# Patient Record
Sex: MALE | Race: BLACK OR AFRICAN AMERICAN | Employment: UNEMPLOYED | URBAN - METROPOLITAN AREA
[De-identification: names, ages, dates, MRNs, and addresses within clinical notes are randomized per-mention and may not be internally consistent; named-entity substitution may affect disease eponyms.]

---

## 2017-01-29 PROBLEM — M25.561 RIGHT KNEE PAIN: Status: ACTIVE | Noted: 2017-01-29

## 2017-01-29 PROBLEM — I16.1 HYPERTENSIVE EMERGENCY WITHOUT CONGESTIVE HEART FAILURE: Status: ACTIVE | Noted: 2017-01-29

## 2017-01-29 PROBLEM — R74.01 TRANSAMINITIS: Status: ACTIVE | Noted: 2017-01-29

## 2017-12-05 PROBLEM — I26.99 PULMONARY EMBOLISM ON RIGHT (HCC): Status: ACTIVE | Noted: 2017-12-05

## 2017-12-05 PROBLEM — I26.99 PULMONARY EMBOLUS, RIGHT (HCC): Status: ACTIVE | Noted: 2017-12-05

## 2019-02-19 ENCOUNTER — HOSPITAL ENCOUNTER (EMERGENCY)
Age: 54
Discharge: OTHER INSTITUTION | End: 2019-02-19
Attending: EMERGENCY MEDICINE
Payer: MEDICAID

## 2019-02-19 ENCOUNTER — HOSPITAL ENCOUNTER (EMERGENCY)
Age: 54
Discharge: OTHER INSTITUTION | End: 2019-02-20
Attending: EMERGENCY MEDICINE
Payer: MEDICAID

## 2019-02-19 ENCOUNTER — APPOINTMENT (OUTPATIENT)
Dept: CT IMAGING | Age: 54
End: 2019-02-19
Payer: MEDICAID

## 2019-02-19 VITALS
WEIGHT: 144.8 LBS | RESPIRATION RATE: 20 BRPM | SYSTOLIC BLOOD PRESSURE: 159 MMHG | HEIGHT: 68 IN | TEMPERATURE: 98.5 F | BODY MASS INDEX: 21.95 KG/M2 | DIASTOLIC BLOOD PRESSURE: 96 MMHG | HEART RATE: 73 BPM | OXYGEN SATURATION: 96 %

## 2019-02-19 DIAGNOSIS — F39 MOOD DISORDER (HCC): ICD-10-CM

## 2019-02-19 DIAGNOSIS — I10 ESSENTIAL HYPERTENSION: Primary | ICD-10-CM

## 2019-02-19 DIAGNOSIS — R51.9 HEADACHE, UNSPECIFIED HEADACHE TYPE: Primary | ICD-10-CM

## 2019-02-19 DIAGNOSIS — R45.851 SUICIDAL IDEATION: ICD-10-CM

## 2019-02-19 DIAGNOSIS — K08.89 TOOTHACHE: ICD-10-CM

## 2019-02-19 LAB
ACETAMINOPHEN LEVEL: <5 MCG/ML (ref 10–30)
ALBUMIN SERPL-MCNC: 4.1 G/DL (ref 3.5–5.2)
ALP BLD-CCNC: 54 U/L (ref 40–129)
ALT SERPL-CCNC: 34 U/L (ref 0–40)
AMPHETAMINE SCREEN, URINE: NOT DETECTED
ANION GAP SERPL CALCULATED.3IONS-SCNC: 15 MMOL/L (ref 7–16)
AST SERPL-CCNC: 94 U/L (ref 0–39)
BARBITURATE SCREEN URINE: NOT DETECTED
BENZODIAZEPINE SCREEN, URINE: NOT DETECTED
BILIRUB SERPL-MCNC: 1.2 MG/DL (ref 0–1.2)
BUN BLDV-MCNC: 13 MG/DL (ref 6–20)
CALCIUM SERPL-MCNC: 8.6 MG/DL (ref 8.6–10.2)
CANNABINOID SCREEN URINE: NOT DETECTED
CHLORIDE BLD-SCNC: 101 MMOL/L (ref 98–107)
CO2: 25 MMOL/L (ref 22–29)
COCAINE METABOLITE SCREEN URINE: NOT DETECTED
CREAT SERPL-MCNC: 0.9 MG/DL (ref 0.7–1.2)
EKG ATRIAL RATE: 84 BPM
EKG P AXIS: 67 DEGREES
EKG P-R INTERVAL: 142 MS
EKG Q-T INTERVAL: 400 MS
EKG QRS DURATION: 110 MS
EKG QTC CALCULATION (BAZETT): 472 MS
EKG R AXIS: -31 DEGREES
EKG T AXIS: 45 DEGREES
EKG VENTRICULAR RATE: 84 BPM
ETHANOL: 64 MG/DL (ref 0–0.08)
GFR AFRICAN AMERICAN: >60
GFR NON-AFRICAN AMERICAN: >60 ML/MIN/1.73
GLUCOSE BLD-MCNC: 66 MG/DL (ref 74–99)
HCT VFR BLD CALC: 39 % (ref 37–54)
HEMOGLOBIN: 14.5 G/DL (ref 12.5–16.5)
MCH RBC QN AUTO: 31.3 PG (ref 26–35)
MCHC RBC AUTO-ENTMCNC: 37.2 % (ref 32–34.5)
MCV RBC AUTO: 84.2 FL (ref 80–99.9)
METHADONE SCREEN, URINE: NOT DETECTED
OPIATE SCREEN URINE: NOT DETECTED
PDW BLD-RTO: 19.4 FL (ref 11.5–15)
PHENCYCLIDINE SCREEN URINE: NOT DETECTED
PLATELET # BLD: 337 E9/L (ref 130–450)
PMV BLD AUTO: 10.7 FL (ref 7–12)
POTASSIUM SERPL-SCNC: 3.5 MMOL/L (ref 3.5–5)
PROPOXYPHENE SCREEN: NOT DETECTED
RBC # BLD: 4.63 E12/L (ref 3.8–5.8)
SALICYLATE, SERUM: <0.3 MG/DL (ref 0–30)
SODIUM BLD-SCNC: 141 MMOL/L (ref 132–146)
TOTAL PROTEIN: 7.5 G/DL (ref 6.4–8.3)
TRICYCLIC ANTIDEPRESSANTS SCREEN SERUM: NEGATIVE NG/ML
WBC # BLD: 5.9 E9/L (ref 4.5–11.5)

## 2019-02-19 PROCEDURE — 80307 DRUG TEST PRSMV CHEM ANLYZR: CPT

## 2019-02-19 PROCEDURE — 70450 CT HEAD/BRAIN W/O DYE: CPT

## 2019-02-19 PROCEDURE — 96372 THER/PROPH/DIAG INJ SC/IM: CPT

## 2019-02-19 PROCEDURE — 6370000000 HC RX 637 (ALT 250 FOR IP): Performed by: EMERGENCY MEDICINE

## 2019-02-19 PROCEDURE — G0480 DRUG TEST DEF 1-7 CLASSES: HCPCS

## 2019-02-19 PROCEDURE — 36415 COLL VENOUS BLD VENIPUNCTURE: CPT

## 2019-02-19 PROCEDURE — 99285 EMERGENCY DEPT VISIT HI MDM: CPT

## 2019-02-19 PROCEDURE — 99282 EMERGENCY DEPT VISIT SF MDM: CPT

## 2019-02-19 PROCEDURE — 6360000002 HC RX W HCPCS: Performed by: EMERGENCY MEDICINE

## 2019-02-19 PROCEDURE — 85027 COMPLETE CBC AUTOMATED: CPT

## 2019-02-19 PROCEDURE — 80053 COMPREHEN METABOLIC PANEL: CPT

## 2019-02-19 PROCEDURE — 93005 ELECTROCARDIOGRAM TRACING: CPT | Performed by: EMERGENCY MEDICINE

## 2019-02-19 RX ORDER — HYDRALAZINE HYDROCHLORIDE 25 MG/1
25 TABLET, FILM COATED ORAL 4 TIMES DAILY
Qty: 90 TABLET | Refills: 1 | Status: SHIPPED | OUTPATIENT
Start: 2019-02-19 | End: 2019-03-21 | Stop reason: SDUPTHER

## 2019-02-19 RX ORDER — CLONIDINE HYDROCHLORIDE 0.1 MG/1
0.2 TABLET ORAL ONCE
Status: COMPLETED | OUTPATIENT
Start: 2019-02-19 | End: 2019-02-19

## 2019-02-19 RX ORDER — LEVETIRACETAM 500 MG/1
500 TABLET ORAL ONCE
Status: COMPLETED | OUTPATIENT
Start: 2019-02-19 | End: 2019-02-19

## 2019-02-19 RX ORDER — ACETAMINOPHEN 325 MG/1
650 TABLET ORAL ONCE
Status: COMPLETED | OUTPATIENT
Start: 2019-02-19 | End: 2019-02-19

## 2019-02-19 RX ORDER — HYDRALAZINE HYDROCHLORIDE 20 MG/ML
10 INJECTION INTRAMUSCULAR; INTRAVENOUS ONCE
Status: COMPLETED | OUTPATIENT
Start: 2019-02-19 | End: 2019-02-19

## 2019-02-19 RX ORDER — AMLODIPINE BESYLATE 10 MG/1
10 TABLET ORAL DAILY
Qty: 30 TABLET | Refills: 1 | Status: SHIPPED | OUTPATIENT
Start: 2019-02-19 | End: 2019-03-21 | Stop reason: SDUPTHER

## 2019-02-19 RX ORDER — AMLODIPINE BESYLATE 5 MG/1
10 TABLET ORAL ONCE
Status: COMPLETED | OUTPATIENT
Start: 2019-02-19 | End: 2019-02-19

## 2019-02-19 RX ORDER — LEVETIRACETAM 500 MG/1
500 TABLET ORAL 2 TIMES DAILY
Qty: 60 TABLET | Refills: 0 | Status: SHIPPED | OUTPATIENT
Start: 2019-02-19 | End: 2019-03-21 | Stop reason: SDUPTHER

## 2019-02-19 RX ADMIN — LEVETIRACETAM 500 MG: 500 TABLET ORAL at 23:04

## 2019-02-19 RX ADMIN — HYDRALAZINE HYDROCHLORIDE 10 MG: 20 INJECTION INTRAMUSCULAR; INTRAVENOUS at 22:02

## 2019-02-19 RX ADMIN — AMLODIPINE BESYLATE 10 MG: 5 TABLET ORAL at 23:04

## 2019-02-19 RX ADMIN — ACETAMINOPHEN 650 MG: 325 TABLET, FILM COATED ORAL at 12:45

## 2019-02-19 RX ADMIN — ACETAMINOPHEN 650 MG: 325 TABLET, FILM COATED ORAL at 06:50

## 2019-02-19 RX ADMIN — CLONIDINE HYDROCHLORIDE 0.2 MG: 0.1 TABLET ORAL at 19:00

## 2019-02-19 ASSESSMENT — PAIN DESCRIPTION - PAIN TYPE: TYPE: ACUTE PAIN

## 2019-02-19 ASSESSMENT — PAIN SCALES - GENERAL
PAINLEVEL_OUTOF10: 9
PAINLEVEL_OUTOF10: 10
PAINLEVEL_OUTOF10: 10

## 2019-02-19 ASSESSMENT — PAIN DESCRIPTION - ORIENTATION: ORIENTATION: UPPER

## 2019-02-19 ASSESSMENT — PAIN DESCRIPTION - DESCRIPTORS: DESCRIPTORS: CONSTANT

## 2019-02-19 ASSESSMENT — PAIN DESCRIPTION - LOCATION: LOCATION: HEAD

## 2019-02-20 VITALS
DIASTOLIC BLOOD PRESSURE: 79 MMHG | WEIGHT: 144 LBS | SYSTOLIC BLOOD PRESSURE: 136 MMHG | BODY MASS INDEX: 21.82 KG/M2 | TEMPERATURE: 98.1 F | HEIGHT: 68 IN | OXYGEN SATURATION: 98 % | HEART RATE: 73 BPM | RESPIRATION RATE: 16 BRPM

## 2019-02-20 PROCEDURE — 6370000000 HC RX 637 (ALT 250 FOR IP): Performed by: EMERGENCY MEDICINE

## 2019-02-20 RX ORDER — PENICILLIN V POTASSIUM 500 MG/1
500 TABLET ORAL 4 TIMES DAILY
Qty: 40 TABLET | Refills: 0 | Status: SHIPPED | OUTPATIENT
Start: 2019-02-20 | End: 2019-02-27

## 2019-02-20 RX ORDER — IBUPROFEN 400 MG/1
400 TABLET ORAL EVERY 6 HOURS PRN
Qty: 28 TABLET | Refills: 0 | Status: SHIPPED | OUTPATIENT
Start: 2019-02-20 | End: 2019-02-27 | Stop reason: SDUPTHER

## 2019-02-20 RX ORDER — IBUPROFEN 400 MG/1
400 TABLET ORAL ONCE
Status: COMPLETED | OUTPATIENT
Start: 2019-02-20 | End: 2019-02-20

## 2019-02-20 RX ADMIN — IBUPROFEN 400 MG: 400 TABLET ORAL at 08:23

## 2019-02-20 ASSESSMENT — PAIN SCALES - GENERAL: PAINLEVEL_OUTOF10: 8

## 2019-02-22 ENCOUNTER — APPOINTMENT (OUTPATIENT)
Dept: CT IMAGING | Age: 54
End: 2019-02-22
Payer: MEDICAID

## 2019-02-22 ENCOUNTER — HOSPITAL ENCOUNTER (EMERGENCY)
Age: 54
Discharge: LEFT BEFORE DISCHARGE | End: 2019-02-22
Attending: EMERGENCY MEDICINE
Payer: MEDICAID

## 2019-02-22 VITALS
DIASTOLIC BLOOD PRESSURE: 91 MMHG | OXYGEN SATURATION: 96 % | HEART RATE: 77 BPM | SYSTOLIC BLOOD PRESSURE: 155 MMHG | WEIGHT: 155 LBS | TEMPERATURE: 97.9 F | BODY MASS INDEX: 23.49 KG/M2 | HEIGHT: 68 IN | RESPIRATION RATE: 16 BRPM

## 2019-02-22 DIAGNOSIS — R51.9 NONINTRACTABLE HEADACHE, UNSPECIFIED CHRONICITY PATTERN, UNSPECIFIED HEADACHE TYPE: Primary | ICD-10-CM
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
## 2019-02-22 LAB — ETHANOL: 194 MG/DL (ref 0–0.08)

## 2019-02-22 PROCEDURE — 36415 COLL VENOUS BLD VENIPUNCTURE: CPT

## 2019-02-22 PROCEDURE — 70450 CT HEAD/BRAIN W/O DYE: CPT

## 2019-02-22 PROCEDURE — 99284 EMERGENCY DEPT VISIT MOD MDM: CPT

## 2019-02-22 PROCEDURE — G0480 DRUG TEST DEF 1-7 CLASSES: HCPCS

## 2019-02-22 ASSESSMENT — PAIN DESCRIPTION - FREQUENCY: FREQUENCY: CONTINUOUS

## 2019-02-22 ASSESSMENT — PAIN DESCRIPTION - PAIN TYPE: TYPE: ACUTE PAIN

## 2019-02-22 ASSESSMENT — PAIN DESCRIPTION - LOCATION: LOCATION: HEAD

## 2019-02-22 ASSESSMENT — PAIN SCALES - GENERAL: PAINLEVEL_OUTOF10: 10

## 2019-02-22 ASSESSMENT — PAIN DESCRIPTION - DESCRIPTORS: DESCRIPTORS: CONSTANT

## 2019-02-24 ENCOUNTER — HOSPITAL ENCOUNTER (EMERGENCY)
Age: 54
Discharge: HOME | End: 2019-02-24
Attending: EMERGENCY MEDICINE
Payer: MEDICAID

## 2019-02-24 VITALS
HEIGHT: 68 IN | WEIGHT: 148.19 LBS | SYSTOLIC BLOOD PRESSURE: 189 MMHG | OXYGEN SATURATION: 96 % | RESPIRATION RATE: 16 BRPM | HEART RATE: 89 BPM | TEMPERATURE: 97.2 F | DIASTOLIC BLOOD PRESSURE: 78 MMHG | BODY MASS INDEX: 22.46 KG/M2

## 2019-02-24 DIAGNOSIS — K64.4 EXTERNAL HEMORRHOIDS WITHOUT COMPLICATION: Primary | ICD-10-CM

## 2019-02-24 PROCEDURE — 99282 EMERGENCY DEPT VISIT SF MDM: CPT

## 2019-02-24 ASSESSMENT — PAIN DESCRIPTION - DESCRIPTORS: DESCRIPTORS: BURNING

## 2019-02-24 ASSESSMENT — PAIN DESCRIPTION - PAIN TYPE: TYPE: CHRONIC PAIN

## 2019-02-24 ASSESSMENT — PAIN DESCRIPTION - LOCATION: LOCATION: BUTTOCKS

## 2019-02-24 ASSESSMENT — PAIN SCALES - GENERAL: PAINLEVEL_OUTOF10: 10

## 2019-02-27 ENCOUNTER — OFFICE VISIT (OUTPATIENT)
Dept: INTERNAL MEDICINE | Age: 54
End: 2019-02-27
Payer: MEDICAID

## 2019-02-27 ENCOUNTER — HOSPITAL ENCOUNTER (EMERGENCY)
Age: 54
Discharge: HOME | End: 2019-02-27
Payer: MEDICAID

## 2019-02-27 VITALS
HEART RATE: 97 BPM | DIASTOLIC BLOOD PRESSURE: 102 MMHG | WEIGHT: 144 LBS | TEMPERATURE: 98.2 F | BODY MASS INDEX: 21.82 KG/M2 | SYSTOLIC BLOOD PRESSURE: 186 MMHG | HEIGHT: 68 IN | RESPIRATION RATE: 14 BRPM

## 2019-02-27 VITALS
RESPIRATION RATE: 16 BRPM | HEART RATE: 78 BPM | BODY MASS INDEX: 22.43 KG/M2 | DIASTOLIC BLOOD PRESSURE: 98 MMHG | TEMPERATURE: 98 F | HEIGHT: 68 IN | OXYGEN SATURATION: 100 % | WEIGHT: 148 LBS | SYSTOLIC BLOOD PRESSURE: 179 MMHG

## 2019-02-27 DIAGNOSIS — K64.9 HEMORRHOIDS, UNSPECIFIED HEMORRHOID TYPE: ICD-10-CM

## 2019-02-27 DIAGNOSIS — R22.31 LOCALIZED SWELLING ON RIGHT HAND: Primary | ICD-10-CM

## 2019-02-27 DIAGNOSIS — I10 UNCONTROLLED HYPERTENSION: ICD-10-CM

## 2019-02-27 DIAGNOSIS — F10.10 ALCOHOL ABUSE: ICD-10-CM

## 2019-02-27 DIAGNOSIS — K64.4 EXTERNAL HEMORRHOIDS WITHOUT COMPLICATION: Primary | ICD-10-CM

## 2019-02-27 PROBLEM — R74.01 TRANSAMINITIS: Status: RESOLVED | Noted: 2017-01-29 | Resolved: 2019-02-27

## 2019-02-27 PROBLEM — I26.99 PULMONARY EMBOLUS, RIGHT (HCC): Status: RESOLVED | Noted: 2017-12-05 | Resolved: 2019-02-27

## 2019-02-27 PROBLEM — M25.561 RIGHT KNEE PAIN: Status: RESOLVED | Noted: 2017-01-29 | Resolved: 2019-02-27

## 2019-02-27 PROBLEM — R51.9 HEADACHE, UNSPECIFIED HEADACHE TYPE: Status: RESOLVED | Noted: 2019-02-19 | Resolved: 2019-02-27

## 2019-02-27 PROCEDURE — 99282 EMERGENCY DEPT VISIT SF MDM: CPT

## 2019-02-27 PROCEDURE — 99214 OFFICE O/P EST MOD 30 MIN: CPT | Performed by: INTERNAL MEDICINE

## 2019-02-27 PROCEDURE — 99212 OFFICE O/P EST SF 10 MIN: CPT | Performed by: INTERNAL MEDICINE

## 2019-02-27 RX ORDER — IBUPROFEN 400 MG/1
400 TABLET ORAL EVERY 6 HOURS PRN
Qty: 28 TABLET | Refills: 0 | Status: SHIPPED | OUTPATIENT
Start: 2019-02-27 | End: 2019-03-21 | Stop reason: SDUPTHER

## 2019-02-27 RX ORDER — DIAPER,BRIEF,INFANT-TODD,DISP
EACH MISCELLANEOUS ONCE
Status: DISCONTINUED | OUTPATIENT
Start: 2019-02-27 | End: 2019-02-27 | Stop reason: HOSPADM

## 2019-02-27 ASSESSMENT — PAIN DESCRIPTION - LOCATION: LOCATION: BUTTOCKS

## 2019-02-27 ASSESSMENT — PAIN DESCRIPTION - PAIN TYPE: TYPE: CHRONIC PAIN

## 2019-02-27 ASSESSMENT — PAIN SCALES - WONG BAKER: WONGBAKER_NUMERICALRESPONSE: 4

## 2019-02-27 ASSESSMENT — ENCOUNTER SYMPTOMS: RESPIRATORY NEGATIVE: 1

## 2019-03-04 ENCOUNTER — HOSPITAL ENCOUNTER (EMERGENCY)
Age: 54
Discharge: HOME | End: 2019-03-04
Attending: EMERGENCY MEDICINE
Payer: MEDICAID

## 2019-03-04 ENCOUNTER — HOSPITAL ENCOUNTER (OUTPATIENT)
Dept: ULTRASOUND IMAGING | Age: 54
Discharge: HOME | End: 2019-03-06
Payer: MEDICAID

## 2019-03-04 VITALS
DIASTOLIC BLOOD PRESSURE: 90 MMHG | OXYGEN SATURATION: 96 % | SYSTOLIC BLOOD PRESSURE: 150 MMHG | RESPIRATION RATE: 14 BRPM | HEART RATE: 87 BPM | TEMPERATURE: 98.6 F

## 2019-03-04 DIAGNOSIS — M79.89 LEG SWELLING: ICD-10-CM

## 2019-03-04 DIAGNOSIS — M79.89 LEG SWELLING: Primary | ICD-10-CM

## 2019-03-04 PROCEDURE — 93971 EXTREMITY STUDY: CPT

## 2019-03-04 PROCEDURE — 99283 EMERGENCY DEPT VISIT LOW MDM: CPT

## 2019-03-21 ENCOUNTER — HOSPITAL ENCOUNTER (EMERGENCY)
Age: 54
Discharge: HOME | End: 2019-03-21
Payer: MEDICAID

## 2019-03-21 VITALS
TEMPERATURE: 97.6 F | HEIGHT: 68 IN | HEART RATE: 95 BPM | SYSTOLIC BLOOD PRESSURE: 183 MMHG | BODY MASS INDEX: 21.82 KG/M2 | RESPIRATION RATE: 16 BRPM | WEIGHT: 144 LBS | OXYGEN SATURATION: 96 % | DIASTOLIC BLOOD PRESSURE: 94 MMHG

## 2019-03-21 DIAGNOSIS — R22.31 LOCALIZED SWELLING ON RIGHT HAND: ICD-10-CM

## 2019-03-21 DIAGNOSIS — Z76.0 ENCOUNTER FOR MEDICATION REFILL: Primary | ICD-10-CM

## 2019-03-21 PROCEDURE — 99281 EMR DPT VST MAYX REQ PHY/QHP: CPT

## 2019-03-21 RX ORDER — LEVETIRACETAM 500 MG/1
500 TABLET ORAL 2 TIMES DAILY
Qty: 60 TABLET | Refills: 0 | Status: SHIPPED | OUTPATIENT
Start: 2019-03-21 | End: 2019-04-21

## 2019-03-21 RX ORDER — HYDRALAZINE HYDROCHLORIDE 25 MG/1
25 TABLET, FILM COATED ORAL 4 TIMES DAILY
Qty: 90 TABLET | Refills: 1 | Status: SHIPPED | OUTPATIENT
Start: 2019-03-21 | End: 2019-04-25 | Stop reason: SDUPTHER

## 2019-03-21 RX ORDER — AMLODIPINE BESYLATE 10 MG/1
10 TABLET ORAL DAILY
Qty: 30 TABLET | Refills: 1 | Status: SHIPPED | OUTPATIENT
Start: 2019-03-21 | End: 2019-04-25 | Stop reason: SDUPTHER

## 2019-03-21 RX ORDER — IBUPROFEN 400 MG/1
400 TABLET ORAL EVERY 6 HOURS PRN
Qty: 28 TABLET | Refills: 0 | Status: SHIPPED | OUTPATIENT
Start: 2019-03-21 | End: 2019-04-25 | Stop reason: SDUPTHER

## 2019-03-21 RX ORDER — HYDROCODONE BITARTRATE AND ACETAMINOPHEN 5; 325 MG/1; MG/1
1 TABLET ORAL EVERY 6 HOURS PRN
Qty: 20 TABLET | Refills: 0 | Status: SHIPPED | OUTPATIENT
Start: 2019-03-21 | End: 2019-03-21 | Stop reason: CLARIF

## 2019-04-21 ENCOUNTER — HOSPITAL ENCOUNTER (EMERGENCY)
Age: 54
Discharge: HOME | End: 2019-04-21
Payer: MEDICAID

## 2019-04-21 VITALS
RESPIRATION RATE: 16 BRPM | OXYGEN SATURATION: 97 % | HEART RATE: 74 BPM | BODY MASS INDEX: 23.04 KG/M2 | WEIGHT: 152 LBS | DIASTOLIC BLOOD PRESSURE: 93 MMHG | HEIGHT: 68 IN | TEMPERATURE: 97 F | SYSTOLIC BLOOD PRESSURE: 150 MMHG

## 2019-04-21 DIAGNOSIS — R51.9 ACUTE NONINTRACTABLE HEADACHE, UNSPECIFIED HEADACHE TYPE: Primary | ICD-10-CM

## 2019-04-21 PROCEDURE — 99283 EMERGENCY DEPT VISIT LOW MDM: CPT

## 2019-04-21 PROCEDURE — 6370000000 HC RX 637 (ALT 250 FOR IP): Performed by: NURSE PRACTITIONER

## 2019-04-21 RX ORDER — ACETAMINOPHEN 325 MG/1
650 TABLET ORAL ONCE
Status: COMPLETED | OUTPATIENT
Start: 2019-04-21 | End: 2019-04-21

## 2019-04-21 RX ORDER — IBUPROFEN 400 MG/1
400 TABLET ORAL ONCE
Status: COMPLETED | OUTPATIENT
Start: 2019-04-21 | End: 2019-04-21

## 2019-04-21 RX ADMIN — ACETAMINOPHEN 650 MG: 325 TABLET, FILM COATED ORAL at 02:30

## 2019-04-21 RX ADMIN — IBUPROFEN 400 MG: 400 TABLET, FILM COATED ORAL at 02:30

## 2019-04-21 ASSESSMENT — PAIN DESCRIPTION - DESCRIPTORS: DESCRIPTORS: SHARP

## 2019-04-21 ASSESSMENT — PAIN DESCRIPTION - FREQUENCY: FREQUENCY: CONTINUOUS

## 2019-04-21 ASSESSMENT — PAIN DESCRIPTION - LOCATION: LOCATION: HEAD

## 2019-04-21 ASSESSMENT — PAIN SCALES - GENERAL
PAINLEVEL_OUTOF10: 10
PAINLEVEL_OUTOF10: 10

## 2019-04-21 NOTE — ED PROVIDER NOTES
[facility]     Department of Emergency Medicine   ED  Provider Note  Admit Date/RoomTime: 4/21/2019  2:06 AM  ED Room: CHAIR01/   Chief Complaint:   Headache (at top of head. sharp pain x1 week. )    History of Present Illness   Source of history provided by:  patient. History/Exam Limitations: none. Kaela Malone is a 52 y.o. old male who has a past medical hx of:   Past Medical History:   Diagnosis Date    Headache     Hypertension     presents to the emergency department by private vehicle, for complaint of gradual onset crown sharp pain which began 1 week(s) prior to arrival.  Since onset the symptoms have been no change and moderate in severity. The patient has history of headaches of unknown type diagnosed in the past.   Today's episode is typical for him. He has had CT and PCP evaluation in the past. The pain is associated with none and negative for numbness, weakness, speech or visual changes. The symptoms are aggravated by none and relieved by nothing. There has been no history of recent trauma. Treatment prior to arrival consisted of: none with no relief of symptoms. He has a history of no anticoagulation use. His CT of the head from 2/22/19 was reviewed and discussed with him. ROS    Pertinent positives and negatives are stated within HPI, all other systems reviewed and are negative. History reviewed. No pertinent surgical history. Social History:  reports that he has been smoking cigarettes. He has a 4.60 pack-year smoking history. He has never used smokeless tobacco. He reports that he drinks alcohol. He reports that he does not use drugs. Family History: family history is not on file.    Allergies: Ace inhibitors and Fish-derived products    Physical Exam           ED Triage Vitals   BP Temp Temp Source Pulse Resp SpO2 Height Weight   04/21/19 0159 04/21/19 0159 04/21/19 0159 04/21/19 0138 04/21/19 0159 04/21/19 0138 04/21/19 0159 04/21/19 0159   (!) 150/93 97 °F (36.1 °C) with the plan. Assessment      1. Acute nonintractable headache, unspecified headache type      Plan   Discharge to home  Patient condition is good    New Medications     New Prescriptions    No medications on file     Electronically signed by ROSY Montana CNP   DD: 4/21/19  **This report was transcribed using voice recognition software. Every effort was made to ensure accuracy; however, inadvertent computerized transcription errors may be present.   END OF ED PROVIDER NOTE       ROSY Bose CNP  04/21/19 2682

## 2019-04-21 NOTE — ED NOTES
resp easy. A&Ox4. Pt walks with steady gait. No apparent distress noted at this time.  OK for d/c after medication per Tiffany Benson RN  04/21/19 7037

## 2019-04-25 ENCOUNTER — HOSPITAL ENCOUNTER (EMERGENCY)
Age: 54
Discharge: HOME | End: 2019-04-25
Attending: EMERGENCY MEDICINE
Payer: MEDICAID

## 2019-04-25 ENCOUNTER — OFFICE VISIT (OUTPATIENT)
Dept: INTERNAL MEDICINE | Age: 54
End: 2019-04-25
Payer: MEDICAID

## 2019-04-25 ENCOUNTER — TELEPHONE (OUTPATIENT)
Dept: NEUROLOGY | Age: 54
End: 2019-04-25

## 2019-04-25 VITALS
RESPIRATION RATE: 18 BRPM | DIASTOLIC BLOOD PRESSURE: 91 MMHG | SYSTOLIC BLOOD PRESSURE: 155 MMHG | HEART RATE: 80 BPM | TEMPERATURE: 98.3 F

## 2019-04-25 VITALS
OXYGEN SATURATION: 96 % | HEIGHT: 68 IN | BODY MASS INDEX: 23.04 KG/M2 | TEMPERATURE: 97.8 F | HEART RATE: 86 BPM | SYSTOLIC BLOOD PRESSURE: 142 MMHG | DIASTOLIC BLOOD PRESSURE: 94 MMHG | RESPIRATION RATE: 18 BRPM | WEIGHT: 152 LBS

## 2019-04-25 DIAGNOSIS — R51.9 NONINTRACTABLE HEADACHE, UNSPECIFIED CHRONICITY PATTERN, UNSPECIFIED HEADACHE TYPE: Primary | ICD-10-CM

## 2019-04-25 DIAGNOSIS — G44.309 HEADACHES DUE TO OLD HEAD INJURY: ICD-10-CM

## 2019-04-25 DIAGNOSIS — R10.13 EPIGASTRIC PAIN: ICD-10-CM

## 2019-04-25 DIAGNOSIS — R56.9 SEIZURE (HCC): ICD-10-CM

## 2019-04-25 DIAGNOSIS — S09.90XS HEADACHES DUE TO OLD HEAD INJURY: ICD-10-CM

## 2019-04-25 DIAGNOSIS — I10 ESSENTIAL HYPERTENSION: Primary | ICD-10-CM

## 2019-04-25 PROCEDURE — 99213 OFFICE O/P EST LOW 20 MIN: CPT | Performed by: INTERNAL MEDICINE

## 2019-04-25 PROCEDURE — 4004F PT TOBACCO SCREEN RCVD TLK: CPT | Performed by: INTERNAL MEDICINE

## 2019-04-25 PROCEDURE — 3017F COLORECTAL CA SCREEN DOC REV: CPT | Performed by: INTERNAL MEDICINE

## 2019-04-25 PROCEDURE — 99283 EMERGENCY DEPT VISIT LOW MDM: CPT

## 2019-04-25 PROCEDURE — G8427 DOCREV CUR MEDS BY ELIG CLIN: HCPCS | Performed by: INTERNAL MEDICINE

## 2019-04-25 PROCEDURE — 6370000000 HC RX 637 (ALT 250 FOR IP): Performed by: NURSE PRACTITIONER

## 2019-04-25 PROCEDURE — 99214 OFFICE O/P EST MOD 30 MIN: CPT | Performed by: INTERNAL MEDICINE

## 2019-04-25 PROCEDURE — G8420 CALC BMI NORM PARAMETERS: HCPCS | Performed by: INTERNAL MEDICINE

## 2019-04-25 RX ORDER — AMLODIPINE BESYLATE 10 MG/1
10 TABLET ORAL DAILY
Qty: 30 TABLET | Refills: 1 | Status: SHIPPED
Start: 2019-04-25 | End: 2022-01-11 | Stop reason: SDUPTHER

## 2019-04-25 RX ORDER — IBUPROFEN 800 MG/1
800 TABLET ORAL ONCE
Status: COMPLETED | OUTPATIENT
Start: 2019-04-25 | End: 2019-04-25

## 2019-04-25 RX ORDER — LEVETIRACETAM 500 MG/1
500 TABLET ORAL 2 TIMES DAILY
COMMUNITY
End: 2019-04-25 | Stop reason: SDUPTHER

## 2019-04-25 RX ORDER — LEVETIRACETAM 500 MG/1
500 TABLET ORAL 2 TIMES DAILY
Qty: 60 TABLET | Refills: 1 | Status: SHIPPED | OUTPATIENT
Start: 2019-04-25 | End: 2019-06-08 | Stop reason: SDUPTHER

## 2019-04-25 RX ORDER — HYDRALAZINE HYDROCHLORIDE 25 MG/1
25 TABLET, FILM COATED ORAL 4 TIMES DAILY
Qty: 120 TABLET | Refills: 1 | Status: SHIPPED
Start: 2019-04-25 | End: 2022-01-16

## 2019-04-25 RX ORDER — IBUPROFEN 600 MG/1
600 TABLET ORAL 3 TIMES DAILY PRN
Qty: 90 TABLET | Refills: 0 | Status: SHIPPED
Start: 2019-04-25 | End: 2021-06-08

## 2019-04-25 RX ORDER — IBUPROFEN 800 MG/1
800 TABLET ORAL EVERY 6 HOURS PRN
Qty: 21 TABLET | Refills: 0 | Status: SHIPPED | OUTPATIENT
Start: 2019-04-25 | End: 2019-04-25 | Stop reason: SDUPTHER

## 2019-04-25 RX ADMIN — IBUPROFEN 800 MG: 800 TABLET ORAL at 02:28

## 2019-04-25 SDOH — HEALTH STABILITY: MENTAL HEALTH: HOW OFTEN DO YOU HAVE A DRINK CONTAINING ALCOHOL?: 2-4 TIMES A MONTH

## 2019-04-25 ASSESSMENT — PAIN DESCRIPTION - PAIN TYPE: TYPE: ACUTE PAIN

## 2019-04-25 ASSESSMENT — PAIN DESCRIPTION - DESCRIPTORS: DESCRIPTORS: HEADACHE

## 2019-04-25 ASSESSMENT — PAIN SCALES - GENERAL: PAINLEVEL_OUTOF10: 10

## 2019-04-25 ASSESSMENT — PAIN DESCRIPTION - LOCATION: LOCATION: HEAD

## 2019-04-25 ASSESSMENT — ENCOUNTER SYMPTOMS
ABDOMINAL PAIN: 1
RESPIRATORY NEGATIVE: 1

## 2019-04-25 NOTE — ED NOTES
[facility]  Expected date:   Expected time:   Means of arrival:   Comments:  triage     Brandan Hartley RN  04/25/19 4211

## 2019-04-25 NOTE — PATIENT INSTRUCTIONS
Please restart your amlodipine 10 mg daily. Please continue your hydralazine four times a day. Please take ibuprofen only as needed. Also, please elevate your legs and monitor for improvement. Lastly, please drink more WATER and try to avoid alcohol. Thank you for coming today. We will see you in 1 month.

## 2019-04-25 NOTE — PROGRESS NOTES
Chief Complaint   Patient presents with    Medication Refill       History of Present Illness:   53 yo male here as a walk-in. He describes having a headache that has lasted for weeks. He does report that ibuprofen helps. He describes it as located on the top portion of his head. He reports being in a coma for a couple of weeks in 2017 and states he has always had discomfort since. He denies having any visual changes/flashes of light, numbness or tingling or neurological symptoms. He states he has been taking his hydralazine four times a day but has not been taking his amlodipine for several days as he was \"saving his one pill. \"    Review of Systems   Constitutional: Negative. HENT: Negative. Respiratory: Negative. Cardiovascular: Positive for leg swelling. Gastrointestinal: Positive for abdominal pain. Genitourinary: Negative. Neurological: Positive for headaches. Psychiatric/Behavioral: Negative for agitation. Past Medical History:   Diagnosis Date    Headache     Hypertension      No past surgical history on file. No family history on file.   Social History     Socioeconomic History    Marital status: Single     Spouse name: Not on file    Number of children: Not on file    Years of education: Not on file    Highest education level: Not on file   Occupational History    Not on file   Social Needs    Financial resource strain: Not on file    Food insecurity:     Worry: Not on file     Inability: Not on file    Transportation needs:     Medical: Not on file     Non-medical: Not on file   Tobacco Use    Smoking status: Current Every Day Smoker     Packs/day: 0.20     Years: 23.00     Pack years: 4.60     Types: Cigarettes    Smokeless tobacco: Never Used   Substance and Sexual Activity    Alcohol use: Yes     Frequency: 2-4 times a month     Comment: occ beer    Drug use: No    Sexual activity: Not on file   Lifestyle    Physical activity:     Days per week: Not on file     Minutes per session: Not on file    Stress: Not on file   Relationships    Social connections:     Talks on phone: Not on file     Gets together: Not on file     Attends Tenriism service: Not on file     Active member of club or organization: Not on file     Attends meetings of clubs or organizations: Not on file     Relationship status: Not on file    Intimate partner violence:     Fear of current or ex partner: Not on file     Emotionally abused: Not on file     Physically abused: Not on file     Forced sexual activity: Not on file   Other Topics Concern    Not on file   Social History Narrative    Not on file     Allergies   Allergen Reactions    Ace Inhibitors      Facial swelling, [facility] 2018    Fish-Derived Products      States allergy to all fish except shrimp     Current Outpatient Medications on File Prior to Visit   Medication Sig Dispense Refill    ibuprofen (ADVIL;MOTRIN) 800 MG tablet Take 1 tablet by mouth every 6 hours as needed for Pain 21 tablet 0    levETIRAcetam (KEPPRA) 500 MG tablet Take 500 mg by mouth 2 times daily      amLODIPine (NORVASC) 10 MG tablet Take 1 tablet by mouth daily 30 tablet 1    hydrALAZINE (APRESOLINE) 25 MG tablet Take 1 tablet by mouth 4 times daily 90 tablet 1    ibuprofen (IBU) 400 MG tablet Take 1 tablet by mouth every 6 hours as needed for Pain 28 tablet 0     No current facility-administered medications on file prior to visit. Blood pressure (!) 155/91, pulse 80, temperature 98.3 °F (36.8 °C), temperature source Oral, resp. rate 18. Physical Exam   Constitutional: He is oriented to person, place, and time. He appears well-developed. HENT:   Head: Normocephalic and atraumatic. Eyes: Pupils are equal, round, and reactive to light. Neck: Normal range of motion. Neck supple. Cardiovascular: Normal rate, regular rhythm and normal heart sounds. Pulmonary/Chest: Effort normal and breath sounds normal.   Abdominal: Soft.    Bony protrusion near inferior portion of sternum   Musculoskeletal: He exhibits edema and deformity. See abdominal section    LLE 1+ pitting edema   Neurological: He is alert and oriented to person, place, and time. Skin: Skin is warm. Psychiatric: He has a normal mood and affect. Assessment/Plan:  Harman Arndt was seen today for medication refill. Diagnoses and all orders for this visit:    Essential hypertension  -     amLODIPine (NORVASC) 10 MG tablet; Take 1 tablet by mouth daily  -     hydrALAZINE (APRESOLINE) 25 MG tablet; Take 1 tablet by mouth 4 times daily    Headaches due to old head injury  -     ibuprofen (ADVIL;MOTRIN) 600 MG tablet; Take 1 tablet by mouth 3 times daily as needed for Pain    Seizure (HCC)  -     levETIRAcetam (KEPPRA) 500 MG tablet;  Take 1 tablet by mouth 2 times daily  -     Shanae - Isaiah Fowler CNP, Neurology, [place]    Epigastric pain- possible pancreatitis especially given alcohol abuse but do not think it would  at this time as patient is tolerating oral and I encouraged increasing oral fluids (water)    Dependent edema- try elevating legs    Bony protrusion near inferior portion of sternum- reviewed 2017 CXR which revealed chronic fracture of right 7th rib but this is a different site: monitor for now; consider re-imaging if pain persists    Maxine Tillman MD

## 2019-04-25 NOTE — ED NOTES
Pt presents to ED for c/o HA x1 week on the front part of his forehead. Walks with steady gait to room. resp easy. A&Ox4. MSPs intact x4. When asked if he tried to take anything for his headache prior to arrival he says \"I don't want to spend money on that, I need a prescription for it\". Pt medicated and given OK by [place] Friend NP for d/c. Pt provided with food and OJ as requested.       Estrella Gracia RN  04/25/19 9333

## 2019-05-22 ENCOUNTER — TELEPHONE (OUTPATIENT)
Dept: INTERNAL MEDICINE | Age: 54
End: 2019-05-22

## 2019-05-22 NOTE — TELEPHONE ENCOUNTER
Reached out to patient to complete Pre-Charting. Unable to reach to patient, left a voicemail for patient to call me back to proceed with [facility] flow sheet.

## 2019-05-28 ENCOUNTER — TELEPHONE (OUTPATIENT)
Dept: NEUROLOGY | Age: 54
End: 2019-05-28

## 2019-05-28 NOTE — TELEPHONE ENCOUNTER
Third attempt, left messages three times. Unable to contact patient. We would be more than happy to schedule this patient with one of our providers when they call us back. At this time we are forwarding the referral back to referring provider to inform them that we were unable to schedule the patient.

## 2019-06-06 ENCOUNTER — HOSPITAL ENCOUNTER (OUTPATIENT)
Age: 54
Setting detail: OBSERVATION
Discharge: LEFT BEFORE DISCHARGE | End: 2019-06-07
Attending: EMERGENCY MEDICINE | Admitting: INTERNAL MEDICINE
Payer: MEDICAID

## 2019-06-06 ENCOUNTER — APPOINTMENT (OUTPATIENT)
Dept: GENERAL RADIOLOGY | Age: 54
End: 2019-06-06
Payer: MEDICAID

## 2019-06-06 ENCOUNTER — APPOINTMENT (OUTPATIENT)
Dept: CT IMAGING | Age: 54
End: 2019-06-06
Payer: MEDICAID

## 2019-06-06 DIAGNOSIS — R56.9 SEIZURE (HCC): Primary | ICD-10-CM

## 2019-06-06 DIAGNOSIS — S00.03XA HEMATOMA OF SCALP, INITIAL ENCOUNTER: ICD-10-CM

## 2019-06-06 DIAGNOSIS — S01.511A LIP LACERATION, INITIAL ENCOUNTER: ICD-10-CM

## 2019-06-06 DIAGNOSIS — R79.89 ELEVATED LFTS: ICD-10-CM

## 2019-06-06 DIAGNOSIS — S01.01XA LACERATION OF SCALP, INITIAL ENCOUNTER: ICD-10-CM

## 2019-06-06 DIAGNOSIS — E87.6 HYPOKALEMIA: ICD-10-CM

## 2019-06-06 LAB
ACETAMINOPHEN LEVEL: <5 MCG/ML (ref 10–30)
ALBUMIN SERPL-MCNC: 3.9 G/DL (ref 3.5–5.2)
ALP BLD-CCNC: 89 U/L (ref 40–129)
ALT SERPL-CCNC: 260 U/L (ref 0–40)
AMPHETAMINE SCREEN, URINE: NOT DETECTED
ANION GAP SERPL CALCULATED.3IONS-SCNC: 24 MMOL/L (ref 7–16)
ANION GAP SERPL CALCULATED.3IONS-SCNC: 25 MMOL/L (ref 7–16)
AST SERPL-CCNC: 1156 U/L (ref 0–39)
BACTERIA: ABNORMAL /HPF
BARBITURATE SCREEN URINE: NOT DETECTED
BASOPHILS ABSOLUTE: 0.02 E9/L (ref 0–0.2)
BASOPHILS RELATIVE PERCENT: 0.3 % (ref 0–2)
BENZODIAZEPINE SCREEN, URINE: NOT DETECTED
BILIRUB SERPL-MCNC: 2.2 MG/DL (ref 0–1.2)
BILIRUBIN URINE: NEGATIVE
BLOOD, URINE: ABNORMAL
BUN BLDV-MCNC: 11 MG/DL (ref 6–20)
BUN BLDV-MCNC: 12 MG/DL (ref 6–20)
CALCIUM SERPL-MCNC: 8.9 MG/DL (ref 8.6–10.2)
CALCIUM SERPL-MCNC: 9 MG/DL (ref 8.6–10.2)
CANNABINOID SCREEN URINE: NOT DETECTED
CHLORIDE BLD-SCNC: 88 MMOL/L (ref 98–107)
CHLORIDE BLD-SCNC: 91 MMOL/L (ref 98–107)
CLARITY: CLEAR
CO2: 22 MMOL/L (ref 22–29)
CO2: 22 MMOL/L (ref 22–29)
COCAINE METABOLITE SCREEN URINE: NOT DETECTED
COLOR: YELLOW
CREAT SERPL-MCNC: 1.3 MG/DL (ref 0.7–1.2)
CREAT SERPL-MCNC: 1.4 MG/DL (ref 0.7–1.2)
EOSINOPHILS ABSOLUTE: 0 E9/L (ref 0.05–0.5)
EOSINOPHILS RELATIVE PERCENT: 0 % (ref 0–6)
EPITHELIAL CELLS, UA: ABNORMAL /HPF
ETHANOL: <10 MG/DL (ref 0–0.08)
GFR AFRICAN AMERICAN: >60
GFR AFRICAN AMERICAN: >60
GFR NON-AFRICAN AMERICAN: >60 ML/MIN/1.73
GFR NON-AFRICAN AMERICAN: >60 ML/MIN/1.73
GLUCOSE BLD-MCNC: 118 MG/DL (ref 74–99)
GLUCOSE BLD-MCNC: 195 MG/DL (ref 74–99)
GLUCOSE URINE: NEGATIVE MG/DL
HCT VFR BLD CALC: 42.1 % (ref 37–54)
HEMOGLOBIN: 15.8 G/DL (ref 12.5–16.5)
IMMATURE GRANULOCYTES #: 0.03 E9/L
IMMATURE GRANULOCYTES %: 0.4 % (ref 0–5)
INR BLD: 0.9
KETONES, URINE: NEGATIVE MG/DL
LACTIC ACID, SEPSIS: 1.5 MMOL/L (ref 0.5–1.9)
LACTIC ACID, SEPSIS: 9.2 MMOL/L (ref 0.5–1.9)
LEUKOCYTE ESTERASE, URINE: NEGATIVE
LYMPHOCYTES ABSOLUTE: 0.37 E9/L (ref 1.5–4)
LYMPHOCYTES RELATIVE PERCENT: 4.7 % (ref 20–42)
MAGNESIUM: 1.7 MG/DL (ref 1.6–2.6)
MCH RBC QN AUTO: 32.2 PG (ref 26–35)
MCHC RBC AUTO-ENTMCNC: 37.5 % (ref 32–34.5)
MCV RBC AUTO: 85.7 FL (ref 80–99.9)
METHADONE SCREEN, URINE: NOT DETECTED
MONOCYTES ABSOLUTE: 0.57 E9/L (ref 0.1–0.95)
MONOCYTES RELATIVE PERCENT: 7.2 % (ref 2–12)
NEUTROPHILS ABSOLUTE: 6.96 E9/L (ref 1.8–7.3)
NEUTROPHILS RELATIVE PERCENT: 87.4 % (ref 43–80)
NITRITE, URINE: NEGATIVE
OPIATE SCREEN URINE: NOT DETECTED
PDW BLD-RTO: 13.9 FL (ref 11.5–15)
PH UA: 7 (ref 5–9)
PHENCYCLIDINE SCREEN URINE: NOT DETECTED
PLATELET # BLD: 169 E9/L (ref 130–450)
PMV BLD AUTO: 11.7 FL (ref 7–12)
POTASSIUM SERPL-SCNC: 2.8 MMOL/L (ref 3.5–5)
POTASSIUM SERPL-SCNC: 3.2 MMOL/L (ref 3.5–5)
PROPOXYPHENE SCREEN: NOT DETECTED
PROTEIN UA: 100 MG/DL
PROTHROMBIN TIME: 10.4 SEC (ref 9.3–12.4)
RBC # BLD: 4.91 E12/L (ref 3.8–5.8)
RBC UA: ABNORMAL /HPF (ref 0–2)
SALICYLATE, SERUM: <0.3 MG/DL (ref 0–30)
SODIUM BLD-SCNC: 135 MMOL/L (ref 132–146)
SODIUM BLD-SCNC: 137 MMOL/L (ref 132–146)
SPECIFIC GRAVITY UA: 1.01 (ref 1–1.03)
TOTAL CK: 1992 U/L (ref 20–200)
TOTAL CK: 2775 U/L (ref 20–200)
TOTAL PROTEIN: 7.6 G/DL (ref 6.4–8.3)
TRICYCLIC ANTIDEPRESSANTS SCREEN SERUM: NEGATIVE NG/ML
TROPONIN: <0.01 NG/ML (ref 0–0.03)
UROBILINOGEN, URINE: 0.2 E.U./DL
WBC # BLD: 8 E9/L (ref 4.5–11.5)
WBC UA: ABNORMAL /HPF (ref 0–5)

## 2019-06-06 PROCEDURE — 83735 ASSAY OF MAGNESIUM: CPT

## 2019-06-06 PROCEDURE — 96375 TX/PRO/DX INJ NEW DRUG ADDON: CPT

## 2019-06-06 PROCEDURE — 80048 BASIC METABOLIC PNL TOTAL CA: CPT

## 2019-06-06 PROCEDURE — 94761 N-INVAS EAR/PLS OXIMETRY MLT: CPT

## 2019-06-06 PROCEDURE — 85610 PROTHROMBIN TIME: CPT

## 2019-06-06 PROCEDURE — 96372 THER/PROPH/DIAG INJ SC/IM: CPT

## 2019-06-06 PROCEDURE — 70450 CT HEAD/BRAIN W/O DYE: CPT

## 2019-06-06 PROCEDURE — 87040 BLOOD CULTURE FOR BACTERIA: CPT

## 2019-06-06 PROCEDURE — G0378 HOSPITAL OBSERVATION PER HR: HCPCS

## 2019-06-06 PROCEDURE — 80307 DRUG TEST PRSMV CHEM ANLYZR: CPT

## 2019-06-06 PROCEDURE — 12001 RPR S/N/AX/GEN/TRNK 2.5CM/<: CPT

## 2019-06-06 PROCEDURE — 80074 ACUTE HEPATITIS PANEL: CPT

## 2019-06-06 PROCEDURE — 82550 ASSAY OF CK (CPK): CPT

## 2019-06-06 PROCEDURE — 2580000003 HC RX 258: Performed by: INTERNAL MEDICINE

## 2019-06-06 PROCEDURE — 6360000002 HC RX W HCPCS: Performed by: STUDENT IN AN ORGANIZED HEALTH CARE EDUCATION/TRAINING PROGRAM

## 2019-06-06 PROCEDURE — 93005 ELECTROCARDIOGRAM TRACING: CPT | Performed by: EMERGENCY MEDICINE

## 2019-06-06 PROCEDURE — 80053 COMPREHEN METABOLIC PANEL: CPT

## 2019-06-06 PROCEDURE — 83605 ASSAY OF LACTIC ACID: CPT

## 2019-06-06 PROCEDURE — 36415 COLL VENOUS BLD VENIPUNCTURE: CPT

## 2019-06-06 PROCEDURE — 84484 ASSAY OF TROPONIN QUANT: CPT

## 2019-06-06 PROCEDURE — 6370000000 HC RX 637 (ALT 250 FOR IP): Performed by: EMERGENCY MEDICINE

## 2019-06-06 PROCEDURE — 2580000003 HC RX 258: Performed by: EMERGENCY MEDICINE

## 2019-06-06 PROCEDURE — 81001 URINALYSIS AUTO W/SCOPE: CPT

## 2019-06-06 PROCEDURE — 2500000003 HC RX 250 WO HCPCS: Performed by: INTERNAL MEDICINE

## 2019-06-06 PROCEDURE — 96374 THER/PROPH/DIAG INJ IV PUSH: CPT

## 2019-06-06 PROCEDURE — 6370000000 HC RX 637 (ALT 250 FOR IP): Performed by: INTERNAL MEDICINE

## 2019-06-06 PROCEDURE — 2500000003 HC RX 250 WO HCPCS: Performed by: EMERGENCY MEDICINE

## 2019-06-06 PROCEDURE — 6360000002 HC RX W HCPCS: Performed by: INTERNAL MEDICINE

## 2019-06-06 PROCEDURE — 99285 EMERGENCY DEPT VISIT HI MDM: CPT

## 2019-06-06 PROCEDURE — 71045 X-RAY EXAM CHEST 1 VIEW: CPT

## 2019-06-06 PROCEDURE — 85025 COMPLETE CBC W/AUTO DIFF WBC: CPT

## 2019-06-06 PROCEDURE — 80177 DRUG SCRN QUAN LEVETIRACETAM: CPT

## 2019-06-06 PROCEDURE — G0480 DRUG TEST DEF 1-7 CLASSES: HCPCS

## 2019-06-06 PROCEDURE — 90471 IMMUNIZATION ADMIN: CPT | Performed by: EMERGENCY MEDICINE

## 2019-06-06 PROCEDURE — 6360000002 HC RX W HCPCS: Performed by: EMERGENCY MEDICINE

## 2019-06-06 PROCEDURE — 90715 TDAP VACCINE 7 YRS/> IM: CPT | Performed by: EMERGENCY MEDICINE

## 2019-06-06 RX ORDER — SODIUM CHLORIDE 0.9 % (FLUSH) 0.9 %
10 SYRINGE (ML) INJECTION PRN
Status: DISCONTINUED | OUTPATIENT
Start: 2019-06-06 | End: 2019-06-07 | Stop reason: HOSPADM

## 2019-06-06 RX ORDER — LORAZEPAM 2 MG/ML
2 INJECTION INTRAMUSCULAR ONCE
Status: COMPLETED | OUTPATIENT
Start: 2019-06-06 | End: 2019-06-06

## 2019-06-06 RX ORDER — 0.9 % SODIUM CHLORIDE 0.9 %
1000 INTRAVENOUS SOLUTION INTRAVENOUS ONCE
Status: COMPLETED | OUTPATIENT
Start: 2019-06-06 | End: 2019-06-06

## 2019-06-06 RX ORDER — LEVETIRACETAM 10 MG/ML
1000 INJECTION INTRAVASCULAR EVERY 12 HOURS
Status: DISCONTINUED | OUTPATIENT
Start: 2019-06-07 | End: 2019-06-07

## 2019-06-06 RX ORDER — LIDOCAINE HYDROCHLORIDE AND EPINEPHRINE 10; 10 MG/ML; UG/ML
20 INJECTION, SOLUTION INFILTRATION; PERINEURAL ONCE
Status: DISCONTINUED | OUTPATIENT
Start: 2019-06-06 | End: 2019-06-07 | Stop reason: HOSPADM

## 2019-06-06 RX ORDER — SODIUM CHLORIDE, SODIUM LACTATE, POTASSIUM CHLORIDE, CALCIUM CHLORIDE 600; 310; 30; 20 MG/100ML; MG/100ML; MG/100ML; MG/100ML
INJECTION, SOLUTION INTRAVENOUS CONTINUOUS
Status: DISCONTINUED | OUTPATIENT
Start: 2019-06-06 | End: 2019-06-07 | Stop reason: HOSPADM

## 2019-06-06 RX ORDER — HYDRALAZINE HYDROCHLORIDE 25 MG/1
25 TABLET, FILM COATED ORAL EVERY 8 HOURS SCHEDULED
Status: DISCONTINUED | OUTPATIENT
Start: 2019-06-06 | End: 2019-06-07 | Stop reason: HOSPADM

## 2019-06-06 RX ORDER — LABETALOL HYDROCHLORIDE 5 MG/ML
10 INJECTION, SOLUTION INTRAVENOUS EVERY 6 HOURS PRN
Status: DISCONTINUED | OUTPATIENT
Start: 2019-06-06 | End: 2019-06-07 | Stop reason: HOSPADM

## 2019-06-06 RX ORDER — AMLODIPINE BESYLATE 10 MG/1
10 TABLET ORAL DAILY
Status: DISCONTINUED | OUTPATIENT
Start: 2019-06-06 | End: 2019-06-07 | Stop reason: HOSPADM

## 2019-06-06 RX ORDER — SODIUM CHLORIDE 9 MG/ML
INJECTION, SOLUTION INTRAVENOUS CONTINUOUS
Status: DISCONTINUED | OUTPATIENT
Start: 2019-06-06 | End: 2019-06-06

## 2019-06-06 RX ORDER — ONDANSETRON 2 MG/ML
4 INJECTION INTRAMUSCULAR; INTRAVENOUS EVERY 6 HOURS PRN
Status: DISCONTINUED | OUTPATIENT
Start: 2019-06-06 | End: 2019-06-07 | Stop reason: HOSPADM

## 2019-06-06 RX ORDER — MORPHINE SULFATE 2 MG/ML
1 INJECTION, SOLUTION INTRAMUSCULAR; INTRAVENOUS ONCE
Status: COMPLETED | OUTPATIENT
Start: 2019-06-06 | End: 2019-06-06

## 2019-06-06 RX ORDER — LEVETIRACETAM 10 MG/ML
1000 INJECTION INTRAVASCULAR ONCE
Status: COMPLETED | OUTPATIENT
Start: 2019-06-06 | End: 2019-06-06

## 2019-06-06 RX ORDER — LABETALOL HYDROCHLORIDE 5 MG/ML
10 INJECTION, SOLUTION INTRAVENOUS ONCE
Status: COMPLETED | OUTPATIENT
Start: 2019-06-06 | End: 2019-06-06

## 2019-06-06 RX ORDER — HYDRALAZINE HYDROCHLORIDE 20 MG/ML
10 INJECTION INTRAMUSCULAR; INTRAVENOUS ONCE
Status: COMPLETED | OUTPATIENT
Start: 2019-06-06 | End: 2019-06-06

## 2019-06-06 RX ORDER — DIAPER,BRIEF,INFANT-TODD,DISP
EACH MISCELLANEOUS ONCE
Status: COMPLETED | OUTPATIENT
Start: 2019-06-06 | End: 2019-06-06

## 2019-06-06 RX ORDER — POTASSIUM CHLORIDE 20 MEQ/1
40 TABLET, EXTENDED RELEASE ORAL ONCE
Status: COMPLETED | OUTPATIENT
Start: 2019-06-06 | End: 2019-06-06

## 2019-06-06 RX ORDER — SODIUM CHLORIDE 0.9 % (FLUSH) 0.9 %
10 SYRINGE (ML) INJECTION EVERY 12 HOURS SCHEDULED
Status: DISCONTINUED | OUTPATIENT
Start: 2019-06-06 | End: 2019-06-07 | Stop reason: HOSPADM

## 2019-06-06 RX ADMIN — LEVETIRACETAM 1000 MG: 10 INJECTION INTRAVENOUS at 13:40

## 2019-06-06 RX ADMIN — LORAZEPAM 2 MG: 2 INJECTION INTRAMUSCULAR; INTRAVENOUS at 13:40

## 2019-06-06 RX ADMIN — SODIUM CHLORIDE 1000 ML: 9 INJECTION, SOLUTION INTRAVENOUS at 13:41

## 2019-06-06 RX ADMIN — AMLODIPINE BESYLATE 10 MG: 10 TABLET ORAL at 23:07

## 2019-06-06 RX ADMIN — HYDRALAZINE HYDROCHLORIDE 25 MG: 25 TABLET, FILM COATED ORAL at 23:07

## 2019-06-06 RX ADMIN — Medication: at 11:52

## 2019-06-06 RX ADMIN — TETANUS TOXOID, REDUCED DIPHTHERIA TOXOID AND ACELLULAR PERTUSSIS VACCINE, ADSORBED 0.5 ML: 5; 2.5; 8; 8; 2.5 SUSPENSION INTRAMUSCULAR at 11:50

## 2019-06-06 RX ADMIN — MORPHINE SULFATE 1 MG: 2 INJECTION, SOLUTION INTRAMUSCULAR; INTRAVENOUS at 23:07

## 2019-06-06 RX ADMIN — Medication 10 ML: at 23:07

## 2019-06-06 RX ADMIN — HYDRALAZINE HYDROCHLORIDE 10 MG: 20 INJECTION, SOLUTION INTRAMUSCULAR; INTRAVENOUS at 11:50

## 2019-06-06 RX ADMIN — SODIUM CHLORIDE 1000 ML: 9 INJECTION, SOLUTION INTRAVENOUS at 11:51

## 2019-06-06 RX ADMIN — FAMOTIDINE 20 MG: 10 INJECTION, SOLUTION INTRAVENOUS at 23:08

## 2019-06-06 RX ADMIN — ENOXAPARIN SODIUM 40 MG: 40 INJECTION SUBCUTANEOUS at 23:12

## 2019-06-06 RX ADMIN — LABETALOL HYDROCHLORIDE 10 MG: 5 INJECTION INTRAVENOUS at 14:11

## 2019-06-06 RX ADMIN — POTASSIUM CHLORIDE 40 MEQ: 20 TABLET, EXTENDED RELEASE ORAL at 12:09

## 2019-06-06 RX ADMIN — SODIUM CHLORIDE, POTASSIUM CHLORIDE, SODIUM LACTATE AND CALCIUM CHLORIDE: 600; 310; 30; 20 INJECTION, SOLUTION INTRAVENOUS at 16:55

## 2019-06-06 ASSESSMENT — PAIN SCALES - GENERAL
PAINLEVEL_OUTOF10: 9
PAINLEVEL_OUTOF10: 9
PAINLEVEL_OUTOF10: 10
PAINLEVEL_OUTOF10: 0

## 2019-06-06 ASSESSMENT — PAIN DESCRIPTION - LOCATION: LOCATION: HEAD

## 2019-06-06 ASSESSMENT — PAIN DESCRIPTION - PAIN TYPE: TYPE: ACUTE PAIN

## 2019-06-06 NOTE — ED NOTES
3L oxygen applied to pt at this time. Pulse ox 99%, pt alert and able to follow commands. Confusion noted.       Abe Cockayne Phillips-Soares, RN  06/06/19 2927

## 2019-06-06 NOTE — PROGRESS NOTES
Patient observed walking in the room and very unsteady on his feet. Educated patient about safety and to use the call light if he wanted to get up to the restroom. Patient observed walking in the room 15 minutes later still unsteady on his feet.

## 2019-06-06 NOTE — PROGRESS NOTES
Phone call received from patient family member. Went into patient room to ask if he wanted to speak with this family member or if I should. Observed patient in bed with eyes closed and IV completely out of arm and running in patient bed.

## 2019-06-06 NOTE — ED NOTES
Bed: 14A-14  Expected date:   Expected time:   Means of arrival:   Comments:  ems     Laura Barbosa RN  06/06/19 8006

## 2019-06-06 NOTE — ED PROVIDER NOTES
Department of Emergency Medicine   ED  Provider Note  Admit Date/RoomTime: 6/6/2019 10:35 AM  ED Room: 8202/8202-A    HPI:   Tom Ravi is a 52 y.o. male presenting to the ED for seizures, beginning just PTA. The complaint has been intermittent, moderate in severity, and worsened by nothing. Pt was walking home from the store when he reportedly fell unconscious and had a seizure. The event was witnessed by his neighbor who called EMS and came outside to help. He states he has had one seizure in the past in 2007. The pt denies HA, SOB, chest pain, unilateral weakness, confusion, focal deficits, neuropathy/paresthesia, incontinence, fever, chills, abd pain, N/V/D, neck pain, back pain, dysuria, and hematuria. ROS:   Pertinent positives and negatives are stated within HPI, all other systems reviewed and are negative.    --------------------------------------------- PAST HISTORY ---------------------------------------------  Past Medical History:  has a past medical history of Headache and Hypertension. Past Surgical History:  has no past surgical history on file. Social History:  reports that he has been smoking cigarettes. He has a 4.60 pack-year smoking history. He has never used smokeless tobacco. He reports that he drinks alcohol. He reports that he does not use drugs. Family History: family history is not on file. The patients home medications have been reviewed.     Allergies: Ace inhibitors and Fish-derived products    -------------------------------------------------- RESULTS -------------------------------------------------  All laboratory and radiology results have been personally reviewed by myself   LABS:  Results for orders placed or performed during the hospital encounter of 06/06/19   CBC Auto Differential   Result Value Ref Range    WBC 8.0 4.5 - 11.5 E9/L    RBC 4.91 3.80 - 5.80 E12/L    Hemoglobin 15.8 12.5 - 16.5 g/dL    Hematocrit 42.1 37.0 - 54.0 %    MCV 85.7 80.0 - 99.9 fL MCH 32.2 26.0 - 35.0 pg    MCHC 37.5 (H) 32.0 - 34.5 %    RDW 13.9 11.5 - 15.0 fL    Platelets 482 958 - 286 E9/L    MPV 11.7 7.0 - 12.0 fL    Neutrophils % 87.4 (H) 43.0 - 80.0 %    Immature Granulocytes % 0.4 0.0 - 5.0 %    Lymphocytes % 4.7 (L) 20.0 - 42.0 %    Monocytes % 7.2 2.0 - 12.0 %    Eosinophils % 0.0 0.0 - 6.0 %    Basophils % 0.3 0.0 - 2.0 %    Neutrophils # 6.96 1.80 - 7.30 E9/L    Immature Granulocytes # 0.03 E9/L    Lymphocytes # 0.37 (L) 1.50 - 4.00 E9/L    Monocytes # 0.57 0.10 - 0.95 E9/L    Eosinophils # 0.00 (L) 0.05 - 0.50 E9/L    Basophils # 0.02 0.00 - 0.20 E9/L   Comprehensive Metabolic Panel   Result Value Ref Range    Sodium 135 132 - 146 mmol/L    Potassium 3.2 (L) 3.5 - 5.0 mmol/L    Chloride 88 (L) 98 - 107 mmol/L    CO2 22 22 - 29 mmol/L    Anion Gap 25 (H) 7 - 16 mmol/L    Glucose 118 (H) 74 - 99 mg/dL    BUN 12 6 - 20 mg/dL    CREATININE 1.4 (H) 0.7 - 1.2 mg/dL    GFR Non-African American >60 >=60 mL/min/1.73    GFR African American >60     Calcium 9.0 8.6 - 10.2 mg/dL    Total Protein 7.6 6.4 - 8.3 g/dL    Alb 3.9 3.5 - 5.2 g/dL    Total Bilirubin 2.2 (H) 0.0 - 1.2 mg/dL    Alkaline Phosphatase 89 40 - 129 U/L     (H) 0 - 40 U/L    AST 1,156 (H) 0 - 39 U/L   Troponin   Result Value Ref Range    Troponin <0.01 0.00 - 0.03 ng/mL   Lactate, Sepsis   Result Value Ref Range    Lactic Acid, Sepsis 9.2 (HH) 0.5 - 1.9 mmol/L   Protime-INR   Result Value Ref Range    Protime 10.4 9.3 - 12.4 sec    INR 0.9    Urinalysis   Result Value Ref Range    Color, UA Yellow Straw/Yellow    Clarity, UA Clear Clear    Glucose, Ur Negative Negative mg/dL    Bilirubin Urine Negative Negative    Ketones, Urine Negative Negative mg/dL    Specific Gravity, UA 1.015 1.005 - 1.030    Blood, Urine LARGE (A) Negative    pH, UA 7.0 5.0 - 9.0    Protein,  (A) Negative mg/dL    Urobilinogen, Urine 0.2 <2.0 E.U./dL    Nitrite, Urine Negative Negative    Leukocyte Esterase, Urine Negative Negative perihilar and basilar infiltrates and pleural   effusion concerning for pneumonia. EKG:  This EKG is signed and interpreted by me. Rate: 1200  Rhythm: Sinus  Interpretation: Normal sinus rhythm without any ST elevation or depression. No T-wave inversion. Evidence for LVH. Normal intervals. Normal axis. Comparison: stable as compared to patient's most recent EKG    ------------------------- NURSING NOTES AND VITALS REVIEWED ---------------------------  The nursing notes within the ED encounter and vital signs as below have been reviewed. BP (!) 170/95   Pulse 95   Temp 98.2 °F (36.8 °C) (Temporal)   Resp 16   Ht 5' 8\" (1.727 m)   Wt 142 lb (64.4 kg)   SpO2 96%   BMI 21.59 kg/m²   Oxygen Saturation Interpretation: Normal    ---------------------------------------------------PHYSICAL EXAM--------------------------------------    Constitutional/General: Alert and oriented x3, afebrile, appears in NAD. HENT: Left upper lip swollen with a small through and through lac which is 0.5 cm on the outside and 2 cm on the inside. 1 cm lac with surrounding hematoma to the occipital region. Surgical scar to the same region from a prior head injury. Pupils equal and reactive bilaterally. Oropharynx clear. Handling secretions. Neck: Supple, full ROM. No JVD. Pulmonary: Lungs clear to auscultation bilaterally, no wheezes, rales, or rhonchi. Not in respiratory distress. Cardiovascular: Tachy. Regular rhythm. No murmurs. 2+ distal pulses. Abdomen: Non tender, soft, non distended. No guarding, rebound, or rigidity. Extremities: Moves all extremities x4. No calf tenderness. No edema. Skin: Warm and dry without rash. Back: No CVA tenderness. Neurologic: No focal deficits, equal sensation, systemic strength 5/5 to all extremities symmetrically.    Psych: Normal affect.     ------------------------------ ED COURSE/MEDICAL DECISION MAKING----------------------  Medications   lidocaine-EPINEPHrine 1 body or tendon injury seen. Debridement: partial thickness. Undermining: None. Wound Margins Revised: no.  Flaps Aligned: yes. The wound was closed with staples. Dressing:  gauze was placed. Total number suture and staples:  3. There were no additional lacerations requiring repair. Medical Decision Making: Libby Villalba presents for a seizure. Patient has multiple injuries including a lip laceration as well as a scalp laceration, which was cleaned, irrigated, and repaired. On lab work patient was hypokalemic and potassium replaced in the emergency department. Lactate elevated at 9.2, likely secondary to seizure activity. Patient hydrated with IV fluids. Patient has elevated LFTs, which is a new finding. Patient not complaining of any abdominal pain. Will add on acute hepatitis testing. CT the head shows no acute intracranial findings. While in the emergency department the patient did have a 2nd seizure. He was given 2 mg of Ativan and loaded with Keppra. There is evidence on the patient's medication rack that he should be taking Keppra, but he states he does not. We'll admit for further observation given his multiple seizures today with ongoing hydration and antiepileptic management. ED Course as of Jun 06 1537   Thu Jun 06, 2019   1337 Spoke with Dr. Kandi Rosenberg about admission.     [BM]   [id number] back to patient's room. Patient actively seizing. Pt given 2 mg of Ativan IV.     [BM]      ED Course User Index  [BM] Jamal Dong MD       Counseling: The emergency provider has spoken with the patient and discussed todays results, in addition to providing specific details for the plan of care and counseling regarding the diagnosis and prognosis. Questions are answered at this time and they are agreeable with the plan.      --------------------------------- IMPRESSION AND DISPOSITION ---------------------------------    IMPRESSION  1. Seizure ([address]    2.  Hematoma of scalp, initial encounter 3. Laceration of scalp, initial encounter    4. Lip laceration, initial encounter    5. Hypokalemia    6. Elevated LFTs        DISPOSITION  Disposition: Admit to telemetry  Patient condition is fair    6/6/19, 10:58 AM.    This note is prepared by Susie Jerez, acting as Scribe for Dg Barger MD.    Dg Barger MD:  The scribe's documentation has been prepared under my direction and personally reviewed by me in its entirety. I confirm that the note above accurately reflects all work, treatment, procedures, and medical decision making performed by me.              Dg Barger MD  06/06/19 3844

## 2019-06-06 NOTE — PROGRESS NOTES
Seizure pads placed on patient bed for safety and bed alarm initiated. Again educated patient on safety and and that he could possibly fall due to him being unsteady. Alarm trigger 20 minutes later. Patient in the bathroom and seizure pads on the floor at the bedside.

## 2019-06-06 NOTE — H&P
[facility]  Internal Medicine Residency Program  History and Physical    Patient:  Mickie Hanna 52 y.o. male MRN: [id number]     Date of Service: 6/6/2019    Hospital Day: 1      Chief complaint: had concerns including Seizures (found by bystander shaking in the street. states had a seizure in the past years ago). History of Present Illness   History obtained from EMR  The patient is a 52 y.o. male with PMH of HTN, seizure on keppra, brought in by EMS after he fell and seizure activity. History obtained from EMR. Patient was brought in after fall which walking followed by tonic clonic seizure activity noted by by stander. He loss consciousness, associated with trauma to back of his head, laceration on upper lip and biting, no bowel/bladder incontinence noted. Following arrival to ED, patient was awake but appear confuse. Patient reported to have second focal seizure while in ED. During interview, patient found to be postictal phase. ED Course: Patient lab showed hypokalemia, elevated creatinine, lactic acidosis,transminitis. 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 Patient received IV ativan and Iv keppra and admitted for further observation. Past Medical History:      Diagnosis Date    Headache     Hypertension        Past Surgical History:    History reviewed. No pertinent surgical history. Medications Prior to Admission:    Prior to Admission medications    Medication Sig Start Date End Date Taking?  Authorizing Provider   amLODIPine (NORVASC) 10 MG tablet Take 1 tablet by mouth daily 4/25/19  Yes Tristan Arriaga MD   hydrALAZINE (APRESOLINE) 25 MG tablet Take 1 tablet by mouth 4 times daily 4/25/19  Yes Tristan Arriaga MD   ibuprofen (ADVIL;MOTRIN) 600 MG tablet Take 1 tablet by mouth 3 times daily as needed for Pain 4/25/19  Yes Tristan Arriaga MD   levETIRAcetam (KEPPRA) 500 MG tablet Take 1 tablet by abnormality, as questioned. If there is clinical concern for potential underlying acute cerebrovascular infarction/accident or other potential abnormalities of underlying brain parenchyma, MRI of the brain would be recommended for more detailed evaluation. . 2. Area of encephalomalacic change in the left temporal lobe suggestive of previous cerebral vascular infarction/accident or trauma, if there is clinical concern for a superimposed acute area of cerebrovascular ischemia/infarction in this region an MRI would be required for further evaluation. 3. Hypodensity noted in the left thalamus seen on previous study consistent with a remote left thalamic infarction. 4. Mild to moderate cerebral volume loss/atrophy with white matter changes suggestive of sequelae small vessel ischemic disease. Xr Chest Portable    Result Date: 2019  Patient MRN:  [id number] : 1965 Age: 52 years Gender: Male Order Date:  2019 11:00 AM EXAM: XR CHEST PORTABLE one image INDICATION:  altered mental status altered mental status COMPARISON: 2017 FINDINGS: Heart size is normal. There is some hazy right perihilar densities extending to the right lung base. There is elevation of right hemidiaphragm with the blunting of the right costophrenic angle likely small pleural effusion. The left lung is clear. Mild strandy right perihilar and basilar infiltrates and pleural effusion concerning for pneumonia. EKG: normal sinus rhythm. Resident's Assessment and Plan     Naa Strauss is a 52 y.o. male     1. 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 Hypokalemia  3. CHAYITO, baseline:0.9, 2/2 NSAID's  4.HAGMA 2/2 to lactic acidosis  5. Transaminitis, in setting of seizure  6. Hx of HTN    Plan  - s/p received iv keppra and ativan in ED  -will check keppra level and continue Keppra  - Neurology consulted, input appreciated  -received oral potassium , follow BMP and Mg at 2000 and replace electrolyte  -follow urine electrolyte, continue IV fluid  -trend lactic acid q 6hr and check Ck , if elevated trend CK q6hr  -follow hepatitis panel and CK  - continue seizure precaution  - NPO , if patient more awake check bed side swallow  -monitor BP, hydralazine q 6hr    PT/OT evaluation:  DVT prophylaxis/ GI prophylaxis: lovenox/ pepcid  Disposition: home +/- home health / [facility] / Armando Camacho / Julianna Mccurdy MD, PGY-1   Attending physician: Dr. Derrick Diggs

## 2019-06-06 NOTE — ED NOTES
SBAR sent to floor, transportation and floor notified at this time.         Ana Laura Rose RN  06/06/19 7246

## 2019-06-07 VITALS
RESPIRATION RATE: 16 BRPM | HEIGHT: 68 IN | DIASTOLIC BLOOD PRESSURE: 88 MMHG | HEART RATE: 97 BPM | BODY MASS INDEX: 21.52 KG/M2 | WEIGHT: 142 LBS | OXYGEN SATURATION: 97 % | SYSTOLIC BLOOD PRESSURE: 129 MMHG | TEMPERATURE: 94.9 F

## 2019-06-07 LAB
ALBUMIN SERPL-MCNC: 3.9 G/DL (ref 3.5–5.2)
ALP BLD-CCNC: 84 U/L (ref 40–129)
ALT SERPL-CCNC: 227 U/L (ref 0–40)
ANION GAP SERPL CALCULATED.3IONS-SCNC: 13 MMOL/L (ref 7–16)
AST SERPL-CCNC: 862 U/L (ref 0–39)
BASOPHILS ABSOLUTE: 0.02 E9/L (ref 0–0.2)
BASOPHILS RELATIVE PERCENT: 0.4 % (ref 0–2)
BILIRUB SERPL-MCNC: 1.5 MG/DL (ref 0–1.2)
BUN BLDV-MCNC: 9 MG/DL (ref 6–20)
CALCIUM SERPL-MCNC: 8.6 MG/DL (ref 8.6–10.2)
CHLORIDE BLD-SCNC: 93 MMOL/L (ref 98–107)
CO2: 29 MMOL/L (ref 22–29)
CREAT SERPL-MCNC: 1.1 MG/DL (ref 0.7–1.2)
EOSINOPHILS ABSOLUTE: 0.03 E9/L (ref 0.05–0.5)
EOSINOPHILS RELATIVE PERCENT: 0.5 % (ref 0–6)
GFR AFRICAN AMERICAN: >60
GFR NON-AFRICAN AMERICAN: >60 ML/MIN/1.73
GLUCOSE BLD-MCNC: 104 MG/DL (ref 74–99)
HAV IGM SER IA-ACNC: NORMAL
HAV IGM SER IA-ACNC: NORMAL
HCT VFR BLD CALC: 38.3 % (ref 37–54)
HEMOGLOBIN: 14.8 G/DL (ref 12.5–16.5)
HEPATITIS B CORE IGM ANTIBODY: NORMAL
HEPATITIS B CORE IGM ANTIBODY: NORMAL
HEPATITIS B SURFACE ANTIGEN INTERPRETATION: NORMAL
HEPATITIS B SURFACE ANTIGEN INTERPRETATION: NORMAL
HEPATITIS C ANTIBODY INTERPRETATION: NORMAL
HEPATITIS C ANTIBODY INTERPRETATION: NORMAL
IMMATURE GRANULOCYTES #: 0.02 E9/L
IMMATURE GRANULOCYTES %: 0.4 % (ref 0–5)
INR BLD: 0.9
LYMPHOCYTES ABSOLUTE: 0.66 E9/L (ref 1.5–4)
LYMPHOCYTES RELATIVE PERCENT: 11.7 % (ref 20–42)
MAGNESIUM: 1.7 MG/DL (ref 1.6–2.6)
MCH RBC QN AUTO: 33 PG (ref 26–35)
MCHC RBC AUTO-ENTMCNC: 38.6 % (ref 32–34.5)
MCV RBC AUTO: 85.5 FL (ref 80–99.9)
MONOCYTES ABSOLUTE: 0.69 E9/L (ref 0.1–0.95)
MONOCYTES RELATIVE PERCENT: 12.2 % (ref 2–12)
NEUTROPHILS ABSOLUTE: 4.23 E9/L (ref 1.8–7.3)
NEUTROPHILS RELATIVE PERCENT: 74.8 % (ref 43–80)
PDW BLD-RTO: 13.8 FL (ref 11.5–15)
PLATELET # BLD: 135 E9/L (ref 130–450)
PMV BLD AUTO: 12.1 FL (ref 7–12)
POTASSIUM REFLEX MAGNESIUM: 2.7 MMOL/L (ref 3.5–5)
PROTHROMBIN TIME: 10.3 SEC (ref 9.3–12.4)
RBC # BLD: 4.48 E12/L (ref 3.8–5.8)
SODIUM BLD-SCNC: 135 MMOL/L (ref 132–146)
TOTAL CK: 3296 U/L (ref 20–200)
TOTAL CK: 3810 U/L (ref 20–200)
TOTAL PROTEIN: 6.9 G/DL (ref 6.4–8.3)
WBC # BLD: 5.7 E9/L (ref 4.5–11.5)

## 2019-06-07 PROCEDURE — 6370000000 HC RX 637 (ALT 250 FOR IP): Performed by: INTERNAL MEDICINE

## 2019-06-07 PROCEDURE — 82550 ASSAY OF CK (CPK): CPT

## 2019-06-07 PROCEDURE — 96372 THER/PROPH/DIAG INJ SC/IM: CPT

## 2019-06-07 PROCEDURE — 6360000002 HC RX W HCPCS: Performed by: INTERNAL MEDICINE

## 2019-06-07 PROCEDURE — 99231 SBSQ HOSP IP/OBS SF/LOW 25: CPT | Performed by: INTERNAL MEDICINE

## 2019-06-07 PROCEDURE — 36415 COLL VENOUS BLD VENIPUNCTURE: CPT

## 2019-06-07 PROCEDURE — 85025 COMPLETE CBC W/AUTO DIFF WBC: CPT

## 2019-06-07 PROCEDURE — 99218 PR INITIAL OBSERVATION CARE/DAY 30 MINUTES: CPT | Performed by: PSYCHIATRY & NEUROLOGY

## 2019-06-07 PROCEDURE — 80053 COMPREHEN METABOLIC PANEL: CPT

## 2019-06-07 PROCEDURE — 83735 ASSAY OF MAGNESIUM: CPT

## 2019-06-07 PROCEDURE — G0378 HOSPITAL OBSERVATION PER HR: HCPCS

## 2019-06-07 PROCEDURE — 85610 PROTHROMBIN TIME: CPT

## 2019-06-07 RX ORDER — POTASSIUM CHLORIDE 20 MEQ/1
40 TABLET, EXTENDED RELEASE ORAL ONCE
Status: COMPLETED | OUTPATIENT
Start: 2019-06-07 | End: 2019-06-07

## 2019-06-07 RX ORDER — LORAZEPAM 2 MG/ML
1 INJECTION INTRAMUSCULAR
Status: DISCONTINUED | OUTPATIENT
Start: 2019-06-07 | End: 2019-06-07 | Stop reason: HOSPADM

## 2019-06-07 RX ORDER — POTASSIUM CHLORIDE 20 MEQ/1
20 TABLET, EXTENDED RELEASE ORAL ONCE
Status: COMPLETED | OUTPATIENT
Start: 2019-06-07 | End: 2019-06-07

## 2019-06-07 RX ORDER — FAMOTIDINE 20 MG/1
20 TABLET, FILM COATED ORAL 2 TIMES DAILY
Status: DISCONTINUED | OUTPATIENT
Start: 2019-06-07 | End: 2019-06-07 | Stop reason: HOSPADM

## 2019-06-07 RX ORDER — LANOLIN ALCOHOL/MO/W.PET/CERES
400 CREAM (GRAM) TOPICAL DAILY
Qty: 30 TABLET | Refills: 0 | OUTPATIENT
Start: 2019-06-07

## 2019-06-07 RX ORDER — MAGNESIUM SULFATE IN WATER 40 MG/ML
2 INJECTION, SOLUTION INTRAVENOUS ONCE
Status: DISCONTINUED | OUTPATIENT
Start: 2019-06-07 | End: 2019-06-07

## 2019-06-07 RX ORDER — ACETAMINOPHEN 325 MG/1
650 TABLET ORAL ONCE
Status: COMPLETED | OUTPATIENT
Start: 2019-06-07 | End: 2019-06-07

## 2019-06-07 RX ORDER — POTASSIUM CHLORIDE 7.45 MG/ML
10 INJECTION INTRAVENOUS
Status: DISCONTINUED | OUTPATIENT
Start: 2019-06-07 | End: 2019-06-07

## 2019-06-07 RX ORDER — LANOLIN ALCOHOL/MO/W.PET/CERES
400 CREAM (GRAM) TOPICAL 2 TIMES DAILY
Status: DISCONTINUED | OUTPATIENT
Start: 2019-06-07 | End: 2019-06-07 | Stop reason: HOSPADM

## 2019-06-07 RX ORDER — LEVETIRACETAM 500 MG/1
1000 TABLET ORAL 2 TIMES DAILY
Status: DISCONTINUED | OUTPATIENT
Start: 2019-06-07 | End: 2019-06-07 | Stop reason: HOSPADM

## 2019-06-07 RX ADMIN — POTASSIUM CHLORIDE 20 MEQ: 20 TABLET, EXTENDED RELEASE ORAL at 05:45

## 2019-06-07 RX ADMIN — HYDRALAZINE HYDROCHLORIDE 25 MG: 25 TABLET, FILM COATED ORAL at 05:45

## 2019-06-07 RX ADMIN — ACETAMINOPHEN 650 MG: 325 TABLET, FILM COATED ORAL at 12:14

## 2019-06-07 RX ADMIN — MAGNESIUM GLUCONATE 500 MG ORAL TABLET 400 MG: 500 TABLET ORAL at 10:34

## 2019-06-07 RX ADMIN — AMLODIPINE BESYLATE 10 MG: 10 TABLET ORAL at 09:19

## 2019-06-07 RX ADMIN — POTASSIUM CHLORIDE 20 MEQ: 20 TABLET, EXTENDED RELEASE ORAL at 10:34

## 2019-06-07 RX ADMIN — ENOXAPARIN SODIUM 40 MG: 40 INJECTION SUBCUTANEOUS at 09:19

## 2019-06-07 RX ADMIN — FAMOTIDINE 20 MG: 20 TABLET, FILM COATED ORAL at 09:19

## 2019-06-07 RX ADMIN — POTASSIUM CHLORIDE 40 MEQ: 20 TABLET, EXTENDED RELEASE ORAL at 05:44

## 2019-06-07 RX ADMIN — LEVETIRACETAM 1000 MG: 500 TABLET ORAL at 09:19

## 2019-06-07 ASSESSMENT — PAIN SCALES - GENERAL
PAINLEVEL_OUTOF10: 0
PAINLEVEL_OUTOF10: 1

## 2019-06-07 ASSESSMENT — VISUAL ACUITY: VA_NORMAL: 1

## 2019-06-07 NOTE — PROGRESS NOTES
Patient keeps pulling out IV's and refusing to get another. Notified doctor to inquire about changing IV meds to oral. Awaiting response.

## 2019-06-07 NOTE — PROGRESS NOTES
[address]   Internal Medicine Residency / [address]    Attending Physician Statement  I have discussed the case, including pertinent history and exam findings with the resident and the team.  I have seen and examined the patient and the key elements of the encounter have been performed by me. I agree with the assessment, plan and orders as documented by the resident. No further seizures on Keppra  VS stable  Off Advil now  Check BP's standing  Plan;Discharge today and NO DRIVING  Remainder of medical problems as per resident note.       Dimitri Han  Internal Medicine Residency Faculty

## 2019-06-07 NOTE — CONSULTS
SpO2 97%   BMI 21.59 kg/m²     Physical Exam   Constitutional: He appears well-nourished. HENT:   Stapled lacceration on the back of the head swelling of the lip. bit tongue   Eyes: Pupils are equal, round, and reactive to light. Conjunctivae and lids are normal.   Neck: Neck supple. Cardiovascular: Normal rate and regular rhythm. Pulmonary/Chest: Effort normal and breath sounds normal.   Abdominal: Soft. Musculoskeletal: Normal range of motion. Neurological: Coordination normal.   Reflex Scores:       Tricep reflexes are 2+ on the right side and 2+ on the left side. Bicep reflexes are 2+ on the right side and 2+ on the left side. Brachioradialis reflexes are 2+ on the right side and 2+ on the left side. Patellar reflexes are 2+ on the right side and 2+ on the left side. Achilles reflexes are 2+ on the right side and 2+ on the left side. Skin: Skin is warm and dry. Psychiatric: He has a normal mood and affect. His speech is normal and behavior is normal.               Neurological Exam  Mental Status   Oriented to person, place, time and situation. Recent and remote memory are intact. Speech is normal. Language is fluent with no aphasia. Attention and concentration are normal. Fund of knowledge is appropriate for level of education. Apraxia absent. Cranial Nerves  CN II: Visual acuity is normal. Visual fields full to confrontation. CN III, IV, VI: Extraocular movements intact bilaterally. Normal lids and orbits bilaterally. Pupils equal round and reactive to light bilaterally. CN V: Facial sensation is normal.  CN VII: Full and symmetric facial movement. CN VIII: Hearing is normal.  CN IX, X: Palate elevates symmetrically. Normal gag reflex. CN XI: Shoulder shrug strength is normal.  CN XII: Tongue midline without atrophy or fasciculations. Motor  Normal muscle bulk throughout. No fasciculations present. Normal muscle tone.  No abnormal involuntary 2/22/2019 2/19/2019 10/15/2017          11:05 AM  4:15 AM  5:20 AM  7:30 AM   Ethanol Lvl      mg/dL <10 194 64 67     Component      Latest Ref Rng & Units 10/7/2017 9/11/2017 7/25/2017 6/21/2017           2:44 AM  3:00 PM  2:00 PM  5:49 AM   Ethanol Lvl      mg/dL 136 <10 252 94     Component      Latest Ref Rng & Units 5/25/2017 1/29/2017           1:10 AM  9:50 AM   Ethanol Lvl      mg/dL 18 <10     I independently reviewed the labs and imaging studies at today's appointment. Assessment:     Seizure. Likely late effect of previous ICH. Patient Active Problem List   Diagnosis    Hypertensive emergency without congestive heart failure    Pulmonary embolism on right ([address]    Suicidal ideation    Seizure ([address]       Plan:     Restart Keppra 500mg BID. Follow up in neurology clinic   OK for discharge from my standpoint when cleared by medicine. Do driving until cleared by neurology in follow up.      Stephanie Gifford  9:44 AM  6/7/2019

## 2019-06-07 NOTE — CARE COORDINATION
Spoke with mother of patient, Seth Chavez, from [place] about the patient as the patient is not cooperative. He states to call his mother. She states he lives with the father in [facility]. All other family lives out of state. He does not drive. He is independent at home. She states he does not have dme equipment. He had a traumatic head injury years ago after falling down the steps. He has required some supervision since that time. His father is with him most of the day and night. Patient does not work currently. He is receiving disability per his mother. Plan is for patient to return to this fathers home. The mother does not know the pcp or the pharmacy that her son uses. Will follow for any needs.

## 2019-06-07 NOTE — DISCHARGE SUMMARY
Internal Medicine Department   Discharge summary    Patient ID:  Sabrina Bro  52 y.o.  1965    Admission Date: 6/6/2019     Discharge Date: 6/7/2019       House team:1    Admission Dx: 1. Tonic clonic Seizure,likley breakthrough seizure,noncompliance  2. Hypokalemia  3. CHAYITO, 2/2 NSAID's  4.HAGMA 2/2 to lactic acidosis  5. Transaminitis  6. Hx of HTN    Discharge Dx: 1. Tonic clonic Seizure,likley breakthrough seizure,noncompliance  2. Hypokalemia  3. CHAYITO, baseline:0.9, 2/2 NSAID's  4.HAGMA 2/2 to lactic acidosis  5. Transaminitis  6. Hx of HTN    Consults:  Neurology    Procedures:  No discharge procedures on file. Brief summary of the patient course:  52 y.o. male with PMH of HTN, seizure on keppra,hx of SDH s/p hemicraniotomy after fall, brought in by EMS after he fell and seizure activity. History obtained from EMR. Patient was brought in after fall which walking followed by tonic clonic seizure activity noted by by stander. He loss consciousness, associated with trauma to back of his head, laceration on upper lip and biting, no bowel/bladder incontinence noted. Following arrival to ED, patient was awake but appear confuse. Patient reported to have second focal seizure while in ED. During interview, patient found to be postictal phase. Patient lab showed hypokalemia, elevated creatinine, lactic acidosis,transminitis. 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 Patient received IV ativan and Iv keppra and admitted . During hospital stay patient, patient didn't have any further seizure activity. Patient was hypokalemic, which was replaced orally,patient refuse iv medication and iv line placement. Neurology consulted recommend,continue keppra ,and to follow up OP. Patient was planned discharge after repeat BMP,for hypokalemia and Magnesium but patient left against medical advice before repeat lab result.       Discharge medication: Harleen Whaley   Home Medication Instructions AOL:[id number]    Printed on:06/07/19 1500   Medication Information                      amLODIPine (NORVASC) 10 MG tablet  Take 1 tablet by mouth daily             hydrALAZINE (APRESOLINE) 25 MG tablet  Take 1 tablet by mouth 4 times daily             ibuprofen (ADVIL;MOTRIN) 600 MG tablet  Take 1 tablet by mouth 3 times daily as needed for Pain             levETIRAcetam (KEPPRA) 500 MG tablet  Take 1 tablet by mouth 2 times daily                    Medication List      ASK your doctor about these medications    amLODIPine 10 MG tablet  Commonly known as:  NORVASC  Take 1 tablet by mouth daily     hydrALAZINE 25 MG tablet  Commonly known as:  APRESOLINE  Take 1 tablet by mouth 4 times daily     ibuprofen 600 MG tablet  Commonly known as:  ADVIL;MOTRIN  Take 1 tablet by mouth 3 times daily as needed for Pain     levETIRAcetam 500 MG tablet  Commonly known as:  KEPPRA  Take 1 tablet by mouth 2 times daily            Discharge Instructions:   Diet: common adult  Activity: as tolerated  Be compliant with medication    Follow up  [facility] [address] [facility] Internal Medicine  On 6/21/2019  hospital follow up at [address]   Emir Iraheta MD    please call and schedule appointment  YUNIOR Robertson [phone]   Patient instruction  1. Be compliant with medication  2. No driving until follow up in Neurology clinic  3.  Avoid Ibuprofen and other NSAID's       Disposition:Patient Left AMA      Attending Physician: Paramjit Barraza MD PGY1,    6/7/2019 3:00 PM

## 2019-06-08 ENCOUNTER — APPOINTMENT (OUTPATIENT)
Dept: CT IMAGING | Age: 54
End: 2019-06-08
Payer: MEDICAID

## 2019-06-08 ENCOUNTER — HOSPITAL ENCOUNTER (EMERGENCY)
Age: 54
Discharge: HOME | End: 2019-06-08
Attending: EMERGENCY MEDICINE
Payer: MEDICAID

## 2019-06-08 VITALS
HEIGHT: 68 IN | DIASTOLIC BLOOD PRESSURE: 104 MMHG | HEART RATE: 100 BPM | TEMPERATURE: 97.7 F | BODY MASS INDEX: 21.22 KG/M2 | SYSTOLIC BLOOD PRESSURE: 152 MMHG | RESPIRATION RATE: 16 BRPM | WEIGHT: 140 LBS | OXYGEN SATURATION: 99 %

## 2019-06-08 DIAGNOSIS — R51.9 NONINTRACTABLE HEADACHE, UNSPECIFIED CHRONICITY PATTERN, UNSPECIFIED HEADACHE TYPE: Primary | ICD-10-CM

## 2019-06-08 LAB
EKG ATRIAL RATE: 97 BPM
EKG P AXIS: 65 DEGREES
EKG P-R INTERVAL: 138 MS
EKG Q-T INTERVAL: 380 MS
EKG QRS DURATION: 94 MS
EKG QTC CALCULATION (BAZETT): 482 MS
EKG R AXIS: 1 DEGREES
EKG T AXIS: 51 DEGREES
EKG VENTRICULAR RATE: 97 BPM
KEPPRA: 16 UG/ML (ref 12–46)

## 2019-06-08 PROCEDURE — 93010 ELECTROCARDIOGRAM REPORT: CPT | Performed by: INTERNAL MEDICINE

## 2019-06-08 PROCEDURE — 70450 CT HEAD/BRAIN W/O DYE: CPT

## 2019-06-08 PROCEDURE — 6370000000 HC RX 637 (ALT 250 FOR IP): Performed by: PREVENTIVE MEDICINE

## 2019-06-08 PROCEDURE — 99284 EMERGENCY DEPT VISIT MOD MDM: CPT

## 2019-06-08 RX ORDER — LEVETIRACETAM 500 MG/1
500 TABLET ORAL 2 TIMES DAILY
Qty: 30 TABLET | Refills: 0 | Status: SHIPPED | OUTPATIENT
Start: 2019-06-08 | End: 2022-01-16

## 2019-06-08 RX ORDER — ACETAMINOPHEN 500 MG
1000 TABLET ORAL ONCE
Status: COMPLETED | OUTPATIENT
Start: 2019-06-08 | End: 2019-06-08

## 2019-06-08 RX ORDER — ACETAMINOPHEN 500 MG
1000 TABLET ORAL EVERY 6 HOURS PRN
Qty: 60 TABLET | Refills: 0 | Status: SHIPPED | OUTPATIENT
Start: 2019-06-08 | End: 2021-06-05

## 2019-06-08 RX ADMIN — ACETAMINOPHEN 1000 MG: 500 TABLET ORAL at 07:50

## 2019-06-08 ASSESSMENT — ENCOUNTER SYMPTOMS
DIARRHEA: 0
ALLERGIC/IMMUNOLOGIC NEGATIVE: 1
COUGH: 0
CHEST TIGHTNESS: 0
NAUSEA: 0
VOMITING: 0
SHORTNESS OF BREATH: 0
CONSTIPATION: 0
ABDOMINAL PAIN: 0

## 2019-06-08 ASSESSMENT — PAIN DESCRIPTION - LOCATION: LOCATION: HEAD

## 2019-06-08 ASSESSMENT — PAIN SCALES - GENERAL: PAINLEVEL_OUTOF10: 7

## 2019-06-08 ASSESSMENT — PAIN DESCRIPTION - DESCRIPTORS: DESCRIPTORS: ACHING

## 2019-06-08 ASSESSMENT — PAIN DESCRIPTION - PAIN TYPE: TYPE: ACUTE PAIN

## 2019-06-08 ASSESSMENT — PAIN DESCRIPTION - FREQUENCY: FREQUENCY: CONTINUOUS

## 2019-06-08 NOTE — PROGRESS NOTES
CLINICAL PHARMACY NOTE: MEDS TO [address] Select Patient?: Yes  Total # of Prescriptions Filled: 2   The following medications were delivered to the patient:  · KEPPRA 500 MG   · TYLENOL 500 MG   Total # of Interventions Completed: 3  Time Spent (min): 15    Additional Documentation:

## 2019-06-08 NOTE — ED NOTES
Called pt to bring back to room, but pt not in waiting room.   Will check again shortly for pt     Vanessa Chau RN  06/08/19 8811

## 2019-06-08 NOTE — ED PROVIDER NOTES
This is a 76-year-old male with history of seizures presenting to the emergency department for evaluation of headache. The patient is the primary historian. He reports this headache has been ongoing for many many months. He states this is not the worst headache of his life. He states he was recently evaluated in the emergency department and admitted to the hospital for seizures however he left before being discharged states he was told to take Tylenol for his headache which she has at home. However because he left against medical advice he did not get his prescriptions, he states he came to the hospital for Tylenol today. He reports no changes in his vision blurry vision or loss of consciousness. He denies any nausea vomiting associated with these symptoms. Localizes the pain to the back of his head with no radiation. States this feels typical headache. Does report a history of a craniotomy in the past. Denies any suicidal homicidal ideation          Review of Systems   Constitutional: Negative for chills and fever. HENT: Negative for congestion. Eyes: Negative for visual disturbance. Respiratory: Negative for cough, chest tightness and shortness of breath. Cardiovascular: Negative for chest pain, palpitations and leg swelling. Gastrointestinal: Negative for abdominal pain, constipation, diarrhea, nausea and vomiting. Endocrine: Negative. Genitourinary: Negative for dysuria, frequency, hematuria and urgency. Musculoskeletal: Negative for arthralgias. Skin: Negative. Allergic/Immunologic: Negative. Neurological: Negative for dizziness, weakness and headaches. Hematological: Negative. Psychiatric/Behavioral: Negative. Physical Exam   Constitutional: He is oriented to person, place, and time. He appears well-developed and well-nourished. No distress. HENT:   Head: Normocephalic and atraumatic.    Right Ear: External ear normal.   Left Ear: External ear normal.   Nose: Nose normal.   Mouth/Throat: Oropharynx is clear and moist. No oropharyngeal exudate. There is a large left-sided surgical scar on the cranium. Eyes: Pupils are equal, round, and reactive to light. Conjunctivae and EOM are normal. Right eye exhibits no discharge. Left eye exhibits no discharge. Neck: Normal range of motion. Neck supple. No JVD present. No tracheal deviation present. No thyromegaly present. Cardiovascular: Normal rate, regular rhythm, normal heart sounds and intact distal pulses. Exam reveals no gallop and no friction rub. No murmur heard. Pulmonary/Chest: Effort normal and breath sounds normal. No respiratory distress. He has no wheezes. He has no rales. Abdominal: Soft. Bowel sounds are normal. He exhibits no distension. There is no tenderness. There is no rebound and no guarding. Musculoskeletal: Normal range of motion. He exhibits no edema. Lymphadenopathy:     He has no cervical adenopathy. Neurological: He is alert and oriented to person, place, and time. No cranial nerve deficit. Coordination normal.   Skin: Skin is warm and dry. He is not diaphoretic. Psychiatric: He has a normal mood and affect. His behavior is normal. Judgment and thought content normal.   Vitals reviewed. Procedures    MDM  Number of Diagnoses or Management Options  Nonintractable headache, unspecified chronicity pattern, unspecified headache type:   Diagnosis management comments: 78-year-old male with the was stated history, signs and symptoms, clinical concern for tension headache, migraine, intracranial bleed or tumor. Patient given Tylenol, CT negative discharged home after feeling much better. I have discussed the results of the workup as well as the plan with the patient and family who indicate understanding and agreement with the plan. All questions answered at this time. ED Course as of Jun 08 1700   Sat Jun 08, 2019   8979 Patient presents with headache.  States he has had similar headaches in the past. No trauma. No focal numbness or weakness. Facial symmetry. Speech clear. Cranial nerves intact. No focal motor or sensory deficits. No ataxia with finger-to-nose or heel-to-shin. No drift in upper or lower extremities. No nystagmus. No nuchal rigidity or meningeal signs. [JA]      ED Course User Index  [MELY] Lee Segovia MD       --------------------------------------------- PAST HISTORY ---------------------------------------------  Past Medical History:  has a past medical history of Headache and Hypertension. Past Surgical History:  has no past surgical history on file. Social History:  reports that he has been smoking cigarettes. He has a 4.60 pack-year smoking history. He has never used smokeless tobacco. He reports that he drinks alcohol. He reports that he does not use drugs. Family History: family history is not on file. The patients home medications have been reviewed. Allergies: Ace inhibitors and Fish-derived products    -------------------------------------------------- RESULTS -------------------------------------------------  Labs:  No results found for this visit on 06/08/19. Radiology:  CT Head WO Contrast   Final Result   No evidence of intracranial hemorrhage. No change since   June 6, 2019.                ------------------------- NURSING NOTES AND VITALS REVIEWED ---------------------------  Date / Time Roomed:  6/8/2019  7:02 AM  ED Bed Assignment:  01/01    The nursing notes within the ED encounter and vital signs as below have been reviewed.    BP (!) 152/104   Pulse 100   Temp 97.7 °F (36.5 °C)   Resp 16   Ht 5' 8\" (1.727 m)   Wt 140 lb (63.5 kg)   SpO2 99%   BMI 21.29 kg/m²   Oxygen Saturation Interpretation: Normal      ------------------------------------------ PROGRESS NOTES ------------------------------------------  5:00 PM  I have spoken with the patient and discussed todays results, in addition to providing specific details for the plan of care and counseling regarding the diagnosis and prognosis. Their questions are answered at this time and they are agreeable with the plan. I discussed at length with them reasons for immediate return here for re evaluation. They will followup with PCP.       --------------------------------- ADDITIONAL PROVIDER NOTES ---------------------------------  At this time the patient is without objective evidence of an acute process requiring hospitalization or inpatient management. They have remained hemodynamically stable throughout their entire ED visit and are stable for discharge with outpatient follow-up. The plan has been discussed in detail and they are aware of the specific conditions for emergent return, as well as the importance of follow-up. Discharge Medication List as of 6/8/2019  8:55 AM      START taking these medications    Details   acetaminophen (TYLENOL) 500 MG tablet Take 2 tablets by mouth every 6 hours as needed for Pain, Disp-60 tablet, R-0Print             Diagnosis:  1. Nonintractable headache, unspecified chronicity pattern, unspecified headache type        Disposition:  Patient's disposition: Discharge to home  Patient's condition is stable. ED Course as of Jun 08 1659   Sat Jun 08, 2019   0745 Patient presents with headache. States he has had similar headaches in the past. No trauma. No focal numbness or weakness. Facial symmetry. Speech clear. Cranial nerves intact. No focal motor or sensory deficits. No ataxia with finger-to-nose or heel-to-shin. No drift in upper or lower extremities. No nystagmus. No nuchal rigidity or meningeal signs. [JA]      ED Course User Index  [JA] Mahad Monteiro MD       Labs      Radiology      EKG Interpretation.           Moncho Felix DO  Resident  06/08/19 2971

## 2019-06-10 ENCOUNTER — TELEPHONE (OUTPATIENT)
Dept: INTERNAL MEDICINE | Age: 54
End: 2019-06-10

## 2019-06-10 NOTE — TELEPHONE ENCOUNTER
[facility] Initial Follow Up Call    Outreach made within 2 business days of discharge: Yes    Patient: Armaan Camara Patient : 1965   MRN: <[id number]>  Reason for Admission: There are no discharge diagnoses documented for the most recent discharge.   Discharge Date: 19       Spoke with: Left voicemail    Discharge department[facility]facility[facility]    Scheduled appointment with PCP within 7-14 days    Follow Up  Future Appointments   Date Time Provider Tom Chavez   2019  8:00 AM ER FOLLOW UP [id number]

## 2019-06-11 LAB
BLOOD CULTURE, ROUTINE: NORMAL
CULTURE, BLOOD 2: NORMAL

## 2019-06-26 ENCOUNTER — HOSPITAL ENCOUNTER (EMERGENCY)
Age: 54
Discharge: HOME | End: 2019-06-26
Payer: MEDICAID

## 2019-06-26 VITALS
RESPIRATION RATE: 16 BRPM | BODY MASS INDEX: 27.28 KG/M2 | HEART RATE: 90 BPM | HEIGHT: 68 IN | WEIGHT: 180 LBS | SYSTOLIC BLOOD PRESSURE: 135 MMHG | DIASTOLIC BLOOD PRESSURE: 89 MMHG | TEMPERATURE: 96.9 F | OXYGEN SATURATION: 96 %

## 2019-06-26 DIAGNOSIS — Z48.02 ENCOUNTER FOR STAPLE REMOVAL: Primary | ICD-10-CM

## 2019-06-26 PROCEDURE — 99283 EMERGENCY DEPT VISIT LOW MDM: CPT

## 2019-06-26 NOTE — ED PROVIDER NOTES
[facility]    HPI: Libby Vidal is a 52 y.o. male with a past medical history of  has a past medical history of Headache and Hypertension. presenting for reevaluation of the wound as well as suture removal. The patient sustained a laceration 12 Days ago. The wound was closed with stples.  Patient states that the wound has been healing well without evidence of erythema, purulent drainage, or increased pain. The patient denies a sensation of foreign body. The patient denies any fevers.     ROS:   Unless otherwise stated in this report or unable to obtain because of the patient's clinical or mental status as evidenced by the medical record, this patients's positive and negative responses for Review of Systems, constitutional, psych, eyes, ENT, cardiovascular, respiratory, gastrointestinal, neurological, genitourinary, musculoskeletal, integument systems and systems related to the presenting problem are either stated in the preceding or were not pertinent or were negative for the symptoms and/or complaints related to the medical problem. --------------------------------------------- PAST HISTORY ---------------------------------------------  Past Medical History:  has a past medical history of Headache and Hypertension. Past Surgical History:  has no past surgical history on file. Social History:  reports that he has been smoking cigarettes. He has a 4.60 pack-year smoking history. He has never used smokeless tobacco. He reports that he drinks alcohol. He reports that he does not use drugs. Family History: family history is not on file. The patients home medications have been reviewed. Allergies: Ace inhibitors and Fish-derived products    ------------------------- NURSING NOTES AND VITALS REVIEWED ---------------------------   The nursing notes within the ED encounter and vital signs as below have been reviewed by myself.   /89   Pulse 90   Temp 96.9 °F (36.1 °C)   Resp 16   Ht 5'

## 2019-06-26 NOTE — ED NOTES
3 staples removed from the posterior head on the left side. Wound is well approximated.      Juan Braxton RN  06/26/19 2881

## 2020-04-14 ENCOUNTER — APPOINTMENT (OUTPATIENT)
Dept: CT IMAGING | Age: 55
End: 2020-04-14
Payer: COMMERCIAL

## 2020-04-14 ENCOUNTER — HOSPITAL ENCOUNTER (EMERGENCY)
Age: 55
Discharge: LEFT BEFORE DISCHARGE | End: 2020-04-14
Attending: EMERGENCY MEDICINE
Payer: COMMERCIAL

## 2020-04-14 VITALS
OXYGEN SATURATION: 99 % | HEART RATE: 87 BPM | HEIGHT: 68 IN | BODY MASS INDEX: 22.28 KG/M2 | WEIGHT: 147 LBS | DIASTOLIC BLOOD PRESSURE: 73 MMHG | TEMPERATURE: 99.1 F | SYSTOLIC BLOOD PRESSURE: 142 MMHG | RESPIRATION RATE: 16 BRPM

## 2020-04-14 LAB
ALBUMIN SERPL-MCNC: 4.4 G/DL (ref 3.5–5.2)
ALP BLD-CCNC: 43 U/L (ref 40–129)
ALT SERPL-CCNC: 38 U/L (ref 0–40)
ANION GAP SERPL CALCULATED.3IONS-SCNC: 14 MMOL/L (ref 7–16)
AST SERPL-CCNC: 160 U/L (ref 0–39)
BASOPHILS ABSOLUTE: 0.03 E9/L (ref 0–0.2)
BASOPHILS RELATIVE PERCENT: 0.6 % (ref 0–2)
BILIRUB SERPL-MCNC: 0.5 MG/DL (ref 0–1.2)
BILIRUBIN DIRECT: <0.2 MG/DL (ref 0–0.3)
BILIRUBIN, INDIRECT: ABNORMAL MG/DL (ref 0–1)
BUN BLDV-MCNC: 21 MG/DL (ref 6–20)
CALCIUM SERPL-MCNC: 8.9 MG/DL (ref 8.6–10.2)
CHLORIDE BLD-SCNC: 104 MMOL/L (ref 98–107)
CO2: 24 MMOL/L (ref 22–29)
CREAT SERPL-MCNC: 1.1 MG/DL (ref 0.7–1.2)
EOSINOPHILS ABSOLUTE: 0 E9/L (ref 0.05–0.5)
EOSINOPHILS RELATIVE PERCENT: 0 % (ref 0–6)
GFR AFRICAN AMERICAN: >60
GFR NON-AFRICAN AMERICAN: >60 ML/MIN/1.73
GLUCOSE BLD-MCNC: 79 MG/DL (ref 74–99)
HCT VFR BLD CALC: 38.4 % (ref 37–54)
HEMOGLOBIN: 13.6 G/DL (ref 12.5–16.5)
IMMATURE GRANULOCYTES #: 0.01 E9/L
IMMATURE GRANULOCYTES %: 0.2 % (ref 0–5)
LACTIC ACID: 1.4 MMOL/L (ref 0.5–2.2)
LIPASE: 75 U/L (ref 13–60)
LYMPHOCYTES ABSOLUTE: 1.77 E9/L (ref 1.5–4)
LYMPHOCYTES RELATIVE PERCENT: 33.7 % (ref 20–42)
MCH RBC QN AUTO: 30 PG (ref 26–35)
MCHC RBC AUTO-ENTMCNC: 35.4 % (ref 32–34.5)
MCV RBC AUTO: 84.8 FL (ref 80–99.9)
MONOCYTES ABSOLUTE: 0.93 E9/L (ref 0.1–0.95)
MONOCYTES RELATIVE PERCENT: 17.7 % (ref 2–12)
NEUTROPHILS ABSOLUTE: 2.51 E9/L (ref 1.8–7.3)
NEUTROPHILS RELATIVE PERCENT: 47.8 % (ref 43–80)
PDW BLD-RTO: 14.8 FL (ref 11.5–15)
PLATELET # BLD: 210 E9/L (ref 130–450)
PMV BLD AUTO: 11.4 FL (ref 7–12)
POTASSIUM REFLEX MAGNESIUM: 4.7 MMOL/L (ref 3.5–5)
RBC # BLD: 4.53 E12/L (ref 3.8–5.8)
SODIUM BLD-SCNC: 142 MMOL/L (ref 132–146)
TOTAL PROTEIN: 7.9 G/DL (ref 6.4–8.3)
WBC # BLD: 5.3 E9/L (ref 4.5–11.5)

## 2020-04-14 PROCEDURE — 83690 ASSAY OF LIPASE: CPT

## 2020-04-14 PROCEDURE — 83605 ASSAY OF LACTIC ACID: CPT

## 2020-04-14 PROCEDURE — 2580000003 HC RX 258: Performed by: EMERGENCY MEDICINE

## 2020-04-14 PROCEDURE — 6370000000 HC RX 637 (ALT 250 FOR IP): Performed by: EMERGENCY MEDICINE

## 2020-04-14 PROCEDURE — 80076 HEPATIC FUNCTION PANEL: CPT

## 2020-04-14 PROCEDURE — 85025 COMPLETE CBC W/AUTO DIFF WBC: CPT

## 2020-04-14 PROCEDURE — 99284 EMERGENCY DEPT VISIT MOD MDM: CPT

## 2020-04-14 PROCEDURE — 80048 BASIC METABOLIC PNL TOTAL CA: CPT

## 2020-04-14 RX ORDER — ACETAMINOPHEN 500 MG
1000 TABLET ORAL ONCE
Status: COMPLETED | OUTPATIENT
Start: 2020-04-14 | End: 2020-04-14

## 2020-04-14 RX ORDER — 0.9 % SODIUM CHLORIDE 0.9 %
1000 INTRAVENOUS SOLUTION INTRAVENOUS ONCE
Status: COMPLETED | OUTPATIENT
Start: 2020-04-14 | End: 2020-04-14

## 2020-04-14 RX ORDER — SODIUM CHLORIDE 0.9 % (FLUSH) 0.9 %
10 SYRINGE (ML) INJECTION ONCE
Status: DISCONTINUED | OUTPATIENT
Start: 2020-04-14 | End: 2020-04-14 | Stop reason: HOSPADM

## 2020-04-14 RX ADMIN — ACETAMINOPHEN 1000 MG: 500 TABLET ORAL at 06:39

## 2020-04-14 RX ADMIN — SODIUM CHLORIDE 1000 ML: 9 INJECTION, SOLUTION INTRAVENOUS at 06:38

## 2020-04-14 ASSESSMENT — ENCOUNTER SYMPTOMS
BLOOD IN STOOL: 0
SHORTNESS OF BREATH: 0
WHEEZING: 0
EYE DISCHARGE: 0
RHINORRHEA: 0
BACK PAIN: 0
EYE PAIN: 0
EYE REDNESS: 0
ABDOMINAL PAIN: 0
VOMITING: 0
CONSTIPATION: 1
SINUS PRESSURE: 0
NAUSEA: 0
DIARRHEA: 0
SORE THROAT: 0
ABDOMINAL DISTENTION: 0
COUGH: 0

## 2020-04-14 ASSESSMENT — PAIN DESCRIPTION - LOCATION: LOCATION: HEAD

## 2020-04-14 ASSESSMENT — PAIN SCALES - GENERAL
PAINLEVEL_OUTOF10: 4
PAINLEVEL_OUTOF10: 9

## 2020-04-14 ASSESSMENT — PAIN DESCRIPTION - DESCRIPTORS: DESCRIPTORS: SHARP

## 2020-04-14 ASSESSMENT — PAIN DESCRIPTION - PAIN TYPE: TYPE: ACUTE PAIN

## 2020-04-14 NOTE — ED NOTES
Attempted to draw blood cultures and vein infiltrated.  Pt refused further attempts at this time      Maya Xiao, [facility]  04/14/20 3954

## 2020-04-14 NOTE — ED PROVIDER NOTES
HPI  This is a 78-year-old male with a PMHx significant for TBI, hypertension, seizure disorder and pulmonary embolism who presents with 2 weeks of constant headache and approximately 4 weeks of constipation. Patient states that the headache came on slowly and has been intermittently present over this time period. He describes the pain as a dull ache localized to the top of his head. The patient states that his last normal bowel movement was approximately 7 days ago. He states that he cannot think of anything that makes his headache better or worse, \"it is just been there since it started. \"  He states that he is concerned about the headache because he has a history of TBI and spent approximately 2 weeks in a coma in 2007. He states that he has taken ibuprofen for his headache, but the last time was several days ago and he did not get any relief. The patient denies recent trauma, fever, chills, fatigue, dizziness, vision changes, congestion, rhinorrhea, sore throat, neck pain, chest pain, palpitations, hx of MI, LE edema, SOB, cough, hemoptysis, wheezing, abdominal pain, N/V/D, hematochezia, melena, dysuria, hematuria, generalized weakness and paresthesias. The patient is currently taking no blood thinners. The patient is a current everyday smoker. He states that he drinks alcohol socially, but denies all illicit drug use. The history is provided by the patient. Review of Systems   Constitutional: Negative for chills, diaphoresis, fatigue and fever. HENT: Negative for congestion, ear pain, postnasal drip, rhinorrhea, sinus pressure and sore throat. Eyes: Negative for pain, discharge and redness. Respiratory: Negative for cough, shortness of breath and wheezing. Cardiovascular: Negative for chest pain, palpitations and leg swelling. Gastrointestinal: Positive for constipation. Negative for abdominal distention, abdominal pain, blood in stool, diarrhea, nausea and vomiting. Genitourinary: Negative for difficulty urinating, dysuria, flank pain, frequency and hematuria. Musculoskeletal: Negative for arthralgias, back pain, myalgias and neck pain. Skin: Negative for rash and wound. Neurological: Positive for headaches. Negative for dizziness, syncope, weakness, light-headedness and numbness. Hematological: Negative for adenopathy. All other systems reviewed and are negative. Physical Exam  Vitals signs and nursing note reviewed. Constitutional:       General: He is not in acute distress. Appearance: He is well-developed. He is not ill-appearing or diaphoretic. HENT:      Head: Normocephalic and atraumatic. Right Ear: External ear normal.      Left Ear: External ear normal.      Nose: Nose normal.      Mouth/Throat:      Mouth: Mucous membranes are moist.      Pharynx: Oropharynx is clear. No oropharyngeal exudate or posterior oropharyngeal erythema. Eyes:      General: No scleral icterus. Extraocular Movements: Extraocular movements intact. Conjunctiva/sclera: Conjunctivae normal.      Pupils: Pupils are equal, round, and reactive to light. Neck:      Musculoskeletal: Normal range of motion and neck supple. No neck rigidity or muscular tenderness. Cardiovascular:      Rate and Rhythm: Normal rate and regular rhythm. Heart sounds: Normal heart sounds. No murmur. No friction rub. No gallop. Comments: Upper extremity lower extremity distal pulses intact bilaterally was 2/4  Pulmonary:      Effort: Pulmonary effort is normal. No respiratory distress. Breath sounds: Normal breath sounds. No wheezing, rhonchi or rales. Comments: Good air movement noted throughout  Chest:      Chest wall: No tenderness. Abdominal:      General: Bowel sounds are normal. There is no distension. Palpations: Abdomen is soft. There is no mass. Tenderness: There is no abdominal tenderness. There is no guarding or rebound.    Musculoskeletal: Normal range of motion. General: No tenderness or deformity. Right lower leg: No edema. Left lower leg: No edema. Lymphadenopathy:      Cervical: No cervical adenopathy. Skin:     General: Skin is warm and dry. Capillary Refill: Capillary refill takes less than 2 seconds. Findings: No erythema or rash. Neurological:      Mental Status: He is alert and oriented to person, place, and time. Cranial Nerves: No cranial nerve deficit. Sensory: No sensory deficit. Motor: No weakness. Coordination: Coordination normal.     Procedures:  No procedures performed. --------------------------------------------- PAST HISTORY ---------------------------------------------  Past Medical History:  has a past medical history of Headache and Hypertension. Past Surgical History:  has no past surgical history on file. Social History:  reports that he has been smoking cigarettes. He has a 4.60 pack-year smoking history. He has never used smokeless tobacco. He reports current alcohol use. He reports that he does not use drugs. Family History: family history is not on file. The patients home medications have been reviewed.     Allergies: Ace inhibitors and Fish-derived products    -------------------------------------------------- RESULTS -------------------------------------------------  Labs:  Results for orders placed or performed during the hospital encounter of 04/14/20   CBC Auto Differential   Result Value Ref Range    WBC 5.3 4.5 - 11.5 E9/L    RBC 4.53 3.80 - 5.80 E12/L    Hemoglobin 13.6 12.5 - 16.5 g/dL    Hematocrit 38.4 37.0 - 54.0 %    MCV 84.8 80.0 - 99.9 fL    MCH 30.0 26.0 - 35.0 pg    MCHC 35.4 (H) 32.0 - 34.5 %    RDW 14.8 11.5 - 15.0 fL    Platelets 398 948 - 794 E9/L    MPV 11.4 7.0 - 12.0 fL    Neutrophils % 47.8 43.0 - 80.0 %    Immature Granulocytes % 0.2 0.0 - 5.0 %    Lymphocytes % 33.7 20.0 - 42.0 %    Monocytes % 17.7 (H) 2.0 - 12.0 % Eosinophils % 0.0 0.0 - 6.0 %    Basophils % 0.6 0.0 - 2.0 %    Neutrophils Absolute 2.51 1.80 - 7.30 E9/L    Immature Granulocytes # 0.01 E9/L    Lymphocytes Absolute 1.77 1.50 - 4.00 E9/L    Monocytes Absolute 0.93 0.10 - 0.95 E9/L    Eosinophils Absolute 0.00 (L) 0.05 - 0.50 E9/L    Basophils Absolute 0.03 0.00 - 0.20 E9/L   Lipase   Result Value Ref Range    Lipase 75 (H) 13 - 60 U/L   Lactic Acid, Plasma   Result Value Ref Range    Lactic Acid 1.4 0.5 - 2.2 mmol/L   Basic Metabolic Panel w/ Reflex to MG   Result Value Ref Range    Sodium 142 132 - 146 mmol/L    Potassium reflex Magnesium 4.7 3.5 - 5.0 mmol/L    Chloride 104 98 - 107 mmol/L    CO2 24 22 - 29 mmol/L    Anion Gap 14 7 - 16 mmol/L    Glucose 79 74 - 99 mg/dL    BUN 21 (H) 6 - 20 mg/dL    CREATININE 1.1 0.7 - 1.2 mg/dL    GFR Non-African American >60 >=60 mL/min/1.73    GFR African American >60     Calcium 8.9 8.6 - 10.2 mg/dL   Hepatic Function Panel   Result Value Ref Range    Total Protein 7.9 6.4 - 8.3 g/dL    Alb 4.4 3.5 - 5.2 g/dL    Alkaline Phosphatase 43 40 - 129 U/L    ALT 38 0 - 40 U/L     (H) 0 - 39 U/L    Total Bilirubin 0.5 0.0 - 1.2 mg/dL    Bilirubin, Direct <0.2 0.0 - 0.3 mg/dL    Bilirubin, Indirect see below 0.0 - 1.0 mg/dL       Radiology:  No results found.    ------------------------- NURSING NOTES AND VITALS REVIEWED ---------------------------  Date / Time Roomed:  4/14/2020  5:57 AM  ED Bed Assignment:  15/15    The nursing notes within the ED encounter and vital signs as below have been reviewed. BP (!) 142/73   Pulse 87   Temp 99.1 °F (37.3 °C)   Resp 16   Ht 5' 8\" (1.727 m)   Wt 147 lb (66.7 kg)   SpO2 99%   BMI 22.35 kg/m²   Oxygen Saturation Interpretation: Normal    --------------------------------- ADDITIONAL PROVIDER NOTES ---------------------------------  ED Course as of Apr 14 0859   Tue Apr 14, 2020   0617 This resident in to evaluate the patient.     [ML]   0896 Patient informed me that he wants to leave AMA. The risks of doing so were explained to him up to and including death. The patient verbalized an understanding and agreement with this. I informed him that, should his condition change, he can always come back to the ER to be evaluated. I also recommended that he follow-up with his PCP in 2 to 3 days. [ML]      ED Course User Index  [ML] Jame Gamboa, DO     I have spoken with the patient and discussed todays availabe results to this point, in addition to providing specific details for the plan of care and counseling regarding the diagnosis and prognosis. Their questions are answered, however they are not agreeable to admission. This patient has chosen to leave against medical advice. I have personally explained to them that choosing to do so may result in permanent bodily harm or death. I discussed at length that without further evaluation and monitoring there may be unforeseen circumstances and deterioration resulting in permanent bodily harm or death as a result of their choice. They are alert, oriented, and competent at this time. They state that they are aware of the serious risks as explained, but they continue to wish to leave against medical   advice. In light of their decision to leave against medical advice, follow-up has been arranged and they are aware of the importance of following up as instructed. They have been advised that they should return to the ED immediately if they change their mind at any time, or if their condition begins to change or worsen. The follow up plan has been discussed in detail and they are aware of the specific conditions for emergent return, as well as the importance of follow-up. MDM:  Patient presented from home with 2 weeks of a headache and 4 weeks of constipation. The patient states that his last bowel movement was approximately 7 days ago.   On arrival, he was mildly hypertensive with remaining vital signs within normal

## 2020-05-06 ENCOUNTER — HOSPITAL ENCOUNTER (EMERGENCY)
Age: 55
Discharge: HOME | End: 2020-05-06
Attending: EMERGENCY MEDICINE
Payer: COMMERCIAL

## 2020-05-06 VITALS
HEIGHT: 68 IN | TEMPERATURE: 98.5 F | BODY MASS INDEX: 24.25 KG/M2 | SYSTOLIC BLOOD PRESSURE: 177 MMHG | WEIGHT: 160 LBS | HEART RATE: 84 BPM | DIASTOLIC BLOOD PRESSURE: 96 MMHG | RESPIRATION RATE: 17 BRPM | OXYGEN SATURATION: 95 %

## 2020-05-06 LAB
EKG ATRIAL RATE: 75 BPM
EKG P AXIS: 29 DEGREES
EKG P-R INTERVAL: 122 MS
EKG Q-T INTERVAL: 414 MS
EKG QRS DURATION: 92 MS
EKG QTC CALCULATION (BAZETT): 462 MS
EKG R AXIS: -26 DEGREES
EKG T AXIS: 44 DEGREES
EKG VENTRICULAR RATE: 75 BPM

## 2020-05-06 PROCEDURE — 6370000000 HC RX 637 (ALT 250 FOR IP): Performed by: EMERGENCY MEDICINE

## 2020-05-06 PROCEDURE — 93005 ELECTROCARDIOGRAM TRACING: CPT | Performed by: EMERGENCY MEDICINE

## 2020-05-06 PROCEDURE — 93010 ELECTROCARDIOGRAM REPORT: CPT | Performed by: INTERNAL MEDICINE

## 2020-05-06 PROCEDURE — 99283 EMERGENCY DEPT VISIT LOW MDM: CPT

## 2020-05-06 RX ORDER — LEVETIRACETAM 500 MG/1
500 TABLET ORAL ONCE
Status: COMPLETED | OUTPATIENT
Start: 2020-05-06 | End: 2020-05-06

## 2020-05-06 RX ORDER — AMLODIPINE BESYLATE 5 MG/1
10 TABLET ORAL ONCE
Status: COMPLETED | OUTPATIENT
Start: 2020-05-06 | End: 2020-05-06

## 2020-05-06 RX ORDER — HYDRALAZINE HYDROCHLORIDE 25 MG/1
25 TABLET, FILM COATED ORAL ONCE
Status: COMPLETED | OUTPATIENT
Start: 2020-05-06 | End: 2020-05-06

## 2020-05-06 RX ADMIN — HYDRALAZINE HYDROCHLORIDE 25 MG: 25 TABLET, FILM COATED ORAL at 11:39

## 2020-05-06 RX ADMIN — AMLODIPINE BESYLATE 10 MG: 5 TABLET ORAL at 11:39

## 2020-05-06 RX ADMIN — LEVETIRACETAM 500 MG: 500 TABLET ORAL at 11:43

## 2020-05-06 ASSESSMENT — ENCOUNTER SYMPTOMS
WHEEZING: 0
NAUSEA: 0
SHORTNESS OF BREATH: 0
DIARRHEA: 0
EYE PAIN: 0
VOMITING: 0
EYE REDNESS: 0
SINUS PRESSURE: 0
EYE DISCHARGE: 0
ABDOMINAL PAIN: 0
COUGH: 0
SORE THROAT: 0
BACK PAIN: 0

## 2020-05-06 NOTE — ED PROVIDER NOTES
Patient is a 64-year-old male with past medical history of hypertension, PE, seizures presenting to the emergency department for hypertension. Symptoms mild in severity. Patient was going to senior living today and they refused him at the senior living secondary to hypertension. Patient has no complaints in the room. He states he did not take his medications today. He is on 2 different hypertension medications at home. He states his blood pressure is usually controlled when he is able to take his meds. He denies any other complaints. No headache, no blurry vision, changes in vision, numbness, tingling, chest pain, shortness of breath, diarrhea, constipation. He has not had decreased urination. He is on nothing at home for symptoms. Taking his medications makes his symptoms better, not taking his medications makes them worse. Review of Systems   Constitutional: Negative for chills and fever. HENT: Negative for ear pain, sinus pressure and sore throat. Eyes: Negative for pain, discharge and redness. Respiratory: Negative for cough, shortness of breath and wheezing. Cardiovascular: Negative for chest pain. Gastrointestinal: Negative for abdominal pain, diarrhea, nausea and vomiting. Genitourinary: Negative for dysuria and frequency. Musculoskeletal: Negative for arthralgias and back pain. Skin: Negative for rash and wound. Neurological: Negative for dizziness, weakness, numbness and headaches. Hematological: Negative for adenopathy. All other systems reviewed and are negative. Physical Exam  Vitals signs and nursing note reviewed. Constitutional:       General: He is not in acute distress. Appearance: He is well-developed. He is not ill-appearing. HENT:      Head: Normocephalic and atraumatic. Eyes:      Extraocular Movements: Extraocular movements intact. Pupils: Pupils are equal, round, and reactive to light.    Neck:      Musculoskeletal: Normal range of motion and

## 2021-05-07 ENCOUNTER — HOSPITAL ENCOUNTER (EMERGENCY)
Age: 56
Discharge: HOME | End: 2021-05-07
Attending: EMERGENCY MEDICINE
Payer: COMMERCIAL

## 2021-05-07 ENCOUNTER — APPOINTMENT (OUTPATIENT)
Dept: CT IMAGING | Age: 56
End: 2021-05-07
Payer: COMMERCIAL

## 2021-05-07 VITALS
HEIGHT: 68 IN | TEMPERATURE: 97.3 F | DIASTOLIC BLOOD PRESSURE: 80 MMHG | OXYGEN SATURATION: 98 % | HEART RATE: 68 BPM | RESPIRATION RATE: 18 BRPM | SYSTOLIC BLOOD PRESSURE: 140 MMHG | BODY MASS INDEX: 24.25 KG/M2 | WEIGHT: 160 LBS

## 2021-05-07 DIAGNOSIS — R51.9 NONINTRACTABLE EPISODIC HEADACHE, UNSPECIFIED HEADACHE TYPE: Primary | ICD-10-CM

## 2021-05-07 LAB
ANION GAP SERPL CALCULATED.3IONS-SCNC: 15 MMOL/L (ref 7–16)
BASOPHILS ABSOLUTE: 0.05 E9/L (ref 0–0.2)
BASOPHILS RELATIVE PERCENT: 0.6 % (ref 0–2)
BUN BLDV-MCNC: 12 MG/DL (ref 6–20)
CALCIUM SERPL-MCNC: 9.4 MG/DL (ref 8.6–10.2)
CHLORIDE BLD-SCNC: 99 MMOL/L (ref 98–107)
CO2: 25 MMOL/L (ref 22–29)
CREAT SERPL-MCNC: 1 MG/DL (ref 0.7–1.2)
EOSINOPHILS ABSOLUTE: 0.07 E9/L (ref 0.05–0.5)
EOSINOPHILS RELATIVE PERCENT: 0.8 % (ref 0–6)
GFR AFRICAN AMERICAN: >60
GFR NON-AFRICAN AMERICAN: >60 ML/MIN/1.73
GLUCOSE BLD-MCNC: 105 MG/DL (ref 74–99)
HCT VFR BLD CALC: 42.2 % (ref 37–54)
HEMOGLOBIN: 15.4 G/DL (ref 12.5–16.5)
IMMATURE GRANULOCYTES #: 0.02 E9/L
IMMATURE GRANULOCYTES %: 0.2 % (ref 0–5)
LYMPHOCYTES ABSOLUTE: 1.7 E9/L (ref 1.5–4)
LYMPHOCYTES RELATIVE PERCENT: 20.5 % (ref 20–42)
MCH RBC QN AUTO: 29.8 PG (ref 26–35)
MCHC RBC AUTO-ENTMCNC: 36.5 % (ref 32–34.5)
MCV RBC AUTO: 81.6 FL (ref 80–99.9)
MONOCYTES ABSOLUTE: 0.53 E9/L (ref 0.1–0.95)
MONOCYTES RELATIVE PERCENT: 6.4 % (ref 2–12)
NEUTROPHILS ABSOLUTE: 5.91 E9/L (ref 1.8–7.3)
NEUTROPHILS RELATIVE PERCENT: 71.5 % (ref 43–80)
PDW BLD-RTO: 14.6 FL (ref 11.5–15)
PLATELET # BLD: 255 E9/L (ref 130–450)
PMV BLD AUTO: 10.4 FL (ref 7–12)
POTASSIUM REFLEX MAGNESIUM: 3.9 MMOL/L (ref 3.5–5)
RBC # BLD: 5.17 E12/L (ref 3.8–5.8)
SODIUM BLD-SCNC: 139 MMOL/L (ref 132–146)
WBC # BLD: 8.3 E9/L (ref 4.5–11.5)

## 2021-05-07 PROCEDURE — 2580000003 HC RX 258: Performed by: EMERGENCY MEDICINE

## 2021-05-07 PROCEDURE — 96375 TX/PRO/DX INJ NEW DRUG ADDON: CPT

## 2021-05-07 PROCEDURE — 80048 BASIC METABOLIC PNL TOTAL CA: CPT

## 2021-05-07 PROCEDURE — 99284 EMERGENCY DEPT VISIT MOD MDM: CPT

## 2021-05-07 PROCEDURE — 70450 CT HEAD/BRAIN W/O DYE: CPT

## 2021-05-07 PROCEDURE — 6360000002 HC RX W HCPCS: Performed by: EMERGENCY MEDICINE

## 2021-05-07 PROCEDURE — 36415 COLL VENOUS BLD VENIPUNCTURE: CPT

## 2021-05-07 PROCEDURE — 85025 COMPLETE CBC W/AUTO DIFF WBC: CPT

## 2021-05-07 PROCEDURE — 96374 THER/PROPH/DIAG INJ IV PUSH: CPT

## 2021-05-07 RX ORDER — 0.9 % SODIUM CHLORIDE 0.9 %
1000 INTRAVENOUS SOLUTION INTRAVENOUS ONCE
Status: COMPLETED | OUTPATIENT
Start: 2021-05-07 | End: 2021-05-07

## 2021-05-07 RX ORDER — METOCLOPRAMIDE HYDROCHLORIDE 5 MG/ML
10 INJECTION INTRAMUSCULAR; INTRAVENOUS ONCE
Status: COMPLETED | OUTPATIENT
Start: 2021-05-07 | End: 2021-05-07

## 2021-05-07 RX ORDER — DIPHENHYDRAMINE HYDROCHLORIDE 50 MG/ML
25 INJECTION INTRAMUSCULAR; INTRAVENOUS ONCE
Status: COMPLETED | OUTPATIENT
Start: 2021-05-07 | End: 2021-05-07

## 2021-05-07 RX ORDER — DEXAMETHASONE SODIUM PHOSPHATE 10 MG/ML
10 INJECTION INTRAMUSCULAR; INTRAVENOUS ONCE
Status: COMPLETED | OUTPATIENT
Start: 2021-05-07 | End: 2021-05-07

## 2021-05-07 RX ORDER — KETOROLAC TROMETHAMINE 30 MG/ML
15 INJECTION, SOLUTION INTRAMUSCULAR; INTRAVENOUS ONCE
Status: COMPLETED | OUTPATIENT
Start: 2021-05-07 | End: 2021-05-07

## 2021-05-07 RX ADMIN — DEXAMETHASONE SODIUM PHOSPHATE 10 MG: 10 INJECTION INTRAMUSCULAR; INTRAVENOUS at 08:17

## 2021-05-07 RX ADMIN — SODIUM CHLORIDE 1000 ML: 9 INJECTION, SOLUTION INTRAVENOUS at 08:17

## 2021-05-07 RX ADMIN — METOCLOPRAMIDE HYDROCHLORIDE 10 MG: 5 INJECTION INTRAMUSCULAR; INTRAVENOUS at 08:14

## 2021-05-07 RX ADMIN — DIPHENHYDRAMINE HYDROCHLORIDE 25 MG: 50 INJECTION, SOLUTION INTRAMUSCULAR; INTRAVENOUS at 08:17

## 2021-05-07 RX ADMIN — KETOROLAC TROMETHAMINE 15 MG: 30 INJECTION, SOLUTION INTRAMUSCULAR; INTRAVENOUS at 08:17

## 2021-05-07 ASSESSMENT — PAIN SCALES - GENERAL
PAINLEVEL_OUTOF10: 10
PAINLEVEL_OUTOF10: 10

## 2021-05-07 ASSESSMENT — PAIN DESCRIPTION - DESCRIPTORS: DESCRIPTORS: OTHER (COMMENT)

## 2021-05-07 NOTE — ED PROVIDER NOTES
Department of Emergency Medicine   ED  Provider Note  Admit Date/RoomTime: 5/7/2021  7:55 AM  ED Room: 24/24          History of Present Illness:  5/7/21, Time: 7:57 AM EDT  Chief Complaint   Patient presents with    Other     states that he is feeling drowsy x 2 wks was asleep in waiting room had to wake him several time     Headache     x2 wks he denies injury states that it feels like people who have alzhimers. pt denies visual issses as well                 Edward Camacho is a 60 y.o. male presenting to the ED for headache and fatigue, beginning over 2 weeks ago. The complaint has been intermittent, mild in severity, and worsened by nothing. Patient states that over the past 2 weeks he has felt somewhat fatigued, more recently over the past few days he has been having a diffuse headache described as dull pressure with no radiation, no exacerbating or remitting factors. He denies any fevers, no neck or back pain. He denies any recent fall or trauma. There has been no cough, nausea/vomiting/diarrhea, or dysuria. Review of Systems:   Pertinent positives and negatives are stated within HPI, all other systems reviewed and are negative.        --------------------------------------------- PAST HISTORY ---------------------------------------------  Past Medical History:  has a past medical history of Headache and Hypertension. Past Surgical History:  has no past surgical history on file. Social History:  reports that he has been smoking cigarettes. He has a 4.60 pack-year smoking history. He has never used smokeless tobacco. He reports current alcohol use. He reports that he does not use drugs. Family History: family history is not on file. . Unless otherwise noted, family history is non contributory    The patients home medications have been reviewed.     Allergies: Ace inhibitors and Fish-derived products        ---------------------------------------------------PHYSICAL EXAM--------------------------------------    Constitutional/General: Alert and oriented x3  Head: Normocephalic and atraumatic  Eyes: PERRL, EOMI, sclera non icteric  Mouth: Oropharynx clear, handling secretions, no trismus, no asymmetry of the posterior oropharynx or uvular edema  Neck: Supple, full ROM, no stridor, no meningeal signs  Respiratory: Lungs clear to auscultation bilaterally, no wheezes, rales, or rhonchi. Not in respiratory distress  Cardiovascular:  Regular rate. Regular rhythm. No murmurs, no aortic murmurs, no gallops, or rubs. 2+ distal pulses. Equal extremity pulses. Chest: No chest wall tenderness  GI:  Abdomen Soft, Non tender, Non distended. No rebound, guarding, or rigidity. No pulsatile masses. Musculoskeletal: Moves all extremities x 4. Warm and well perfused, no clubbing, cyanosis, or edema. Capillary refill <3 seconds  Integument: skin warm and dry. No rashes. Neurologic: GCS 15, no focal deficits, symmetric strength 5/5 in the upper and lower extremities bilaterally  Psychiatric: Normal Affect          -------------------------------------------------- RESULTS -------------------------------------------------  I have personally reviewed all laboratory and imaging results for this patient. Results are listed below.      LABS: (Lab results interpreted by me)  Results for orders placed or performed during the hospital encounter of 05/07/21   CBC Auto Differential   Result Value Ref Range    WBC 8.3 4.5 - 11.5 E9/L    RBC 5.17 3.80 - 5.80 E12/L    Hemoglobin 15.4 12.5 - 16.5 g/dL    Hematocrit 42.2 37.0 - 54.0 %    MCV 81.6 80.0 - 99.9 fL    MCH 29.8 26.0 - 35.0 pg    MCHC 36.5 (H) 32.0 - 34.5 %    RDW 14.6 11.5 - 15.0 fL    Platelets 408 657 - 589 E9/L    MPV 10.4 7.0 - 12.0 fL    Neutrophils % 71.5 43.0 - 80.0 %    Immature Granulocytes % 0.2 0.0 - 5.0 %    Lymphocytes % 20.5 20.0 - 42.0 %    Monocytes % 6.4 2.0 - 12.0 %    Eosinophils % 0.8 0.0 - 6.0 %    Basophils % 0.6 0.0 - 2.0 % Neutrophils Absolute 5.91 1.80 - 7.30 E9/L    Immature Granulocytes # 0.02 E9/L    Lymphocytes Absolute 1.70 1.50 - 4.00 E9/L    Monocytes Absolute 0.53 0.10 - 0.95 E9/L    Eosinophils Absolute 0.07 0.05 - 0.50 E9/L    Basophils Absolute 0.05 0.00 - 0.20 D1/W   Basic Metabolic Panel w/ Reflex to MG   Result Value Ref Range    Sodium 139 132 - 146 mmol/L    Potassium reflex Magnesium 3.9 3.5 - 5.0 mmol/L    Chloride 99 98 - 107 mmol/L    CO2 25 22 - 29 mmol/L    Anion Gap 15 7 - 16 mmol/L    Glucose 105 (H) 74 - 99 mg/dL    BUN 12 6 - 20 mg/dL    CREATININE 1.0 0.7 - 1.2 mg/dL    GFR Non-African American >60 >=60 mL/min/1.73    GFR African American >60     Calcium 9.4 8.6 - 10.2 mg/dL   ,       RADIOLOGY:  Interpreted by Radiologist unless otherwise specified  CT Head WO Contrast   Final Result   No change in left temporal lobe encephalomalacia. No intracranial hemorrhage.                   ------------------------- NURSING NOTES AND VITALS REVIEWED ---------------------------   The nursing notes within the ED encounter and vital signs as below have been reviewed by myself  BP (!) 140/80   Pulse 68   Temp 97.3 °F (36.3 °C) (Temporal)   Resp 18   Ht 5' 8\" (1.727 m)   Wt 160 lb (72.6 kg)   SpO2 98%   BMI 24.33 kg/m²     Oxygen Saturation Interpretation: Normal    The patients available past medical records and past encounters were reviewed.         ------------------------------ ED COURSE/MEDICAL DECISION MAKING----------------------  Medications   metoclopramide (REGLAN) injection 10 mg (10 mg Intravenous Given 5/7/21 0814)   diphenhydrAMINE (BENADRYL) injection 25 mg (25 mg Intravenous Given 5/7/21 0817)   ketorolac (TORADOL) injection 15 mg (15 mg Intravenous Given 5/7/21 0817)   0.9 % sodium chloride bolus (0 mLs Intravenous Stopped 5/7/21 0952)   dexamethasone (DECADRON) injection 10 mg (10 mg Intravenous Given 5/7/21 0817)           The cardiac monitor revealed sinus rhythm with a heart rate in the 70s as interpreted by me. The cardiac monitor was ordered secondary to the patient's chest pain and to monitor for patient for dysrhythmia. CPT [id number]           Medical Decision Making:     I, Dr. Parminder Fajardo, am the primary provider of record    49-year-old male presenting with headache and fatigue. He arrives hemodynamically stable and well-appearing, neurologically intact. Metabolic panel is within acceptable limits, no leukocytosis or anemia. CT head shows no acute abnormality. Patient was given migraine cocktail including IV fluids with Reglan and Benadryl, Toradol and Decadron. Reassessment showed resolution of headache. Patient is stating he is feeling better, ambulating without difficulty. He is comfortable discharge home and PCP follow-up. Instruction to return for any changes in condition or new symptoms. Re-Evaluations:  ED Course as of May 07 1626   Fri May 07, 2021   1025 Patient is ambulating to bathroom without difficulty    [PB]      ED Course User Index  [PB] Parminder Fajardo DO             This patient's ED course included: a personal history and physicial examination, re-evaluation prior to disposition and IV medications    This patient has remained hemodynamically stable, improved and been closely monitored during their ED course. Counseling: The emergency provider has spoken with the patient and discussed todays results, in addition to providing specific details for the plan of care and counseling regarding the diagnosis and prognosis. Questions are answered at this time and they are agreeable with the plan.       --------------------------------- IMPRESSION AND DISPOSITION ---------------------------------    IMPRESSION  1. Nonintractable episodic headache, unspecified headache type        DISPOSITION  Disposition: Discharge to home  Patient condition is stable        NOTE: This report was transcribed using voice recognition software.  Every effort was made to ensure

## 2021-05-07 NOTE — LETTER
[facility]   [address]  [place], [address]      May 10, 2021    [address]  [place] [id number]      Dear Lora Todd,    This letter is regarding your Emergency Department (ED) visit at [facility] Emergency Department on 5/7/21. Melinda Mon wanted to make sure that you understand your discharge instructions and that you were able to fill any prescriptions that may have been ordered for you. Please contact the office at \"[phone]  if the ED advised to you follow up with Melinda Mon, or if you have any further questions or needs. Also did you know -   *Visiting the ED for a non-emergency could result in higher co-pays than you would normally be subject to paying? *You can call your doctor even after hours so they can direct you to the most appropriate care. [facility]) practices can often offer you an appointment on the same day that you call. Many [address]  appointments; check our website for availability in your community , [email or web address]    Evisits are now available for patients for $36 through [employer] for certain conditions:  * Sinus, cold and or cough       * Diarrhea            * Headache  * Heartburn                                * Poison Aliza          * Back pain     * Urinary problems                         Sincerely,     Dara Hawkins MD and your [facility]

## 2021-05-11 ENCOUNTER — TELEPHONE (OUTPATIENT)
Dept: INTERNAL MEDICINE | Age: 56
End: 2021-05-11

## 2021-05-11 NOTE — TELEPHONE ENCOUNTER
[place] [facility] ([facility]) ED Follow up Call    Reason for ED visit:  nonintractable headache                FU appts/Provider:    No future appointments. VOICEMAIL DOCUMENTATION - ERASE IF NOT USED  Hi, this message is for  Mariann Cope   This is Lilly Monk  from [employer] office. Just calling to see how you are doing after your recent visit to the Emergency Room. [facility] wants to make sure you were able to fill any prescriptions and that you understand your discharge instructions. Please return our call if you need to make a follow up appointment with your provider or have any further needs. Our phone number is [phone] Have a great day.

## 2021-05-16 ENCOUNTER — APPOINTMENT (OUTPATIENT)
Dept: GENERAL RADIOLOGY | Age: 56
End: 2021-05-16
Payer: COMMERCIAL

## 2021-05-16 ENCOUNTER — HOSPITAL ENCOUNTER (EMERGENCY)
Age: 56
Discharge: HOME | End: 2021-05-16
Attending: EMERGENCY MEDICINE
Payer: COMMERCIAL

## 2021-05-16 ENCOUNTER — APPOINTMENT (OUTPATIENT)
Dept: CT IMAGING | Age: 56
End: 2021-05-16
Payer: COMMERCIAL

## 2021-05-16 VITALS
DIASTOLIC BLOOD PRESSURE: 80 MMHG | OXYGEN SATURATION: 97 % | HEART RATE: 80 BPM | TEMPERATURE: 98.1 F | SYSTOLIC BLOOD PRESSURE: 130 MMHG | RESPIRATION RATE: 18 BRPM

## 2021-05-16 DIAGNOSIS — R91.8 LUNG MASS: ICD-10-CM

## 2021-05-16 DIAGNOSIS — R51.9 ACUTE NONINTRACTABLE HEADACHE, UNSPECIFIED HEADACHE TYPE: Primary | ICD-10-CM

## 2021-05-16 DIAGNOSIS — G40.919 BREAKTHROUGH SEIZURE (HCC): ICD-10-CM

## 2021-05-16 LAB
ACETAMINOPHEN LEVEL: <5 MCG/ML (ref 10–30)
ALBUMIN SERPL-MCNC: 3.9 G/DL (ref 3.5–5.2)
ALP BLD-CCNC: 63 U/L (ref 40–129)
ALT SERPL-CCNC: 74 U/L (ref 0–40)
AMPHETAMINE SCREEN, URINE: NOT DETECTED
ANION GAP SERPL CALCULATED.3IONS-SCNC: 38 MMOL/L (ref 7–16)
AST SERPL-CCNC: 263 U/L (ref 0–39)
BACTERIA: ABNORMAL /HPF
BARBITURATE SCREEN URINE: NOT DETECTED
BASOPHILS ABSOLUTE: 0.04 E9/L (ref 0–0.2)
BASOPHILS RELATIVE PERCENT: 0.4 % (ref 0–2)
BENZODIAZEPINE SCREEN, URINE: NOT DETECTED
BILIRUB SERPL-MCNC: 1.6 MG/DL (ref 0–1.2)
BILIRUBIN URINE: NEGATIVE
BLOOD, URINE: ABNORMAL
BUN BLDV-MCNC: 20 MG/DL (ref 6–20)
CALCIUM SERPL-MCNC: 8.4 MG/DL (ref 8.6–10.2)
CANNABINOID SCREEN URINE: NOT DETECTED
CHLORIDE BLD-SCNC: 88 MMOL/L (ref 98–107)
CLARITY: CLEAR
CO2: 13 MMOL/L (ref 22–29)
COCAINE METABOLITE SCREEN URINE: NOT DETECTED
COLOR: YELLOW
CREAT SERPL-MCNC: 1.2 MG/DL (ref 0.7–1.2)
EKG ATRIAL RATE: 107 BPM
EKG P AXIS: 66 DEGREES
EKG P-R INTERVAL: 124 MS
EKG Q-T INTERVAL: 382 MS
EKG QRS DURATION: 96 MS
EKG QTC CALCULATION (BAZETT): 509 MS
EKG R AXIS: -24 DEGREES
EKG T AXIS: 52 DEGREES
EKG VENTRICULAR RATE: 107 BPM
EOSINOPHILS ABSOLUTE: 0 E9/L (ref 0.05–0.5)
EOSINOPHILS RELATIVE PERCENT: 0 % (ref 0–6)
ETHANOL: 66 MG/DL (ref 0–0.08)
FENTANYL SCREEN, URINE: NOT DETECTED
GFR AFRICAN AMERICAN: >60
GFR NON-AFRICAN AMERICAN: >60 ML/MIN/1.73
GLUCOSE BLD-MCNC: 108 MG/DL (ref 74–99)
GLUCOSE URINE: NEGATIVE MG/DL
HCT VFR BLD CALC: 46.9 % (ref 37–54)
HEMOGLOBIN: 16.7 G/DL (ref 12.5–16.5)
IMMATURE GRANULOCYTES #: 0.05 E9/L
IMMATURE GRANULOCYTES %: 0.5 % (ref 0–5)
KETONES, URINE: NEGATIVE MG/DL
LEUKOCYTE ESTERASE, URINE: NEGATIVE
LYMPHOCYTES ABSOLUTE: 2.36 E9/L (ref 1.5–4)
LYMPHOCYTES RELATIVE PERCENT: 22.7 % (ref 20–42)
Lab: NORMAL
MCH RBC QN AUTO: 29.7 PG (ref 26–35)
MCHC RBC AUTO-ENTMCNC: 35.6 % (ref 32–34.5)
MCV RBC AUTO: 83.5 FL (ref 80–99.9)
METHADONE SCREEN, URINE: NOT DETECTED
MONOCYTES ABSOLUTE: 1.24 E9/L (ref 0.1–0.95)
MONOCYTES RELATIVE PERCENT: 11.9 % (ref 2–12)
NEUTROPHILS ABSOLUTE: 6.7 E9/L (ref 1.8–7.3)
NEUTROPHILS RELATIVE PERCENT: 64.5 % (ref 43–80)
NITRITE, URINE: NEGATIVE
OPIATE SCREEN URINE: NOT DETECTED
OXYCODONE URINE: NOT DETECTED
PDW BLD-RTO: 15.9 FL (ref 11.5–15)
PH UA: 6 (ref 5–9)
PHENCYCLIDINE SCREEN URINE: NOT DETECTED
PLATELET # BLD: 156 E9/L (ref 130–450)
PMV BLD AUTO: 11.2 FL (ref 7–12)
POTASSIUM SERPL-SCNC: 3.5 MMOL/L (ref 3.5–5)
PROTEIN UA: 100 MG/DL
RBC # BLD: 5.62 E12/L (ref 3.8–5.8)
RBC UA: ABNORMAL /HPF (ref 0–2)
SALICYLATE, SERUM: <0.3 MG/DL (ref 0–30)
SODIUM BLD-SCNC: 139 MMOL/L (ref 132–146)
SPECIFIC GRAVITY UA: 1.02 (ref 1–1.03)
TOTAL PROTEIN: 7.4 G/DL (ref 6.4–8.3)
TRICYCLIC ANTIDEPRESSANTS SCREEN SERUM: NEGATIVE NG/ML
UROBILINOGEN, URINE: 2 E.U./DL
WBC # BLD: 10.4 E9/L (ref 4.5–11.5)
WBC UA: ABNORMAL /HPF (ref 0–5)

## 2021-05-16 PROCEDURE — 93005 ELECTROCARDIOGRAM TRACING: CPT | Performed by: EMERGENCY MEDICINE

## 2021-05-16 PROCEDURE — 70450 CT HEAD/BRAIN W/O DYE: CPT

## 2021-05-16 PROCEDURE — 6360000002 HC RX W HCPCS: Performed by: EMERGENCY MEDICINE

## 2021-05-16 PROCEDURE — 36415 COLL VENOUS BLD VENIPUNCTURE: CPT

## 2021-05-16 PROCEDURE — 6360000002 HC RX W HCPCS

## 2021-05-16 PROCEDURE — 6370000000 HC RX 637 (ALT 250 FOR IP): Performed by: EMERGENCY MEDICINE

## 2021-05-16 PROCEDURE — 96375 TX/PRO/DX INJ NEW DRUG ADDON: CPT

## 2021-05-16 PROCEDURE — 80307 DRUG TEST PRSMV CHEM ANLYZR: CPT

## 2021-05-16 PROCEDURE — 71045 X-RAY EXAM CHEST 1 VIEW: CPT

## 2021-05-16 PROCEDURE — 80053 COMPREHEN METABOLIC PANEL: CPT

## 2021-05-16 PROCEDURE — 96365 THER/PROPH/DIAG IV INF INIT: CPT

## 2021-05-16 PROCEDURE — 2580000003 HC RX 258: Performed by: EMERGENCY MEDICINE

## 2021-05-16 PROCEDURE — 80143 DRUG ASSAY ACETAMINOPHEN: CPT

## 2021-05-16 PROCEDURE — 80179 DRUG ASSAY SALICYLATE: CPT

## 2021-05-16 PROCEDURE — 81001 URINALYSIS AUTO W/SCOPE: CPT

## 2021-05-16 PROCEDURE — 99285 EMERGENCY DEPT VISIT HI MDM: CPT

## 2021-05-16 PROCEDURE — 85025 COMPLETE CBC W/AUTO DIFF WBC: CPT

## 2021-05-16 PROCEDURE — 93010 ELECTROCARDIOGRAM REPORT: CPT | Performed by: INTERNAL MEDICINE

## 2021-05-16 PROCEDURE — 82077 ASSAY SPEC XCP UR&BREATH IA: CPT

## 2021-05-16 RX ORDER — LORAZEPAM 2 MG/ML
2 INJECTION INTRAMUSCULAR ONCE
Status: COMPLETED | OUTPATIENT
Start: 2021-05-16 | End: 2021-05-16

## 2021-05-16 RX ORDER — LORAZEPAM 2 MG/ML
INJECTION INTRAMUSCULAR
Status: COMPLETED
Start: 2021-05-16 | End: 2021-05-16

## 2021-05-16 RX ORDER — SODIUM CHLORIDE 9 MG/ML
INJECTION, SOLUTION INTRAVENOUS CONTINUOUS
Status: DISCONTINUED | OUTPATIENT
Start: 2021-05-16 | End: 2021-05-16 | Stop reason: HOSPADM

## 2021-05-16 RX ORDER — AMLODIPINE BESYLATE 5 MG/1
10 TABLET ORAL ONCE
Status: COMPLETED | OUTPATIENT
Start: 2021-05-16 | End: 2021-05-16

## 2021-05-16 RX ORDER — METOCLOPRAMIDE HYDROCHLORIDE 5 MG/ML
10 INJECTION INTRAMUSCULAR; INTRAVENOUS ONCE
Status: COMPLETED | OUTPATIENT
Start: 2021-05-16 | End: 2021-05-16

## 2021-05-16 RX ORDER — LEVETIRACETAM 10 MG/ML
1000 INJECTION INTRAVASCULAR ONCE
Status: COMPLETED | OUTPATIENT
Start: 2021-05-16 | End: 2021-05-16

## 2021-05-16 RX ORDER — DIPHENHYDRAMINE HYDROCHLORIDE 50 MG/ML
25 INJECTION INTRAMUSCULAR; INTRAVENOUS ONCE
Status: COMPLETED | OUTPATIENT
Start: 2021-05-16 | End: 2021-05-16

## 2021-05-16 RX ORDER — KETOROLAC TROMETHAMINE 30 MG/ML
15 INJECTION, SOLUTION INTRAMUSCULAR; INTRAVENOUS ONCE
Status: COMPLETED | OUTPATIENT
Start: 2021-05-16 | End: 2021-05-16

## 2021-05-16 RX ADMIN — DIPHENHYDRAMINE HYDROCHLORIDE 25 MG: 50 INJECTION INTRAMUSCULAR; INTRAVENOUS at 05:45

## 2021-05-16 RX ADMIN — LORAZEPAM 2 MG: 2 INJECTION INTRAMUSCULAR at 05:58

## 2021-05-16 RX ADMIN — LORAZEPAM 2 MG: 2 INJECTION INTRAMUSCULAR; INTRAVENOUS at 05:58

## 2021-05-16 RX ADMIN — KETOROLAC TROMETHAMINE 15 MG: 30 INJECTION, SOLUTION INTRAMUSCULAR; INTRAVENOUS at 05:45

## 2021-05-16 RX ADMIN — LEVETIRACETAM 1000 MG: 1000 INJECTION, SOLUTION INTRAVENOUS at 06:04

## 2021-05-16 RX ADMIN — METOCLOPRAMIDE HYDROCHLORIDE 10 MG: 5 INJECTION INTRAMUSCULAR; INTRAVENOUS at 05:46

## 2021-05-16 RX ADMIN — SODIUM CHLORIDE: 9 INJECTION, SOLUTION INTRAVENOUS at 05:45

## 2021-05-16 RX ADMIN — AMLODIPINE BESYLATE 10 MG: 5 TABLET ORAL at 05:41

## 2021-05-16 NOTE — ED PROVIDER NOTES
EXAM--------------------------------------    Constitutional/General: Alert and oriented x3  Head: Normocephalic and atraumatic  Eyes: PERRL, EOMI, sclera non icteric  Mouth: Oropharynx clear, handling secretions, no trismus, no asymmetry of the posterior oropharynx or uvular edema  Neck: Supple, full ROM, no stridor, no meningeal signs  Respiratory: Lungs clear to auscultation bilaterally,Not in respiratory distress  Cardiovascular:  Regular rate. Regular rhythm. 2+ distal pulses. Equal extremity pulses. Chest: No chest wall tenderness  GI:  Abdomen Soft, Non tender, Non distended. No rebound, guarding, or rigidity. Musculoskeletal: Moves all extremities x 4. Warm and well perfused, no clubbing, cyanosis, or edema. Capillary refill <3 seconds  Integument: skin warm and dry. No rashes. Neurologic: GCS 15,  Psychiatric: Normal Affect      EKG: Interpreted by emergency department physician, Dr. Wendy Carrington   This EKG is signed and interpreted by me. Rate: 107  Rhythm: Sinus  Interpretation: Sinus rhythm sinus tachycardia, borderline left ventricular perjury. WY is 124, QRS is 96, QTc is 509, previously 467, otherwise stable compared to prior EKG from May 6, 2020  Comparison: stable as compared to patient's most recent EKG      -------------------------------------------------- RESULTS -------------------------------------------------  I have personally reviewed all laboratory and imaging results for this patient. Results are listed below.      LABS: (Lab results interpreted by me)  No results found for this visit on 05/16/21.,       RADIOLOGY:  Interpreted by Radiologist unless otherwise specified  XR CHEST PORTABLE    (Results Pending)   CT HEAD WO CONTRAST    (Results Pending)                    ------------------------- NURSING NOTES AND VITALS REVIEWED ---------------------------   The nursing notes within the ED encounter and vital signs as below have been reviewed by myself  BP (!) 158/113   Pulse 95 and emergency management    This patient has remained unchanged during their ED course. Time: 0630   I have signed this patient out to the oncoming physician, Dr. Erinn oPtts. I have discussed the patient's initial exam, treatment and plan of care with the on coming physician. I have notified the patient / family of the change in treating physician and answered their questions to this point.            --------------------------------- IMPRESSION AND DISPOSITION ---------------------------------    IMPRESSION  1. Acute nonintractable headache, unspecified headache type    2. Breakthrough seizure ([facility] [address]    3. Lung mass        DISPOSITION  Disposition: Pending Workup   Patient condition is stable        NOTE: This report was transcribed using voice recognition software.  Every effort was made to ensure accuracy; however, inadvertent computerized transcription errors may be present       Cary Butcher DO  05/17/21 4023

## 2021-05-16 NOTE — LETTER
[facility]   [address] [facility] [address], [address]      May 17, 2021    [address]  [place] [address]      Dear Lora Todd,    This letter is regarding your Emergency Department (ED) visit at [facility] Emergency Department on 5/16/21. Melinda Mon wanted to make sure that you understand your discharge instructions and that you were able to fill any prescriptions that may have been ordered for you. Please contact the office at \"[phone]  if the ED advised to you follow up with Melinda Mon, or if you have any further questions or needs. Also did you know -   *Visiting the ED for a non-emergency could result in higher co-pays than you would normally be subject to paying? *You can call your doctor even after hours so they can direct you to the most appropriate care. [facility]) practices can often offer you an appointment on the same day that you call. [facility] [address]  appointments; check our website for availability in your community , www. [email or web address]    Evisits are now available for patients for $36 through [employer] for certain conditions:  * Sinus, cold and or cough       * Diarrhea            * Headache  * Heartburn                                * Poison Aliza          * Back pain     * Urinary problems                         Sincerely,     Dara Hawkins MD and your [facility]

## 2021-05-20 ENCOUNTER — HOSPITAL ENCOUNTER (EMERGENCY)
Age: 56
Discharge: HOME | End: 2021-05-20
Attending: EMERGENCY MEDICINE
Payer: COMMERCIAL

## 2021-05-20 VITALS
HEART RATE: 78 BPM | HEIGHT: 68 IN | TEMPERATURE: 97.7 F | OXYGEN SATURATION: 98 % | WEIGHT: 160 LBS | RESPIRATION RATE: 14 BRPM | DIASTOLIC BLOOD PRESSURE: 83 MMHG | BODY MASS INDEX: 24.25 KG/M2 | SYSTOLIC BLOOD PRESSURE: 157 MMHG

## 2021-05-20 DIAGNOSIS — R51.9 HEADACHE, UNSPECIFIED HEADACHE TYPE: Primary | ICD-10-CM

## 2021-05-20 DIAGNOSIS — I10 ESSENTIAL HYPERTENSION: ICD-10-CM

## 2021-05-20 LAB
ALBUMIN SERPL-MCNC: 3.9 G/DL (ref 3.5–5.2)
ALP BLD-CCNC: 68 U/L (ref 40–129)
ALT SERPL-CCNC: 129 U/L (ref 0–40)
ANION GAP SERPL CALCULATED.3IONS-SCNC: 19 MMOL/L (ref 7–16)
AST SERPL-CCNC: 406 U/L (ref 0–39)
BASOPHILS ABSOLUTE: 0.03 E9/L (ref 0–0.2)
BASOPHILS RELATIVE PERCENT: 0.3 % (ref 0–2)
BILIRUB SERPL-MCNC: 1.4 MG/DL (ref 0–1.2)
BUN BLDV-MCNC: 16 MG/DL (ref 6–20)
CALCIUM SERPL-MCNC: 8.9 MG/DL (ref 8.6–10.2)
CHLORIDE BLD-SCNC: 92 MMOL/L (ref 98–107)
CO2: 25 MMOL/L (ref 22–29)
CREAT SERPL-MCNC: 1.2 MG/DL (ref 0.7–1.2)
EKG ATRIAL RATE: 67 BPM
EKG P AXIS: 71 DEGREES
EKG P-R INTERVAL: 140 MS
EKG Q-T INTERVAL: 450 MS
EKG QRS DURATION: 96 MS
EKG QTC CALCULATION (BAZETT): 475 MS
EKG R AXIS: 17 DEGREES
EKG T AXIS: 53 DEGREES
EKG VENTRICULAR RATE: 67 BPM
EOSINOPHILS ABSOLUTE: 0.15 E9/L (ref 0.05–0.5)
EOSINOPHILS RELATIVE PERCENT: 1.3 % (ref 0–6)
GFR AFRICAN AMERICAN: >60
GFR NON-AFRICAN AMERICAN: >60 ML/MIN/1.73
GLUCOSE BLD-MCNC: 82 MG/DL (ref 74–99)
HCT VFR BLD CALC: 41.5 % (ref 37–54)
HEMOGLOBIN: 15.6 G/DL (ref 12.5–16.5)
IMMATURE GRANULOCYTES #: 0.07 E9/L
IMMATURE GRANULOCYTES %: 0.6 % (ref 0–5)
LYMPHOCYTES ABSOLUTE: 2.75 E9/L (ref 1.5–4)
LYMPHOCYTES RELATIVE PERCENT: 23.7 % (ref 20–42)
MCH RBC QN AUTO: 30.3 PG (ref 26–35)
MCHC RBC AUTO-ENTMCNC: 37.6 % (ref 32–34.5)
MCV RBC AUTO: 80.6 FL (ref 80–99.9)
MONOCYTES ABSOLUTE: 0.98 E9/L (ref 0.1–0.95)
MONOCYTES RELATIVE PERCENT: 8.5 % (ref 2–12)
NEUTROPHILS ABSOLUTE: 7.6 E9/L (ref 1.8–7.3)
NEUTROPHILS RELATIVE PERCENT: 65.6 % (ref 43–80)
PDW BLD-RTO: 15.9 FL (ref 11.5–15)
PLATELET # BLD: 141 E9/L (ref 130–450)
PMV BLD AUTO: 11.1 FL (ref 7–12)
POTASSIUM SERPL-SCNC: 3.4 MMOL/L (ref 3.5–5)
RBC # BLD: 5.15 E12/L (ref 3.8–5.8)
SODIUM BLD-SCNC: 136 MMOL/L (ref 132–146)
TOTAL PROTEIN: 7.3 G/DL (ref 6.4–8.3)
TROPONIN: <0.01 NG/ML (ref 0–0.03)
WBC # BLD: 11.6 E9/L (ref 4.5–11.5)

## 2021-05-20 PROCEDURE — 84484 ASSAY OF TROPONIN QUANT: CPT

## 2021-05-20 PROCEDURE — 93005 ELECTROCARDIOGRAM TRACING: CPT | Performed by: EMERGENCY MEDICINE

## 2021-05-20 PROCEDURE — 6360000002 HC RX W HCPCS: Performed by: EMERGENCY MEDICINE

## 2021-05-20 PROCEDURE — 96374 THER/PROPH/DIAG INJ IV PUSH: CPT

## 2021-05-20 PROCEDURE — 93010 ELECTROCARDIOGRAM REPORT: CPT | Performed by: INTERNAL MEDICINE

## 2021-05-20 PROCEDURE — 80053 COMPREHEN METABOLIC PANEL: CPT

## 2021-05-20 PROCEDURE — 85025 COMPLETE CBC W/AUTO DIFF WBC: CPT

## 2021-05-20 PROCEDURE — 99283 EMERGENCY DEPT VISIT LOW MDM: CPT

## 2021-05-20 PROCEDURE — 96375 TX/PRO/DX INJ NEW DRUG ADDON: CPT

## 2021-05-20 RX ORDER — METOCLOPRAMIDE HYDROCHLORIDE 5 MG/ML
10 INJECTION INTRAMUSCULAR; INTRAVENOUS ONCE
Status: COMPLETED | OUTPATIENT
Start: 2021-05-20 | End: 2021-05-20

## 2021-05-20 RX ORDER — DIPHENHYDRAMINE HYDROCHLORIDE 50 MG/ML
25 INJECTION INTRAMUSCULAR; INTRAVENOUS ONCE
Status: COMPLETED | OUTPATIENT
Start: 2021-05-20 | End: 2021-05-20

## 2021-05-20 RX ADMIN — METOCLOPRAMIDE HYDROCHLORIDE 10 MG: 5 INJECTION INTRAMUSCULAR; INTRAVENOUS at 06:33

## 2021-05-20 RX ADMIN — DIPHENHYDRAMINE HYDROCHLORIDE 25 MG: 50 INJECTION, SOLUTION INTRAMUSCULAR; INTRAVENOUS at 06:34

## 2021-05-20 NOTE — LETTER
[facility]   [address] [facility] [address], [address]      May 21, 2021    [place] [address]      Dear Marcos Gallego,    This letter is regarding your Emergency Department (ED) visit at [facility] Emergency Department on 5/20/21. Michelle Jauregui wanted to make sure that you understand your discharge instructions and that you were able to fill any prescriptions that may have been ordered for you. Please contact the office at \"[phone]  if the ED advised to you follow up with Michelle Jauregui, or if you have any further questions or needs. Also did you know -   *Visiting the ED for a non-emergency could result in higher co-pays than you would normally be subject to paying? *You can call your doctor even after hours so they can direct you to the most appropriate care. [facility] practices can often offer you an appointment on the same day that you call. [facility] [address]  appointments; check our website for availability in your community , [email or web address]    Evisits are now available for patients for $36 through [employer] for certain conditions:  * Sinus, cold and or cough       * Diarrhea            * Headache  * Heartburn                                * Poison Aliaz          * Back pain     * Urinary problems                         Sincerely,     Rob Fajardo MD and your [facility]

## 2021-05-20 NOTE — ED PROVIDER NOTES
crepitus, no meningeal signs  Pulmonary: Lungs clear to auscultation bilaterally, no wheezes, rales, or rhonchi. Not in respiratory distress  Cardiovascular:  Regular rate. Regular rhythm. No murmurs, gallops, or rubs. 2+ distal pulses  Chest: no chest wall tenderness  Abdomen: Soft. Non tender. Non distended. +BS. No rebound, guarding, or rigidity. No pulsatile masses appreciated. Musculoskeletal: Moves all extremities x 4. Warm and well perfused, no clubbing, cyanosis, or edema. Capillary refill <3 seconds  Skin: warm and dry. No rashes. Neurologic: GCS 15, CN 2-12 grossly intact, no focal deficits, symmetric strength 5/5 in the upper and lower extremities bilaterally  Psych: Normal Affect    -------------------------------------------------- RESULTS -------------------------------------------------  I have personally reviewed all laboratory and imaging results for this patient. Results are listed below.      LABS:  Results for orders placed or performed during the hospital encounter of 05/20/21   CBC auto differential   Result Value Ref Range    WBC 11.6 (H) 4.5 - 11.5 E9/L    RBC 5.15 3.80 - 5.80 E12/L    Hemoglobin 15.6 12.5 - 16.5 g/dL    Hematocrit 41.5 37.0 - 54.0 %    MCV 80.6 80.0 - 99.9 fL    MCH 30.3 26.0 - 35.0 pg    MCHC 37.6 (H) 32.0 - 34.5 %    RDW 15.9 (H) 11.5 - 15.0 fL    Platelets 768 998 - 630 E9/L    MPV 11.1 7.0 - 12.0 fL    Neutrophils % 65.6 43.0 - 80.0 %    Immature Granulocytes % 0.6 0.0 - 5.0 %    Lymphocytes % 23.7 20.0 - 42.0 %    Monocytes % 8.5 2.0 - 12.0 %    Eosinophils % 1.3 0.0 - 6.0 %    Basophils % 0.3 0.0 - 2.0 %    Neutrophils Absolute 7.60 (H) 1.80 - 7.30 E9/L    Immature Granulocytes # 0.07 E9/L    Lymphocytes Absolute 2.75 1.50 - 4.00 E9/L    Monocytes Absolute 0.98 (H) 0.10 - 0.95 E9/L    Eosinophils Absolute 0.15 0.05 - 0.50 E9/L    Basophils Absolute 0.03 0.00 - 0.20 E9/L   Comprehensive Metabolic Panel   Result Value Ref Range    Sodium 136 132 - 146 mmol/L Potassium 3.4 (L) 3.5 - 5.0 mmol/L    Chloride 92 (L) 98 - 107 mmol/L    CO2 25 22 - 29 mmol/L    Anion Gap 19 (H) 7 - 16 mmol/L    Glucose 82 74 - 99 mg/dL    BUN 16 6 - 20 mg/dL    CREATININE 1.2 0.7 - 1.2 mg/dL    GFR Non-African American >60 >=60 mL/min/1.73    GFR African American >60     Calcium 8.9 8.6 - 10.2 mg/dL    Total Protein 7.3 6.4 - 8.3 g/dL    Albumin 3.9 3.5 - 5.2 g/dL    Total Bilirubin 1.4 (H) 0.0 - 1.2 mg/dL    Alkaline Phosphatase 68 40 - 129 U/L     (H) 0 - 40 U/L     (H) 0 - 39 U/L   Troponin   Result Value Ref Range    Troponin <0.01 0.00 - 0.03 ng/mL       RADIOLOGY:  Interpreted by Radiologist.  No orders to display       EKG: This EKG is signed and interpreted by me. Rate: 67  Rhythm: Sinus  Interpretation: non-specific EKG  Comparison: stable as compared to patient's most recent EKG        ------------------------- NURSING NOTES AND VITALS REVIEWED ---------------------------   The nursing notes within the ED encounter and vital signs as below have been reviewed by myself. BP (!) 159/107   Pulse 95   Temp 97.7 °F (36.5 °C)   Resp 16   Ht 5' 8\" (1.727 m)   Wt 160 lb (72.6 kg)   SpO2 94%   BMI 24.33 kg/m²   Oxygen Saturation Interpretation: Normal    The patients available past medical records and past encounters were reviewed. ------------------------------ ED COURSE/MEDICAL DECISION MAKING----------------------  Medications   metoclopramide (REGLAN) injection 10 mg (10 mg Intravenous Given 5/20/21 0633)   diphenhydrAMINE (BENADRYL) injection 25 mg (25 mg Intravenous Given 5/20/21 8579)             Medical Decision Making:   Patient presenting here because of ongoing headache he has had in the past.  He states is not the worst headache of his life. He had several CAT scans this month which were all negative. Patient blood pressure slightly elevated here.   Patient reporting no physical planes of chest pain shortness of breath or weakness labs noted and are within normal limits EKG is unchanged patient medicated with Reglan Benadryl with relief of his headache. Patient will be discharged home with outpatient follow-up. Re-Evaluations:             Patient reevaluate after being medicated patient improving headache has resolved. Patient neurologically intact here having no complaints of chest pain or difficulty breathing. Patient made aware of findings and need for follow-up and need to return if symptoms worsen or persist      Consultations:                 Critical Care: This patient's ED course included: a personal history and physicial eaxmination    This patient has been closely monitored during their ED course. Counseling: The emergency provider has spoken with the patient and discussed todays results, in addition to providing specific details for the plan of care and counseling regarding the diagnosis and prognosis. Questions are answered at this time and they are agreeable with the plan.       --------------------------------- IMPRESSION AND DISPOSITION ---------------------------------    IMPRESSION  1. Headache, unspecified headache type    2. Essential hypertension        DISPOSITION  Disposition: To be discharged home  Patient condition is stable        NOTE: This report was transcribed using voice recognition software.  Every effort was made to ensure accuracy; however, inadvertent computerized transcription errors may be present          Rickie Christine MD  05/20/21 3359       Rickie Christine MD  05/20/21 8629

## 2021-05-20 NOTE — ED NOTES
Bed:  HA  Expected date:   Expected time:   Means of arrival:   Comments:  triage     Peter Zapata RN  05/20/21 9494

## 2021-05-27 ENCOUNTER — HOSPITAL ENCOUNTER (EMERGENCY)
Age: 56
Discharge: OTHER INSTITUTION | End: 2021-05-27
Attending: EMERGENCY MEDICINE
Payer: COMMERCIAL

## 2021-05-27 VITALS
HEART RATE: 89 BPM | DIASTOLIC BLOOD PRESSURE: 72 MMHG | SYSTOLIC BLOOD PRESSURE: 138 MMHG | BODY MASS INDEX: 24.25 KG/M2 | OXYGEN SATURATION: 99 % | TEMPERATURE: 97.8 F | RESPIRATION RATE: 16 BRPM | HEIGHT: 68 IN | WEIGHT: 160 LBS

## 2021-05-27 DIAGNOSIS — E87.6 HYPOKALEMIA: ICD-10-CM

## 2021-05-27 DIAGNOSIS — F39 MOOD DISORDER (HCC): Primary | ICD-10-CM

## 2021-05-27 LAB
ACETAMINOPHEN LEVEL: <5 MCG/ML (ref 10–30)
ALBUMIN SERPL-MCNC: 3.5 G/DL (ref 3.5–5.2)
ALP BLD-CCNC: 85 U/L (ref 40–129)
ALT SERPL-CCNC: 75 U/L (ref 0–40)
AMPHETAMINE SCREEN, URINE: NOT DETECTED
ANION GAP SERPL CALCULATED.3IONS-SCNC: 15 MMOL/L (ref 7–16)
AST SERPL-CCNC: 230 U/L (ref 0–39)
BARBITURATE SCREEN URINE: NOT DETECTED
BASOPHILS ABSOLUTE: 0.05 E9/L (ref 0–0.2)
BASOPHILS RELATIVE PERCENT: 0.6 % (ref 0–2)
BENZODIAZEPINE SCREEN, URINE: NOT DETECTED
BILIRUB SERPL-MCNC: 0.6 MG/DL (ref 0–1.2)
BUN BLDV-MCNC: 10 MG/DL (ref 6–20)
CALCIUM SERPL-MCNC: 8.2 MG/DL (ref 8.6–10.2)
CANNABINOID SCREEN URINE: NOT DETECTED
CHLORIDE BLD-SCNC: 96 MMOL/L (ref 98–107)
CO2: 24 MMOL/L (ref 22–29)
COCAINE METABOLITE SCREEN URINE: NOT DETECTED
CREAT SERPL-MCNC: 0.9 MG/DL (ref 0.7–1.2)
EOSINOPHILS ABSOLUTE: 0.06 E9/L (ref 0.05–0.5)
EOSINOPHILS RELATIVE PERCENT: 0.7 % (ref 0–6)
ETHANOL: 191 MG/DL (ref 0–0.08)
ETHANOL: <10 MG/DL (ref 0–0.08)
FENTANYL SCREEN, URINE: NOT DETECTED
GFR AFRICAN AMERICAN: >60
GFR NON-AFRICAN AMERICAN: >60 ML/MIN/1.73
GLUCOSE BLD-MCNC: 141 MG/DL (ref 74–99)
HCT VFR BLD CALC: 38.8 % (ref 37–54)
HEMOGLOBIN: 14.3 G/DL (ref 12.5–16.5)
IMMATURE GRANULOCYTES #: 0.03 E9/L
IMMATURE GRANULOCYTES %: 0.4 % (ref 0–5)
LYMPHOCYTES ABSOLUTE: 1.84 E9/L (ref 1.5–4)
LYMPHOCYTES RELATIVE PERCENT: 21.6 % (ref 20–42)
Lab: NORMAL
MCH RBC QN AUTO: 30.2 PG (ref 26–35)
MCHC RBC AUTO-ENTMCNC: 36.9 % (ref 32–34.5)
MCV RBC AUTO: 82 FL (ref 80–99.9)
METHADONE SCREEN, URINE: NOT DETECTED
MONOCYTES ABSOLUTE: 0.92 E9/L (ref 0.1–0.95)
MONOCYTES RELATIVE PERCENT: 10.8 % (ref 2–12)
NEUTROPHILS ABSOLUTE: 5.6 E9/L (ref 1.8–7.3)
NEUTROPHILS RELATIVE PERCENT: 65.9 % (ref 43–80)
OPIATE SCREEN URINE: NOT DETECTED
OXYCODONE URINE: NOT DETECTED
PDW BLD-RTO: 17.5 FL (ref 11.5–15)
PHENCYCLIDINE SCREEN URINE: NOT DETECTED
PLATELET # BLD: 222 E9/L (ref 130–450)
PMV BLD AUTO: 10.8 FL (ref 7–12)
POTASSIUM SERPL-SCNC: 2.6 MMOL/L (ref 3.5–5)
POTASSIUM SERPL-SCNC: 3.8 MMOL/L (ref 3.5–5)
RBC # BLD: 4.73 E12/L (ref 3.8–5.8)
SALICYLATE, SERUM: <0.3 MG/DL (ref 0–30)
SODIUM BLD-SCNC: 135 MMOL/L (ref 132–146)
TOTAL PROTEIN: 6.7 G/DL (ref 6.4–8.3)
TRICYCLIC ANTIDEPRESSANTS SCREEN SERUM: NEGATIVE NG/ML
WBC # BLD: 8.5 E9/L (ref 4.5–11.5)

## 2021-05-27 PROCEDURE — 6360000002 HC RX W HCPCS: Performed by: EMERGENCY MEDICINE

## 2021-05-27 PROCEDURE — 80053 COMPREHEN METABOLIC PANEL: CPT

## 2021-05-27 PROCEDURE — 99285 EMERGENCY DEPT VISIT HI MDM: CPT

## 2021-05-27 PROCEDURE — 84132 ASSAY OF SERUM POTASSIUM: CPT

## 2021-05-27 PROCEDURE — 85025 COMPLETE CBC W/AUTO DIFF WBC: CPT

## 2021-05-27 PROCEDURE — 80143 DRUG ASSAY ACETAMINOPHEN: CPT

## 2021-05-27 PROCEDURE — 80179 DRUG ASSAY SALICYLATE: CPT

## 2021-05-27 PROCEDURE — 80307 DRUG TEST PRSMV CHEM ANLYZR: CPT

## 2021-05-27 PROCEDURE — 6370000000 HC RX 637 (ALT 250 FOR IP): Performed by: EMERGENCY MEDICINE

## 2021-05-27 PROCEDURE — 82077 ASSAY SPEC XCP UR&BREATH IA: CPT

## 2021-05-27 RX ORDER — CLONIDINE HYDROCHLORIDE 0.1 MG/1
0.1 TABLET ORAL ONCE
Status: DISCONTINUED | OUTPATIENT
Start: 2021-05-27 | End: 2021-05-27 | Stop reason: HOSPADM

## 2021-05-27 RX ORDER — POTASSIUM CHLORIDE 20 MEQ/1
40 TABLET, EXTENDED RELEASE ORAL ONCE
Status: COMPLETED | OUTPATIENT
Start: 2021-05-27 | End: 2021-05-27

## 2021-05-27 RX ORDER — POTASSIUM CHLORIDE 7.45 MG/ML
10 INJECTION INTRAVENOUS
Status: DISPENSED | OUTPATIENT
Start: 2021-05-27 | End: 2021-05-27

## 2021-05-27 RX ADMIN — POTASSIUM CHLORIDE 40 MEQ: 1500 TABLET, EXTENDED RELEASE ORAL at 05:09

## 2021-05-27 RX ADMIN — POTASSIUM CHLORIDE 10 MEQ: 10 INJECTION, SOLUTION INTRAVENOUS at 05:11

## 2021-05-27 RX ADMIN — POTASSIUM CHLORIDE 40 MEQ: 1500 TABLET, EXTENDED RELEASE ORAL at 08:44

## 2021-05-27 NOTE — ED NOTES
Call to [facility]. Aron relays that there is one male bed available.  Pt ETOH needs to be >10 prior to referral.     Zaina Wang, MSW, LSW  05/27/21 2130

## 2021-05-27 NOTE — ED NOTES
Faxed clinical information to [facility] [phone].      NA Allen, LSW  05/27/21 0877

## 2021-05-27 NOTE — ED NOTES
Emergency Department [facility] Biopsychosocial Assessment Note    Chief Complaint:     Patient presents to the ED for depression. MSE:    Pt presents as a 60year old male. He is alert and oriented x4. Mood is sad, affect congruent. Speech is slow in rate, low in volume. Poor eye contact. Denies SI, HI and AVH. Clinical Summary/History:     Pt tearful at time of assessment. He has been laying in bed, fingers in his ears. Pt in a shared room and his roommate has been awake all night, talking rather loudly. He states to SW that he just needs a quiet, peaceful space. \"I didn't know there were so many needle users here\" he repetitively states. Pt reports he is from [facility], and the difference between there and [place] is unreal.    Explains that he is feeling extremely depressed. The depression is affecting pt ability to function. Denies engagement in mental health treatment. Reports he is prescribed medication for depression, however is unable to relay what the medication is or the prescriber as he is intensely distracted by his roommate. Pt eventually relays he has not been taking his medication. Denies SI, HI and AVH to SW.     Gender  [x] Male [] Female [] Transgender  [] Other    Sexual Orientation    [x] Heterosexual [] Homosexual [] Bisexual [] Other    Suicidal Behavioral: CSSR-S Complete. [] Reports:    [] Past [] Present   [x] Denies    Homicidal/ Violent Behavior  [] Reports:   [] Past [] Present   [x] Denies     Hallucinations/Delusions   [] Reports:   [x] Denies     Substance Use/Alcohol Use/Addiction: SBIRT Screen Complete. [] Reports:   [x] Denies     Trauma History  [] Reports:  [] Denies     Collateral Information:   No collateral obtained at this time.     Level of Care/Disposition Plan  [] Home:   [] Outpatient Provider:   [] Crisis Unit:   [] Inpatient Psychiatric Unit:  [] Other:   SW contacting CSU to determine bed availability     NA Palacios, [place]  05/27/21 5824

## 2021-05-27 NOTE — ED PROVIDER NOTES
HPI:  5/27/21, Time: 1:57 AM EDT         Nick Camacho is a 60 y.o. male presenting to the ED for feeling depressed, beginning weeks ago. The complaint has been persistent, moderate in severity, and worsened by nothing. Patient reporting feeling depressed having no suicidal thoughts or homicidal thoughts. Patient reporting he lives alone. Patient reporting no nausea vomiting or abdominal pain. Patient reporting no fever chills. Patient reporting no headache. Patient reporting no weakness or dizziness there is no vomiting or diarrhea. ROS:   Pertinent positives and negatives are stated within HPI, all other systems reviewed and are negative.  --------------------------------------------- PAST HISTORY ---------------------------------------------  Past Medical History:  has a past medical history of Headache and Hypertension. Past Surgical History:  has no past surgical history on file. Social History:  reports that he has been smoking cigarettes. He has a 4.60 pack-year smoking history. He has never used smokeless tobacco. He reports current alcohol use. He reports that he does not use drugs. Family History: family history is not on file. The patients home medications have been reviewed. Allergies: Ace inhibitors and Fish-derived products    ---------------------------------------------------PHYSICAL EXAM--------------------------------------    Constitutional/General: Alert and oriented x3, well appearing, non toxic in NAD  Head: Normocephalic and atraumatic  Eyes: PERRL, EOMI  Mouth: Oropharynx clear, handling secretions, no trismus  Neck: Supple, full ROM, non tender to palpation in the midline, no stridor, no crepitus, no meningeal signs  Pulmonary: Lungs clear to auscultation bilaterally, no wheezes, rales, or rhonchi. Not in respiratory distress  Cardiovascular:  Regular rate. Regular rhythm. No murmurs, gallops, or rubs.  2+ distal pulses  Chest: no chest wall tenderness  Abdomen: Soft.  Non tender. Non distended. +BS. No rebound, guarding, or rigidity. No pulsatile masses appreciated. Musculoskeletal: Moves all extremities x 4. Warm and well perfused, no clubbing, cyanosis, or edema. Capillary refill <3 seconds  Skin: warm and dry. No rashes. Neurologic: GCS 15, CN 2-12 grossly intact, no focal deficits, symmetric strength 5/5 in the upper and lower extremities bilaterally  Psych: Affect flat depressed reporting no homicidal or suicidal thoughts    -------------------------------------------------- RESULTS -------------------------------------------------  I have personally reviewed all laboratory and imaging results for this patient. Results are listed below. LABS:  Results for orders placed or performed during the hospital encounter of 05/27/21   Urine Drug Screen   Result Value Ref Range    Drug Screen Comment: see below        RADIOLOGY:  Interpreted by Radiologist.  No orders to display         EKG Interpretation  }      ------------------------- NURSING NOTES AND VITALS REVIEWED ---------------------------   The nursing notes within the ED encounter and vital signs as below have been reviewed by myself. BP (!) 181/114   Pulse 80   Temp 97.9 °F (36.6 °C)   Resp 16   Ht 5' 8\" (1.727 m)   Wt 160 lb (72.6 kg)   SpO2 96%   BMI 24.33 kg/m²   Oxygen Saturation Interpretation: Normal    The patients available past medical records and past encounters were reviewed. ------------------------------ ED COURSE/MEDICAL DECISION MAKING----------------------  Medications - No data to display          Medical Decision Making:   Presenting here because of feeling depressed having no thoughts of harming self or others. Patient reporting no hallucinations. Patient reporting ongoing problem for the last several weeks. Patient reporting no fever no chills no chest pain no abdominal pain or vomiting.   Labs will be obtained plan will be for  to evaluate. Re-Evaluations:             Re-evaluation. Patients symptoms show no change      Consultations:                Critical Care: This patient's ED course included: a personal history and physicial eaxmination    This patient has been closely monitored during their ED course. Counseling: The emergency provider has spoken with the patient and discussed todays results, in addition to providing specific details for the plan of care and counseling regarding the diagnosis and prognosis. Questions are answered at this time and they are agreeable with the plan.       --------------------------------- IMPRESSION AND DISPOSITION ---------------------------------    IMPRESSION  1. Mood disorder ([facility] [address]        DISPOSITION  Disposition:  to evaluate  Patient condition is stable        NOTE: This report was transcribed using voice recognition software.  Every effort was made to ensure accuracy; however, inadvertent computerized transcription errors may be present          Brittany Oakley MD  05/27/21 3584       Brittany Oakley MD  05/27/21 5403

## 2021-05-27 NOTE — ED NOTES
Per charge dorothy Patel pt to be placed in hallway bed/ waitng area  to await for i     Haley Raza, DOROTHY  05/27/21 5784

## 2021-05-28 ENCOUNTER — HOSPITAL ENCOUNTER (EMERGENCY)
Age: 56
Discharge: HOME | End: 2021-05-28
Payer: COMMERCIAL

## 2021-05-28 ENCOUNTER — APPOINTMENT (OUTPATIENT)
Dept: GENERAL RADIOLOGY | Age: 56
End: 2021-05-28
Payer: COMMERCIAL

## 2021-05-28 VITALS
HEART RATE: 72 BPM | BODY MASS INDEX: 24.25 KG/M2 | TEMPERATURE: 97.1 F | WEIGHT: 160 LBS | SYSTOLIC BLOOD PRESSURE: 175 MMHG | DIASTOLIC BLOOD PRESSURE: 108 MMHG | RESPIRATION RATE: 16 BRPM | OXYGEN SATURATION: 95 % | HEIGHT: 68 IN

## 2021-05-28 DIAGNOSIS — M25.531 RIGHT WRIST PAIN: Primary | ICD-10-CM

## 2021-05-28 LAB
ANION GAP SERPL CALCULATED.3IONS-SCNC: 13 MMOL/L (ref 7–16)
BUN BLDV-MCNC: 10 MG/DL (ref 6–20)
CALCIUM SERPL-MCNC: 8.7 MG/DL (ref 8.6–10.2)
CHLORIDE BLD-SCNC: 102 MMOL/L (ref 98–107)
CO2: 24 MMOL/L (ref 22–29)
CREAT SERPL-MCNC: 0.9 MG/DL (ref 0.7–1.2)
GFR AFRICAN AMERICAN: >60
GFR NON-AFRICAN AMERICAN: >60 ML/MIN/1.73
GLUCOSE BLD-MCNC: 88 MG/DL (ref 74–99)
POTASSIUM SERPL-SCNC: 3.4 MMOL/L (ref 3.5–5)
SODIUM BLD-SCNC: 139 MMOL/L (ref 132–146)
URIC ACID, SERUM: 6.3 MG/DL (ref 3.4–7)

## 2021-05-28 PROCEDURE — 84550 ASSAY OF BLOOD/URIC ACID: CPT

## 2021-05-28 PROCEDURE — 73110 X-RAY EXAM OF WRIST: CPT

## 2021-05-28 PROCEDURE — 99282 EMERGENCY DEPT VISIT SF MDM: CPT

## 2021-05-28 PROCEDURE — 80048 BASIC METABOLIC PNL TOTAL CA: CPT

## 2021-05-28 RX ORDER — NAPROXEN 500 MG/1
500 TABLET ORAL 2 TIMES DAILY
Qty: 14 TABLET | Refills: 0 | Status: SHIPPED | OUTPATIENT
Start: 2021-05-28 | End: 2021-06-02 | Stop reason: ALTCHOICE

## 2021-05-28 ASSESSMENT — PAIN SCALES - GENERAL: PAINLEVEL_OUTOF10: 7

## 2021-05-28 ASSESSMENT — PAIN DESCRIPTION - PROGRESSION: CLINICAL_PROGRESSION: NOT CHANGED

## 2021-05-28 ASSESSMENT — PAIN DESCRIPTION - FREQUENCY: FREQUENCY: CONTINUOUS

## 2021-05-28 ASSESSMENT — PAIN DESCRIPTION - LOCATION: LOCATION: HAND

## 2021-05-28 ASSESSMENT — PAIN DESCRIPTION - ONSET: ONSET: ON-GOING

## 2021-05-28 ASSESSMENT — PAIN DESCRIPTION - DESCRIPTORS: DESCRIPTORS: ACHING

## 2021-05-28 ASSESSMENT — PAIN DESCRIPTION - PAIN TYPE: TYPE: ACUTE PAIN

## 2021-05-28 ASSESSMENT — PAIN DESCRIPTION - ORIENTATION: ORIENTATION: RIGHT

## 2021-05-28 NOTE — ED PROVIDER NOTES
Independent [facility]  HPI:  5/28/21, Time: 2:46 AM EDT         Saskia Bui is a 60 y.o. male presenting to the ED for right wrist pain beginning 3 weeks ago. The complaint has been persistent, moderate in severity, and worsened by movement of wrist .  patient comes in with complaint of right wrist pain pain started 3 weeks ago. He denies any injury. No numbness tingling or weakness of extremity. Review of Systems:   A complete review of systems was performed and pertinent positives and negatives are stated within HPI, all other systems reviewed and are negative.          --------------------------------------------- PAST HISTORY ---------------------------------------------  Past Medical History:  has a past medical history of Headache and Hypertension. Past Surgical History:  has no past surgical history on file. Social History:  reports that he has been smoking cigarettes. He has a 4.60 pack-year smoking history. He has never used smokeless tobacco. He reports current alcohol use. He reports that he does not use drugs. Family History: family history is not on file. The patients home medications have been reviewed.     Allergies: Ace inhibitors and Fish-derived products    -------------------------------------------------- RESULTS -------------------------------------------------  All laboratory and radiology results have been personally reviewed by myself   LABS:  Results for orders placed or performed during the hospital encounter of 05/28/21   Uric acid   Result Value Ref Range    Uric Acid, Serum 6.3 3.4 - 7.0 mg/dL   Basic Metabolic Panel   Result Value Ref Range    Sodium 139 132 - 146 mmol/L    Potassium 3.4 (L) 3.5 - 5.0 mmol/L    Chloride 102 98 - 107 mmol/L    CO2 24 22 - 29 mmol/L    Anion Gap 13 7 - 16 mmol/L    Glucose 88 74 - 99 mg/dL    BUN 10 6 - 20 mg/dL    CREATININE 0.9 0.7 - 1.2 mg/dL    GFR Non-African American >60 >=60 mL/min/1.73    GFR African American >60     Calcium 8.7 8.6 - 10.2 mg/dL       RADIOLOGY:  Interpreted by Radiologist.  XR WRIST RIGHT (MIN 3 VIEWS)   Final Result   Degenerative changes. MRI would be useful if symptoms persist.             ------------------------- NURSING NOTES AND VITALS REVIEWED ---------------------------   The nursing notes within the ED encounter and vital signs as below have been reviewed. BP (!) 175/108   Pulse 72   Temp 97.1 °F (36.2 °C) (Temporal)   Resp 16   Ht 5' 8\" (1.727 m)   Wt 160 lb (72.6 kg)   SpO2 95%   BMI 24.33 kg/m²   Oxygen Saturation Interpretation: Normal      ---------------------------------------------------PHYSICAL EXAM--------------------------------------      Constitutional/General: Alert and oriented x3, well appearing, non toxic in NAD  Head: Normocephalic and atraumatic  Eyes: PERRL, EOMI  Mouth: Oropharynx clear, handling secretions, no trismus  Neck: Supple, full ROM,   Pulmonary: Lungs clear to auscultation bilaterally, no wheezes, rales, or rhonchi. Not in respiratory distress  Cardiovascular:  Regular rate and rhythm, no murmurs, gallops, or rubs. 2+ distal pulses  Abdomen: Soft, non tender, non distended,   Extremities: Moves all extremities x 4. Warm and well perfused small ganglion noted right lateral wrist tenderness to the area. Full range of motion of the wrist present pulses normal no tenderness of the right dorsal hand or phalanx. Pulses normal cap refill less than 2  Skin: warm and dry without rash  Neurologic: GCS 15,  Psych: Normal Affect      ------------------------------ ED COURSE/MEDICAL DECISION MAKING----------------------  Medications - No data to display      ED COURSE:       Medical Decision Making:    Patient came in with complaint of wrist pain with no injury. X-ray no fracture degenerative changes present. Patient had negative uric acid he was placed in a wrist splint discharged with Naprosyn follow-up primary care 1-2 Days     Counseling:    The emergency provider has spoken with the patient and discussed todays results, in addition to providing specific details for the plan of care and counseling regarding the diagnosis and prognosis. Questions are answered at this time and they are agreeable with the plan.      --------------------------------- IMPRESSION AND DISPOSITION ---------------------------------    IMPRESSION  1. Right wrist pain        DISPOSITION  Disposition: Discharge to home  Patient condition is good      NOTE: This report was transcribed using voice recognition software.  Every effort was made to ensure accuracy; however, inadvertent computerized transcription errors may be present     Etha Nissen, [place]  05/28/21 4169    ATTENDING PROVIDER ATTESTATION:     Supervising Physician, on-site, available for consultation, non-participatory in the evaluation or care of this patient         Juan Acosta MD  05/28/21 3533

## 2021-05-28 NOTE — LETTER
[facility] Internal Medicine   [address] [facility] [place], [address]      May 28, 2021    Joyce Chu [address] [place] [address]      Dear Annalisa Olguin,    This letter is regarding your Emergency Department (ED) visit at [facility] Emergency Department on 5/28/21. Mignon Li wanted to make sure that you understand your discharge instructions and that you were able to fill any prescriptions that may have been ordered for you. Please contact the office at \"[phone]  if the ED advised to you follow up with Mignon Li, or if you have any further questions or needs. Also did you know -   *Visiting the ED for a non-emergency could result in higher co-pays than you would normally be subject to paying? *You can call your doctor even after hours so they can direct you to the most appropriate care. [facility] ([facility]) practices can often offer you an appointment on the same day that you call. Many [address]  appointments; check our website for availability in your community , www. [employer]    Evisits are now available for patients for $36 through [employer] for certain conditions:  * Sinus, cold and or cough       * Diarrhea            * Headache  * Heartburn                                * Poison Aliza          * Back pain     * Urinary problems                         Sincerely,     Christa Kuo MD and your [facility]

## 2021-06-02 ENCOUNTER — HOSPITAL ENCOUNTER (EMERGENCY)
Age: 56
Discharge: HOME | End: 2021-06-02
Payer: COMMERCIAL

## 2021-06-02 VITALS
SYSTOLIC BLOOD PRESSURE: 147 MMHG | DIASTOLIC BLOOD PRESSURE: 81 MMHG | BODY MASS INDEX: 24.33 KG/M2 | OXYGEN SATURATION: 95 % | RESPIRATION RATE: 16 BRPM | HEIGHT: 68 IN | TEMPERATURE: 97 F | HEART RATE: 90 BPM

## 2021-06-02 DIAGNOSIS — M25.531 RIGHT WRIST PAIN: Primary | ICD-10-CM

## 2021-06-02 DIAGNOSIS — M67.90 TENDINOPATHY: ICD-10-CM

## 2021-06-02 PROCEDURE — 99283 EMERGENCY DEPT VISIT LOW MDM: CPT

## 2021-06-02 PROCEDURE — 96372 THER/PROPH/DIAG INJ SC/IM: CPT

## 2021-06-02 PROCEDURE — 6360000002 HC RX W HCPCS: Performed by: NURSE PRACTITIONER

## 2021-06-02 PROCEDURE — 6370000000 HC RX 637 (ALT 250 FOR IP): Performed by: NURSE PRACTITIONER

## 2021-06-02 RX ORDER — NAPROXEN 500 MG/1
500 TABLET ORAL 2 TIMES DAILY PRN
Qty: 28 TABLET | Refills: 0 | Status: SHIPPED | OUTPATIENT
Start: 2021-06-02 | End: 2021-06-05

## 2021-06-02 RX ORDER — OXYCODONE HYDROCHLORIDE AND ACETAMINOPHEN 5; 325 MG/1; MG/1
1 TABLET ORAL ONCE
Status: COMPLETED | OUTPATIENT
Start: 2021-06-02 | End: 2021-06-02

## 2021-06-02 RX ORDER — HYDROCODONE BITARTRATE AND ACETAMINOPHEN 5; 325 MG/1; MG/1
1 TABLET ORAL EVERY 4 HOURS PRN
Qty: 3 TABLET | Refills: 0 | Status: SHIPPED | OUTPATIENT
Start: 2021-06-02 | End: 2021-06-05

## 2021-06-02 RX ORDER — DEXAMETHASONE SODIUM PHOSPHATE 10 MG/ML
10 INJECTION INTRAMUSCULAR; INTRAVENOUS ONCE
Status: COMPLETED | OUTPATIENT
Start: 2021-06-02 | End: 2021-06-02

## 2021-06-02 RX ORDER — PREDNISONE 10 MG/1
TABLET ORAL
Qty: 20 TABLET | Refills: 0 | Status: SHIPPED | OUTPATIENT
Start: 2021-06-02 | End: 2021-06-05

## 2021-06-02 RX ADMIN — DEXAMETHASONE SODIUM PHOSPHATE 10 MG: 10 INJECTION INTRAMUSCULAR; INTRAVENOUS at 03:12

## 2021-06-02 RX ADMIN — OXYCODONE AND ACETAMINOPHEN 1 TABLET: 5; 325 TABLET ORAL at 03:12

## 2021-06-02 ASSESSMENT — PAIN DESCRIPTION - LOCATION: LOCATION: WRIST

## 2021-06-02 ASSESSMENT — PAIN SCALES - GENERAL
PAINLEVEL_OUTOF10: 9
PAINLEVEL_OUTOF10: 9

## 2021-06-02 ASSESSMENT — PAIN DESCRIPTION - PAIN TYPE: TYPE: CHRONIC PAIN

## 2021-06-02 ASSESSMENT — PAIN DESCRIPTION - ORIENTATION: ORIENTATION: RIGHT

## 2021-06-02 ASSESSMENT — PAIN DESCRIPTION - FREQUENCY: FREQUENCY: CONTINUOUS

## 2021-06-02 NOTE — ED PROVIDER NOTES
06/02/21. RADIOLOGY:  Interpreted by Radiologist.  No orders to display       ------------------------- NURSING NOTES AND VITALS REVIEWED ---------------------------   The nursing notes within the ED encounter and vital signs as below have been reviewed. Pulse 90   Temp 97 °F (36.1 °C)   Resp 16   Ht 5' 8\" (1.727 m)   SpO2 95%   BMI 24.33 kg/m²   Oxygen Saturation Interpretation: Normal      ---------------------------------------------------PHYSICAL EXAM--------------------------------------      Constitutional/General: Alert and oriented x3, well appearing, non toxic in NAD  Head: Normocephalic and atraumatic  Eyes: PERRL, EOMI  Mouth: Oropharynx clear, handling secretions, no trismus  Neck: Supple, full ROM,   Pulmonary: Lungs clear to auscultation bilaterally, no wheezes, rales, or rhonchi. Not in respiratory distress  Cardiovascular:  Regular rate and rhythm, no murmurs, gallops, or rubs. 2+ distal pulses  Abdomen: Soft, non tender, non distended,   Extremities: Moves all extremities x 4. Warm and well perfused, 2+ right radial pulse. Mild point tenderness across the right dorsal hand and wrist.  Is able to perform pronation and supination, grasp 5 out of 5. Skin: warm and dry without rash  Neurologic: GCS 15,  Psych: Normal Affect      ------------------------------ ED COURSE/MEDICAL DECISION MAKING----------------------  Medications   dexamethasone (DECADRON) injection 10 mg (10 mg Intramuscular Given 6/2/21 0312)   oxyCODONE-acetaminophen (PERCOCET) 5-325 MG per tablet 1 tablet (1 tablet Oral Given 6/2/21 0312)         ED COURSE:       Medical Decision Making: Pain to right wrist ongoing for several weeks, patient provided with Decadron 10 mg IM as well as 1 Percocet tablet. We will also place him any wrist splint for additional support and patient will be discharged home with additional meds, patient likely with tendinitis component.   Patient was educated on supportive measures and the importance of good follow-up care. He is neurovascularly intact. Patient will be provided with referral as well to orthopedics. Patient expressed understanding and safely discharged home       Counseling: The emergency provider has spoken with the patient and discussed todays results, in addition to providing specific details for the plan of care and counseling regarding the diagnosis and prognosis. Questions are answered at this time and they are agreeable with the plan.      --------------------------------- IMPRESSION AND DISPOSITION ---------------------------------    IMPRESSION  1. Right wrist pain    2. Tendinopathy        DISPOSITION  Disposition: Discharge to home  Patient condition is good      NOTE: This report was transcribed using voice recognition software.  Every effort was made to ensure accuracy; however, inadvertent computerized transcription errors may be present     ROSY Flower CNP  06/02/21 2025  ATTENDING PROVIDER ATTESTATION:     Supervising Physician, on-site, available for consultation, non-participatory in the evaluation or care of this patient       Rochelle Tan MD  06/02/21 2027

## 2021-06-05 ENCOUNTER — HOSPITAL ENCOUNTER (EMERGENCY)
Age: 56
Discharge: HOME | End: 2021-06-05
Payer: COMMERCIAL

## 2021-06-05 ENCOUNTER — APPOINTMENT (OUTPATIENT)
Dept: GENERAL RADIOLOGY | Age: 56
End: 2021-06-05
Payer: COMMERCIAL

## 2021-06-05 VITALS
RESPIRATION RATE: 18 BRPM | OXYGEN SATURATION: 96 % | WEIGHT: 160 LBS | SYSTOLIC BLOOD PRESSURE: 139 MMHG | HEART RATE: 90 BPM | DIASTOLIC BLOOD PRESSURE: 88 MMHG | TEMPERATURE: 98 F | BODY MASS INDEX: 24.25 KG/M2 | HEIGHT: 68 IN

## 2021-06-05 DIAGNOSIS — M25.511 ACUTE PAIN OF RIGHT SHOULDER: Primary | ICD-10-CM

## 2021-06-05 PROCEDURE — 73030 X-RAY EXAM OF SHOULDER: CPT

## 2021-06-05 PROCEDURE — 99283 EMERGENCY DEPT VISIT LOW MDM: CPT

## 2021-06-05 ASSESSMENT — PAIN DESCRIPTION - ORIENTATION: ORIENTATION: RIGHT

## 2021-06-05 ASSESSMENT — PAIN DESCRIPTION - FREQUENCY: FREQUENCY: CONTINUOUS

## 2021-06-05 ASSESSMENT — PAIN DESCRIPTION - DESCRIPTORS: DESCRIPTORS: ACHING

## 2021-06-05 ASSESSMENT — PAIN SCALES - GENERAL: PAINLEVEL_OUTOF10: 10

## 2021-06-05 ASSESSMENT — PAIN DESCRIPTION - LOCATION: LOCATION: ARM

## 2021-06-05 NOTE — ED PROVIDER NOTES
[address]  Department of Emergency Medicine   ED  Encounter Note  Admit Date/RoomTime: 2021  2:45 AM  ED Room: [facility]/ST1    NAME: Saundra Morin  : 1965  MRN: [id number]     Chief Complaint:  Arm Pain (Fall 4 days ago states \"more then likely you tripped over something in the house\" did not hit head -LOC -THINNERS; voices no other compaints)    History of Present Illness       Saundra Morin is a 60 y.o. old male who presents to the emergency department by private vehicle, for Right shoulder moderate aching pain which occured 1 day(s) prior to arrival.  He states he did have a mechanical fall approximately 1 week ago and has had multiple evaluations for right wrist pain. He states that his right wrist no longer hurts. He is right handed. The patients tetanus status is unknown. Since onset the symptoms have been persistent. His pain is aggraveated by any movement and relieved by nothing. He denies any head injury, headache, loss of consciousness, confusion, dizziness, neck pain, chest pain, abdominal pain, back pain, numbness, weakness, blurred vision, nausea or vomiting. ROS   Pertinent positives and negatives are stated within HPI, all other systems reviewed and are negative. Past Medical History:  has a past medical history of Headache and Hypertension. Surgical History:  has no past surgical history on file. Social History:  reports that he has been smoking cigarettes. He has a 4.60 pack-year smoking history. He has never used smokeless tobacco. He reports current alcohol use. He reports that he does not use drugs. Family History: family history is not on file.      Allergies: Ace inhibitors and Fish-derived products    Physical Exam   Oxygen Saturation Interpretation: Normal.        ED Triage Vitals   BP Temp Temp Source Pulse Resp SpO2 Height Weight   21 0240 21 0231 21 0231 21 0231 21 0240 21 0231 patient in addition to providing specific details for the plan of care and counseling regarding the diagnosis and prognosis. Questions are answered at this time and are agreeable with the plan. Assessment      1. Acute pain of right shoulder      Plan   Discharge home. Patient condition is good    New Medications     New Prescriptions    No medications on file     Electronically signed by ROSY Thompson CNP   DD: 6/5/21  **This report was transcribed using voice recognition software. Every effort was made to ensure accuracy; however, inadvertent computerized transcription errors may be present.   END OF ED PROVIDER NOTE          ROSY Mayes CNP  06/05/21 5479

## 2021-06-08 ENCOUNTER — HOSPITAL ENCOUNTER (EMERGENCY)
Age: 56
Discharge: HOME | End: 2021-06-08
Payer: COMMERCIAL

## 2021-06-08 VITALS
SYSTOLIC BLOOD PRESSURE: 157 MMHG | HEART RATE: 89 BPM | RESPIRATION RATE: 16 BRPM | DIASTOLIC BLOOD PRESSURE: 87 MMHG | HEIGHT: 68 IN | WEIGHT: 160 LBS | BODY MASS INDEX: 24.25 KG/M2 | OXYGEN SATURATION: 91 % | TEMPERATURE: 97.3 F

## 2021-06-08 DIAGNOSIS — M19.031 ARTHRITIS OF WRIST, RIGHT: Primary | ICD-10-CM

## 2021-06-08 DIAGNOSIS — M25.531 ARTHRALGIA OF RIGHT WRIST: ICD-10-CM

## 2021-06-08 PROCEDURE — 99282 EMERGENCY DEPT VISIT SF MDM: CPT

## 2021-06-08 RX ORDER — DEXAMETHASONE SODIUM PHOSPHATE 10 MG/ML
10 INJECTION INTRAMUSCULAR; INTRAVENOUS ONCE
Status: DISCONTINUED | OUTPATIENT
Start: 2021-06-08 | End: 2021-06-08 | Stop reason: HOSPADM

## 2021-06-08 RX ORDER — MELOXICAM 7.5 MG/1
7.5 TABLET ORAL DAILY
Qty: 10 TABLET | Refills: 0 | OUTPATIENT
Start: 2021-06-08 | End: 2021-06-12

## 2021-06-08 RX ORDER — KETOROLAC TROMETHAMINE 30 MG/ML
30 INJECTION, SOLUTION INTRAMUSCULAR; INTRAVENOUS ONCE
Status: DISCONTINUED | OUTPATIENT
Start: 2021-06-08 | End: 2021-06-08 | Stop reason: HOSPADM

## 2021-06-08 ASSESSMENT — PAIN DESCRIPTION - LOCATION: LOCATION: HAND

## 2021-06-08 ASSESSMENT — PAIN DESCRIPTION - DESCRIPTORS: DESCRIPTORS: CONSTANT

## 2021-06-08 ASSESSMENT — PAIN DESCRIPTION - PAIN TYPE: TYPE: ACUTE PAIN

## 2021-06-08 ASSESSMENT — PAIN SCALES - GENERAL: PAINLEVEL_OUTOF10: 9

## 2021-06-08 ASSESSMENT — PAIN DESCRIPTION - ORIENTATION: ORIENTATION: RIGHT

## 2021-06-08 ASSESSMENT — PAIN DESCRIPTION - FREQUENCY: FREQUENCY: CONTINUOUS

## 2021-06-08 NOTE — LETTER
[facility] Internal Medicine   [address]  [place], [address]      June 8, 2021    Nick Chu [address]  [place] [id number]      Dear Marylene Kill,    This letter is regarding your Emergency Department (ED) visit at [facility] Emergency Department on 6/8/21. Artie De La Paz wanted to make sure that you understand your discharge instructions and that you were able to fill any prescriptions that may have been ordered for you. Please contact the office at \"[phone]  if the ED advised to you follow up with Artie De La Paz, or if you have any further questions or needs. Also did you know -   *Visiting the ED for a non-emergency could result in higher co-pays than you would normally be subject to paying? *You can call your doctor even after hours so they can direct you to the most appropriate care. [facility]) practices can often offer you an appointment on the same day that you call. Many [address]  appointments; check our website for availability in your community , www. [email or web address]    Evisits are now available for patients for $36 through [employer] for certain conditions:  * Sinus, cold and or cough       * Diarrhea            * Headache  * Heartburn                                * Poison Aliza          * Back pain     * Urinary problems                         Sincerely,     Tam Lopez MD and your [facility]

## 2021-06-08 NOTE — ED PROVIDER NOTES
------------------------- NURSING NOTES AND VITALS REVIEWED ---------------------------   The nursing notes within the ED encounter and vital signs as below have been reviewed. BP (!) 157/87   Pulse 89   Temp 97.3 °F (36.3 °C)   Resp 16   Ht 5' 8\" (1.727 m)   Wt 160 lb (72.6 kg)   SpO2 91%   BMI 24.33 kg/m²   Oxygen Saturation Interpretation: Normal      ---------------------------------------------------PHYSICAL EXAM--------------------------------------      Constitutional/General: Alert and oriented x3, well appearing, non toxic in NAD  Head: Normocephalic and atraumatic  Eyes: PERRL, EOMI  Mouth: Oropharynx clear, handling secretions, no trismus  Neck: Supple, full ROM,   Pulmonary: Lungs clear to auscultation bilaterally, no wheezes, rales, or rhonchi. Not in respiratory distress  Cardiovascular:  Regular rate and rhythm, no murmurs, gallops, or rubs. 2+ distal pulses  Abdomen: Soft, non tender, non distended,   Extremities: Moves all extremities x 4. Warm and well perfused, pronation and supination intact. No swelling noted no erythema noted. Right radial pulse strong at 2+. No deformity noted. Full sensations intact. Grasp 5 out of 5. Is able to perform pronation and supination without any increase in pain. Skin: warm and dry without rash  Neurologic: GCS 15,  Psych: Normal Affect      ------------------------------ ED COURSE/MEDICAL DECISION MAKING----------------------  Medications   dexamethasone (DECADRON) injection 10 mg (has no administration in time range)   ketorolac (TORADOL) injection 30 mg (has no administration in time range)         ED COURSE:       Medical Decision Making: Plan will be to medicate patient will provided with a wrist splint, no indication for imaging as he already had previous imaging and this is an ongoing pain without any new injury or trauma. Patient will be discharged home with prescription for Mobic. Patient otherwise nontoxic, neurovascular intact. Patient made aware the importance of good follow-up with his primary care physician as well as when to return back. I do not suspect any infectious etiology or any septic joint. Patient nontoxic. Patient will be discharged home       Counseling: The emergency provider has spoken with the patient and discussed todays results, in addition to providing specific details for the plan of care and counseling regarding the diagnosis and prognosis. Questions are answered at this time and they are agreeable with the plan.      --------------------------------- IMPRESSION AND DISPOSITION ---------------------------------    IMPRESSION  1. Arthritis of wrist, right    2. Arthralgia of right wrist        DISPOSITION  Disposition: Discharge to home  Patient condition is good      NOTE: This report was transcribed using voice recognition software.  Every effort was made to ensure accuracy; however, inadvertent computerized transcription errors may be present     ROSY Cornelius - CNP  06/08/21 0328  ATTENDING PROVIDER ATTESTATION:     Supervising Physician, on-site, available for consultation, non-participatory in the evaluation or care of this patient       Brittany Oakley MD  06/08/21 8375

## 2021-06-12 ENCOUNTER — HOSPITAL ENCOUNTER (EMERGENCY)
Age: 56
Discharge: HOME | End: 2021-06-12
Attending: EMERGENCY MEDICINE
Payer: COMMERCIAL

## 2021-06-12 VITALS
OXYGEN SATURATION: 99 % | HEART RATE: 98 BPM | TEMPERATURE: 98 F | RESPIRATION RATE: 18 BRPM | SYSTOLIC BLOOD PRESSURE: 178 MMHG | DIASTOLIC BLOOD PRESSURE: 99 MMHG

## 2021-06-12 DIAGNOSIS — M25.531 RIGHT WRIST PAIN: Primary | ICD-10-CM

## 2021-06-12 PROCEDURE — 99282 EMERGENCY DEPT VISIT SF MDM: CPT

## 2021-06-12 RX ORDER — METHYLPREDNISOLONE 4 MG/1
TABLET ORAL
Qty: 1 KIT | Status: SHIPPED | OUTPATIENT
Start: 2021-06-12 | End: 2021-06-18

## 2021-06-12 RX ORDER — IBUPROFEN 800 MG/1
800 TABLET ORAL EVERY 8 HOURS PRN
Qty: 21 TABLET | Refills: 0 | Status: SHIPPED | OUTPATIENT
Start: 2021-06-12 | End: 2021-06-13

## 2021-06-12 NOTE — ED PROVIDER NOTES
[address]  Department of Emergency Medicine   ED  Encounter Note  Admit Date/RoomTime: 2021  8:20 AM  ED Room: [facility]    NAME: Magui Delvalle  : 1965  MRN: [id number]     Chief Complaint:  Hand Pain    History of Present Illness       Magui Delvalle is a 60 y.o. old male who presents to the emergency department by private vehicle, for non-traumatic Right wrist pain which occured several week(s) prior to arrival.  The complaint is due to over use with his right hand and right wrist while cleaning out his house. Patient  has a prior history of pain to mentioned area related to today's visit. He is right handed. The patients tetanus status is up to date. Since onset the symptoms have been stable. His pain is aggraveated by use  and relieved by OTC NSAIDS. He denies any head injury, headache, loss of consciousness, chest pain or abdominal pain. Recent evaluation the emergency department several times for right wrist/hand pain. Reports he had been using his wrist splint but stopped using it recently. Currently cleaning out his house and noticed his right hand and wrist were hurting him more. No traumas falls or injuries. ROS   Pertinent positives and negatives are stated within HPI, all other systems reviewed and are negative. Past Medical History:  has a past medical history of Headache and Hypertension. Surgical History:  has no past surgical history on file. Social History:  reports that he has been smoking cigarettes. He has a 4.60 pack-year smoking history. He has never used smokeless tobacco. He reports current alcohol use. He reports that he does not use drugs. Family History: family history is not on file.      Allergies: Ace inhibitors and Fish-derived products    Physical Exam   Oxygen Saturation Interpretation: Normal.        ED Triage Vitals   BP Temp Temp src Pulse Resp SpO2 Height Weight   -- -- -- -- -- -- -- --         Constitutional: Alert, development consistent with age. Neck:  Normal ROM. Supple. Non-tender. Wrist:  Right  radial and ulnar aspect. Tenderness:  mild. Swelling: Mild. Deformity: no.            ROM: full range of motion. Skin:  no wounds, erythema, or swelling. Neurovascular: Motor deficit: none. Sensory deficit:   none. Pulse deficit: none. Capillary refill: normal.  Elbow: Right              Tenderness: none              Swelling: None. Deformity: no.               ROM: full range of motion. Skin:  no wounds, erythema, or swelling. Hand: Right              Tenderness: mild              Swelling: None. Deformity: no.               ROM: full range of motion. Skin:  no wounds, erythema, or swelling. Lymphatics: No lymphangitis or adenopathy noted. Neurological:  Oriented. Motor functions intact. Lab / Imaging Results   (All laboratory and radiology results have been personally reviewed by myself)  Labs:  No results found for this visit on 06/12/21. Imaging: All Radiology results interpreted by Radiologist unless otherwise noted. No orders to display     ED Course / Medical Decision Making   Medications - No data to display     Consult:   none    Procedure(s):   none    MDM:      Imaging was not obtained based on no suspicion for fracture / bony abnormality as per history/physical findings. He is encouraged to use the wrist splint he was previously given. Discussed RICE follow-up with primary care    Plan of Care/Counseling:  Myself: Bayron Arthur DO reviewed today's visit with the patient in addition to providing specific details for the plan of care and counseling regarding the diagnosis and prognosis. Questions are answered at this time and are agreeable with the plan. Assessment      1. Right wrist pain      Plan   Disposition:   Discharge home.   Patient condition is stable    New Medications     New Prescriptions    IBUPROFEN (IBU) 800 MG TABLET    Take 1 tablet by mouth every 8 hours as needed for Pain    METHYLPREDNISOLONE (MEDROL, ASHU,) 4 MG TABLET    Take by mouth. Electronically signed by Charli Gonsalez DO   DD: 6/12/21  **This report was transcribed using voice recognition software. Every effort was made to ensure accuracy; however, inadvertent computerized transcription errors may be present.   END OF ED PROVIDER NOTE        Charli Gonsalez DO  06/12/21 [address] Irwin Gloria DO  06/12/21 8872

## 2021-06-13 ENCOUNTER — HOSPITAL ENCOUNTER (EMERGENCY)
Age: 56
Discharge: HOME | End: 2021-06-13
Payer: COMMERCIAL

## 2021-06-13 VITALS
OXYGEN SATURATION: 98 % | HEART RATE: 84 BPM | RESPIRATION RATE: 17 BRPM | SYSTOLIC BLOOD PRESSURE: 183 MMHG | DIASTOLIC BLOOD PRESSURE: 96 MMHG | BODY MASS INDEX: 24.25 KG/M2 | TEMPERATURE: 97.3 F | HEIGHT: 68 IN | WEIGHT: 160 LBS

## 2021-06-13 DIAGNOSIS — Z76.0 ENCOUNTER FOR MEDICATION REFILL: Primary | ICD-10-CM

## 2021-06-13 PROCEDURE — 99283 EMERGENCY DEPT VISIT LOW MDM: CPT

## 2021-06-13 PROCEDURE — 6370000000 HC RX 637 (ALT 250 FOR IP): Performed by: NURSE PRACTITIONER

## 2021-06-13 RX ORDER — IBUPROFEN 400 MG/1
600 TABLET ORAL ONCE
Status: COMPLETED | OUTPATIENT
Start: 2021-06-13 | End: 2021-06-13

## 2021-06-13 RX ORDER — IBUPROFEN 600 MG/1
600 TABLET ORAL EVERY 8 HOURS PRN
Qty: 30 TABLET | Refills: 0 | Status: SHIPPED | OUTPATIENT
Start: 2021-06-13 | End: 2021-12-26

## 2021-06-13 RX ADMIN — IBUPROFEN 600 MG: 400 TABLET, FILM COATED ORAL at 09:42

## 2021-06-13 ASSESSMENT — PAIN DESCRIPTION - FREQUENCY: FREQUENCY: CONTINUOUS

## 2021-06-13 ASSESSMENT — PAIN SCALES - GENERAL
PAINLEVEL_OUTOF10: 9
PAINLEVEL_OUTOF10: 9

## 2021-06-13 ASSESSMENT — PAIN DESCRIPTION - LOCATION: LOCATION: ARM

## 2021-06-13 ASSESSMENT — PAIN DESCRIPTION - PAIN TYPE: TYPE: CHRONIC PAIN

## 2021-06-13 ASSESSMENT — PAIN DESCRIPTION - ORIENTATION: ORIENTATION: RIGHT

## 2021-06-13 NOTE — ED PROVIDER NOTES
[address]  Department of Emergency Medicine   ED  Encounter Note  Admit Date/RoomTime: 2021  7:53 AM  ED Room: [facility]/ST-1    NAME: Gilberto Contreras  : 1965  MRN: [id number]     Chief Complaint:  Arm Pain (right arm. has been seen several times for the same complaint. pt states that he has no new injury. . but has not filled his scripts from previous visits )    History of Present Illness      Gilberto Contreras is a 60 y.o. old male presents to the emergency department via by private vehicle requesting medication(s) as result of running out of pain medication. He has chronic right wrist and hand pain and is requesting a new prescription for his ibuprofen. He is also requesting food as he is hungry. He denies any new fall or injury he denies any fevers or chills he has other complaints or concerns. ROS   Pertinent positives and negatives are stated within HPI, all other systems reviewed and are negative. Past Medical History:  has a past medical history of Headache and Hypertension. Surgical History:  has no past surgical history on file. Social History:  reports that he has been smoking cigarettes. He has a 4.60 pack-year smoking history. He has never used smokeless tobacco. He reports current alcohol use. He reports that he does not use drugs. Family History: family history is not on file. Allergies: Ace inhibitors and Fish-derived products    Physical Exam   Oxygen Saturation Interpretation: Normal.        ED Triage Vitals   BP Temp Temp Source Pulse Resp SpO2 Height Weight   21 0624 21 0519 21 0624 21 0519 21 0519 21 0519 21 0624 21 0624   (!) 183/96 97.3 °F (36.3 °C) Temporal 81 16 93 % 5' 8\" (1.727 m) 160 lb (72.6 kg)         Constitutional:  Alert, development consistent with age. HEENT:  NC/NT. Airway patent. Neck:  Normal ROM. Supple.   Respiratory:  Clear to auscultation and breath sounds equal.    CV: Regular rate and rhythm, normal heart sounds, without pathological murmurs, ectopy, gallops, or rubs. GI:  Abdomen Soft, nontender, good bowel sounds. No firm or pulsatile mass. Integument:  No rashes, erythema present. Lymphatics: No lymphangitis or adenopathy noted. Neurological:  Oriented. Motor functions intact. Lab / Imaging Results   (All laboratory and radiology results have been personally reviewed by myself)  Labs:  No results found for this visit on 06/13/21. Imaging: All Radiology results interpreted by Radiologist unless otherwise noted. No orders to display       ED Course / Medical Decision Making     Medications   ibuprofen (ADVIL;MOTRIN) tablet 600 mg (600 mg Oral Given 6/13/21 0942)        Consults:   None    Counseling/MDM:   Myself: ROSY Garcia CNP  have spoken with the patient and discussed todays emergency visit, in addition to providing specific details for the plan of care and counseling regarding the diagnosis and prognosis. He was counseled on the role of the emergency department regarding prescribing medications for chronic conditions including Narcotic and other controlled substances. Based on the presenting complaint and nature of illness, the requested medications will be provided today in prescription form and he is instructed to contact their prescribing provider for any/all supplemental medications as soon as possible. Questions are answered at this time and is agreeable with the plan. Assessment      1. Encounter for medication refill      Plan   Discharge home. Patient condition is good    New Medications     Discharge Medication List as of 6/13/2021  9:40 AM        Electronically signed by ROSY Garcia CNP   DD: 6/13/21  **This report was transcribed using voice recognition software. Every effort was made to ensure accuracy; however, inadvertent computerized transcription errors may be present.   END OF ED PROVIDER NOTE Koby Esposito, ROSY - CNP  06/13/21 1038

## 2021-06-14 ENCOUNTER — HOSPITAL ENCOUNTER (EMERGENCY)
Age: 56
Discharge: HOME | End: 2021-06-14
Payer: COMMERCIAL

## 2021-06-14 VITALS
RESPIRATION RATE: 18 BRPM | HEART RATE: 90 BPM | TEMPERATURE: 98 F | OXYGEN SATURATION: 96 % | SYSTOLIC BLOOD PRESSURE: 140 MMHG | BODY MASS INDEX: 24.25 KG/M2 | HEIGHT: 68 IN | WEIGHT: 160 LBS | DIASTOLIC BLOOD PRESSURE: 90 MMHG

## 2021-06-14 DIAGNOSIS — M25.531 RIGHT WRIST PAIN: Primary | ICD-10-CM

## 2021-06-14 PROCEDURE — 99283 EMERGENCY DEPT VISIT LOW MDM: CPT

## 2021-06-14 ASSESSMENT — PAIN DESCRIPTION - FREQUENCY: FREQUENCY: CONTINUOUS

## 2021-06-14 ASSESSMENT — PAIN DESCRIPTION - LOCATION: LOCATION: WRIST

## 2021-06-14 ASSESSMENT — PAIN SCALES - GENERAL: PAINLEVEL_OUTOF10: 9

## 2021-06-14 ASSESSMENT — PAIN DESCRIPTION - ORIENTATION: ORIENTATION: RIGHT

## 2021-06-14 NOTE — LETTER
[facility] Internal Medicine   [address]  [place], [address]      June 14, 2021    [address] [address]      Dear Jonathon Ahuja,    This letter is regarding your Emergency Department (ED) visit at [facility] Emergency Department on 6/14/21. Mary Kay Zacarias wanted to make sure that you understand your discharge instructions and that you were able to fill any prescriptions that may have been ordered for you. Please contact the office at \"[phone]  if the ED advised to you follow up with Mary Kay Zacarias, or if you have any further questions or needs. Also did you know -   *Visiting the ED for a non-emergency could result in higher co-pays than you would normally be subject to paying? *You can call your doctor even after hours so they can direct you to the most appropriate care. [facility] ([facility]) practices can often offer you an appointment on the same day that you call. Many [address]  appointments; check our website for availability in your community , [email or web address]    Evisits are now available for patients for $36 through [employer] for certain conditions:  * Sinus, cold and or cough       * Diarrhea            * Headache  * Heartburn                                * Poison Aliza          * Back pain     * Urinary problems                         Sincerely,     Terrence Cardenas MD and your [facility]

## 2021-06-14 NOTE — ED PROVIDER NOTES
[address]  Department of Emergency Medicine   ED  Encounter Note  Admit Date/RoomTime: No admission date for patient encounter. ED Room: Room/bed info not found    NAME: Rochelle Constantino  : 1965  MRN: [id number]     Chief Complaint:  Wrist Pain (Right wrist pain; seen here multiple times for same)    History of Present Illness       Rochelle Constantino is a 60 y.o. old male who presents to the emergency department for complaint of chronic right wrist pain. He reports that the pain has been ongoing for quite some time. Reported that he needs somewhere to lay down to sleep tonight. Denies any injury/trauma. Reports history of arthritis. Taking ibuprofen with no significant improvement. Reports being right-hand dominant. Denies numbness, tingling, paresthesias, or any other complaints at this time. Denies any falls or head injuries. ROS   Pertinent positives and negatives are stated within HPI, all other systems reviewed and are negative. Past Medical History:  has a past medical history of Headache and Hypertension. Surgical History:  has no past surgical history on file. Social History:  reports that he has been smoking cigarettes. He has a 4.60 pack-year smoking history. He has never used smokeless tobacco. He reports current alcohol use. He reports that he does not use drugs. Family History: family history is not on file. Allergies: Ace inhibitors and Fish-derived products    Physical Exam   Oxygen Saturation Interpretation: Normal.        ED Triage Vitals [21 0035]   BP Temp Temp Source Pulse Resp SpO2 Height Weight   (!) 140/90 98 °F (36.7 °C) Tympanic 90 18 -- 5' 8\" (1.727 m) 160 lb (72.6 kg)         Constitutional:  Alert, development consistent with age. Neck:  Normal ROM. Supple. Non-tender. Wrist:  Right  radial and ulnar aspect. Tenderness:  mild. No snuffbox tenderness. Swelling: None. Deformity: no.            ROM: diminished range with pain. Skin:  no wounds, erythema, or swelling. Neurovascular: Motor deficit: none. Sensory deficit:   none. Pulse deficit: none. Capillary refill: normal.  Elbow: Right              Tenderness: none              Swelling: None. Deformity: no.               ROM: full range of motion. Skin:  no wounds, erythema, or swelling. Hand: Right              Tenderness: none              Swelling: None. Deformity: no.               ROM: full range of motion. Skin:  no wounds, erythema, or swelling. RUE: No neurovascular deficits. No motor or sensory deficits. Compartments soft and compressible. Lymphatics: No lymphangitis or adenopathy noted. Neurological:  Oriented. Motor functions intact. Lab / Imaging Results   (All laboratory and radiology results have been personally reviewed by myself)  Labs:  No results found for this visit on 06/14/21. Imaging: All Radiology results interpreted by Radiologist unless otherwise noted. No orders to display     ED Course / Medical Decision Making   Medications - No data to display     Consult:   none    Procedure(s):   none    MDM:      Jolene Merlin is a 64-year-old male who presented to the emergency department for complaint of chronic right wrist pain. He reported no injury/trauma. Reported using ibuprofen with no significant improvement. Also reported that he needed somewhere to sleep tonight. Denied any falls or head injury. He was seen and evaluated on 5/28/2021 and had imaging of his right wrist at that time. Imaging showed degenerative changes. No other abnormalities were noted. No snuffbox tenderness. No neurovascular deficits. No motor or sensory deficits. Compartments soft and compressible.   Due to the chronicity of his pain he was given orthopedic contact information and instructed to

## 2021-06-21 ENCOUNTER — APPOINTMENT (OUTPATIENT)
Dept: GENERAL RADIOLOGY | Age: 56
End: 2021-06-21
Payer: COMMERCIAL

## 2021-06-21 ENCOUNTER — HOSPITAL ENCOUNTER (EMERGENCY)
Age: 56
Discharge: HOME | End: 2021-06-21
Attending: EMERGENCY MEDICINE
Payer: COMMERCIAL

## 2021-06-21 ENCOUNTER — APPOINTMENT (OUTPATIENT)
Dept: CT IMAGING | Age: 56
End: 2021-06-21
Payer: COMMERCIAL

## 2021-06-21 VITALS
HEIGHT: 68 IN | SYSTOLIC BLOOD PRESSURE: 156 MMHG | OXYGEN SATURATION: 96 % | HEART RATE: 97 BPM | WEIGHT: 133.3 LBS | RESPIRATION RATE: 18 BRPM | TEMPERATURE: 97.8 F | BODY MASS INDEX: 20.2 KG/M2 | DIASTOLIC BLOOD PRESSURE: 101 MMHG

## 2021-06-21 DIAGNOSIS — S60.221A CONTUSION OF RIGHT HAND, INITIAL ENCOUNTER: ICD-10-CM

## 2021-06-21 DIAGNOSIS — M79.641 HAND PAIN, RIGHT: ICD-10-CM

## 2021-06-21 DIAGNOSIS — R10.13 ABDOMINAL PAIN, EPIGASTRIC: Primary | ICD-10-CM

## 2021-06-21 LAB
ALBUMIN SERPL-MCNC: 3.8 G/DL (ref 3.5–5.2)
ALP BLD-CCNC: 88 U/L (ref 40–129)
ALT SERPL-CCNC: 161 U/L (ref 0–40)
ANION GAP SERPL CALCULATED.3IONS-SCNC: 19 MMOL/L (ref 7–16)
APTT: 27.5 SEC (ref 24.5–35.1)
AST SERPL-CCNC: 521 U/L (ref 0–39)
BACTERIA: NORMAL /HPF
BASOPHILS ABSOLUTE: 0.03 E9/L (ref 0–0.2)
BASOPHILS RELATIVE PERCENT: 0.5 % (ref 0–2)
BILIRUB SERPL-MCNC: 1.3 MG/DL (ref 0–1.2)
BILIRUBIN URINE: NEGATIVE
BLOOD, URINE: ABNORMAL
BUN BLDV-MCNC: 11 MG/DL (ref 6–20)
CALCIUM SERPL-MCNC: 8.3 MG/DL (ref 8.6–10.2)
CHLORIDE BLD-SCNC: 91 MMOL/L (ref 98–107)
CLARITY: CLEAR
CO2: 27 MMOL/L (ref 22–29)
COLOR: YELLOW
CREAT SERPL-MCNC: 0.9 MG/DL (ref 0.7–1.2)
EOSINOPHILS ABSOLUTE: 0.01 E9/L (ref 0.05–0.5)
EOSINOPHILS RELATIVE PERCENT: 0.2 % (ref 0–6)
GFR AFRICAN AMERICAN: >60
GFR NON-AFRICAN AMERICAN: >60 ML/MIN/1.73
GLUCOSE BLD-MCNC: 94 MG/DL (ref 74–99)
GLUCOSE URINE: NEGATIVE MG/DL
HCT VFR BLD CALC: 43.3 % (ref 37–54)
HEMOGLOBIN: 16.3 G/DL (ref 12.5–16.5)
IMMATURE GRANULOCYTES #: 0.03 E9/L
IMMATURE GRANULOCYTES %: 0.5 % (ref 0–5)
INR BLD: 0.9
KETONES, URINE: NEGATIVE MG/DL
LACTIC ACID: 2.6 MMOL/L (ref 0.5–2.2)
LACTIC ACID: 5.8 MMOL/L (ref 0.5–2.2)
LEUKOCYTE ESTERASE, URINE: NEGATIVE
LIPASE: 63 U/L (ref 13–60)
LYMPHOCYTES ABSOLUTE: 0.78 E9/L (ref 1.5–4)
LYMPHOCYTES RELATIVE PERCENT: 12.2 % (ref 20–42)
MAGNESIUM: 1.6 MG/DL (ref 1.6–2.6)
MCH RBC QN AUTO: 31.7 PG (ref 26–35)
MCHC RBC AUTO-ENTMCNC: 37.6 % (ref 32–34.5)
MCV RBC AUTO: 84.2 FL (ref 80–99.9)
MONOCYTES ABSOLUTE: 0.55 E9/L (ref 0.1–0.95)
MONOCYTES RELATIVE PERCENT: 8.6 % (ref 2–12)
NEUTROPHILS ABSOLUTE: 5.01 E9/L (ref 1.8–7.3)
NEUTROPHILS RELATIVE PERCENT: 78 % (ref 43–80)
NITRITE, URINE: NEGATIVE
PDW BLD-RTO: 19.4 FL (ref 11.5–15)
PH UA: 7 (ref 5–9)
PLATELET # BLD: 178 E9/L (ref 130–450)
PMV BLD AUTO: 10.4 FL (ref 7–12)
POTASSIUM SERPL-SCNC: 3 MMOL/L (ref 3.5–5)
PROTEIN UA: 100 MG/DL
PROTHROMBIN TIME: 10 SEC (ref 9.3–12.4)
RBC # BLD: 5.14 E12/L (ref 3.8–5.8)
RBC UA: NORMAL /HPF (ref 0–2)
SODIUM BLD-SCNC: 137 MMOL/L (ref 132–146)
SPECIFIC GRAVITY UA: 1.02 (ref 1–1.03)
SPERM, URINE: NORMAL
TOTAL PROTEIN: 7.1 G/DL (ref 6.4–8.3)
TROPONIN, HIGH SENSITIVITY: 40 NG/L (ref 0–11)
TROPONIN, HIGH SENSITIVITY: 41 NG/L (ref 0–11)
UROBILINOGEN, URINE: 1 E.U./DL
WBC # BLD: 6.4 E9/L (ref 4.5–11.5)
WBC UA: NORMAL /HPF (ref 0–5)

## 2021-06-21 PROCEDURE — 2500000003 HC RX 250 WO HCPCS: Performed by: STUDENT IN AN ORGANIZED HEALTH CARE EDUCATION/TRAINING PROGRAM

## 2021-06-21 PROCEDURE — 83605 ASSAY OF LACTIC ACID: CPT

## 2021-06-21 PROCEDURE — 6360000002 HC RX W HCPCS: Performed by: STUDENT IN AN ORGANIZED HEALTH CARE EDUCATION/TRAINING PROGRAM

## 2021-06-21 PROCEDURE — 84484 ASSAY OF TROPONIN QUANT: CPT

## 2021-06-21 PROCEDURE — 80053 COMPREHEN METABOLIC PANEL: CPT

## 2021-06-21 PROCEDURE — 93005 ELECTROCARDIOGRAM TRACING: CPT | Performed by: NURSE PRACTITIONER

## 2021-06-21 PROCEDURE — 74176 CT ABD & PELVIS W/O CONTRAST: CPT

## 2021-06-21 PROCEDURE — 96366 THER/PROPH/DIAG IV INF ADDON: CPT

## 2021-06-21 PROCEDURE — 99283 EMERGENCY DEPT VISIT LOW MDM: CPT

## 2021-06-21 PROCEDURE — 71046 X-RAY EXAM CHEST 2 VIEWS: CPT

## 2021-06-21 PROCEDURE — 85730 THROMBOPLASTIN TIME PARTIAL: CPT

## 2021-06-21 PROCEDURE — 81001 URINALYSIS AUTO W/SCOPE: CPT

## 2021-06-21 PROCEDURE — 85025 COMPLETE CBC W/AUTO DIFF WBC: CPT

## 2021-06-21 PROCEDURE — 2580000003 HC RX 258: Performed by: STUDENT IN AN ORGANIZED HEALTH CARE EDUCATION/TRAINING PROGRAM

## 2021-06-21 PROCEDURE — 96375 TX/PRO/DX INJ NEW DRUG ADDON: CPT

## 2021-06-21 PROCEDURE — 73130 X-RAY EXAM OF HAND: CPT

## 2021-06-21 PROCEDURE — 96365 THER/PROPH/DIAG IV INF INIT: CPT

## 2021-06-21 PROCEDURE — 83735 ASSAY OF MAGNESIUM: CPT

## 2021-06-21 PROCEDURE — 85610 PROTHROMBIN TIME: CPT

## 2021-06-21 PROCEDURE — 96368 THER/DIAG CONCURRENT INF: CPT

## 2021-06-21 PROCEDURE — 83690 ASSAY OF LIPASE: CPT

## 2021-06-21 RX ORDER — 0.9 % SODIUM CHLORIDE 0.9 %
1000 INTRAVENOUS SOLUTION INTRAVENOUS ONCE
Status: COMPLETED | OUTPATIENT
Start: 2021-06-21 | End: 2021-06-21

## 2021-06-21 RX ORDER — MAGNESIUM SULFATE IN WATER 40 MG/ML
2000 INJECTION, SOLUTION INTRAVENOUS ONCE
Status: COMPLETED | OUTPATIENT
Start: 2021-06-21 | End: 2021-06-21

## 2021-06-21 RX ORDER — POTASSIUM CHLORIDE 7.45 MG/ML
10 INJECTION INTRAVENOUS
Status: COMPLETED | OUTPATIENT
Start: 2021-06-21 | End: 2021-06-21

## 2021-06-21 RX ORDER — LORAZEPAM 2 MG/ML
2 INJECTION INTRAMUSCULAR ONCE
Status: COMPLETED | OUTPATIENT
Start: 2021-06-21 | End: 2021-06-21

## 2021-06-21 RX ORDER — FENTANYL CITRATE 50 UG/ML
50 INJECTION, SOLUTION INTRAMUSCULAR; INTRAVENOUS ONCE
Status: COMPLETED | OUTPATIENT
Start: 2021-06-21 | End: 2021-06-21

## 2021-06-21 RX ADMIN — FENTANYL CITRATE 50 MCG: 0.05 INJECTION, SOLUTION INTRAMUSCULAR; INTRAVENOUS at 14:15

## 2021-06-21 RX ADMIN — FAMOTIDINE 20 MG: 10 INJECTION INTRAVENOUS at 14:15

## 2021-06-21 RX ADMIN — SODIUM CHLORIDE 1000 ML: 9 INJECTION, SOLUTION INTRAVENOUS at 14:15

## 2021-06-21 RX ADMIN — POTASSIUM CHLORIDE 10 MEQ: 7.46 INJECTION, SOLUTION INTRAVENOUS at 14:14

## 2021-06-21 RX ADMIN — POTASSIUM CHLORIDE 10 MEQ: 7.46 INJECTION, SOLUTION INTRAVENOUS at 15:38

## 2021-06-21 RX ADMIN — SODIUM CHLORIDE 1000 ML: 9 INJECTION, SOLUTION INTRAVENOUS at 15:55

## 2021-06-21 RX ADMIN — LORAZEPAM 2 MG: 2 INJECTION INTRAMUSCULAR; INTRAVENOUS at 15:38

## 2021-06-21 RX ADMIN — MAGNESIUM SULFATE HEPTAHYDRATE 2000 MG: 40 INJECTION, SOLUTION INTRAVENOUS at 14:14

## 2021-06-21 ASSESSMENT — PAIN SCALES - GENERAL
PAINLEVEL_OUTOF10: 10
PAINLEVEL_OUTOF10: 10

## 2021-06-21 NOTE — PROGRESS NOTES
Pt was in CT at time of room assignment given in the ED. Spoke with CT and made them aware that pt pt would be going ot RM 35 when scans are complete. Section RN made aware that pt would need to be placed on the monitor.

## 2021-06-21 NOTE — ED NOTES
Patient refused to keep on telemetry monitor, pulse ox, and BP cuff.       Shaji Garcia RN  06/21/21 [id number]

## 2021-06-21 NOTE — ED PROVIDER NOTES
[address]      Pt Name: Saundra Morin  MRN: [id number]  [place] 1965  Date of evaluation: 6/21/2021      CHIEF COMPLAINT       Chief Complaint   Patient presents with    Abdominal Pain     RLQ abdominal pain that started yesterday. Denies N/V/D     Hand Pain     R hand pain/edema. No known injury, states he woke up yesterday and it was swollen. HPI  Saundra Morin is a 60 y.o. male with history of hypertension, who presents to the emergency department with complaints of abdominal pain. Patient states that he woke up this morning with epigastric abdominal pain. He states that it across his epigastrium and seems to be on his lower chest wall. Described as constant pain. States it is achy. He denies any injury or trauma. States he also woke up with right hand pain and mild swelling. He admits to drinking alcohol regularly. He denies any chest pain, shortness of breath, headache, vision changes, weakness numbness or tingling. Denies any black or bloody stools. Denies any diarrhea. Except as noted above the remainder of the review of systems was reviewed and negative. Review of Systems   Constitutional: Negative for activity change, chills, diaphoresis, fatigue and fever. HENT: Negative for rhinorrhea, sore throat and trouble swallowing. Eyes: Negative for photophobia, pain and redness. Respiratory: Negative for apnea, cough, chest tightness, shortness of breath and wheezing. Cardiovascular: Negative for chest pain, palpitations and leg swelling. Gastrointestinal: Positive for abdominal pain. Negative for constipation, diarrhea, nausea and vomiting. Endocrine: Negative for polyuria. Genitourinary: Negative for difficulty urinating and dysuria. Musculoskeletal: Negative for back pain, neck pain and neck stiffness.         Reports right hand pain   Skin: Negative for pallor and rash. Neurological: Negative for dizziness, syncope, weakness, light-headedness and numbness. Psychiatric/Behavioral: Negative for confusion. The patient is not nervous/anxious. Physical Exam  Vitals and nursing note reviewed. Constitutional:       General: He is not in acute distress. Appearance: He is well-developed. Comments: Awake and alert. Sitting in the gurney in no obvious distress. HENT:      Head: Normocephalic and atraumatic. Mouth/Throat:      Mouth: Mucous membranes are moist.      Pharynx: No oropharyngeal exudate. Eyes:      General: No scleral icterus. Pupils: Pupils are equal, round, and reactive to light. Cardiovascular:      Rate and Rhythm: Normal rate and regular rhythm. Heart sounds: Normal heart sounds. No murmur heard. Comments: 2+ radial and dorsal pedis pulses bilaterally  Pulmonary:      Effort: Pulmonary effort is normal. No respiratory distress. Breath sounds: Normal breath sounds. No wheezing. Abdominal:      Palpations: Abdomen is soft. Tenderness: There is abdominal tenderness. There is no guarding or rebound. Comments: Mild epigastric tenderness. Abdomen soft nontender. Musculoskeletal:         General: No tenderness or deformity. Normal range of motion. Cervical back: Normal range of motion and neck supple. Right lower leg: No edema. Left lower leg: No edema. Comments: Mild swelling to the right hand. No erythema. Normal range of motion. Nontender. Skin:     General: Skin is warm and dry. Capillary Refill: Capillary refill takes less than 2 seconds. Findings: No rash. Comments: No wound to the hand. Neurological:      General: No focal deficit present. Mental Status: He is alert and oriented to person, place, and time. Cranial Nerves: No cranial nerve deficit. Sensory: No sensory deficit. Motor: No weakness or abnormal muscle tone.    Psychiatric: Mood and Affect: Mood normal.         Behavior: Behavior normal.          Procedures     MDM   This is a 12-year-old male with a history of hypertension, alcohol use who presents with epigastric pain. In the emergency department patient is awake and alert, hemodynamic stable, afebrile and in no respiratory distress. Abdomen soft with minimal epigastric tenderness. CT showed no acute intra-abdominal pathology. Work-up showed mild hypokalemia. Repleted in the ED. Initial lactic elevated repeat after hydration improved. Likely secondary to mild dehydration. Repeat abdominal exam showed soft benign abdomen. Patient with no leukocytosis. Patient feeling better after supportive therapy in the ED. Patient symptoms seem more consistent with gastritis versus GERD. Discussed plan for discharge home as well as return precautions and patient understands and agrees with plan                    --------------------------------------------- PAST HISTORY ---------------------------------------------  Past Medical History:  has a past medical history of Headache and Hypertension. Past Surgical History:  has no past surgical history on file. Social History:  reports that he has been smoking cigarettes. He has a 4.60 pack-year smoking history. He has never used smokeless tobacco. He reports current alcohol use. He reports that he does not use drugs. Family History: family history is not on file. The patients home medications have been reviewed.     Allergies: Ace inhibitors and Fish-derived products    -------------------------------------------------- RESULTS -------------------------------------------------  Labs:  Results for orders placed or performed during the hospital encounter of 06/21/21   Troponin   Result Value Ref Range    Troponin, High Sensitivity 40 (H) 0 - 11 ng/L   CBC Auto Differential   Result Value Ref Range    WBC 6.4 4.5 - 11.5 E9/L    RBC 5.14 3.80 - 5.80 E12/L    Hemoglobin 16.3 12.5 - 16.5 g/dL    Hematocrit 43.3 37.0 - 54.0 %    MCV 84.2 80.0 - 99.9 fL    MCH 31.7 26.0 - 35.0 pg    MCHC 37.6 (H) 32.0 - 34.5 %    RDW 19.4 (H) 11.5 - 15.0 fL    Platelets 664 408 - 272 E9/L    MPV 10.4 7.0 - 12.0 fL    Neutrophils % 78.0 43.0 - 80.0 %    Immature Granulocytes % 0.5 0.0 - 5.0 %    Lymphocytes % 12.2 (L) 20.0 - 42.0 %    Monocytes % 8.6 2.0 - 12.0 %    Eosinophils % 0.2 0.0 - 6.0 %    Basophils % 0.5 0.0 - 2.0 %    Neutrophils Absolute 5.01 1.80 - 7.30 E9/L    Immature Granulocytes # 0.03 E9/L    Lymphocytes Absolute 0.78 (L) 1.50 - 4.00 E9/L    Monocytes Absolute 0.55 0.10 - 0.95 E9/L    Eosinophils Absolute 0.01 (L) 0.05 - 0.50 E9/L    Basophils Absolute 0.03 0.00 - 0.20 E9/L   Comprehensive Metabolic Panel   Result Value Ref Range    Sodium 137 132 - 146 mmol/L    Potassium 3.0 (L) 3.5 - 5.0 mmol/L    Chloride 91 (L) 98 - 107 mmol/L    CO2 27 22 - 29 mmol/L    Anion Gap 19 (H) 7 - 16 mmol/L    Glucose 94 74 - 99 mg/dL    BUN 11 6 - 20 mg/dL    CREATININE 0.9 0.7 - 1.2 mg/dL    GFR Non-African American >60 >=60 mL/min/1.73    GFR African American >60     Calcium 8.3 (L) 8.6 - 10.2 mg/dL    Total Protein 7.1 6.4 - 8.3 g/dL    Albumin 3.8 3.5 - 5.2 g/dL    Total Bilirubin 1.3 (H) 0.0 - 1.2 mg/dL    Alkaline Phosphatase 88 40 - 129 U/L     (H) 0 - 40 U/L     (H) 0 - 39 U/L   Protime-INR   Result Value Ref Range    Protime 10.0 9.3 - 12.4 sec    INR 0.9    APTT   Result Value Ref Range    aPTT 27.5 24.5 - 35.1 sec   Magnesium   Result Value Ref Range    Magnesium 1.6 1.6 - 2.6 mg/dL   Lactic Acid, Plasma   Result Value Ref Range    Lactic Acid 5.8 (HH) 0.5 - 2.2 mmol/L   Urinalysis   Result Value Ref Range    Color, UA Yellow Straw/Yellow    Clarity, UA Clear Clear    Glucose, Ur Negative Negative mg/dL    Bilirubin Urine Negative Negative    Ketones, Urine Negative Negative mg/dL    Specific Gravity, UA 1.020 1.005 - 1.030    Blood, Urine MODERATE (A) Negative    pH, UA 7.0 5.0 - 9.0    Protein,  (A) Negative mg/dL    Urobilinogen, Urine 1.0 <2.0 E.U./dL    Nitrite, Urine Negative Negative    Leukocyte Esterase, Urine Negative Negative   Troponin   Result Value Ref Range    Troponin, High Sensitivity 41 (H) 0 - 11 ng/L   Lipase   Result Value Ref Range    Lipase 63 (H) 13 - 60 U/L   Microscopic Urinalysis   Result Value Ref Range    WBC, UA NONE 0 - 5 /HPF    RBC, UA 0-1 0 - 2 /HPF    Bacteria, UA NONE SEEN None Seen /HPF    Sperm, Urine RARE    Lactic Acid, Plasma   Result Value Ref Range    Lactic Acid 2.6 (H) 0.5 - 2.2 mmol/L   EKG 12 Lead   Result Value Ref Range    Ventricular Rate 109 BPM    Atrial Rate 109 BPM    P-R Interval 132 ms    QRS Duration 84 ms    Q-T Interval 340 ms    QTc Calculation (Bazett) 457 ms    P Axis 66 degrees    R Axis 65 degrees    T Axis 9 degrees       Radiology:  XR HAND RIGHT (MIN 3 VIEWS)   Final Result   Mild right thumb osteoarthritis. CT ABDOMEN PELVIS WO CONTRAST Additional Contrast? None   Final Result   1. There is no acute intra-abdominal or intrapelvic pathology. Specifically,   there is no free air, abscess or inflammatory process. 2. Hepatic steatosis. XR CHEST (2 VW)   Final Result   No new abnormal findings. ------------------------- NURSING NOTES AND VITALS REVIEWED ---------------------------  Date / Time Roomed:  6/21/2021  1:27 PM  ED Bed Assignment:  91/02    The nursing notes within the ED encounter and vital signs as below have been reviewed.    BP (!) 156/101   Pulse 97   Temp 97.8 °F (36.6 °C)   Resp 18   Ht 5' 8\" (1.727 m)   Wt 133 lb 4.8 oz (60.5 kg)   SpO2 96%   BMI 20.27 kg/m²   Oxygen Saturation Interpretation: Normal      ------------------------------------------ PROGRESS NOTES ------------------------------------------    I have spoken with the patient and discussed todays results, in addition to providing specific details for the plan of care and counseling regarding the diagnosis and prognosis. Their questions are answered at this time and they are agreeable with the plan. I discussed at length with them reasons for immediate return here for re evaluation. They will followup with their primary care physician by calling their office tomorrow. --------------------------------- ADDITIONAL PROVIDER NOTES ---------------------------------  At this time the patient is without objective evidence of an acute process requiring hospitalization or inpatient management. They have remained hemodynamically stable throughout their entire ED visit and are stable for discharge with outpatient follow-up. The plan has been discussed in detail and they are aware of the specific conditions for emergent return, as well as the importance of follow-up. Discharge Medication List as of 6/21/2021  6:39 PM          Diagnosis:  1. Abdominal pain, epigastric    2. Hand pain, right    3. Contusion of right hand, initial encounter        Disposition:  Patient's disposition: Discharge to home  Patient's condition is stable.        Sibohan Cuenca DO  Resident  06/23/21 [id number]

## 2021-06-21 NOTE — ED NOTES
Patient pulled out his own IV, stating \"I had to go to the bathroom. \" patient ambulatory to bathroom.       José Miguel Whatley RN  06/21/21 8610

## 2021-06-21 NOTE — ED NOTES
FIRST PROVIDER CONTACT ASSESSMENT NOTE      Department of Emergency Medicine   Admit Date: No admission date for patient encounter. Chief Complaint: Abdominal Pain (RLQ abdominal pain that started yesterday. Denies N/V/D ) and Hand Pain (R hand pain/edema. No known injury, states he woke up yesterday and it was swollen. )      History of Present Illness:    Saskia Bui is a 60 y.o. male who presents to the ED for of midepigastric abdominal pain which he states began yesterday. Also states upon waking this morning he had pain and swelling to the right hand. Denies any known injury known fall.   Denies any chest pain.        -----------------END OF FIRST PROVIDER CONTACT ASSESSMENT NOTE--------------  Electronically signed by ROSY Cruz CNP   DD: 6/21/21               ROSY Joseph CNP  06/21/21 1019

## 2021-06-21 NOTE — ED NOTES
Attempt to locate pt for EKG and blood work.  Pt in XR at this time     Celio Natarajan, RN  06/21/21 0276

## 2021-06-21 NOTE — LETTER
[address] Internal Medicine   [address]   [place], [address]      June 22, 2021    [address]  [place] [address]      Dear Anant Ham,    This letter is regarding your Emergency Department (ED) visit at [facility] Emergency Department on 6/21/21. Brown Patino wanted to make sure that you understand your discharge instructions and that you were able to fill any prescriptions that may have been ordered for you. Please contact the office at \"[phone]  if the ED advised to you follow up with Brown Patino, or if you have any further questions or needs. Also did you know -   *Visiting the ED for a non-emergency could result in higher co-pays than you would normally be subject to paying? *You can call your doctor even after hours so they can direct you to the most appropriate care. [facility]) practices can often offer you an appointment on the same day that you call. Many [address]  appointments; check our website for availability in your community , www. [employer]    Evisits are now available for patients for $36 through [employer] for certain conditions:  * Sinus, cold and or cough       * Diarrhea            * Headache  * Heartburn                                * Poison Aliza          * Back pain     * Urinary problems                         Sincerely,     Odilia Mitchell MD and your [facility]

## 2021-06-22 LAB
EKG ATRIAL RATE: 109 BPM
EKG P AXIS: 66 DEGREES
EKG P-R INTERVAL: 132 MS
EKG Q-T INTERVAL: 340 MS
EKG QRS DURATION: 84 MS
EKG QTC CALCULATION (BAZETT): 457 MS
EKG R AXIS: 65 DEGREES
EKG T AXIS: 9 DEGREES
EKG VENTRICULAR RATE: 109 BPM

## 2021-06-22 PROCEDURE — 93010 ELECTROCARDIOGRAM REPORT: CPT | Performed by: INTERNAL MEDICINE

## 2021-06-23 ENCOUNTER — APPOINTMENT (OUTPATIENT)
Dept: GENERAL RADIOLOGY | Age: 56
End: 2021-06-23
Payer: COMMERCIAL

## 2021-06-23 ENCOUNTER — HOSPITAL ENCOUNTER (OUTPATIENT)
Age: 56
Setting detail: OBSERVATION
Discharge: HOME | End: 2021-06-23
Attending: EMERGENCY MEDICINE | Admitting: INTERNAL MEDICINE
Payer: COMMERCIAL

## 2021-06-23 VITALS
BODY MASS INDEX: 19.7 KG/M2 | OXYGEN SATURATION: 93 % | WEIGHT: 130 LBS | DIASTOLIC BLOOD PRESSURE: 103 MMHG | RESPIRATION RATE: 19 BRPM | HEIGHT: 68 IN | TEMPERATURE: 98 F | HEART RATE: 75 BPM | SYSTOLIC BLOOD PRESSURE: 192 MMHG

## 2021-06-23 DIAGNOSIS — E87.6 HYPOKALEMIA: Primary | ICD-10-CM

## 2021-06-23 DIAGNOSIS — F10.920 ACUTE ALCOHOLIC INTOXICATION WITHOUT COMPLICATION (HCC): ICD-10-CM

## 2021-06-23 DIAGNOSIS — E87.20 LACTIC ACIDOSIS: ICD-10-CM

## 2021-06-23 DIAGNOSIS — K85.20 ALCOHOL-INDUCED ACUTE PANCREATITIS, UNSPECIFIED COMPLICATION STATUS: ICD-10-CM

## 2021-06-23 PROBLEM — K85.90 PANCREATITIS, UNSPECIFIED PANCREATITIS TYPE: Status: ACTIVE | Noted: 2021-06-23

## 2021-06-23 PROBLEM — F10.10 ETOH ABUSE: Status: ACTIVE | Noted: 2021-06-23

## 2021-06-23 PROBLEM — G40.909 SEIZURE DISORDER (HCC): Status: ACTIVE | Noted: 2021-06-23

## 2021-06-23 PROBLEM — I10 ESSENTIAL HYPERTENSION: Status: ACTIVE | Noted: 2021-06-23

## 2021-06-23 LAB
ACETAMINOPHEN LEVEL: <5 MCG/ML (ref 10–30)
ALBUMIN SERPL-MCNC: 3.6 G/DL (ref 3.5–5.2)
ALP BLD-CCNC: 68 U/L (ref 40–129)
ALT SERPL-CCNC: 133 U/L (ref 0–40)
AMPHETAMINE SCREEN, URINE: NOT DETECTED
ANION GAP SERPL CALCULATED.3IONS-SCNC: 16 MMOL/L (ref 7–16)
AST SERPL-CCNC: 398 U/L (ref 0–39)
BARBITURATE SCREEN URINE: NOT DETECTED
BASOPHILS ABSOLUTE: 0.03 E9/L (ref 0–0.2)
BASOPHILS RELATIVE PERCENT: 0.4 % (ref 0–2)
BENZODIAZEPINE SCREEN, URINE: NOT DETECTED
BILIRUB SERPL-MCNC: 1.2 MG/DL (ref 0–1.2)
BUN BLDV-MCNC: 5 MG/DL (ref 6–20)
CALCIUM SERPL-MCNC: 8.3 MG/DL (ref 8.6–10.2)
CANNABINOID SCREEN URINE: NOT DETECTED
CHLORIDE BLD-SCNC: 94 MMOL/L (ref 98–107)
CO2: 27 MMOL/L (ref 22–29)
COCAINE METABOLITE SCREEN URINE: NOT DETECTED
CREAT SERPL-MCNC: 0.9 MG/DL (ref 0.7–1.2)
EKG ATRIAL RATE: 78 BPM
EKG P AXIS: 16 DEGREES
EKG P-R INTERVAL: 102 MS
EKG Q-T INTERVAL: 430 MS
EKG QRS DURATION: 108 MS
EKG QTC CALCULATION (BAZETT): 490 MS
EKG T AXIS: 54 DEGREES
EKG VENTRICULAR RATE: 78 BPM
EOSINOPHILS ABSOLUTE: 0.09 E9/L (ref 0.05–0.5)
EOSINOPHILS RELATIVE PERCENT: 1.2 % (ref 0–6)
ETHANOL: 111 MG/DL (ref 0–0.08)
FENTANYL SCREEN, URINE: NOT DETECTED
GFR AFRICAN AMERICAN: >60
GFR NON-AFRICAN AMERICAN: >60 ML/MIN/1.73
GLUCOSE BLD-MCNC: 77 MG/DL (ref 74–99)
HCT VFR BLD CALC: 39.8 % (ref 37–54)
HEMOGLOBIN: 15.1 G/DL (ref 12.5–16.5)
IMMATURE GRANULOCYTES #: 0.03 E9/L
IMMATURE GRANULOCYTES %: 0.4 % (ref 0–5)
LACTIC ACID, SEPSIS: 3.9 MMOL/L (ref 0.5–1.9)
LACTIC ACID: 3.5 MMOL/L (ref 0.5–2.2)
LIPASE: 146 U/L (ref 13–60)
LYMPHOCYTES ABSOLUTE: 1.44 E9/L (ref 1.5–4)
LYMPHOCYTES RELATIVE PERCENT: 19.2 % (ref 20–42)
Lab: NORMAL
MCH RBC QN AUTO: 32 PG (ref 26–35)
MCHC RBC AUTO-ENTMCNC: 37.9 % (ref 32–34.5)
MCV RBC AUTO: 84.3 FL (ref 80–99.9)
METHADONE SCREEN, URINE: NOT DETECTED
MONOCYTES ABSOLUTE: 0.74 E9/L (ref 0.1–0.95)
MONOCYTES RELATIVE PERCENT: 9.9 % (ref 2–12)
NEUTROPHILS ABSOLUTE: 5.17 E9/L (ref 1.8–7.3)
NEUTROPHILS RELATIVE PERCENT: 68.9 % (ref 43–80)
OPIATE SCREEN URINE: NOT DETECTED
OXYCODONE URINE: NOT DETECTED
PDW BLD-RTO: 18.9 FL (ref 11.5–15)
PHENCYCLIDINE SCREEN URINE: NOT DETECTED
PLATELET # BLD: 142 E9/L (ref 130–450)
PMV BLD AUTO: 11 FL (ref 7–12)
POTASSIUM SERPL-SCNC: 2.9 MMOL/L (ref 3.5–5)
RBC # BLD: 4.72 E12/L (ref 3.8–5.8)
REASON FOR REJECTION: NORMAL
REJECTED TEST: NORMAL
SALICYLATE, SERUM: <0.3 MG/DL (ref 0–30)
SODIUM BLD-SCNC: 137 MMOL/L (ref 132–146)
TOTAL PROTEIN: 6.5 G/DL (ref 6.4–8.3)
TRICYCLIC ANTIDEPRESSANTS SCREEN SERUM: NEGATIVE NG/ML
TROPONIN, HIGH SENSITIVITY: 34 NG/L (ref 0–11)
WBC # BLD: 7.5 E9/L (ref 4.5–11.5)

## 2021-06-23 PROCEDURE — G0378 HOSPITAL OBSERVATION PER HR: HCPCS

## 2021-06-23 PROCEDURE — 83605 ASSAY OF LACTIC ACID: CPT

## 2021-06-23 PROCEDURE — 99283 EMERGENCY DEPT VISIT LOW MDM: CPT

## 2021-06-23 PROCEDURE — 36415 COLL VENOUS BLD VENIPUNCTURE: CPT

## 2021-06-23 PROCEDURE — 82077 ASSAY SPEC XCP UR&BREATH IA: CPT

## 2021-06-23 PROCEDURE — 2500000003 HC RX 250 WO HCPCS: Performed by: NURSE PRACTITIONER

## 2021-06-23 PROCEDURE — 96374 THER/PROPH/DIAG INJ IV PUSH: CPT

## 2021-06-23 PROCEDURE — 74022 RADEX COMPL AQT ABD SERIES: CPT

## 2021-06-23 PROCEDURE — 80179 DRUG ASSAY SALICYLATE: CPT

## 2021-06-23 PROCEDURE — 83690 ASSAY OF LIPASE: CPT

## 2021-06-23 PROCEDURE — 80053 COMPREHEN METABOLIC PANEL: CPT

## 2021-06-23 PROCEDURE — 85025 COMPLETE CBC W/AUTO DIFF WBC: CPT

## 2021-06-23 PROCEDURE — 80307 DRUG TEST PRSMV CHEM ANLYZR: CPT

## 2021-06-23 PROCEDURE — 93005 ELECTROCARDIOGRAM TRACING: CPT | Performed by: NURSE PRACTITIONER

## 2021-06-23 PROCEDURE — 80143 DRUG ASSAY ACETAMINOPHEN: CPT

## 2021-06-23 PROCEDURE — 84484 ASSAY OF TROPONIN QUANT: CPT

## 2021-06-23 PROCEDURE — 2580000003 HC RX 258: Performed by: NURSE PRACTITIONER

## 2021-06-23 PROCEDURE — 6370000000 HC RX 637 (ALT 250 FOR IP): Performed by: NURSE PRACTITIONER

## 2021-06-23 PROCEDURE — 93010 ELECTROCARDIOGRAM REPORT: CPT | Performed by: INTERNAL MEDICINE

## 2021-06-23 RX ORDER — LEVETIRACETAM 500 MG/1
500 TABLET ORAL 2 TIMES DAILY
Status: DISCONTINUED | OUTPATIENT
Start: 2021-06-23 | End: 2021-06-23 | Stop reason: HOSPADM

## 2021-06-23 RX ORDER — ACETAMINOPHEN 650 MG/1
650 SUPPOSITORY RECTAL EVERY 6 HOURS PRN
Status: DISCONTINUED | OUTPATIENT
Start: 2021-06-23 | End: 2021-06-23 | Stop reason: HOSPADM

## 2021-06-23 RX ORDER — AMLODIPINE BESYLATE 10 MG/1
10 TABLET ORAL DAILY
Status: DISCONTINUED | OUTPATIENT
Start: 2021-06-23 | End: 2021-06-23 | Stop reason: HOSPADM

## 2021-06-23 RX ORDER — 0.9 % SODIUM CHLORIDE 0.9 %
1000 INTRAVENOUS SOLUTION INTRAVENOUS ONCE
Status: COMPLETED | OUTPATIENT
Start: 2021-06-23 | End: 2021-06-23

## 2021-06-23 RX ORDER — ONDANSETRON 2 MG/ML
4 INJECTION INTRAMUSCULAR; INTRAVENOUS EVERY 6 HOURS PRN
Status: DISCONTINUED | OUTPATIENT
Start: 2021-06-23 | End: 2021-06-23 | Stop reason: HOSPADM

## 2021-06-23 RX ORDER — THIAMINE HYDROCHLORIDE 100 MG/ML
100 INJECTION, SOLUTION INTRAMUSCULAR; INTRAVENOUS DAILY
Status: DISCONTINUED | OUTPATIENT
Start: 2021-06-23 | End: 2021-06-23 | Stop reason: HOSPADM

## 2021-06-23 RX ORDER — NICOTINE POLACRILEX 4 MG
15 LOZENGE BUCCAL PRN
Status: DISCONTINUED | OUTPATIENT
Start: 2021-06-23 | End: 2021-06-23 | Stop reason: HOSPADM

## 2021-06-23 RX ORDER — SODIUM CHLORIDE 9 MG/ML
25 INJECTION, SOLUTION INTRAVENOUS PRN
Status: DISCONTINUED | OUTPATIENT
Start: 2021-06-23 | End: 2021-06-23 | Stop reason: HOSPADM

## 2021-06-23 RX ORDER — 0.9 % SODIUM CHLORIDE 0.9 %
1000 INTRAVENOUS SOLUTION INTRAVENOUS ONCE
Status: DISCONTINUED | OUTPATIENT
Start: 2021-06-23 | End: 2021-06-23

## 2021-06-23 RX ORDER — FOLIC ACID 5 MG/ML
1 INJECTION, SOLUTION INTRAMUSCULAR; INTRAVENOUS; SUBCUTANEOUS DAILY
Status: DISCONTINUED | OUTPATIENT
Start: 2021-06-23 | End: 2021-06-23 | Stop reason: HOSPADM

## 2021-06-23 RX ORDER — SODIUM CHLORIDE 0.9 % (FLUSH) 0.9 %
5-40 SYRINGE (ML) INJECTION EVERY 12 HOURS SCHEDULED
Status: DISCONTINUED | OUTPATIENT
Start: 2021-06-23 | End: 2021-06-23 | Stop reason: HOSPADM

## 2021-06-23 RX ORDER — POTASSIUM CHLORIDE 20 MEQ/1
40 TABLET, EXTENDED RELEASE ORAL ONCE
Status: COMPLETED | OUTPATIENT
Start: 2021-06-23 | End: 2021-06-23

## 2021-06-23 RX ORDER — SODIUM CHLORIDE 9 MG/ML
INJECTION, SOLUTION INTRAVENOUS CONTINUOUS
Status: DISCONTINUED | OUTPATIENT
Start: 2021-06-23 | End: 2021-06-23 | Stop reason: HOSPADM

## 2021-06-23 RX ORDER — POLYETHYLENE GLYCOL 3350 17 G/17G
17 POWDER, FOR SOLUTION ORAL DAILY PRN
Status: DISCONTINUED | OUTPATIENT
Start: 2021-06-23 | End: 2021-06-23 | Stop reason: HOSPADM

## 2021-06-23 RX ORDER — ONDANSETRON 4 MG/1
4 TABLET, ORALLY DISINTEGRATING ORAL EVERY 8 HOURS PRN
Status: DISCONTINUED | OUTPATIENT
Start: 2021-06-23 | End: 2021-06-23 | Stop reason: HOSPADM

## 2021-06-23 RX ORDER — SODIUM CHLORIDE 9 MG/ML
10 INJECTION INTRAVENOUS DAILY
Status: DISCONTINUED | OUTPATIENT
Start: 2021-06-23 | End: 2021-06-23 | Stop reason: HOSPADM

## 2021-06-23 RX ORDER — ACETAMINOPHEN 325 MG/1
650 TABLET ORAL EVERY 6 HOURS PRN
Status: DISCONTINUED | OUTPATIENT
Start: 2021-06-23 | End: 2021-06-23 | Stop reason: HOSPADM

## 2021-06-23 RX ORDER — SODIUM CHLORIDE 0.9 % (FLUSH) 0.9 %
5-40 SYRINGE (ML) INJECTION PRN
Status: DISCONTINUED | OUTPATIENT
Start: 2021-06-23 | End: 2021-06-23 | Stop reason: HOSPADM

## 2021-06-23 RX ORDER — PANTOPRAZOLE SODIUM 40 MG/10ML
40 INJECTION, POWDER, LYOPHILIZED, FOR SOLUTION INTRAVENOUS DAILY
Status: DISCONTINUED | OUTPATIENT
Start: 2021-06-23 | End: 2021-06-23 | Stop reason: HOSPADM

## 2021-06-23 RX ORDER — HYDRALAZINE HYDROCHLORIDE 25 MG/1
25 TABLET, FILM COATED ORAL 4 TIMES DAILY
Status: DISCONTINUED | OUTPATIENT
Start: 2021-06-23 | End: 2021-06-23 | Stop reason: HOSPADM

## 2021-06-23 RX ORDER — DEXTROSE MONOHYDRATE 25 G/50ML
12.5 INJECTION, SOLUTION INTRAVENOUS PRN
Status: DISCONTINUED | OUTPATIENT
Start: 2021-06-23 | End: 2021-06-23 | Stop reason: HOSPADM

## 2021-06-23 RX ORDER — DEXTROSE MONOHYDRATE 50 MG/ML
100 INJECTION, SOLUTION INTRAVENOUS PRN
Status: DISCONTINUED | OUTPATIENT
Start: 2021-06-23 | End: 2021-06-23 | Stop reason: HOSPADM

## 2021-06-23 RX ADMIN — SODIUM CHLORIDE 1000 ML: 9 INJECTION, SOLUTION INTRAVENOUS at 02:01

## 2021-06-23 RX ADMIN — POTASSIUM CHLORIDE 40 MEQ: 1500 TABLET, EXTENDED RELEASE ORAL at 03:58

## 2021-06-23 RX ADMIN — SODIUM CHLORIDE 1000 ML: 9 INJECTION, SOLUTION INTRAVENOUS at 03:58

## 2021-06-23 RX ADMIN — FAMOTIDINE 20 MG: 10 INJECTION INTRAVENOUS at 02:01

## 2021-06-23 ASSESSMENT — ENCOUNTER SYMPTOMS
WHEEZING: 0
COUGH: 0
RHINORRHEA: 0
ABDOMINAL PAIN: 1
BACK PAIN: 0
CONSTIPATION: 0
SHORTNESS OF BREATH: 0
SORE THROAT: 0
NAUSEA: 0
PHOTOPHOBIA: 0
APNEA: 0
TROUBLE SWALLOWING: 0
EYE REDNESS: 0
EYE PAIN: 0
DIARRHEA: 0
VOMITING: 0
CHEST TIGHTNESS: 0

## 2021-06-23 ASSESSMENT — PAIN DESCRIPTION - LOCATION: LOCATION: ABDOMEN

## 2021-06-23 ASSESSMENT — PAIN SCALES - GENERAL
PAINLEVEL_OUTOF10: 10
PAINLEVEL_OUTOF10: 0

## 2021-06-23 ASSESSMENT — PAIN DESCRIPTION - ORIENTATION: ORIENTATION: MID

## 2021-06-23 ASSESSMENT — PAIN DESCRIPTION - PAIN TYPE: TYPE: ACUTE PAIN

## 2021-06-23 NOTE — ED NOTES
Bed: 09  Expected date:   Expected time:   Means of arrival:   Comments:  triage     Rachel Chung RN  06/23/21 2339

## 2021-06-23 NOTE — PROGRESS NOTES
[facility]  Internal Medicine Residency / [address]    Attending Physician Statement  I have discussed the case, including pertinent history and exam findings with the resident and the team,  reviewed meds and pertinent labs and  I agree with the assessment, plan and orders as documented by the resident. However, patient left AMA before I could see him on rounds. Remainder of medical problems as per resident note.       Jose Soares, DO  Internal Medicine Residency Faculty

## 2021-06-23 NOTE — ED PROVIDER NOTES
ED Physician   HPI:  6/23/21, Time: 1:28 AM EDT         Nicola Adames is a 60 y.o. male presenting to the ED for midepigastric abdominal pain. Patient presents emergency department states that over the last several hours he had return of midepigastric abdominal pain. He states he believes it is his pancreas acting up. Patient does admit to drinking alcohol, states that he drinks beer every day. He reports feeling nauseous. He states that he tried to drink some water and rest just to try to calm down his pain but reports that it continued which brought him here today. He denies any chest pain or shortness of breath. Denies any back or flank pain. He also denies any difficulty with urination. Patient without any black or tarry stools. Denies take anything at home for symptom relief. States pain is mild in severity but persistent describing it as aching. Pain does not radiate anywhere else. Patient did report that this is the same pain that he has been seen for in the past.  Denies it being any different in nature. Review of Systems:   A complete review of systems was performed and pertinent positives and negatives are stated within HPI, all other systems reviewed and are negative.          --------------------------------------------- PAST HISTORY ---------------------------------------------  Past Medical History:  has a past medical history of Headache and Hypertension. Past Surgical History:  has no past surgical history on file. Social History:  reports that he has been smoking cigarettes. He has a 4.60 pack-year smoking history. He has never used smokeless tobacco. He reports current alcohol use. He reports that he does not use drugs. Family History: family history is not on file. The patients home medications have been reviewed.     Allergies: Ace inhibitors and Fish-derived products    -------------------------------------------------- RESULTS -------------------------------------------------  All laboratory and radiology results have been personally reviewed by myself   LABS:  Results for orders placed or performed during the hospital encounter of 06/23/21   Urine Drug Screen   Result Value Ref Range    Amphetamine Screen, Urine NOT DETECTED Negative <1000 ng/mL    Barbiturate Screen, Ur NOT DETECTED Negative < 200 ng/mL    Benzodiazepine Screen, Urine NOT DETECTED Negative < 200 ng/mL    Cannabinoid Scrn, Ur NOT DETECTED Negative < 50ng/mL    Cocaine Metabolite Screen, Urine NOT DETECTED Negative < 300 ng/mL    Opiate Scrn, Ur NOT DETECTED Negative < 300ng/mL    PCP Screen, Urine NOT DETECTED Negative < 25 ng/mL    Methadone Screen, Urine NOT DETECTED Negative <300 ng/mL    Oxycodone Urine NOT DETECTED Negative <100 ng/mL    FENTANYL SCREEN, URINE NOT DETECTED Negative <1 ng/mL    Drug Screen Comment: see below    SPECIMEN REJECTION   Result Value Ref Range    Rejected Test cmp trop sds     Reason for Rejection see below    Troponin   Result Value Ref Range    Troponin, High Sensitivity 34 (H) 0 - 11 ng/L   CBC auto differential   Result Value Ref Range    WBC 7.5 4.5 - 11.5 E9/L    RBC 4.72 3.80 - 5.80 E12/L    Hemoglobin 15.1 12.5 - 16.5 g/dL    Hematocrit 39.8 37.0 - 54.0 %    MCV 84.3 80.0 - 99.9 fL    MCH 32.0 26.0 - 35.0 pg    MCHC 37.9 (H) 32.0 - 34.5 %    RDW 18.9 (H) 11.5 - 15.0 fL    Platelets 593 310 - 519 E9/L    MPV 11.0 7.0 - 12.0 fL    Neutrophils % 68.9 43.0 - 80.0 %    Immature Granulocytes % 0.4 0.0 - 5.0 %    Lymphocytes % 19.2 (L) 20.0 - 42.0 %    Monocytes % 9.9 2.0 - 12.0 %    Eosinophils % 1.2 0.0 - 6.0 %    Basophils % 0.4 0.0 - 2.0 %    Neutrophils Absolute 5.17 1.80 - 7.30 E9/L    Immature Granulocytes # 0.03 E9/L    Lymphocytes Absolute 1.44 (L) 1.50 - 4.00 E9/L    Monocytes Absolute 0.74 0.10 - 0.95 E9/L    Eosinophils Absolute 0.09 0.05 - 0.50 E9/L    Basophils Absolute 0.03 0.00 - 0.20 E9/L   Comprehensive metabolic panel Result Value Ref Range    Sodium 137 132 - 146 mmol/L    Potassium 2.9 (L) 3.5 - 5.0 mmol/L    Chloride 94 (L) 98 - 107 mmol/L    CO2 27 22 - 29 mmol/L    Anion Gap 16 7 - 16 mmol/L    Glucose 77 74 - 99 mg/dL    BUN 5 (L) 6 - 20 mg/dL    CREATININE 0.9 0.7 - 1.2 mg/dL    GFR Non-African American >60 >=60 mL/min/1.73    GFR African American >60     Calcium 8.3 (L) 8.6 - 10.2 mg/dL    Total Protein 6.5 6.4 - 8.3 g/dL    Albumin 3.6 3.5 - 5.2 g/dL    Total Bilirubin 1.2 0.0 - 1.2 mg/dL    Alkaline Phosphatase 68 40 - 129 U/L     (H) 0 - 40 U/L     (H) 0 - 39 U/L   Lactate, Sepsis   Result Value Ref Range    Lactic Acid, Sepsis 3.9 (HH) 0.5 - 1.9 mmol/L   Serum Drug Screen   Result Value Ref Range    Ethanol Lvl 111 mg/dL    Acetaminophen Level <5.0 (L) 10.0 - 87.1 mcg/mL    Salicylate, Serum <7.2 0.0 - 30.0 mg/dL    TCA Scrn NEGATIVE Cutoff:300 ng/mL   Lipase   Result Value Ref Range    Lipase 146 (H) 13 - 60 U/L   EKG 12 Lead   Result Value Ref Range    Ventricular Rate 78 BPM    Atrial Rate 78 BPM    P-R Interval 102 ms    QRS Duration 108 ms    Q-T Interval 430 ms    QTc Calculation (Bazett) 490 ms    P Axis 16 degrees    T Axis 54 degrees       RADIOLOGY:  Interpreted by Radiologist.  XR ACUTE ABD SERIES CHEST 1 VW   Final Result   Unremarkable chest radiograph. Nonspecific, nonobstructive abdomen. ------------------------- NURSING NOTES AND VITALS REVIEWED ---------------------------   The nursing notes within the ED encounter and vital signs as below have been reviewed.    BP (!) 174/118   Pulse 78   Temp 98.2 °F (36.8 °C)   Resp 20   Ht 5' 8\" (1.727 m)   Wt 130 lb (59 kg)   SpO2 98%   BMI 19.77 kg/m²   Oxygen Saturation Interpretation: Normal      ---------------------------------------------------PHYSICAL EXAM--------------------------------------      Constitutional/General: Alert and oriented x3, well appearing, non toxic in NAD  Head: Normocephalic and or depression noted. Lactic acid level is elevated at 3.9 will provide patient with additional 1 L of normal saline and then reevaluate lactic acid. Counseling: The emergency provider has spoken with the patient and discussed todays results, in addition to providing specific details for the plan of care and counseling regarding the diagnosis and prognosis. Questions are answered at this time and they are agreeable with the plan.      --------------------------------- IMPRESSION AND DISPOSITION ---------------------------------    IMPRESSION  1. Hypokalemia    2. Lactic acidosis    3. Acute alcoholic intoxication without complication ([facility] [address]    4. Alcohol-induced acute pancreatitis, unspecified complication status        DISPOSITION  Disposition: Admit  Patient condition is stable      NOTE: This report was transcribed using voice recognition software. Every effort was made to ensure accuracy; however, inadvertent computerized transcription errors may be present     ROSY Villeda - CNP  06/23/21 0353  ATTENDING PROVIDER ATTESTATION:     I have personally performed and/or participated in the history, exam, medical decision making, and procedures and agree with all pertinent clinical information. I have also reviewed and agree with the past medical, family and social history unless otherwise noted. My findings/Plan: Patient presenting here because abdominal pain for the last several days. Patient reporting no vomiting. Patient reports that he has been drinking alcohol. Patient reporting history of pancreatitis. Patient reports similar pains when he is had. Patient reporting no black or tarry stools he reports no chest pain or difficulty breathing patient is awake alert [place] x3 heart lung exam normal patient tender upper abdomen. Patient neurologically intact. Labs and EKG noted reviewed patient does have lactic acidosis lipase is elevated. Patient ordered IV fluids as well as IV Pepcid. Patient ordered potassium as well as further IV hydration. Patient made aware of plan. Patient will be admitted to general floor. I did speak to  and plan will be to admit resident will be notified.        Darci Caldwell MD  06/23/21 608 [facility] Lisa Hansen MD  06/23/21 [id number]

## 2021-06-23 NOTE — H&P
[facility]  Internal Medicine Residency Program  History and Physical    Patient:  Angel Hernández 60 y.o. male MRN: [id number]     Date of Service: 6/23/2021    Hospital Day: 1      Chief complaint: had concerns including Abdominal Pain (pt states mid, upper gastric pain x a couple of weeks). History of Present Illness   The patient is a 60 y.o. male with a past medical history of seizures on keppra, Hypertension on amlodipine and hydralazine and alcohol abuse disorder coming in for epigastric pain. Mr. Wilbur Philippe is coming in for epigastric pain that began two days ago. He says it is 7 out of 10 sharp in the epigastric area with no radiation anywhere. He did not take any medications for the pain, comments that eating a little bit makes it better although he has had poor oral intake in the past 3 days. Denies any nausea vomiting, fever, changes in bowel or bladder habits. He did go to the ED 2 days prior for the same. Work-up showed hepatic steatosis on CT scan no other abnormalities. He also admits to drinking alcohol. His alcohol of choice is beer, notes he does consume 9 bottles sometimes. Denies daily drinking, states he drinks 2x/week. Per patient his last drink was today consuming 2 bottles. Due to progression of symptoms he proceeded to the emergency department. On arrival patient arrived with hypertension 173/108, tachycardic 88, afebrile. Saturating well in room air. Work-up showed potassium of 2.9, 0.9 creatinine, glucose 77, troponin 34. , . Lipase was elevated at 146. CBC was unremarkable. Serum ethanol level was 111. UDS negative, serum drug screen negative. Abdominal x-ray unremarkable. He was given 2 L of normal saline, 40 mEq of KCl, and Pepcid. Past Medical History:      Diagnosis Date    Headache     Hypertension        Past Surgical History:    History reviewed. No pertinent surgical history.     Medications Prior to Admission:    Prior skin changes noticed by patient  · Psychology: no depressed mood, no suicidal ideation    Physical Exam   · Vitals: BP (!) 192/103   Pulse 75   Temp 98 °F (36.7 °C) (Oral)   Resp 19   Ht 5' 8\" (1.727 m)   Wt 130 lb (59 kg)   SpO2 93%   BMI 19.77 kg/m²     · General Appearance: alert and oriented to person, place and time, well developed and well- nourished, in no acute distress  · Skin: warm and dry, no rash or erythema  · Head: normocephalic and atraumatic  · ENT: tympanic membrane, external ear and ear canal normal bilaterally, nose without deformity, nasal mucosa and turbinates normal without polyps  · Neck: supple and non-tender without mass, no thyromegaly or thyroid nodules, no cervical lymphadenopathy  · Pulmonary/Chest: clear to auscultation bilaterally- no wheezes, rales or rhonchi, normal air movement, no respiratory distress  · Cardiovascular: normal rate, regular rhythm, normal S1 and S2, no murmurs, rubs, clicks, or gallops, distal pulses intact, no carotid bruits  · Abdomen: soft, (+) epigastric tenderness, non-distended, normal bowel sounds, no masses or organomegaly  · Extremities: no cyanosis, clubbing or edema  · Musculoskeletal: normal range of motion, no joint swelling, deformity or tenderness  · Neurologic:  no cranial nerve deficit, gait, coordination and speech normal   Labs and Imaging Studies   Basic Labs  Recent Labs     06/21/21  1220 06/23/21  0231    137   K 3.0* 2.9*   CL 91* 94*   CO2 27 27   BUN 11 5*   CREATININE 0.9 0.9   GLUCOSE 94 77   CALCIUM 8.3* 8.3*       Recent Labs     06/21/21  1220 06/23/21  0231   WBC 6.4 7.5   RBC 5.14 4.72   HGB 16.3 15.1   HCT 43.3 39.8   MCV 84.2 84.3   MCH 31.7 32.0   MCHC 37.6* 37.9*   RDW 19.4* 18.9*    142   MPV 10.4 11.0       CBC:   Lab Results   Component Value Date    WBC 7.5 06/23/2021    RBC 4.72 06/23/2021    HGB 15.1 06/23/2021    HCT 39.8 06/23/2021    MCV 84.3 06/23/2021    RDW 18.9 06/23/2021     06/23/2021 CMP:  Lab Results   Component Value Date     06/23/2021    K 2.9 06/23/2021    K 3.9 05/07/2021    CL 94 06/23/2021    CO2 27 06/23/2021    BUN 5 06/23/2021    PROT 6.5 06/23/2021       Imaging Studies:     CT ABDOMEN PELVIS WO CONTRAST Additional Contrast? None    Result Date: 6/21/2021  EXAMINATION: CT OF THE ABDOMEN AND PELVIS WITHOUT CONTRAST 6/21/2021 1:00 pm TECHNIQUE: CT of the abdomen and pelvis was performed without the administration of intravenous contrast. Multiplanar reformatted images are provided for review. Dose modulation, iterative reconstruction, and/or weight based adjustment of the mA/kV was utilized to reduce the radiation dose to as low as reasonably achievable. COMPARISON: None. HISTORY: ORDERING SYSTEM PROVIDED HISTORY: pain TECHNOLOGIST PROVIDED HISTORY: Additional Contrast?->None Reason for exam:->pain Decision Support Exception - unselect if not a suspected or confirmed emergency medical condition->Emergency Medical Condition (MA) What reading provider will be dictating this exam?->CRC FINDINGS: The examination is limited due to the patient's mental status and inability to follow commands. The patient's hands were also overlying the abdomen and pelvis which causes artifact. Lower Chest: The lung bases are clear. There is a linear scar seen within the right lung base posteriorly. Organs: 1 there is diffuse fatty infiltration of the liver. No hepatic masses noted. The spleen, pancreas and adrenal glands are grossly unremarkable. There is no right or left obstructive uropathy. There is no calculus seen within the course of the ureters. GI/Bowel: The gallbladder is normally distended. There is no gastric distention. The duodenal sweep is unremarkable. There is no small bowel obstruction. There is no colonic distention or findings to suggest inflammatory bowel disease. The appendix is visualized and is unremarkable. No evidence of acute appendicitis.  Pelvis:  Bladder is unremarkable in appearance. There is no pelvic inflammatory process Peritoneum/Retroperitoneum:  No evidence of retroperitoneal lymphadenopathy. . The abdominal aorta is normal caliber. Bones/Soft Tissues:  No acute abnormality of the visualized osseous structures. 1. There is no acute intra-abdominal or intrapelvic pathology. Specifically, there is no free air, abscess or inflammatory process. 2. Hepatic steatosis. XR CHEST (2 VW)    Result Date: 6/21/2021  EXAMINATION: TWO XRAY VIEWS OF THE CHEST 6/21/2021 10:29 am COMPARISON: 16 May 2021 HISTORY: ORDERING SYSTEM PROVIDED HISTORY: cp TECHNOLOGIST PROVIDED HISTORY: Reason for exam:->cp What reading provider will be dictating this exam?->CRC FINDINGS: Right 7th rib deformity is unchanged and is likely related to prior trauma or surgery. Heart and pulmonary vascularity are normal.  Lungs are clear. Neither costophrenic angle appears newly blunted. No new abnormal findings. XR SHOULDER RIGHT (MIN 2 VIEWS)    Result Date: 6/5/2021  EXAMINATION: THREE XRAY VIEWS OF THE RIGHT SHOULDER 6/5/2021 3:11 am COMPARISON: 06/20/2017 HISTORY: ORDERING SYSTEM PROVIDED HISTORY: Fall with pain TECHNOLOGIST PROVIDED HISTORY: Reason for exam:->Fall with pain What reading provider will be dictating this exam?->CRC FINDINGS: Glenohumeral joint is normally aligned. No evidence of acute fracture or dislocation. No abnormal periarticular calcifications. The [facility] joint is unremarkable in appearance. Visualized lung is unremarkable. No acute abnormality. XR WRIST RIGHT (MIN 3 VIEWS)    Result Date: 5/28/2021  EXAMINATION: XRAY VIEWS OF THE RIGHT WRIST 5/28/2021 3:18 am COMPARISON: 06/27/2007 HISTORY: ORDERING SYSTEM PROVIDED HISTORY: pain TECHNOLOGIST PROVIDED HISTORY: Reason for exam:->pain What reading provider will be dictating this exam?->CRC FINDINGS: Moderate multifocal degenerative changes greatest at the 1st CMC, STT and radiocarpal joints.   Scattered vascular calcifications. No radiopaque foreign body. No bony destruction, dislocation or acute fracture identified. Degenerative changes. MRI would be useful if symptoms persist.     XR HAND RIGHT (MIN 3 VIEWS)    Result Date: 6/21/2021  EXAMINATION: THREE XRAY VIEWS OF THE RIGHT HAND 6/21/2021 2:22 pm COMPARISON: Right hand series from June 27, 2007 HISTORY: ORDERING SYSTEM PROVIDED HISTORY: right hand pain TECHNOLOGIST PROVIDED HISTORY: Reason for exam:->right hand pain What reading provider will be dictating this exam?->CRC FINDINGS: Radiographs of the right hand demonstrate no fractures with preserved alignment. Normal appearance of the radiocarpal joint. Mild right thumb CMC and interphalangeal joint degenerative spurring and sclerosis. No erosive changes. Osseous mineralization is normal.  No soft tissue abnormality. Mild right thumb osteoarthritis. XR ACUTE ABD SERIES CHEST 1 VW    Result Date: 6/23/2021  EXAMINATION: TWO XRAY VIEWS OF THE ABDOMEN AND SINGLE  XRAY VIEW OF THE CHEST 6/23/2021 1:36 am COMPARISON: Chest radiograph of 06/21/2021 HISTORY: ORDERING SYSTEM PROVIDED HISTORY: pain TECHNOLOGIST PROVIDED HISTORY: Reason for exam:->pain What reading provider will be dictating this exam?->CRC FINDINGS: Chest radiograph demonstrates no cardiomegaly or pulmonary vascular congestion. There is no acute infiltrate, pleural effusion or pneumothorax. There is no free air. Abdominal images demonstrate nonspecific, nonobstructive bowel gas pattern. There is air and stool within nondilated colon. There is some gas scattered in normal caliber small bowel. No suspicious calcifications are seen. Unremarkable chest radiograph. Nonspecific, nonobstructive abdomen. EKG: normal sinus rhythm, possible old septal infarct unchanged from previous.     Resident's Assessment and Plan     Saundra Morin is a 60 y.o. male with a past medical history of seizures on keppra, Hypertension on amlodipine

## 2021-06-23 NOTE — PROGRESS NOTES
Patient signed out AMA, notified internal med resident to speak to patient. Resident was at bedside when he signed [facility] paperwork. Patient stated he \"had to get home to do something very important\". Notified resident need of discharge AMA order.

## 2021-06-23 NOTE — PROGRESS NOTES
Pt. Refusing to initiate IV fluids on him, and check a blood sugar, pt.  States, I just wants to eat and that he never ate in 2 days\"

## 2021-06-23 NOTE — ED NOTES
[facility] faxed, floor called and aware of needing repeat Lactic Acid after 2nd bolus.  Transport pending     Melly Morales RN  06/23/21 [id number]

## 2021-06-24 ENCOUNTER — HOSPITAL ENCOUNTER (EMERGENCY)
Age: 56
Discharge: HOME | End: 2021-06-24
Payer: COMMERCIAL

## 2021-06-24 VITALS
OXYGEN SATURATION: 95 % | TEMPERATURE: 97.8 F | SYSTOLIC BLOOD PRESSURE: 162 MMHG | RESPIRATION RATE: 16 BRPM | WEIGHT: 130 LBS | HEIGHT: 68 IN | BODY MASS INDEX: 19.7 KG/M2 | HEART RATE: 87 BPM | DIASTOLIC BLOOD PRESSURE: 80 MMHG

## 2021-06-24 DIAGNOSIS — R10.84 GENERALIZED ABDOMINAL PAIN: ICD-10-CM

## 2021-06-24 DIAGNOSIS — R10.13 ABDOMINAL PAIN, EPIGASTRIC: Primary | ICD-10-CM

## 2021-06-24 LAB
ACETAMINOPHEN LEVEL: <5 MCG/ML (ref 10–30)
ALBUMIN SERPL-MCNC: 3.8 G/DL (ref 3.5–5.2)
ALP BLD-CCNC: 65 U/L (ref 40–129)
ALT SERPL-CCNC: 132 U/L (ref 0–40)
ANION GAP SERPL CALCULATED.3IONS-SCNC: 16 MMOL/L (ref 7–16)
AST SERPL-CCNC: 346 U/L (ref 0–39)
BASOPHILS ABSOLUTE: 0.04 E9/L (ref 0–0.2)
BASOPHILS RELATIVE PERCENT: 0.5 % (ref 0–2)
BILIRUB SERPL-MCNC: 1.3 MG/DL (ref 0–1.2)
BUN BLDV-MCNC: 7 MG/DL (ref 6–20)
CALCIUM SERPL-MCNC: 8.4 MG/DL (ref 8.6–10.2)
CHLORIDE BLD-SCNC: 89 MMOL/L (ref 98–107)
CO2: 26 MMOL/L (ref 22–29)
CREAT SERPL-MCNC: 0.9 MG/DL (ref 0.7–1.2)
EOSINOPHILS ABSOLUTE: 0.07 E9/L (ref 0.05–0.5)
EOSINOPHILS RELATIVE PERCENT: 0.9 % (ref 0–6)
ETHANOL: 114 MG/DL (ref 0–0.08)
GFR AFRICAN AMERICAN: >60
GFR NON-AFRICAN AMERICAN: >60 ML/MIN/1.73
GLUCOSE BLD-MCNC: 82 MG/DL (ref 74–99)
HCT VFR BLD CALC: 40.3 % (ref 37–54)
HEMOGLOBIN: 15 G/DL (ref 12.5–16.5)
IMMATURE GRANULOCYTES #: 0.05 E9/L
IMMATURE GRANULOCYTES %: 0.6 % (ref 0–5)
LIPASE: 109 U/L (ref 13–60)
LYMPHOCYTES ABSOLUTE: 1.76 E9/L (ref 1.5–4)
LYMPHOCYTES RELATIVE PERCENT: 22.1 % (ref 20–42)
MCH RBC QN AUTO: 31.7 PG (ref 26–35)
MCHC RBC AUTO-ENTMCNC: 37.2 % (ref 32–34.5)
MCV RBC AUTO: 85.2 FL (ref 80–99.9)
MONOCYTES ABSOLUTE: 0.7 E9/L (ref 0.1–0.95)
MONOCYTES RELATIVE PERCENT: 8.8 % (ref 2–12)
NEUTROPHILS ABSOLUTE: 5.35 E9/L (ref 1.8–7.3)
NEUTROPHILS RELATIVE PERCENT: 67.1 % (ref 43–80)
PDW BLD-RTO: 18.9 FL (ref 11.5–15)
PLATELET # BLD: 132 E9/L (ref 130–450)
PMV BLD AUTO: 11.3 FL (ref 7–12)
POTASSIUM SERPL-SCNC: 3.8 MMOL/L (ref 3.5–5)
RBC # BLD: 4.73 E12/L (ref 3.8–5.8)
SALICYLATE, SERUM: <0.3 MG/DL (ref 0–30)
SODIUM BLD-SCNC: 131 MMOL/L (ref 132–146)
TOTAL PROTEIN: 6.6 G/DL (ref 6.4–8.3)
TRICYCLIC ANTIDEPRESSANTS SCREEN SERUM: NEGATIVE NG/ML
WBC # BLD: 8 E9/L (ref 4.5–11.5)

## 2021-06-24 PROCEDURE — 80143 DRUG ASSAY ACETAMINOPHEN: CPT

## 2021-06-24 PROCEDURE — 6360000002 HC RX W HCPCS: Performed by: NURSE PRACTITIONER

## 2021-06-24 PROCEDURE — 85025 COMPLETE CBC W/AUTO DIFF WBC: CPT

## 2021-06-24 PROCEDURE — 83690 ASSAY OF LIPASE: CPT

## 2021-06-24 PROCEDURE — 99284 EMERGENCY DEPT VISIT MOD MDM: CPT

## 2021-06-24 PROCEDURE — 2580000003 HC RX 258: Performed by: NURSE PRACTITIONER

## 2021-06-24 PROCEDURE — 82077 ASSAY SPEC XCP UR&BREATH IA: CPT

## 2021-06-24 PROCEDURE — 2500000003 HC RX 250 WO HCPCS: Performed by: NURSE PRACTITIONER

## 2021-06-24 PROCEDURE — 96374 THER/PROPH/DIAG INJ IV PUSH: CPT

## 2021-06-24 PROCEDURE — 80307 DRUG TEST PRSMV CHEM ANLYZR: CPT

## 2021-06-24 PROCEDURE — 96375 TX/PRO/DX INJ NEW DRUG ADDON: CPT

## 2021-06-24 PROCEDURE — 80179 DRUG ASSAY SALICYLATE: CPT

## 2021-06-24 PROCEDURE — 80053 COMPREHEN METABOLIC PANEL: CPT

## 2021-06-24 RX ORDER — SODIUM CHLORIDE, SODIUM LACTATE, POTASSIUM CHLORIDE, CALCIUM CHLORIDE 600; 310; 30; 20 MG/100ML; MG/100ML; MG/100ML; MG/100ML
INJECTION, SOLUTION INTRAVENOUS CONTINUOUS
Status: DISCONTINUED | OUTPATIENT
Start: 2021-06-24 | End: 2021-06-24 | Stop reason: HOSPADM

## 2021-06-24 RX ORDER — KETOROLAC TROMETHAMINE 30 MG/ML
30 INJECTION, SOLUTION INTRAMUSCULAR; INTRAVENOUS ONCE
Status: COMPLETED | OUTPATIENT
Start: 2021-06-24 | End: 2021-06-24

## 2021-06-24 RX ADMIN — KETOROLAC TROMETHAMINE 30 MG: 30 INJECTION, SOLUTION INTRAMUSCULAR; INTRAVENOUS at 03:51

## 2021-06-24 RX ADMIN — SODIUM CHLORIDE, POTASSIUM CHLORIDE, SODIUM LACTATE AND CALCIUM CHLORIDE: 600; 310; 30; 20 INJECTION, SOLUTION INTRAVENOUS at 03:50

## 2021-06-24 RX ADMIN — FAMOTIDINE 20 MG: 10 INJECTION INTRAVENOUS at 03:53

## 2021-06-24 ASSESSMENT — PAIN DESCRIPTION - LOCATION: LOCATION: ABDOMEN

## 2021-06-24 ASSESSMENT — PAIN DESCRIPTION - ORIENTATION: ORIENTATION: MID;UPPER

## 2021-06-24 ASSESSMENT — PAIN DESCRIPTION - DESCRIPTORS: DESCRIPTORS: DISCOMFORT

## 2021-06-24 ASSESSMENT — PAIN DESCRIPTION - PAIN TYPE: TYPE: CHRONIC PAIN

## 2021-06-24 ASSESSMENT — PAIN SCALES - GENERAL: PAINLEVEL_OUTOF10: 6

## 2021-06-24 NOTE — ED NOTES
Pt sitting up in bed a/ox4. Pt skin warm/dry and respirations are equal and even bilat. Pt vitals updated and stable. Pt given d/c instructions and teaching per AVS and to follow up w/ PCP and specialist listed. Pt stated complete understanding and has no questions or concerns. Pt able to walk w/ steady gait to triage area.         Brianne Abrams RN  06/24/21 1344

## 2021-06-24 NOTE — ED NOTES
Bed: 18B-18  Expected date:   Expected time:   Means of arrival:   Comments:  triage     Clement Vasquez RN  06/24/21 [id number]

## 2021-06-24 NOTE — LETTER
[facility] Internal Medicine   [address]  ' [place], [address]      June 24, 2021    [address] [facility]  [place] [address]      Dear Ana Laura Cruz,    This letter is regarding your Emergency Department (ED) visit at [facility] Emergency Department on 6/24/21. Francesca Owen wanted to make sure that you understand your discharge instructions and that you were able to fill any prescriptions that may have been ordered for you. Please contact the office at \"[phone]  if the ED advised to you follow up with Francesca Owen, or if you have any further questions or needs. Also did you know -   *Visiting the ED for a non-emergency could result in higher co-pays than you would normally be subject to paying? *You can call your doctor even after hours so they can direct you to the most appropriate care. [facility] ([facility]) practices can often offer you an appointment on the same day that you call. Many [address]  appointments; check our website for availability in your community , [email or web address]    Evisits are now available for patients for $36 through [place] [employer] for certain conditions:  * Sinus, cold and or cough       * Diarrhea            * Headache  * Heartburn                                * Poison Aliza          * Back pain     * Urinary problems                         Sincerely,     Viviana Mart MD and your [facility]

## 2021-06-24 NOTE — ED NOTES
Pt lying in bed asleep. Pt skin warm/dry and respirations are equal and even bilat. Pt vitals updated and stable. Pt has no distress noted. Pt IV fluids continue to gravity. Pt will continue to be monitored.       Michael Ashley RN  06/24/21 6331

## 2021-06-24 NOTE — ED PROVIDER NOTES
Independent   HPI:  6/24/21, Time: 3:27 AM EDT         Edward Camacho is a 60 y.o. male presenting to the ED for return of midepigastric abdominal pain. Patient presents to the emergency department with complaints of midepigastric abdominal pain. Patient was actually seen here yesterday and was admitted with diagnosis of pancreatitis. Patient does admit to drinking 2 beers today. He does admit to drinking on a daily basis. States he normally only drinks beer. Patient reports that the pain returned,   I did question him on why he signed out [address] yesterday after he was admitted and he stated that he felt better. Patient denies any chest pain denies any shortness of breath denies any nausea, vomiting or diarrhea. Denies any black or tarry stools denies any blood noted in his urine and also without any back or flank pain. Patient is denying any drug use. He also denies fevers. Symptoms moderate in severity but persistent and describing the pain as aching. Review of Systems:   A complete review of systems was performed and pertinent positives and negatives are stated within HPI, all other systems reviewed and are negative.          --------------------------------------------- PAST HISTORY ---------------------------------------------  Past Medical History:  has a past medical history of Headache and Hypertension. Past Surgical History:  has no past surgical history on file. Social History:  reports that he has been smoking cigarettes. He has a 4.60 pack-year smoking history. He has never used smokeless tobacco. He reports current alcohol use. He reports that he does not use drugs. Family History: family history is not on file. The patients home medications have been reviewed.     Allergies: Ace inhibitors and Fish-derived products    -------------------------------------------------- RESULTS -------------------------------------------------  All laboratory and radiology results have been personally reviewed by myself   LABS:  Results for orders placed or performed during the hospital encounter of 06/24/21   CBC Auto Differential   Result Value Ref Range    WBC 8.0 4.5 - 11.5 E9/L    RBC 4.73 3.80 - 5.80 E12/L    Hemoglobin 15.0 12.5 - 16.5 g/dL    Hematocrit 40.3 37.0 - 54.0 %    MCV 85.2 80.0 - 99.9 fL    MCH 31.7 26.0 - 35.0 pg    MCHC 37.2 (H) 32.0 - 34.5 %    RDW 18.9 (H) 11.5 - 15.0 fL    Platelets 755 109 - 783 E9/L    MPV 11.3 7.0 - 12.0 fL    Neutrophils % 67.1 43.0 - 80.0 %    Immature Granulocytes % 0.6 0.0 - 5.0 %    Lymphocytes % 22.1 20.0 - 42.0 %    Monocytes % 8.8 2.0 - 12.0 %    Eosinophils % 0.9 0.0 - 6.0 %    Basophils % 0.5 0.0 - 2.0 %    Neutrophils Absolute 5.35 1.80 - 7.30 E9/L    Immature Granulocytes # 0.05 E9/L    Lymphocytes Absolute 1.76 1.50 - 4.00 E9/L    Monocytes Absolute 0.70 0.10 - 0.95 E9/L    Eosinophils Absolute 0.07 0.05 - 0.50 E9/L    Basophils Absolute 0.04 0.00 - 0.20 E9/L   Comprehensive Metabolic Panel   Result Value Ref Range    Sodium 131 (L) 132 - 146 mmol/L    Potassium 3.8 3.5 - 5.0 mmol/L    Chloride 89 (L) 98 - 107 mmol/L    CO2 26 22 - 29 mmol/L    Anion Gap 16 7 - 16 mmol/L    Glucose 82 74 - 99 mg/dL    BUN 7 6 - 20 mg/dL    CREATININE 0.9 0.7 - 1.2 mg/dL    GFR Non-African American >60 >=60 mL/min/1.73    GFR African American >60     Calcium 8.4 (L) 8.6 - 10.2 mg/dL    Total Protein 6.6 6.4 - 8.3 g/dL    Albumin 3.8 3.5 - 5.2 g/dL    Total Bilirubin 1.3 (H) 0.0 - 1.2 mg/dL    Alkaline Phosphatase 65 40 - 129 U/L     (H) 0 - 40 U/L     (H) 0 - 39 U/L   Lipase   Result Value Ref Range    Lipase 109 (H) 13 - 60 U/L   Serum Drug Screen   Result Value Ref Range    Ethanol Lvl 114 mg/dL    Acetaminophen Level <5.0 (L) 10.0 - 05.5 mcg/mL    Salicylate, Serum <3.1 0.0 - 30.0 mg/dL    TCA Scrn NEGATIVE Cutoff:300 ng/mL       RADIOLOGY:  Interpreted by Radiologist.  CT ABDOMEN PELVIS W IV CONTRAST Additional Contrast? None (Results Pending)       ------------------------- NURSING NOTES AND VITALS REVIEWED ---------------------------   The nursing notes within the ED encounter and vital signs as below have been reviewed. BP (!) 170/116   Pulse 87   Temp 97.8 °F (36.6 °C)   Resp 16   Ht 5' 8\" (1.727 m)   Wt 130 lb (59 kg)   SpO2 96%   BMI 19.77 kg/m²   Oxygen Saturation Interpretation: Normal      ---------------------------------------------------PHYSICAL EXAM--------------------------------------      Constitutional/General: Alert and oriented x3, mildly uncomfortable  Head: Normocephalic and atraumatic  Eyes: PERRL, EOMI  Mouth: Oropharynx clear, handling secretions, no trismus  Neck: Supple, full ROM,   Pulmonary: Lungs clear to auscultation bilaterally, no wheezes, rales, or rhonchi. Not in respiratory distress  Cardiovascular:  Regular rate and rhythm, no murmurs, gallops, or rubs. 2+ distal pulses  Abdomen: Soft,  tender, non distended, point tenderness midepigastric region, no unusual bulges or areas of pulsation noted. Extremities: Moves all extremities x 4. Warm and well perfused  Skin: warm and dry without rash  Neurologic: GCS 15,  Psych: Normal Affect      ------------------------------ ED COURSE/MEDICAL DECISION MAKING----------------------  Medications   famotidine (PEPCID) injection 20 mg (20 mg Intravenous Given 6/24/21 3591)   ketorolac (TORADOL) injection 30 mg (30 mg Intravenous Given 6/24/21 9343)         ED COURSE:  ED Course as of Jun 24 1920   Thu Jun 24, 2021   0509 Patient presented with progressive abdominal pain. This is the same presentation that he has had in the past.  Patient has a benign exam.  He had a paced which returned at 109 which is lower than his previous. Patient was reexamined and is resting comfortably sleeping. He is asking for a boxed lunch.   At this time he is discharged to follow-up with his physician.    [LS]      ED Course User Index  [LS] Sander Austin MD       Medical Decision Making:    Plan be for labs will medicate for symptom relief will reevaluate and determine if he needs to be readmitted. CBC unremarkable, chemistry panel with sodium level 131, liver enzymes appear to be hemolyzed. Lipase is decreasing it is now 109 from previous value, alcohol level 114. Patient signed out to emergency room provider. Per note patient was discharged home    Counseling: The emergency provider has spoken with the patient and discussed todays results, in addition to providing specific details for the plan of care and counseling regarding the diagnosis and prognosis. Questions are answered at this time and they are agreeable with the plan.      --------------------------------- IMPRESSION AND DISPOSITION ---------------------------------    IMPRESSION  1. Abdominal pain, epigastric    2. Generalized abdominal pain        DISPOSITION  Disposition: Discharged home  Patient condition is good      NOTE: This report was transcribed using voice recognition software.  Every effort was made to ensure accuracy; however, inadvertent computerized transcription errors may be present     [facility], APRN - CNP  06/24/21 1921

## 2021-06-25 NOTE — DISCHARGE SUMMARY
Saturating well in room air. Work-up showed potassium of 2.9, 0.9 creatinine, glucose 77, troponin 34. , . Lipase was elevated at 146. CBC was unremarkable. Serum ethanol level was 111. UDS negative, serum drug screen negative. Abdominal x-ray unremarkable. He was given 2 L of normal saline, 40 mEq of KCl, and Pepcid. Patient was admitted as a case of pancreatitis and GERD, Lactic acidosis and alcohol intoxication. He was started on normal saline infusion at 100 cc/hr and started on protonix. Lactic acid was being trended every 6 hours. On arrival to the floors he states that his abdominal pain has been improved. He was started on a diet. Repeat CMP, troponin were ordered as well as triglycerides and CRP.  However patient decided to leave [place] before he could be further evaluated by the medicine team.     Significant findings (history and exam, laboratory, radiological, pathology, other tests):  CBC:   Lab Results   Component Value Date    WBC 8.0 06/24/2021    RBC 4.73 06/24/2021    HGB 15.0 06/24/2021    HCT 40.3 06/24/2021    MCV 85.2 06/24/2021    MCH 31.7 06/24/2021    MCHC 37.2 06/24/2021    RDW 18.9 06/24/2021     06/24/2021    MPV 11.3 06/24/2021     CMP:    Lab Results   Component Value Date     06/24/2021    K 3.8 06/24/2021    K 3.9 05/07/2021    CL 89 06/24/2021    CO2 26 06/24/2021    BUN 7 06/24/2021    CREATININE 0.9 06/24/2021    GFRAA >60 06/24/2021    LABGLOM >60 06/24/2021    GLUCOSE 82 06/24/2021    PROT 6.6 06/24/2021    LABALBU 3.8 06/24/2021    CALCIUM 8.4 06/24/2021    BILITOT 1.3 06/24/2021    ALKPHOS 65 06/24/2021     06/24/2021     06/24/2021     Troponin:    Lab Results   Component Value Date    TROPONINI <0.01 05/20/2021        CT ABDOMEN PELVIS WO CONTRAST Additional Contrast? None    Result Date: 6/21/2021  EXAMINATION: CT OF THE ABDOMEN AND PELVIS WITHOUT CONTRAST 6/21/2021 1:00 pm TECHNIQUE: CT of the abdomen and pelvis was performed without the administration of intravenous contrast. Multiplanar reformatted images are provided for review. Dose modulation, iterative reconstruction, and/or weight based adjustment of the mA/kV was utilized to reduce the radiation dose to as low as reasonably achievable. COMPARISON: None. HISTORY: ORDERING SYSTEM PROVIDED HISTORY: pain TECHNOLOGIST PROVIDED HISTORY: Additional Contrast?->None Reason for exam:->pain Decision Support Exception - unselect if not a suspected or confirmed emergency medical condition->Emergency Medical Condition (MA) What reading provider will be dictating this exam?->CRC FINDINGS: The examination is limited due to the patient's mental status and inability to follow commands. The patient's hands were also overlying the abdomen and pelvis which causes artifact. Lower Chest: The lung bases are clear. There is a linear scar seen within the right lung base posteriorly. Organs: 1 there is diffuse fatty infiltration of the liver. No hepatic masses noted. The spleen, pancreas and adrenal glands are grossly unremarkable. There is no right or left obstructive uropathy. There is no calculus seen within the course of the ureters. GI/Bowel: The gallbladder is normally distended. There is no gastric distention. The duodenal sweep is unremarkable. There is no small bowel obstruction. There is no colonic distention or findings to suggest inflammatory bowel disease. The appendix is visualized and is unremarkable. No evidence of acute appendicitis. Pelvis:  Bladder is unremarkable in appearance. There is no pelvic inflammatory process Peritoneum/Retroperitoneum:  No evidence of retroperitoneal lymphadenopathy. . The abdominal aorta is normal caliber. Bones/Soft Tissues:  No acute abnormality of the visualized osseous structures. 1. There is no acute intra-abdominal or intrapelvic pathology. Specifically, there is no free air, abscess or inflammatory process. 2. Hepatic steatosis.      XR CHEST (2 VW)    Result Date: 6/21/2021  EXAMINATION: TWO XRAY VIEWS OF THE CHEST 6/21/2021 10:29 am COMPARISON: 16 May 2021 HISTORY: ORDERING SYSTEM PROVIDED HISTORY: cp TECHNOLOGIST PROVIDED HISTORY: Reason for exam:->cp What reading provider will be dictating this exam?->CRC FINDINGS: Right 7th rib deformity is unchanged and is likely related to prior trauma or surgery. Heart and pulmonary vascularity are normal.  Lungs are clear. Neither costophrenic angle appears newly blunted. No new abnormal findings. XR SHOULDER RIGHT (MIN 2 VIEWS)    Result Date: 6/5/2021  EXAMINATION: THREE XRAY VIEWS OF THE RIGHT SHOULDER 6/5/2021 3:11 am COMPARISON: 06/20/2017 HISTORY: ORDERING SYSTEM PROVIDED HISTORY: Fall with pain TECHNOLOGIST PROVIDED HISTORY: Reason for exam:->Fall with pain What reading provider will be dictating this exam?->CRC FINDINGS: Glenohumeral joint is normally aligned. No evidence of acute fracture or dislocation. No abnormal periarticular calcifications. The [facility] joint is unremarkable in appearance. Visualized lung is unremarkable. No acute abnormality. XR WRIST RIGHT (MIN 3 VIEWS)    Result Date: 5/28/2021  EXAMINATION: XRAY VIEWS OF THE RIGHT WRIST 5/28/2021 3:18 am COMPARISON: 06/27/2007 HISTORY: ORDERING SYSTEM PROVIDED HISTORY: pain TECHNOLOGIST PROVIDED HISTORY: Reason for exam:->pain What reading provider will be dictating this exam?->CRC FINDINGS: Moderate multifocal degenerative changes greatest at the 1st CMC, STT and radiocarpal joints. Scattered vascular calcifications. No radiopaque foreign body. No bony destruction, dislocation or acute fracture identified. Degenerative changes.   MRI would be useful if symptoms persist.     XR HAND RIGHT (MIN 3 VIEWS)    Result Date: 6/21/2021  EXAMINATION: THREE XRAY VIEWS OF THE RIGHT HAND 6/21/2021 2:22 pm COMPARISON: Right hand series from June 27, 2007 HISTORY: ORDERING SYSTEM PROVIDED HISTORY: right hand pain TECHNOLOGIST by mouth 4 times daily, Disp-120 tablet, R-1Normal         STOP taking these medications       meloxicam (MOBIC) 7.5 MG tablet Comments:   Reason for Stopping:               Activity: activity as tolerated  Diet: regular diet, avoid caffiene, alcohol intake     Patient left AMA prior to being able to have more workup or treatment done.       Nelda Sal MD M.D., PGY - 1   3:25 PM  6/25/2021    Attending physician: Dr. Narcisa Kumar

## 2021-06-27 ENCOUNTER — HOSPITAL ENCOUNTER (EMERGENCY)
Age: 56
Discharge: LEFT BEFORE DISCHARGE | End: 2021-06-27
Attending: EMERGENCY MEDICINE
Payer: COMMERCIAL

## 2021-06-27 VITALS
BODY MASS INDEX: 20.4 KG/M2 | DIASTOLIC BLOOD PRESSURE: 78 MMHG | TEMPERATURE: 97.1 F | HEIGHT: 67 IN | WEIGHT: 130 LBS | SYSTOLIC BLOOD PRESSURE: 132 MMHG | OXYGEN SATURATION: 94 % | HEART RATE: 78 BPM | RESPIRATION RATE: 16 BRPM

## 2021-06-27 DIAGNOSIS — M54.9 CHRONIC BACK PAIN, UNSPECIFIED BACK LOCATION, UNSPECIFIED BACK PAIN LATERALITY: Primary | ICD-10-CM

## 2021-06-27 DIAGNOSIS — G89.29 CHRONIC BACK PAIN, UNSPECIFIED BACK LOCATION, UNSPECIFIED BACK PAIN LATERALITY: Primary | ICD-10-CM

## 2021-06-27 PROCEDURE — 99283 EMERGENCY DEPT VISIT LOW MDM: CPT

## 2021-06-27 ASSESSMENT — PAIN DESCRIPTION - DESCRIPTORS: DESCRIPTORS: DISCOMFORT

## 2021-06-27 ASSESSMENT — PAIN DESCRIPTION - PAIN TYPE: TYPE: ACUTE PAIN

## 2021-06-27 ASSESSMENT — PAIN DESCRIPTION - ORIENTATION: ORIENTATION: LEFT;LOWER

## 2021-06-27 ASSESSMENT — PAIN SCALES - GENERAL: PAINLEVEL_OUTOF10: 5

## 2021-06-27 ASSESSMENT — PAIN DESCRIPTION - LOCATION: LOCATION: BACK

## 2021-06-27 NOTE — ED PROVIDER NOTES
FIRST PROVIDER CONTACT ASSESSMENT NOTE      Department of Emergency Medicine   Admit Date: 6/27/2021  6:27 AM    Chief Complaint: Back Pain (low, left side back pain, ongoing for 2 days. has not attempted OTC meds prior to coming in.  denies accident/injury)      History of Present Illness:    Magui Delvalle is a 60 y.o. male who presents to the ED for back pain. I evaluated him in the triage/lobby area. He said he had back pain.  As I was starting to take a full history and was actively talking to him, prior to a physical exam exam he got up and walked out prior to my completion of a full history or exam        -----------------END OF FIRST PROVIDER CONTACT ASSESSMENT NOTE--------------  Electronically signed by David Bobo MD   DD: 6/27/21               Jenae Perry MD  06/27/21 0630

## 2021-07-03 ENCOUNTER — HOSPITAL ENCOUNTER (EMERGENCY)
Age: 56
Discharge: HOME | End: 2021-07-03
Attending: EMERGENCY MEDICINE
Payer: COMMERCIAL

## 2021-07-03 ENCOUNTER — APPOINTMENT (OUTPATIENT)
Dept: GENERAL RADIOLOGY | Age: 56
End: 2021-07-03
Payer: COMMERCIAL

## 2021-07-03 VITALS
WEIGHT: 130 LBS | OXYGEN SATURATION: 95 % | DIASTOLIC BLOOD PRESSURE: 115 MMHG | TEMPERATURE: 97.2 F | BODY MASS INDEX: 19.7 KG/M2 | SYSTOLIC BLOOD PRESSURE: 164 MMHG | RESPIRATION RATE: 18 BRPM | HEIGHT: 68 IN | HEART RATE: 96 BPM

## 2021-07-03 DIAGNOSIS — M25.531 RIGHT WRIST PAIN: Primary | ICD-10-CM

## 2021-07-03 PROCEDURE — 6360000002 HC RX W HCPCS: Performed by: STUDENT IN AN ORGANIZED HEALTH CARE EDUCATION/TRAINING PROGRAM

## 2021-07-03 PROCEDURE — 96372 THER/PROPH/DIAG INJ SC/IM: CPT

## 2021-07-03 PROCEDURE — 99282 EMERGENCY DEPT VISIT SF MDM: CPT

## 2021-07-03 PROCEDURE — 71120 X-RAY EXAM BREASTBONE 2/>VWS: CPT

## 2021-07-03 RX ORDER — IBUPROFEN 600 MG/1
600 TABLET ORAL 4 TIMES DAILY PRN
Qty: 120 TABLET | Refills: 0 | Status: SHIPPED | OUTPATIENT
Start: 2021-07-03 | End: 2022-01-16

## 2021-07-03 RX ORDER — KETOROLAC TROMETHAMINE 30 MG/ML
15 INJECTION, SOLUTION INTRAMUSCULAR; INTRAVENOUS ONCE
Status: COMPLETED | OUTPATIENT
Start: 2021-07-03 | End: 2021-07-03

## 2021-07-03 RX ADMIN — KETOROLAC TROMETHAMINE 15 MG: 30 INJECTION, SOLUTION INTRAMUSCULAR; INTRAVENOUS at 08:39

## 2021-07-03 ASSESSMENT — ENCOUNTER SYMPTOMS
ABDOMINAL PAIN: 0
RHINORRHEA: 0
SINUS PAIN: 0
SHORTNESS OF BREATH: 0
SINUS PRESSURE: 0
VOMITING: 0
BACK PAIN: 0
EYE DISCHARGE: 0
CHEST TIGHTNESS: 0
COUGH: 0
CONSTIPATION: 0
NAUSEA: 0
DIARRHEA: 0
ANAL BLEEDING: 0
ABDOMINAL DISTENTION: 0

## 2021-07-03 ASSESSMENT — PAIN SCALES - GENERAL
PAINLEVEL_OUTOF10: 10
PAINLEVEL_OUTOF10: 10

## 2021-07-03 ASSESSMENT — PAIN DESCRIPTION - LOCATION: LOCATION: STERNUM;WRIST

## 2021-07-03 ASSESSMENT — PAIN DESCRIPTION - PAIN TYPE: TYPE: ACUTE PAIN

## 2021-07-03 NOTE — ED PROVIDER NOTES
HPI     Patient is a 60 y.o. Arch Ades a past medical history of wrist pain who presents with a chief complaint of wrist pain concern. This has been occurring for a few days. Patient states that it gets better with nothing. Patient states that it gets worse with nothing. Patient states that it is moderate in severity. Patient presents with a chief complaint of wrist pain. States that he has been seen for this multiple times in the past.  Patient has a swollen right wrist that later went down. Patient states that the wrist feels better now. Patient also has a small painful lump on his sternum. Patient states that this is also new. Patient denies any trauma. Patient denies any chest pain or shortness of breath. Patient denies any fevers, chills, nausea, vomiting, abdominal pain, changes in urinary or bowel habits. Patient is taking nothing for the pain. Review of Systems   Constitutional: Negative for activity change, appetite change, fatigue and fever. HENT: Negative for congestion, rhinorrhea, sinus pressure and sinus pain. Eyes: Negative for discharge. Respiratory: Negative for cough, chest tightness and shortness of breath. Cardiovascular: Positive for chest pain. Negative for palpitations. Gastrointestinal: Negative for abdominal distention, abdominal pain, anal bleeding, constipation, diarrhea, nausea and vomiting. Endocrine: Negative for polydipsia and polyuria. Genitourinary: Negative for decreased urine volume, difficulty urinating, enuresis, flank pain, frequency and urgency. Musculoskeletal: Positive for arthralgias. Negative for back pain and neck stiffness. Skin: Negative for rash and wound. Neurological: Negative for dizziness, weakness, light-headedness and headaches. Psychiatric/Behavioral: Negative for agitation, behavioral problems and confusion. Physical Exam  Vitals and nursing note reviewed.    Constitutional:       Appearance: He is well-developed. HENT:      Head: Normocephalic and atraumatic. Mouth/Throat:      Mouth: Mucous membranes are moist.      Pharynx: No oropharyngeal exudate or posterior oropharyngeal erythema. Eyes:      Extraocular Movements: Extraocular movements intact. Conjunctiva/sclera: Conjunctivae normal.      Pupils: Pupils are equal, round, and reactive to light. Cardiovascular:      Rate and Rhythm: Normal rate and regular rhythm. Heart sounds: Normal heart sounds. No murmur heard. Comments: Patient has some tenderness to palpation of the low sternum. Pulmonary:      Effort: Pulmonary effort is normal. No respiratory distress. Breath sounds: Normal breath sounds. No wheezing or rales. Abdominal:      General: Bowel sounds are normal.      Palpations: Abdomen is soft. Tenderness: There is no abdominal tenderness. There is no guarding or rebound. Musculoskeletal:         General: No tenderness or deformity. Cervical back: Normal range of motion and neck supple. Comments: Patient's right wrist has full range of motion good cap refill. Is neurovascularly intact. Patient has a small lump on the ulnar side of the dorsum of the right wrist.   Skin:     General: Skin is warm and dry. Neurological:      Mental Status: He is alert and oriented to person, place, and time. Cranial Nerves: No cranial nerve deficit. Coordination: Coordination normal.   Psychiatric:         Mood and Affect: Mood normal.         Behavior: Behavior normal.          Procedures     MDM         Patient is a 60 y.o. male presenting with low sternal pain. Patient has been seen for this as well as the wrist multiple times in the past.  Patient has previously gotten wrist x-rays. Patient's wrist is neurovascularly intact. Patient had a sternum x-ray. Sternum x-ray is normal.  Patient had no chest pain or shortness of breath. Patient is otherwise normal.  Patient denies any trauma.   Patient was given naproxen. Patient stated that his symptoms are improved. Patient was discharged home. Patient was seen and evaluated by myself and my attending No att. providers found. Assessment and Plan discussed with attending provider, please see attestation for final plan of care. This note was done using dictation software and there may be some grammatical errors associated with this. Scott Agarwal MD            --------------------------------------------- PAST HISTORY ---------------------------------------------  Past Medical History:  has a past medical history of Headache and Hypertension. Past Surgical History:  has no past surgical history on file. Social History:  reports that he has been smoking cigarettes. He has a 4.60 pack-year smoking history. He has never used smokeless tobacco. He reports current alcohol use. He reports that he does not use drugs. Family History: family history is not on file. The patients home medications have been reviewed. Allergies: Ace inhibitors and Fish-derived products    -------------------------------------------------- RESULTS -------------------------------------------------  Labs:  No results found for this visit on 07/03/21. Radiology:  XR STERNUM (MIN 2 VIEWS)   Final Result   There is no appreciable fracture of of the sternum on the plain films.             ------------------------- NURSING NOTES AND VITALS REVIEWED ---------------------------  Date / Time Roomed:  7/3/2021  7:33 AM  ED Bed Assignment:  NANCI KEMP    The nursing notes within the ED encounter and vital signs as below have been reviewed.    BP (!) 164/115   Pulse 96   Temp 97.2 °F (36.2 °C)   Resp 18   Ht 5' 8\" (1.727 m)   Wt 130 lb (59 kg)   SpO2 95%   BMI 19.77 kg/m²   Oxygen Saturation Interpretation: Normal      ------------------------------------------ PROGRESS NOTES ------------------------------------------  2:33 PM EDT  I have spoken with the patient and discussed todays results, in addition to providing specific details for the plan of care and counseling regarding the diagnosis and prognosis. Their questions are answered at this time and they are agreeable with the plan. I discussed at length with them reasons for immediate return here for re evaluation. They will followup with their primary care physician by calling their office tomorrow. --------------------------------- ADDITIONAL PROVIDER NOTES ---------------------------------  At this time the patient is without objective evidence of an acute process requiring hospitalization or inpatient management. They have remained hemodynamically stable throughout their entire ED visit and are stable for discharge with outpatient follow-up. The plan has been discussed in detail and they are aware of the specific conditions for emergent return, as well as the importance of follow-up. Discharge Medication List as of 7/3/2021  9:15 AM      START taking these medications    Details   !! ibuprofen (ADVIL;MOTRIN) 600 MG tablet Take 1 tablet by mouth 4 times daily as needed for Pain, Disp-120 tablet, R-0Normal       !! - Potential duplicate medications found. Please discuss with provider. Diagnosis:  1. Right wrist pain        Disposition:  Patient's disposition: Discharge to home  Patient's condition is stable.          Adelina Harvey MD  Resident  07/03/21 3637

## 2021-12-18 ENCOUNTER — APPOINTMENT (OUTPATIENT)
Dept: CT IMAGING | Age: 56
End: 2021-12-18
Payer: COMMERCIAL

## 2021-12-18 LAB
ALBUMIN SERPL-MCNC: 4.5 G/DL (ref 3.5–5.2)
ALP BLD-CCNC: 55 U/L (ref 40–129)
ALT SERPL-CCNC: 29 U/L (ref 0–40)
ANION GAP SERPL CALCULATED.3IONS-SCNC: 23 MMOL/L (ref 7–16)
AST SERPL-CCNC: 130 U/L (ref 0–39)
BASOPHILS ABSOLUTE: 0.02 E9/L (ref 0–0.2)
BASOPHILS RELATIVE PERCENT: 0.3 % (ref 0–2)
BILIRUB SERPL-MCNC: 1.4 MG/DL (ref 0–1.2)
BUN BLDV-MCNC: 17 MG/DL (ref 6–20)
CALCIUM SERPL-MCNC: 9 MG/DL (ref 8.6–10.2)
CHLORIDE BLD-SCNC: 98 MMOL/L (ref 98–107)
CO2: 20 MMOL/L (ref 22–29)
CREAT SERPL-MCNC: 1.3 MG/DL (ref 0.7–1.2)
EOSINOPHILS ABSOLUTE: 0 E9/L (ref 0.05–0.5)
EOSINOPHILS RELATIVE PERCENT: 0 % (ref 0–6)
GFR AFRICAN AMERICAN: >60
GFR NON-AFRICAN AMERICAN: >60 ML/MIN/1.73
GLUCOSE BLD-MCNC: 73 MG/DL (ref 74–99)
HCT VFR BLD CALC: 37.1 % (ref 37–54)
HEMOGLOBIN: 13.9 G/DL (ref 12.5–16.5)
IMMATURE GRANULOCYTES #: 0.03 E9/L
IMMATURE GRANULOCYTES %: 0.4 % (ref 0–5)
LYMPHOCYTES ABSOLUTE: 1.77 E9/L (ref 1.5–4)
LYMPHOCYTES RELATIVE PERCENT: 22.8 % (ref 20–42)
MCH RBC QN AUTO: 29.2 PG (ref 26–35)
MCHC RBC AUTO-ENTMCNC: 37.5 % (ref 32–34.5)
MCV RBC AUTO: 77.9 FL (ref 80–99.9)
MONOCYTES ABSOLUTE: 0.78 E9/L (ref 0.1–0.95)
MONOCYTES RELATIVE PERCENT: 10.1 % (ref 2–12)
NEUTROPHILS ABSOLUTE: 5.16 E9/L (ref 1.8–7.3)
NEUTROPHILS RELATIVE PERCENT: 66.4 % (ref 43–80)
PDW BLD-RTO: 14.9 FL (ref 11.5–15)
PLATELET # BLD: 248 E9/L (ref 130–450)
PMV BLD AUTO: 10.9 FL (ref 7–12)
POTASSIUM SERPL-SCNC: 3.2 MMOL/L (ref 3.5–5)
RBC # BLD: 4.76 E12/L (ref 3.8–5.8)
SODIUM BLD-SCNC: 141 MMOL/L (ref 132–146)
TOTAL PROTEIN: 7.2 G/DL (ref 6.4–8.3)
TROPONIN, HIGH SENSITIVITY: 26 NG/L (ref 0–11)
WBC # BLD: 7.8 E9/L (ref 4.5–11.5)

## 2021-12-18 PROCEDURE — 85025 COMPLETE CBC W/AUTO DIFF WBC: CPT

## 2021-12-18 PROCEDURE — 70450 CT HEAD/BRAIN W/O DYE: CPT

## 2021-12-18 PROCEDURE — 82077 ASSAY SPEC XCP UR&BREATH IA: CPT

## 2021-12-18 PROCEDURE — 51702 INSERT TEMP BLADDER CATH: CPT

## 2021-12-18 PROCEDURE — 72125 CT NECK SPINE W/O DYE: CPT

## 2021-12-18 PROCEDURE — 80179 DRUG ASSAY SALICYLATE: CPT

## 2021-12-18 PROCEDURE — 80053 COMPREHEN METABOLIC PANEL: CPT

## 2021-12-18 PROCEDURE — 80307 DRUG TEST PRSMV CHEM ANLYZR: CPT

## 2021-12-18 PROCEDURE — 99284 EMERGENCY DEPT VISIT MOD MDM: CPT

## 2021-12-18 PROCEDURE — 80143 DRUG ASSAY ACETAMINOPHEN: CPT

## 2021-12-18 PROCEDURE — 93005 ELECTROCARDIOGRAM TRACING: CPT | Performed by: NURSE PRACTITIONER

## 2021-12-18 PROCEDURE — 84484 ASSAY OF TROPONIN QUANT: CPT

## 2021-12-19 ENCOUNTER — HOSPITAL ENCOUNTER (EMERGENCY)
Age: 56
Discharge: HOME | End: 2021-12-19
Payer: COMMERCIAL

## 2021-12-19 VITALS
SYSTOLIC BLOOD PRESSURE: 119 MMHG | HEART RATE: 69 BPM | TEMPERATURE: 98.4 F | DIASTOLIC BLOOD PRESSURE: 75 MMHG | HEIGHT: 68 IN | WEIGHT: 130 LBS | BODY MASS INDEX: 19.7 KG/M2 | OXYGEN SATURATION: 96 % | RESPIRATION RATE: 16 BRPM

## 2021-12-19 DIAGNOSIS — S09.90XA CLOSED HEAD INJURY, INITIAL ENCOUNTER: Primary | ICD-10-CM

## 2021-12-19 DIAGNOSIS — E87.6 HYPOKALEMIA: ICD-10-CM
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
## 2021-12-19 DIAGNOSIS — F10.920 ACUTE ALCOHOLIC INTOXICATION WITHOUT COMPLICATION (HCC): ICD-10-CM

## 2021-12-19 LAB
ACETAMINOPHEN LEVEL: <5 MCG/ML (ref 10–30)
ETHANOL: 122 MG/DL (ref 0–0.08)
ETHANOL: 209 MG/DL (ref 0–0.08)
SALICYLATE, SERUM: <0.3 MG/DL (ref 0–30)
TRICYCLIC ANTIDEPRESSANTS SCREEN SERUM: NEGATIVE NG/ML
TROPONIN, HIGH SENSITIVITY: 24 NG/L (ref 0–11)

## 2021-12-19 PROCEDURE — 6370000000 HC RX 637 (ALT 250 FOR IP): Performed by: NURSE PRACTITIONER

## 2021-12-19 PROCEDURE — 82077 ASSAY SPEC XCP UR&BREATH IA: CPT

## 2021-12-19 PROCEDURE — 84484 ASSAY OF TROPONIN QUANT: CPT

## 2021-12-19 RX ORDER — POTASSIUM CHLORIDE 20 MEQ/1
40 TABLET, EXTENDED RELEASE ORAL ONCE
Status: COMPLETED | OUTPATIENT
Start: 2021-12-19 | End: 2021-12-19

## 2021-12-19 RX ADMIN — POTASSIUM CHLORIDE 40 MEQ: 20 TABLET, EXTENDED RELEASE ORAL at 01:54

## 2021-12-19 NOTE — ED NOTES
Department of Emergency Medicine  FIRST PROVIDER TRIAGE NOTE             Independent MLP           12/18/21  10:06 PM EST    Date of Encounter: 12/18/21   MRN: [id number]      HPI: Odilia Morton is a 60 y.o. male who presents to the ED for Fall (ETOH+ fall at a store -LOC c/o HA. daily drinker.)  Patient presents to the emergency department by EMS after having a fall as well as positive for alcohol intoxication. Patient initially denied falling and then when we reminded him that we were informed that he fell in the store he did not stated oh yeah he must have. Patient states that he only drank 2 beers today. Patient does not have any gross deformities noted. Denies any chest pain, shortness of breath or abdominal pain. Does not have any concerning injuries, no laceration no areas of ecchymosis. Patient is not on any anticoagulant therapy    ROS: Negative for cp or sob. PE: Gen Appearance/Constitutional: alert  HEENT: NC/NT. PERRLA,  Airway patent. Initial Plan of Care: All treatment areas with department are currently occupied. Plan to order/Initiate the following while awaiting opening in ED: labs, EKG and imaging studies.     Initial Plan of Care: Initiate Treatment-Testing, Proceed toTreatment Area When Bed Available for ED Attending/MLP to Continue Care    Electronically signed by ROSY Cox Res, CNP   DD: 12/18/21         ROSY Cox Res, CNP  12/18/21 [id number]

## 2021-12-19 NOTE — ED PROVIDER NOTES
independent   HPI:  12/19/21, Time: 12:12 AM ALEJANDRO Marcus is a 60 y.o. male presenting to the ED for fall as well as positive for alcohol intoxication. Patient initially denied falling and then when we reminded him that we were informed that he fell in the store he did not stated oh yeah he must have. Patient states that he only drank 2 beers today. Patient does not have any gross deformities noted. Denies any chest pain, shortness of breath or abdominal pain. Does not have any concerning injuries, no laceration no areas of ecchymosis. Patient is not on any anticoagulant therapy. Symptoms mild in severity and persistent. Review of Systems:   A complete review of systems was performed and pertinent positives and negatives are stated within HPI, all other systems reviewed and are negative.          --------------------------------------------- PAST HISTORY ---------------------------------------------  Past Medical History:  has a past medical history of Headache and Hypertension. Past Surgical History:  has no past surgical history on file. Social History:  reports that he has been smoking cigarettes. He has a 4.60 pack-year smoking history. He has never used smokeless tobacco. He reports current alcohol use. He reports that he does not use drugs. Family History: family history is not on file. The patients home medications have been reviewed.     Allergies: Ace inhibitors and Fish-derived products    -------------------------------------------------- RESULTS -------------------------------------------------  All laboratory and radiology results have been personally reviewed by myself   LABS:  Results for orders placed or performed during the hospital encounter of 12/19/21   Troponin   Result Value Ref Range    Troponin, High Sensitivity 26 (H) 0 - 11 ng/L   CBC Auto Differential   Result Value Ref Range    WBC 7.8 4.5 - 11.5 E9/L    RBC 4.76 3.80 - 5.80 E12/L    Hemoglobin 13.9 12.5 - 16.5 g/dL    Hematocrit 37.1 37.0 - 54.0 %    MCV 77.9 (L) 80.0 - 99.9 fL    MCH 29.2 26.0 - 35.0 pg    MCHC 37.5 (H) 32.0 - 34.5 %    RDW 14.9 11.5 - 15.0 fL    Platelets 447 009 - 369 E9/L    MPV 10.9 7.0 - 12.0 fL    Neutrophils % 66.4 43.0 - 80.0 %    Immature Granulocytes % 0.4 0.0 - 5.0 %    Lymphocytes % 22.8 20.0 - 42.0 %    Monocytes % 10.1 2.0 - 12.0 %    Eosinophils % 0.0 0.0 - 6.0 %    Basophils % 0.3 0.0 - 2.0 %    Neutrophils Absolute 5.16 1.80 - 7.30 E9/L    Immature Granulocytes # 0.03 E9/L    Lymphocytes Absolute 1.77 1.50 - 4.00 E9/L    Monocytes Absolute 0.78 0.10 - 0.95 E9/L    Eosinophils Absolute 0.00 (L) 0.05 - 0.50 E9/L    Basophils Absolute 0.02 0.00 - 0.20 E9/L   Comprehensive Metabolic Panel   Result Value Ref Range    Sodium 141 132 - 146 mmol/L    Potassium 3.2 (L) 3.5 - 5.0 mmol/L    Chloride 98 98 - 107 mmol/L    CO2 20 (L) 22 - 29 mmol/L    Anion Gap 23 (H) 7 - 16 mmol/L    Glucose 73 (L) 74 - 99 mg/dL    BUN 17 6 - 20 mg/dL    CREATININE 1.3 (H) 0.7 - 1.2 mg/dL    GFR Non-African American >60 >=60 mL/min/1.73    GFR African American >60     Calcium 9.0 8.6 - 10.2 mg/dL    Total Protein 7.2 6.4 - 8.3 g/dL    Albumin 4.5 3.5 - 5.2 g/dL    Total Bilirubin 1.4 (H) 0.0 - 1.2 mg/dL    Alkaline Phosphatase 55 40 - 129 U/L    ALT 29 0 - 40 U/L     (H) 0 - 39 U/L   Serum Drug Screen   Result Value Ref Range    Ethanol Lvl 209 mg/dL    Acetaminophen Level <5.0 (L) 10.0 - 49.7 mcg/mL    Salicylate, Serum <1.8 0.0 - 30.0 mg/dL    TCA Scrn NEGATIVE Cutoff:300 ng/mL   Troponin   Result Value Ref Range    Troponin, High Sensitivity 24 (H) 0 - 11 ng/L   Ethanol   Result Value Ref Range    Ethanol Lvl 122 mg/dL   EKG 12 Lead   Result Value Ref Range    Ventricular Rate 70 BPM    Atrial Rate 70 BPM    P-R Interval 140 ms    QRS Duration 98 ms    Q-T Interval 398 ms    QTc Calculation (Bazett) 429 ms    P Axis 63 degrees    R Axis 24 degrees    T Axis 58 degrees RADIOLOGY:  Interpreted by Radiologist.  [address] [place] [address]   Final Result   No acute intracranial abnormality. Changes of encephalomalacia left temporoparietal lobes. Remote lacunar infarct left thalamic nucleus. RECOMMENDATIONS:   Unavailable         CT CERVICAL SPINE WO CONTRAST   Final Result   No acute abnormality of the cervical spine. RECOMMENDATIONS:   Unavailable             ------------------------- NURSING NOTES AND VITALS REVIEWED ---------------------------   The nursing notes within the ED encounter and vital signs as below have been reviewed. /78   Pulse 82   Temp 98.4 °F (36.9 °C) (Oral)   Resp 16   Ht 5' 8\" (1.727 m)   Wt 130 lb (59 kg)   SpO2 95%   BMI 19.77 kg/m²   Oxygen Saturation Interpretation: Normal      ---------------------------------------------------PHYSICAL EXAM--------------------------------------      Constitutional/General: Alert and oriented x3, well appearing, non toxic in NAD  Head: Normocephalic and atraumatic  Eyes: PERRL, EOMI  Mouth: Oropharynx clear, handling secretions, no trismus  Neck: Supple, full ROM,   Pulmonary: Lungs clear to auscultation bilaterally, no wheezes, rales, or rhonchi. Not in respiratory distress  Cardiovascular:  Regular rate and rhythm, no murmurs, gallops, or rubs. 2+ distal pulses  Abdomen: Soft, non tender, non distended,   Extremities: Moves all extremities x 4. Warm and well perfused  Skin: warm and dry without rash, no clinical signs of injury. No areas of ecchymosis abrasions no areas soft tissue swelling. Moves all extremities equally x4. Neurologic: GCS 15, cranial nerves II through XII grossly intact. No acute neurovascular deficit noted.   Speech clear and coherent strength strong and equal bilaterally  Psych: Normal Affect      ------------------------------ ED COURSE/MEDICAL DECISION MAKING----------------------  Medications   potassium chloride (KLOR-CON M) extended release tablet 40 mEq (40 mEq Oral Given 12/19/21 0154)         ED COURSE:       Medical Decision Making: Plan will be for labs will also obtain imaging. Labs resulted troponin XX 6, repeat troponin XX 4. Patient denies any chest pain or shortness of breath. CBC negative, chemistry panel with slightly low potassium of 3.2 will replete. Creatinine slightly elevated at 1.3 also some slight elevation in his AST likely related to alcohol use. Initial alcohol level 209. Nursing staff did get a repeat alcohol use 122. CT scan of the brain showing no acute intercranial abnormality CT cervical spine negative. Twelve-lead EKG interpreted showing heart rate of 70, normal sinus rhythm. No acute ST elevation or depression noted. Patient is completely awake alert oriented x4 on reevaluation, at 03 45 he once again was reevaluated he can ambulate easily and independently with strong steady gait. Patient will be discharged home. He was educated on abstaining from alcohol use. The importance of good follow-up care with his primary care doctor and encouraged to seek alcohol detox. Patient otherwise nontoxic he denies chest pain denies shortness of breath, patient expressed understanding of good follow-up as well as when to return. Patient safely discharged home. Counseling: The emergency provider has spoken with the patient and discussed todays results, in addition to providing specific details for the plan of care and counseling regarding the diagnosis and prognosis. Questions are answered at this time and they are agreeable with the plan.      --------------------------------- IMPRESSION AND DISPOSITION ---------------------------------    IMPRESSION  1. Closed head injury, initial encounter    2. Hypokalemia    3. Acute alcoholic intoxication without complication ([facility] [address]    4.  Fall, initial encounter        DISPOSITION  Disposition: Discharge to home  Patient condition is good      NOTE: This report was transcribed using voice recognition software.  Every effort was made to ensure accuracy; however, inadvertent computerized transcription errors may be present     ROSY Escobedo CNP  12/19/21 0522

## 2021-12-20 LAB
EKG ATRIAL RATE: 70 BPM
EKG P AXIS: 63 DEGREES
EKG P-R INTERVAL: 140 MS
EKG Q-T INTERVAL: 398 MS
EKG QRS DURATION: 98 MS
EKG QTC CALCULATION (BAZETT): 429 MS
EKG R AXIS: 24 DEGREES
EKG T AXIS: 58 DEGREES
EKG VENTRICULAR RATE: 70 BPM

## 2021-12-26 ENCOUNTER — HOSPITAL ENCOUNTER (INPATIENT)
Age: 56
LOS: 1 days | Discharge: NURSING, REHAB OR LONG-TERM CARE | DRG: 055 | End: 2021-12-27
Attending: EMERGENCY MEDICINE | Admitting: STUDENT IN AN ORGANIZED HEALTH CARE EDUCATION/TRAINING PROGRAM
Payer: COMMERCIAL

## 2021-12-26 ENCOUNTER — APPOINTMENT (OUTPATIENT)
Dept: CT IMAGING | Age: 56
DRG: 055 | End: 2021-12-26
Payer: COMMERCIAL

## 2021-12-26 ENCOUNTER — HOSPITAL ENCOUNTER (EMERGENCY)
Age: 56
Discharge: HOME | End: 2021-12-26
Attending: EMERGENCY MEDICINE
Payer: COMMERCIAL

## 2021-12-26 ENCOUNTER — APPOINTMENT (OUTPATIENT)
Dept: CT IMAGING | Age: 56
End: 2021-12-26
Payer: COMMERCIAL

## 2021-12-26 VITALS
TEMPERATURE: 97.2 F | DIASTOLIC BLOOD PRESSURE: 104 MMHG | SYSTOLIC BLOOD PRESSURE: 190 MMHG | OXYGEN SATURATION: 96 % | HEART RATE: 74 BPM | RESPIRATION RATE: 16 BRPM

## 2021-12-26 DIAGNOSIS — R51.9 NONINTRACTABLE HEADACHE, UNSPECIFIED CHRONICITY PATTERN, UNSPECIFIED HEADACHE TYPE: Primary | ICD-10-CM

## 2021-12-26 DIAGNOSIS — S06.5XAA SDH (SUBDURAL HEMATOMA): Primary | ICD-10-CM

## 2021-12-26 PROBLEM — T14.90XA TRAUMA: Status: ACTIVE | Noted: 2021-12-26

## 2021-12-26 LAB
ALBUMIN SERPL-MCNC: 4.4 G/DL (ref 3.5–5.2)
ALP BLD-CCNC: 103 U/L (ref 40–129)
ALT SERPL-CCNC: 48 U/L (ref 0–40)
ANION GAP SERPL CALCULATED.3IONS-SCNC: 32 MMOL/L (ref 7–16)
APTT: 24.1 SEC (ref 24.5–35.1)
AST SERPL-CCNC: 222 U/L (ref 0–39)
BILIRUB SERPL-MCNC: 1.2 MG/DL (ref 0–1.2)
BUN BLDV-MCNC: 19 MG/DL (ref 6–20)
CALCIUM SERPL-MCNC: 8.9 MG/DL (ref 8.6–10.2)
CHLORIDE BLD-SCNC: 93 MMOL/L (ref 98–107)
CHP ED QC CHECK: NORMAL
CO2: 17 MMOL/L (ref 22–29)
CREAT SERPL-MCNC: 1.1 MG/DL (ref 0.7–1.2)
ETHANOL: <10 MG/DL (ref 0–0.08)
GFR AFRICAN AMERICAN: >60
GFR NON-AFRICAN AMERICAN: >60 ML/MIN/1.73
GLUCOSE BLD-MCNC: 118 MG/DL (ref 74–99)
GLUCOSE BLD-MCNC: 174 MG/DL
HCT VFR BLD CALC: 37.7 % (ref 37–54)
HEMOGLOBIN: 14.1 G/DL (ref 12.5–16.5)
INR BLD: 1
MCH RBC QN AUTO: 29.4 PG (ref 26–35)
MCHC RBC AUTO-ENTMCNC: 37.4 % (ref 32–34.5)
MCV RBC AUTO: 78.5 FL (ref 80–99.9)
METER GLUCOSE: 174 MG/DL (ref 74–99)
PDW BLD-RTO: 16.8 FL (ref 11.5–15)
PLATELET # BLD: 185 E9/L (ref 130–450)
PMV BLD AUTO: 11.6 FL (ref 7–12)
POTASSIUM SERPL-SCNC: 4.2 MMOL/L (ref 3.5–5)
PROTHROMBIN TIME: 10.7 SEC (ref 9.3–12.4)
RBC # BLD: 4.8 E12/L (ref 3.8–5.8)
REASON FOR REJECTION: NORMAL
REJECTED TEST: NORMAL
SODIUM BLD-SCNC: 142 MMOL/L (ref 132–146)
TOTAL PROTEIN: 7.7 G/DL (ref 6.4–8.3)
WBC # BLD: 8.2 E9/L (ref 4.5–11.5)

## 2021-12-26 PROCEDURE — 82077 ASSAY SPEC XCP UR&BREATH IA: CPT

## 2021-12-26 PROCEDURE — 80053 COMPREHEN METABOLIC PANEL: CPT

## 2021-12-26 PROCEDURE — 82962 GLUCOSE BLOOD TEST: CPT

## 2021-12-26 PROCEDURE — 99223 1ST HOSP IP/OBS HIGH 75: CPT | Performed by: SURGERY

## 2021-12-26 PROCEDURE — 70450 CT HEAD/BRAIN W/O DYE: CPT

## 2021-12-26 PROCEDURE — 82550 ASSAY OF CK (CPK): CPT

## 2021-12-26 PROCEDURE — 1200000000 HC SEMI PRIVATE

## 2021-12-26 PROCEDURE — 72125 CT NECK SPINE W/O DYE: CPT

## 2021-12-26 PROCEDURE — 99285 EMERGENCY DEPT VISIT HI MDM: CPT

## 2021-12-26 PROCEDURE — 6370000000 HC RX 637 (ALT 250 FOR IP): Performed by: STUDENT IN AN ORGANIZED HEALTH CARE EDUCATION/TRAINING PROGRAM

## 2021-12-26 PROCEDURE — 6370000000 HC RX 637 (ALT 250 FOR IP): Performed by: GENERAL PRACTICE

## 2021-12-26 PROCEDURE — 96375 TX/PRO/DX INJ NEW DRUG ADDON: CPT

## 2021-12-26 PROCEDURE — 71260 CT THORAX DX C+: CPT

## 2021-12-26 PROCEDURE — 85730 THROMBOPLASTIN TIME PARTIAL: CPT

## 2021-12-26 PROCEDURE — 85610 PROTHROMBIN TIME: CPT

## 2021-12-26 PROCEDURE — 2500000003 HC RX 250 WO HCPCS: Performed by: EMERGENCY MEDICINE

## 2021-12-26 PROCEDURE — 36415 COLL VENOUS BLD VENIPUNCTURE: CPT

## 2021-12-26 PROCEDURE — 93005 ELECTROCARDIOGRAM TRACING: CPT | Performed by: EMERGENCY MEDICINE

## 2021-12-26 PROCEDURE — 85027 COMPLETE CBC AUTOMATED: CPT

## 2021-12-26 PROCEDURE — 2580000003 HC RX 258: Performed by: STUDENT IN AN ORGANIZED HEALTH CARE EDUCATION/TRAINING PROGRAM

## 2021-12-26 PROCEDURE — 96365 THER/PROPH/DIAG IV INF INIT: CPT

## 2021-12-26 PROCEDURE — 6360000004 HC RX CONTRAST MEDICATION: Performed by: RADIOLOGY

## 2021-12-26 PROCEDURE — 70486 CT MAXILLOFACIAL W/O DYE: CPT

## 2021-12-26 PROCEDURE — 6360000002 HC RX W HCPCS: Performed by: EMERGENCY MEDICINE

## 2021-12-26 PROCEDURE — 99282 EMERGENCY DEPT VISIT SF MDM: CPT

## 2021-12-26 PROCEDURE — 74177 CT ABD & PELVIS W/CONTRAST: CPT

## 2021-12-26 RX ORDER — SODIUM CHLORIDE 9 MG/ML
25 INJECTION, SOLUTION INTRAVENOUS PRN
Status: DISCONTINUED | OUTPATIENT
Start: 2021-12-26 | End: 2021-12-27 | Stop reason: HOSPADM

## 2021-12-26 RX ORDER — SODIUM CHLORIDE 0.9 % (FLUSH) 0.9 %
10 SYRINGE (ML) INJECTION EVERY 12 HOURS SCHEDULED
Status: DISCONTINUED | OUTPATIENT
Start: 2021-12-26 | End: 2021-12-27 | Stop reason: HOSPADM

## 2021-12-26 RX ORDER — OXYCODONE HYDROCHLORIDE 10 MG/1
10 TABLET ORAL EVERY 4 HOURS PRN
Status: DISCONTINUED | OUTPATIENT
Start: 2021-12-26 | End: 2021-12-27

## 2021-12-26 RX ORDER — SODIUM CHLORIDE 9 MG/ML
INJECTION, SOLUTION INTRAVENOUS CONTINUOUS
Status: DISCONTINUED | OUTPATIENT
Start: 2021-12-26 | End: 2021-12-27

## 2021-12-26 RX ORDER — IBUPROFEN 400 MG/1
400 TABLET ORAL ONCE
Status: COMPLETED | OUTPATIENT
Start: 2021-12-26 | End: 2021-12-26

## 2021-12-26 RX ORDER — FOLIC ACID 1 MG/1
1 TABLET ORAL DAILY
Status: DISCONTINUED | OUTPATIENT
Start: 2021-12-26 | End: 2021-12-27 | Stop reason: HOSPADM

## 2021-12-26 RX ORDER — LEVETIRACETAM 5 MG/ML
500 INJECTION INTRAVASCULAR ONCE
Status: COMPLETED | OUTPATIENT
Start: 2021-12-26 | End: 2021-12-26

## 2021-12-26 RX ORDER — SODIUM CHLORIDE 0.9 % (FLUSH) 0.9 %
10 SYRINGE (ML) INJECTION PRN
Status: DISCONTINUED | OUTPATIENT
Start: 2021-12-26 | End: 2021-12-27 | Stop reason: HOSPADM

## 2021-12-26 RX ORDER — LEVETIRACETAM 500 MG/1
1000 TABLET ORAL 2 TIMES DAILY
Status: DISCONTINUED | OUTPATIENT
Start: 2021-12-26 | End: 2021-12-27 | Stop reason: HOSPADM

## 2021-12-26 RX ORDER — ONDANSETRON 2 MG/ML
4 INJECTION INTRAMUSCULAR; INTRAVENOUS EVERY 6 HOURS PRN
Status: DISCONTINUED | OUTPATIENT
Start: 2021-12-26 | End: 2021-12-27 | Stop reason: HOSPADM

## 2021-12-26 RX ORDER — SODIUM CHLORIDE, SODIUM LACTATE, POTASSIUM CHLORIDE, AND CALCIUM CHLORIDE .6; .31; .03; .02 G/100ML; G/100ML; G/100ML; G/100ML
1000 INJECTION, SOLUTION INTRAVENOUS ONCE
Status: COMPLETED | OUTPATIENT
Start: 2021-12-26 | End: 2021-12-26

## 2021-12-26 RX ORDER — ONDANSETRON 4 MG/1
4 TABLET, ORALLY DISINTEGRATING ORAL EVERY 8 HOURS PRN
Status: DISCONTINUED | OUTPATIENT
Start: 2021-12-26 | End: 2021-12-27 | Stop reason: HOSPADM

## 2021-12-26 RX ORDER — PROCHLORPERAZINE EDISYLATE 5 MG/ML
10 INJECTION INTRAMUSCULAR; INTRAVENOUS ONCE
Status: DISCONTINUED | OUTPATIENT
Start: 2021-12-26 | End: 2021-12-26

## 2021-12-26 RX ORDER — MULTIVITAMIN WITH IRON
1 TABLET ORAL DAILY
Status: DISCONTINUED | OUTPATIENT
Start: 2021-12-26 | End: 2021-12-27 | Stop reason: HOSPADM

## 2021-12-26 RX ORDER — LABETALOL HYDROCHLORIDE 5 MG/ML
10 INJECTION, SOLUTION INTRAVENOUS ONCE
Status: COMPLETED | OUTPATIENT
Start: 2021-12-26 | End: 2021-12-26

## 2021-12-26 RX ORDER — POLYETHYLENE GLYCOL 3350 17 G/17G
17 POWDER, FOR SOLUTION ORAL DAILY
Status: DISCONTINUED | OUTPATIENT
Start: 2021-12-26 | End: 2021-12-27 | Stop reason: HOSPADM

## 2021-12-26 RX ORDER — GAUZE BANDAGE 2" X 2"
100 BANDAGE TOPICAL DAILY
Status: DISCONTINUED | OUTPATIENT
Start: 2021-12-26 | End: 2021-12-27 | Stop reason: HOSPADM

## 2021-12-26 RX ORDER — 0.9 % SODIUM CHLORIDE 0.9 %
1000 INTRAVENOUS SOLUTION INTRAVENOUS ONCE
Status: DISCONTINUED | OUTPATIENT
Start: 2021-12-26 | End: 2021-12-26

## 2021-12-26 RX ORDER — ACETAMINOPHEN 325 MG/1
650 TABLET ORAL EVERY 6 HOURS
Status: DISCONTINUED | OUTPATIENT
Start: 2021-12-26 | End: 2021-12-27 | Stop reason: HOSPADM

## 2021-12-26 RX ORDER — HYDRALAZINE HYDROCHLORIDE 20 MG/ML
10 INJECTION INTRAMUSCULAR; INTRAVENOUS ONCE
Status: DISCONTINUED | OUTPATIENT
Start: 2021-12-26 | End: 2021-12-26

## 2021-12-26 RX ORDER — KETOROLAC TROMETHAMINE 30 MG/ML
15 INJECTION, SOLUTION INTRAMUSCULAR; INTRAVENOUS ONCE
Status: DISCONTINUED | OUTPATIENT
Start: 2021-12-26 | End: 2021-12-26

## 2021-12-26 RX ORDER — IBUPROFEN 200 MG
200 TABLET ORAL EVERY 6 HOURS PRN
Qty: 120 TABLET | Refills: 0 | Status: SHIPPED | OUTPATIENT
Start: 2021-12-26 | End: 2022-01-16

## 2021-12-26 RX ORDER — DIPHENHYDRAMINE HYDROCHLORIDE 50 MG/ML
25 INJECTION INTRAMUSCULAR; INTRAVENOUS ONCE
Status: DISCONTINUED | OUTPATIENT
Start: 2021-12-26 | End: 2021-12-26

## 2021-12-26 RX ORDER — OXYCODONE HYDROCHLORIDE 5 MG/1
5 TABLET ORAL EVERY 4 HOURS PRN
Status: DISCONTINUED | OUTPATIENT
Start: 2021-12-26 | End: 2021-12-27

## 2021-12-26 RX ADMIN — SODIUM CHLORIDE, POTASSIUM CHLORIDE, SODIUM LACTATE AND CALCIUM CHLORIDE 1000 ML: 600; 310; 30; 20 INJECTION, SOLUTION INTRAVENOUS at 17:07

## 2021-12-26 RX ADMIN — IOPAMIDOL 90 ML: 755 INJECTION, SOLUTION INTRAVENOUS at 20:09

## 2021-12-26 RX ADMIN — LABETALOL HYDROCHLORIDE 10 MG: 5 INJECTION INTRAVENOUS at 16:16

## 2021-12-26 RX ADMIN — ACETAMINOPHEN 650 MG: 325 TABLET ORAL at 21:36

## 2021-12-26 RX ADMIN — LEVETIRACETAM 500 MG: 5 INJECTION INTRAVENOUS at 15:04

## 2021-12-26 RX ADMIN — IBUPROFEN 400 MG: 400 TABLET, FILM COATED ORAL at 09:57

## 2021-12-26 ASSESSMENT — PAIN SCALES - GENERAL
PAINLEVEL_OUTOF10: 9
PAINLEVEL_OUTOF10: 10

## 2021-12-26 NOTE — Clinical Note
Patient Class: Inpatient [101]   REQUIRED: Diagnosis: Trauma [[id number]]   Estimated Length of Stay: Estimated stay of more than 2 midnights

## 2021-12-26 NOTE — H&P
TRAUMA HISTORY & PHYSICAL  Surgical Resident/Advance Practice Nurse  12/26/2021  5:14 PM    PRIMARY SURVEY    CHIEF COMPLAINT:  Trauma consult. Injury occurred overnight. Patient was found down in an ally by a bystander. Patient thinks that he tripped and fell. He is amnestic to the events surrounding the injury. He is GCS 14 for confusion and neurologically intact. Only has mild abdominal discomfort. He underwent CT head which showed small left frontal/temporal/parietal SDH. CT face and c-spine were unremarkable. He is not on any anticoagulation. Per chart review, he was here on 12/19 for a similar issue.  At that time, CT head was unremarkable and he was discharged    AIRWAY:   Airway Normal  EMS ETT Absent  Noisy respirations Absent  Retractions: Absent  Vomiting/bleeding: Absent      BREATHING:    Midaxillary breath sound left:  Normal  Midaxillary breath sound right:  Normal    Cough sound intensity:  good   FiO2: room air  SMI unreliable      CIRCULATION:   Femerol pulse intensity: Strong  Palpebral conjunctiva: Pink       Vitals:    12/26/21 1713   BP: (!) 170/88   Pulse: 79   Resp: 17   Temp:    SpO2: 98%       Vitals:    12/26/21 1239 12/26/21 1600 12/26/21 1713   BP: (!) 176/98 (!) 181/91 (!) 170/88   Pulse: 100 88 79   Resp: 16  17   Temp: 98.4 °F (36.9 °C)     TempSrc: Oral     SpO2: 97%  98%   Weight: 150 lb (68 kg)     Height: 5' 7\" (1.702 m)          FAST EXAM: deferred    Central Nervous System    GCS Initial 15 minutes   Eye  Motor  Verbal 4 - Opens eyes on own  6 - Follows simple motor commands  4 - Seems confused, disoriented 4 - Opens eyes on own  6 - Follows simple motor commands  4 - Seems confused, disoriented     Neuromuscular blockade: No  Pupil size:  Left 4 mm    Right 4 mm  Pupil reaction: Yes    Wiggles fingers: Left Yes Right Yes  Wiggles toes: Left Yes   Right Yes    Hand grasp:   Left  Present      Right  Present  Plantar flexion: Left  Present      Right   Present    Loss of consciousness:  Yes  History Obtained From:  Patient & chart review  Private Medical Doctor: Baudilio Ramirez MD    Pre-exisiting Medical History:  yes    Conditions:   Past Medical History:   Diagnosis Date    Headache     Hypertension          Medications:   No current facility-administered medications on file prior to encounter. Current Outpatient Medications on File Prior to Encounter   Medication Sig Dispense Refill    ibuprofen (ADVIL) 200 MG tablet Take 1 tablet by mouth every 6 hours as needed for Pain 120 tablet 0    ibuprofen (ADVIL;MOTRIN) 600 MG tablet Take 1 tablet by mouth 4 times daily as needed for Pain 120 tablet 0    [DISCONTINUED] meloxicam (MOBIC) 7.5 MG tablet Take 1 tablet by mouth daily 10 tablet 0    levETIRAcetam (KEPPRA) 500 MG tablet Take 1 tablet by mouth 2 times daily 30 tablet 0    amLODIPine (NORVASC) 10 MG tablet Take 1 tablet by mouth daily 30 tablet 1    hydrALAZINE (APRESOLINE) 25 MG tablet Take 1 tablet by mouth 4 times daily 120 tablet 1         Allergies: Allergies   Allergen Reactions    Ace Inhibitors      Facial swelling, [facility] 2018    Fish-Derived Products      States allergy to all fish except shrimp         Social History:   Tobacco use:  positive for approximately 1 packs per day. Patient advised to quit smoking  Alcohol use:  history unreliable, but patient admits to drinking occasionally  Illicit drug use:  no history of illicit drug use    Past Surgical History:  History reviewed. No pertinent surgical history. Anticoagulant use:  No   Antiplatelet use: No    NSAID use in last 72 hours: unknown  Taken PCN in past:  unknown  Last food/drink: yesterday  Last tetanus: unknown    Family History:   Not pertinent to presenting problem. Complaints:   Head:  None  Neck:   None  Chest:   None  Back:   None  Abdomen:   Mild  Extremities:   None  Comments: none    Review of systems:  All negative unless otherwise noted.         SECONDARY SURVEY  Head/scalp:

## 2021-12-26 NOTE — ED NOTES
Pt resting comfortably, neurosurgery at bedside     [facility]AKUR  12/26/21 0430

## 2021-12-26 NOTE — ED PROVIDER NOTES
Department of Emergency Medicine   ED  Provider Note  Admit Date/RoomTime: 12/26/2021 12:35 PM  ED Room: [facility]          History of Present Illness:  12/26/21, Time: 3:10 PM EST  Chief Complaint   Patient presents with    Altered Mental Status     FOUND IN PARKING LOT LAYING THERE, Rafat Marin is a 60 y.o. male presenting to the ED for fall. Patient states he tripped and fell in a parking lot. He does not member what he tripped over. He does admit to drinking today. He does complain of a forehead pain, aching, nothing makes it better or worse, does not rating her. He also has laceration to the left side of his face. He is not sure of his last tetanus shot. He is on no anticoagulation. Denies any fever, chills, nausea, vomiting, change in bowel or bladder, neck pain or stiffness, paresthesias, lethargy, or any other symptoms or complaints. Review of Systems:   Pertinent positives and negatives are stated within HPI, all other systems reviewed and are negative.        --------------------------------------------- PAST HISTORY ---------------------------------------------  Past Medical History:  has a past medical history of Headache and Hypertension. Past Surgical History:  has no past surgical history on file. Social History:  reports that he has been smoking cigarettes. He has a 4.60 pack-year smoking history. He has never used smokeless tobacco. He reports current alcohol use. He reports that he does not use drugs. Family History: family history is not on file. . Unless otherwise noted, family history is non contributory    The patients home medications have been reviewed.     Allergies: Ace inhibitors and Fish-derived products        ---------------------------------------------------PHYSICAL EXAM--------------------------------------    Constitutional/General: Alert and oriented x3  Head: Normocephalic and atraumatic  Eyes: PERRL, EOMI, sclera non icteric  Mouth: Oropharynx clear, handling secretions, no trismus, 4 cm laceration over the left upper lip  Neck: Supple, full ROM, no stridor, no meningeal signs, no C-spine tenderness or step-offs  Respiratory: Lungs clear to auscultation bilaterally, no wheezes, rales, or rhonchi. Not in respiratory distress  Cardiovascular:  Regular rate. Regular rhythm. 2+ distal pulses. Equal extremity pulses. Chest: No chest wall tenderness  GI:  Abdomen Soft, Non tender, Non distended. No rebound, guarding, or rigidity. No pulsatile masses. Musculoskeletal: Moves all extremities x 4. Warm and well perfused, no clubbing, cyanosis, or edema. Capillary refill <3 seconds  Integument: skin warm and dry. No rashes. Neurologic: GCS 15, no focal deficits, symmetric strength 5/5 in the upper and lower extremities bilaterally  Psychiatric: Normal Affect          -------------------------------------------------- RESULTS -------------------------------------------------  I have personally reviewed all laboratory and imaging results for this patient. Results are listed below. LABS: (Lab results interpreted by me)  Results for orders placed or performed during the hospital encounter of 12/26/21   POCT Glucose   Result Value Ref Range    Glucose 174 mg/dL    QC OK? OK    POCT Glucose   Result Value Ref Range    Meter Glucose 174 (H) 74 - 99 mg/dL   ,       RADIOLOGY:  Interpreted by Radiologist unless otherwise specified  CT HEAD WO CONTRAST   Final Result   Small acute left frontotemporoparietal subdural hematoma. RECOMMENDATIONS:   Unavailable         CT CERVICAL SPINE WO CONTRAST   Final Result   No traumatic injuries in cervical spine. Left subdural hemorrhage is partially imaged. Please refer to separate   report CT of the head for further detail. RECOMMENDATIONS:   Unavailable         CT FACIAL BONES WO CONTRAST   Final Result   Mild limited evaluation due to motion artifact.       No traumatic injuries

## 2021-12-26 NOTE — ED NOTES
Pt arrives per EMS. [place] side [place]. When EMS arrives he was very confused. Pt was just seen at [facility] for a HA. Pt undressed in hospital gown. 12 lead shows SR. Kirstie Loo RN  12/26/21 5975

## 2021-12-27 VITALS
OXYGEN SATURATION: 98 % | RESPIRATION RATE: 18 BRPM | HEIGHT: 67 IN | SYSTOLIC BLOOD PRESSURE: 164 MMHG | DIASTOLIC BLOOD PRESSURE: 99 MMHG | HEART RATE: 87 BPM | WEIGHT: 150 LBS | BODY MASS INDEX: 23.54 KG/M2 | TEMPERATURE: 99 F

## 2021-12-27 PROBLEM — I60.9 SAH (SUBARACHNOID HEMORRHAGE) (HCC): Status: ACTIVE | Noted: 2021-12-27

## 2021-12-27 LAB
ANION GAP SERPL CALCULATED.3IONS-SCNC: 14 MMOL/L (ref 7–16)
BUN BLDV-MCNC: 14 MG/DL (ref 6–20)
CALCIUM SERPL-MCNC: 8.2 MG/DL (ref 8.6–10.2)
CHLORIDE BLD-SCNC: 97 MMOL/L (ref 98–107)
CO2: 24 MMOL/L (ref 22–29)
CREAT SERPL-MCNC: 1.1 MG/DL (ref 0.7–1.2)
GFR AFRICAN AMERICAN: >60
GFR NON-AFRICAN AMERICAN: >60 ML/MIN/1.73
GLUCOSE BLD-MCNC: 119 MG/DL (ref 74–99)
HCT VFR BLD CALC: 33.5 % (ref 37–54)
HEMOGLOBIN: 12.3 G/DL (ref 12.5–16.5)
MCH RBC QN AUTO: 29.2 PG (ref 26–35)
MCHC RBC AUTO-ENTMCNC: 36.7 % (ref 32–34.5)
MCV RBC AUTO: 79.6 FL (ref 80–99.9)
PDW BLD-RTO: 17 FL (ref 11.5–15)
PLATELET # BLD: 111 E9/L (ref 130–450)
PMV BLD AUTO: 11.3 FL (ref 7–12)
POTASSIUM REFLEX MAGNESIUM: 4.3 MMOL/L (ref 3.5–5)
RBC # BLD: 4.21 E12/L (ref 3.8–5.8)
SODIUM BLD-SCNC: 135 MMOL/L (ref 132–146)
TOTAL CK: 857 U/L (ref 20–200)
WBC # BLD: 7.7 E9/L (ref 4.5–11.5)

## 2021-12-27 PROCEDURE — 99233 SBSQ HOSP IP/OBS HIGH 50: CPT | Performed by: SURGERY

## 2021-12-27 PROCEDURE — 80048 BASIC METABOLIC PNL TOTAL CA: CPT

## 2021-12-27 PROCEDURE — 0CQ0XZZ REPAIR UPPER LIP, EXTERNAL APPROACH: ICD-10-PCS | Performed by: STUDENT IN AN ORGANIZED HEALTH CARE EDUCATION/TRAINING PROGRAM

## 2021-12-27 PROCEDURE — 2500000003 HC RX 250 WO HCPCS: Performed by: STUDENT IN AN ORGANIZED HEALTH CARE EDUCATION/TRAINING PROGRAM

## 2021-12-27 PROCEDURE — 85027 COMPLETE CBC AUTOMATED: CPT

## 2021-12-27 PROCEDURE — 6370000000 HC RX 637 (ALT 250 FOR IP): Performed by: SURGERY

## 2021-12-27 PROCEDURE — 6370000000 HC RX 637 (ALT 250 FOR IP): Performed by: STUDENT IN AN ORGANIZED HEALTH CARE EDUCATION/TRAINING PROGRAM

## 2021-12-27 PROCEDURE — 2580000003 HC RX 258: Performed by: STUDENT IN AN ORGANIZED HEALTH CARE EDUCATION/TRAINING PROGRAM

## 2021-12-27 RX ORDER — SODIUM CHLORIDE, SODIUM LACTATE, POTASSIUM CHLORIDE, AND CALCIUM CHLORIDE .6; .31; .03; .02 G/100ML; G/100ML; G/100ML; G/100ML
1000 INJECTION, SOLUTION INTRAVENOUS ONCE
Status: COMPLETED | OUTPATIENT
Start: 2021-12-27 | End: 2021-12-27

## 2021-12-27 RX ORDER — LIDOCAINE HYDROCHLORIDE AND EPINEPHRINE 10; 10 MG/ML; UG/ML
20 INJECTION, SOLUTION INFILTRATION; PERINEURAL ONCE
Status: COMPLETED | OUTPATIENT
Start: 2021-12-27 | End: 2021-12-27

## 2021-12-27 RX ADMIN — BISACODYL 5 MG: 5 TABLET, COATED ORAL at 01:35

## 2021-12-27 RX ADMIN — Medication 1 TABLET: at 01:36

## 2021-12-27 RX ADMIN — LEVETIRACETAM 1000 MG: 500 TABLET, FILM COATED ORAL at 08:25

## 2021-12-27 RX ADMIN — LIDOCAINE HYDROCHLORIDE AND EPINEPHRINE 20 ML: 10; 10 INJECTION, SOLUTION INFILTRATION; PERINEURAL at 10:08

## 2021-12-27 RX ADMIN — ACETAMINOPHEN 650 MG: 325 TABLET ORAL at 02:48

## 2021-12-27 RX ADMIN — Medication 100 MG: at 08:25

## 2021-12-27 RX ADMIN — Medication 1 TABLET: at 08:25

## 2021-12-27 RX ADMIN — BISACODYL 5 MG: 5 TABLET, COATED ORAL at 08:26

## 2021-12-27 RX ADMIN — SODIUM CHLORIDE, POTASSIUM CHLORIDE, SODIUM LACTATE AND CALCIUM CHLORIDE 1000 ML: 600; 310; 30; 20 INJECTION, SOLUTION INTRAVENOUS at 04:24

## 2021-12-27 RX ADMIN — FOLIC ACID 1 MG: 1 TABLET ORAL at 08:25

## 2021-12-27 RX ADMIN — Medication 10 ML: at 01:13

## 2021-12-27 RX ADMIN — LEVETIRACETAM 1000 MG: 500 TABLET, FILM COATED ORAL at 01:37

## 2021-12-27 RX ADMIN — OXYCODONE HYDROCHLORIDE 10 MG: 10 TABLET ORAL at 05:37

## 2021-12-27 RX ADMIN — Medication 10 ML: at 08:27

## 2021-12-27 RX ADMIN — ACETAMINOPHEN 650 MG: 325 TABLET ORAL at 08:25

## 2021-12-27 RX ADMIN — Medication 100 MG: at 01:35

## 2021-12-27 ASSESSMENT — PAIN SCALES - GENERAL
PAINLEVEL_OUTOF10: 10
PAINLEVEL_OUTOF10: 5
PAINLEVEL_OUTOF10: 4
PAINLEVEL_OUTOF10: 10
PAINLEVEL_OUTOF10: 10

## 2021-12-27 NOTE — ED NOTES
Pt not in hallway bed at this time. Police aware. This nurse attempting to notify physician at this time.  Message left     Shahbaz Early RN  12/27/21 8883

## 2021-12-27 NOTE — ED NOTES
Pt ambulated to/from bathroom with steady gait. MDs at bedside to notify pt of plan of care.       Orly Davidson RN  12/27/21 8804

## 2021-12-27 NOTE — PROGRESS NOTES
Speech Language Pathology      NAME:  Soraya Raygoza  :  1965  DATE: 2021  ROOM:  [facility] C/    Order received. Chart reviewed. Pt unavailable at this time due to:  [] HOLD per RN  [] Off unit for testing/ procedure    [] With medical staff   [] Declined intervention  [] Sleeping/ Lethargic   [x] Other: Per RN, pt is not in hallway bed at this time. No answer and full VM. Will re-attempt later this date as able. Thank you.        Trauma [T14.90XA]

## 2021-12-27 NOTE — PROGRESS NOTES
Laceration Repair Procedure Note    Indication: Laceration    Procedure: The patient was placed in the appropriate position and anesthesia around the laceration was obtained by infiltration using 1% Lidocaine with epinephrine. The area was then cleansed with betadine and draped in a sterile fashion. The laceration was closed with 5-0 fast absorbing gut. There were no additional lacerations requiring repair. The wound area was then dressed with bacitracin. Total repaired wound length: 1.5 cm. Other Items: Suture count: 4    The patient tolerated the procedure well.     Complications: None

## 2021-12-27 NOTE — PROGRESS NOTES
TRAUMA TERTIARY    Admit Date: 12/26/2021    [facility]    CC:    Chief Complaint   Patient presents with    Altered Mental Status     FOUND IN PARKING LOT LAYING THERE, LAC TO FACE AND CONFUSED       Alcohol pre-screening:  How many times in the past year have you had 4-5 or more drinks in a day?  none    Subjective:       Patient sitting up comfortably in his bed. He is only having lip pain from his superficial skin tears. GCS 15 and neurologically intact. Objective:     Patient Vitals for the past 8 hrs:   BP Temp Temp src Pulse Resp SpO2   12/27/21 0545 (!) 163/107 -- -- 83 18 97 %   12/27/21 0415 (!) 157/89 -- -- 72 18 96 %   12/27/21 0230 (!) 147/81 99 °F (37.2 °C) Oral 90 16 96 %   12/26/21 2214 (!) 156/99 -- -- 95 16 93 %       No intake/output data recorded. No intake/output data recorded. Radiology:  CT CHEST W CONTRAST   Final Result   No acute post-traumatic abnormality seen. RECOMMENDATIONS:   Unavailable         CT ABDOMEN PELVIS W IV CONTRAST Additional Contrast? None   Final Result   No acute post-traumatic abnormality seen. RECOMMENDATIONS:   Unavailable         CT HEAD WO CONTRAST   Final Result   Unchanged left tentorial subdural hematoma. Improving left para falcine   small subdural hematoma. There is also likely minimal subarachnoid   hemorrhage within temporal sulci which is also not changed. RECOMMENDATIONS:   Unavailable         CT HEAD WO CONTRAST   Final Result   Small acute left frontotemporoparietal subdural hematoma. RECOMMENDATIONS:   Unavailable         CT CERVICAL SPINE WO CONTRAST   Final Result   No traumatic injuries in cervical spine. Left subdural hemorrhage is partially imaged. Please refer to separate   report CT of the head for further detail. RECOMMENDATIONS:   Unavailable         CT FACIAL BONES WO CONTRAST   Final Result   Mild limited evaluation due to motion artifact. No traumatic injuries in facial bones.       Subdural hemorrhage. Please refer to separate CT head report for further   detail. RECOMMENDATIONS:   Unavailable             PHYSICAL EXAM:   GCS:  4 - Opens eyes on own   6 - Follows simple motor commands  5 - Alert and oriented    Pupil size: Left 4 mm     Right 4 mm  Pupil reaction: Yes  Wiggles fingers: Left Yes     Right Yes  Wiggles toes: Left Yes     Right Yes  Plantar flexion: Left normal     Right normal    GENERAL:  NAD. A&Ox3. HEAD:  Normocephalic, atraumatic. LUNGS:  No increased work of breathing. CTAB.  but likely unreliable due to lip superficial tears  CARDIOVASCULAR: Regular rate, hypertensive  ABDOMEN:  Soft, non-distended, non-tender. No guarding, rigidity, rebound. EXTREMITIES:  MAEx4. No LE edema. Atraumatic. SKIN:  Warm and dry      Spine:       Spine Tenderness ROM   Cervical 0 /10 Normal   Thoracic 0 /10 Normal   Lumbar 0 /10 Normal     Musculoskeletal:    Joint Tenderness Swelling/Deformity ROM   Right shoulder absent absent normal   Left shoulder absent absent normal   Right elbow absent absent normal   Left elbow absent absent normal   Right wrist absent absent normal   Left wrist absent absent normal   Right hand grasp absent absent normal   Left hand grasp absent absent normal   Right hip absent absent normal   Left hip absent absent normal   Right knee absent absent normal   Left knee absent absent normal   Right ankle absent absent normal   Left ankle absent absent normal   Right foot absent absent normal   Left foot absent absent normal         CONSULTS: neurosurgery      Active Problems:    Trauma    SDH (subdural hematoma) (HCC)  Resolved Problems:    * No resolved hospital problems. *        Assessment/Plan:     Neuro:  SDH/SAH. Keppra 1000 BID. Neurosurgery following. GCS 15. Pain control. CV: No acute issues. Pulm: No acute issues. Encourage IS/SMI. GI: No acute issues. Bowel regimen. Zofran PRN. Renal: No acute issues. Endocrine: No acute issues.   MSK: CK elevated. Will continue to resuscitate with fluids. PT/OT. Heme: No acute issues. ID: No acute issues. Pain/Analgesia: Tylenol, Ivelisse  Bowel Regimen: Glycolax, Senokot  DVT PPx:  SCDs  GI PPx:  none     Code status:  Full Code    Disposition:  Admitted to monitored floor    Racquel Manuel MD  Resident, PGY-1  12/27/2021  5:56 AM      Patient seen and examined I agree with above     CC: fall     S: doing well just has some facial pain, mild headache     GEN NAD   HEENT: PERRL 3mm  Upper lip avulsion,   Resp non labored clear b/l   CVS RR no extra heart sounds   ABD SNT   EXT NVI ROM WNL all ext   SPINE: non tender c/t/l     A/P s/p fall with SDH    - SDH NS following keppra, repeat imaging stable,    - lip avulsion, needs sutured,   - ETOH abuse?  Monitor for DTs, Thiamine folate Multi vits, pnb  - Houston Juárez MD

## 2021-12-28 ENCOUNTER — HOSPITAL ENCOUNTER (EMERGENCY)
Age: 56
Discharge: LEFT BEFORE DISCHARGE | End: 2021-12-28
Attending: EMERGENCY MEDICINE
Payer: COMMERCIAL

## 2021-12-28 ENCOUNTER — HOSPITAL ENCOUNTER (EMERGENCY)
Age: 56
Discharge: HOME | End: 2021-12-28
Payer: COMMERCIAL

## 2021-12-28 ENCOUNTER — APPOINTMENT (OUTPATIENT)
Dept: CT IMAGING | Age: 56
End: 2021-12-28
Payer: COMMERCIAL

## 2021-12-28 VITALS
OXYGEN SATURATION: 96 % | HEART RATE: 100 BPM | TEMPERATURE: 99.2 F | SYSTOLIC BLOOD PRESSURE: 168 MMHG | RESPIRATION RATE: 18 BRPM | DIASTOLIC BLOOD PRESSURE: 104 MMHG

## 2021-12-28 VITALS
SYSTOLIC BLOOD PRESSURE: 166 MMHG | HEART RATE: 86 BPM | TEMPERATURE: 98 F | OXYGEN SATURATION: 99 % | DIASTOLIC BLOOD PRESSURE: 96 MMHG | RESPIRATION RATE: 18 BRPM

## 2021-12-28 DIAGNOSIS — S01.81XD FACIAL LACERATION, SUBSEQUENT ENCOUNTER: ICD-10-CM

## 2021-12-28 DIAGNOSIS — S06.5XAA SDH (SUBDURAL HEMATOMA): ICD-10-CM

## 2021-12-28 DIAGNOSIS — S09.90XA INJURY OF HEAD, INITIAL ENCOUNTER: Primary | ICD-10-CM

## 2021-12-28 DIAGNOSIS — R51.9 NONINTRACTABLE HEADACHE, UNSPECIFIED CHRONICITY PATTERN, UNSPECIFIED HEADACHE TYPE: Primary | ICD-10-CM

## 2021-12-28 LAB
ACETAMINOPHEN LEVEL: <5 MCG/ML (ref 10–30)
ALBUMIN SERPL-MCNC: 4 G/DL (ref 3.5–5.2)
ALP BLD-CCNC: 62 U/L (ref 40–129)
ALT SERPL-CCNC: 43 U/L (ref 0–40)
ANION GAP SERPL CALCULATED.3IONS-SCNC: 10 MMOL/L (ref 7–16)
AST SERPL-CCNC: 162 U/L (ref 0–39)
BASOPHILS ABSOLUTE: 0.02 E9/L (ref 0–0.2)
BASOPHILS RELATIVE PERCENT: 0.2 % (ref 0–2)
BILIRUB SERPL-MCNC: 0.9 MG/DL (ref 0–1.2)
BUN BLDV-MCNC: 9 MG/DL (ref 6–20)
CALCIUM SERPL-MCNC: 8.3 MG/DL (ref 8.6–10.2)
CHLORIDE BLD-SCNC: 98 MMOL/L (ref 98–107)
CO2: 24 MMOL/L (ref 22–29)
CREAT SERPL-MCNC: 0.8 MG/DL (ref 0.7–1.2)
EKG ATRIAL RATE: 74 BPM
EKG ATRIAL RATE: 95 BPM
EKG P AXIS: 27 DEGREES
EKG P AXIS: 36 DEGREES
EKG P-R INTERVAL: 110 MS
EKG P-R INTERVAL: 110 MS
EKG Q-T INTERVAL: 370 MS
EKG Q-T INTERVAL: 408 MS
EKG QRS DURATION: 84 MS
EKG QRS DURATION: 86 MS
EKG QTC CALCULATION (BAZETT): 452 MS
EKG QTC CALCULATION (BAZETT): 464 MS
EKG R AXIS: -3 DEGREES
EKG R AXIS: 11 DEGREES
EKG T AXIS: 44 DEGREES
EKG T AXIS: 48 DEGREES
EKG VENTRICULAR RATE: 74 BPM
EKG VENTRICULAR RATE: 95 BPM
EOSINOPHILS ABSOLUTE: 0.06 E9/L (ref 0.05–0.5)
EOSINOPHILS RELATIVE PERCENT: 0.7 % (ref 0–6)
ETHANOL: 16 MG/DL (ref 0–0.08)
GFR AFRICAN AMERICAN: >60
GFR NON-AFRICAN AMERICAN: >60 ML/MIN/1.73
GLUCOSE BLD-MCNC: 93 MG/DL (ref 74–99)
HCT VFR BLD CALC: 34.3 % (ref 37–54)
HEMOGLOBIN: 13 G/DL (ref 12.5–16.5)
IMMATURE GRANULOCYTES #: 0.04 E9/L
IMMATURE GRANULOCYTES %: 0.5 % (ref 0–5)
LYMPHOCYTES ABSOLUTE: 1.33 E9/L (ref 1.5–4)
LYMPHOCYTES RELATIVE PERCENT: 16.6 % (ref 20–42)
MCH RBC QN AUTO: 29.1 PG (ref 26–35)
MCHC RBC AUTO-ENTMCNC: 37.9 % (ref 32–34.5)
MCV RBC AUTO: 76.7 FL (ref 80–99.9)
MONOCYTES ABSOLUTE: 0.86 E9/L (ref 0.1–0.95)
MONOCYTES RELATIVE PERCENT: 10.7 % (ref 2–12)
NEUTROPHILS ABSOLUTE: 5.7 E9/L (ref 1.8–7.3)
NEUTROPHILS RELATIVE PERCENT: 71.3 % (ref 43–80)
PDW BLD-RTO: 17.2 FL (ref 11.5–15)
PLATELET # BLD: 131 E9/L (ref 130–450)
PMV BLD AUTO: 11.1 FL (ref 7–12)
POTASSIUM SERPL-SCNC: 4.2 MMOL/L (ref 3.5–5)
POTASSIUM SERPL-SCNC: 6.5 MMOL/L (ref 3.5–5)
RBC # BLD: 4.47 E12/L (ref 3.8–5.8)
SALICYLATE, SERUM: <0.3 MG/DL (ref 0–30)
SODIUM BLD-SCNC: 132 MMOL/L (ref 132–146)
TOTAL PROTEIN: 7.3 G/DL (ref 6.4–8.3)
TRICYCLIC ANTIDEPRESSANTS SCREEN SERUM: NEGATIVE NG/ML
WBC # BLD: 8 E9/L (ref 4.5–11.5)

## 2021-12-28 PROCEDURE — 70450 CT HEAD/BRAIN W/O DYE: CPT

## 2021-12-28 PROCEDURE — 93005 ELECTROCARDIOGRAM TRACING: CPT | Performed by: NURSE PRACTITIONER

## 2021-12-28 PROCEDURE — 2580000003 HC RX 258: Performed by: NURSE PRACTITIONER

## 2021-12-28 PROCEDURE — 99282 EMERGENCY DEPT VISIT SF MDM: CPT

## 2021-12-28 PROCEDURE — 80179 DRUG ASSAY SALICYLATE: CPT

## 2021-12-28 PROCEDURE — 80143 DRUG ASSAY ACETAMINOPHEN: CPT

## 2021-12-28 PROCEDURE — 85025 COMPLETE CBC W/AUTO DIFF WBC: CPT

## 2021-12-28 PROCEDURE — 6370000000 HC RX 637 (ALT 250 FOR IP): Performed by: NURSE PRACTITIONER

## 2021-12-28 PROCEDURE — 82077 ASSAY SPEC XCP UR&BREATH IA: CPT

## 2021-12-28 PROCEDURE — 84132 ASSAY OF SERUM POTASSIUM: CPT

## 2021-12-28 PROCEDURE — 80307 DRUG TEST PRSMV CHEM ANLYZR: CPT

## 2021-12-28 PROCEDURE — 80053 COMPREHEN METABOLIC PANEL: CPT

## 2021-12-28 RX ORDER — BACITRACIN, NEOMYCIN, POLYMYXIN B 400; 3.5; 5 [USP'U]/G; MG/G; [USP'U]/G
OINTMENT TOPICAL
Qty: 30 G | Refills: 0 | Status: SHIPPED | OUTPATIENT
Start: 2021-12-28 | End: 2022-01-07

## 2021-12-28 RX ORDER — HYDRALAZINE HYDROCHLORIDE 25 MG/1
25 TABLET, FILM COATED ORAL ONCE
Status: COMPLETED | OUTPATIENT
Start: 2021-12-28 | End: 2021-12-28

## 2021-12-28 RX ORDER — DIAPER,BRIEF,INFANT-TODD,DISP
EACH MISCELLANEOUS ONCE
Status: COMPLETED | OUTPATIENT
Start: 2021-12-28 | End: 2021-12-28

## 2021-12-28 RX ORDER — CEPHALEXIN 500 MG/1
500 CAPSULE ORAL 3 TIMES DAILY
Qty: 21 CAPSULE | Refills: 0 | Status: SHIPPED | OUTPATIENT
Start: 2021-12-28 | End: 2022-01-04 | Stop reason: SDUPTHER

## 2021-12-28 RX ORDER — LEVETIRACETAM 500 MG/1
500 TABLET ORAL ONCE
Status: COMPLETED | OUTPATIENT
Start: 2021-12-28 | End: 2021-12-28

## 2021-12-28 RX ORDER — 0.9 % SODIUM CHLORIDE 0.9 %
500 INTRAVENOUS SOLUTION INTRAVENOUS ONCE
Status: COMPLETED | OUTPATIENT
Start: 2021-12-28 | End: 2021-12-28

## 2021-12-28 RX ORDER — CHLORHEXIDINE GLUCONATE 0.12 MG/ML
15 RINSE ORAL 2 TIMES DAILY
Qty: 420 ML | Refills: 0 | Status: SHIPPED | OUTPATIENT
Start: 2021-12-28 | End: 2022-01-11

## 2021-12-28 RX ORDER — AMLODIPINE BESYLATE 5 MG/1
10 TABLET ORAL ONCE
Status: COMPLETED | OUTPATIENT
Start: 2021-12-28 | End: 2021-12-28

## 2021-12-28 RX ADMIN — BACITRACIN ZINC: 500 OINTMENT TOPICAL at 17:35

## 2021-12-28 RX ADMIN — HYDRALAZINE HYDROCHLORIDE 25 MG: 25 TABLET, FILM COATED ORAL at 11:44

## 2021-12-28 RX ADMIN — SODIUM CHLORIDE 500 ML: 9 INJECTION, SOLUTION INTRAVENOUS at 11:26

## 2021-12-28 RX ADMIN — AMLODIPINE BESYLATE 10 MG: 5 TABLET ORAL at 11:43

## 2021-12-28 RX ADMIN — LEVETIRACETAM 500 MG: 500 TABLET ORAL at 11:44

## 2021-12-28 ASSESSMENT — ENCOUNTER SYMPTOMS
SHORTNESS OF BREATH: 0
DIARRHEA: 0
WHEEZING: 0
EYE PAIN: 0
EYE DISCHARGE: 0
BACK PAIN: 0
ABDOMINAL PAIN: 0
COUGH: 0
SORE THROAT: 0
EYE REDNESS: 0
SINUS PRESSURE: 0
NAUSEA: 0
VOMITING: 0

## 2021-12-28 NOTE — CONSULTS
TRAUMA HISTORY & PHYSICAL  Surgical Resident/Advance Practice Nurse  12/28/2021  10:00 AM    PRIMARY SURVEY    CHIEF COMPLAINT:  Trauma consult. Injury occurred just 12/26. Patient was found down in an Alley[place]. Found to have SDH/SAH that was stable on repeat scan. He was started on Keppra 1000 BID. He had a lip laceration that was repaired. He eloped on 12/27. He represents today with a headache and lip swelling. He admits to drinking last night. Repeat CT head stable. He is GCS 15 and neurologically intact. AIRWAY:   Airway Normal  EMS ETT Absent  Noisy respirations Absent  Retractions: Absent  Vomiting/bleeding: Absent      BREATHING:    Midaxillary breath sound left:  Normal  Midaxillary breath sound right:  Normal    Cough sound intensity:  good   FiO2: on room air   mL.  But unreliable due to lip injury      CIRCULATION:   Femerol pulse intensity: Strong  Palpebral conjunctiva: Pink       Vitals:    12/28/21 0915   BP: (!) 161/114   Pulse: 92   Resp: 18   Temp: 98 °F (36.7 °C)   SpO2: 95%       Vitals:    12/28/21 0818 12/28/21 0915   BP:  (!) 161/114   Pulse: 87 92   Resp:  18   Temp:  98 °F (36.7 °C)   SpO2: 93% 95%        FAST EXAM: deferred    Central Nervous System    GCS Initial 15 minutes   Eye  Motor  Verbal 4 - Opens eyes on own  6 - Follows simple motor commands  5 - Alert and oriented 4 - Opens eyes on own  6 - Follows simple motor commands  5 - Alert and oriented     Neuromuscular blockade: No  Pupil size:  Left 3 mm    Right 3 mm  Pupil reaction: Yes    Wiggles fingers: Left Yes Right Yes  Wiggles toes: Left Yes   Right Yes    Hand grasp:   Left  Present      Right  Present  Plantar flexion: Left  Present      Right   Present    Loss of consciousness: unknown  History Obtained From:  Patient  Private Medical Doctor: Deanne Mendoza MD    Pre-exisiting Medical History:  yes    Conditions:   Past Medical History:   Diagnosis Date    Headache     Hypertension        Medications:   No current facility-administered medications on file prior to encounter. Current Outpatient Medications on File Prior to Encounter   Medication Sig Dispense Refill    ibuprofen (ADVIL) 200 MG tablet Take 1 tablet by mouth every 6 hours as needed for Pain 120 tablet 0    ibuprofen (ADVIL;MOTRIN) 600 MG tablet Take 1 tablet by mouth 4 times daily as needed for Pain 120 tablet 0    [DISCONTINUED] meloxicam (MOBIC) 7.5 MG tablet Take 1 tablet by mouth daily 10 tablet 0    levETIRAcetam (KEPPRA) 500 MG tablet Take 1 tablet by mouth 2 times daily 30 tablet 0    amLODIPine (NORVASC) 10 MG tablet Take 1 tablet by mouth daily 30 tablet 1    hydrALAZINE (APRESOLINE) 25 MG tablet Take 1 tablet by mouth 4 times daily 120 tablet 1       Allergies: Allergies   Allergen Reactions    Ace Inhibitors      Facial swelling, [facility] 2018    Fish-Derived Products      States allergy to all fish except shrimp       Social History:   Tobacco use:  positive for approximately 1 packs per day. Patient advised to quit smoking  Alcohol use:  drinks alcohol but cannot quantify how much or how often  Illicit drug use:  Occasional marijuana    Past Surgical History:  No past surgical history on file. Anticoagulant use:  No   Antiplatelet use: No    NSAID use in last 72 hours: no  Taken PCN in past:  yes  Last food/drink: yesterday  Last tetanus: within past 5 years    Family History:   Not pertinent to presenting problem. Complaints:   Head: Moderate  Neck:   Mild lip swelling  Chest:   None  Back:   None  Abdomen:   None  Extremities:   None  Comments: none    Review of systems:  All negative unless otherwise noted. SECONDARY SURVEY  Head/scalp: Atraumatic    Face: Lip swelling.  Lip repaired 12/27    Eyes/ears/nose: Atraumatic    Pharynx/mouth: Atraumatic    Neck: Atraumatic     Cervical spine tenderness:   Cervical collar not indicated  Pain:  none  ROM:  Normal    Chest wall:  Atraumatic    Heart:  Regular rate & rhythm    Abdomen: Atraumatic. Soft ND  Tenderness:  none    Pelvis: Atraumatic  Tenderness: none    Thoracolumbar spine: Atraumatic  Tenderness:  none    Genitourinary:  Atraumatic. No blood or urine noted    Rectum: Atraumatic. No blood noted. Perineum: Atraumatic. No blood or urine noted. Extremities:   Sensory normal  Motor normal    Distal Pulses  Left arm normal  Right arm normal  Left leg normal  Right leg normal    Capillary refill  Left arm normal  Right arm normal  Left leg normal  Right leg normal    Procedures in ED:  none    In the event of Emergency Blood Transfusion:  Due to the critical condition of this patient, I request the immediate release of blood products for emergency transfusion secondary to shock. I understand the increased risks incurred by the lack of complete transfusion testing. Radiology: CT Head     Consultations:  none    Admission/Diagnosis: stable SDH/SAH    Plan of Treatment:  No plans for trauma surgical intervention at this time. Patient appears to have left AMA again.     Plan discussed with Dr. Jame Roque at 12/28/2021 on 10:00 AM    Electronically signed by Jesica Rahman MD on 12/28/2021 at 10:00 AM

## 2021-12-28 NOTE — ED NOTES
Patient arrived to hospital with IV in placed from encounter on 12/26. Patient states he left hospital without being discharged.       Neptali Segundo RN  12/28/21 3143

## 2021-12-28 NOTE — ED PROVIDER NOTES
[facility]    HPI:  12/28/21,   Time: 6:27 PM ALEJANDRO Lane is a 60 y.o. male presenting to the ED for wound check to the left side of his face beginning 2 days ago. The complaint has been persistent, mild in severity, and worsened by nothing. Patient returns after eloping from the department earlier today concern regarding the open laceration to the left side of his upper lip and cheek area. He was a trauma here 2 days ago for subdural hematoma. CAT scan from earlier today showed no change in the subdural hematoma from 2 days ago. ROS:   Pertinent positives and negatives are stated within HPI, all other systems reviewed and are negative.  --------------------------------------------- PAST HISTORY ---------------------------------------------  Past Medical History:  has a past medical history of Headache and Hypertension. Past Surgical History:  has no past surgical history on file. Social History:  reports that he has been smoking cigarettes. He has a 4.60 pack-year smoking history. He has never used smokeless tobacco. He reports current alcohol use. He reports that he does not use drugs. Family History: family history is not on file. The patients home medications have been reviewed. Allergies: Ace inhibitors and Fish-derived products    -------------------------------------------------- RESULTS -------------------------------------------------  All laboratory and radiology results have been personally reviewed by myself   LABS:  No results found for this visit on 12/28/21. RADIOLOGY:  Interpreted by Radiologist.  No orders to display       ------------------------- NURSING NOTES AND VITALS REVIEWED ---------------------------   The nursing notes within the ED encounter and vital signs as below have been reviewed.    BP (!) 168/104   Pulse 100   Temp 99.2 °F (37.3 °C)   Resp 18   SpO2 96%   Oxygen Saturation Interpretation: Normal      ---------------------------------------------------PHYSICAL EXAM--------------------------------------      Constitutional/General: Alert and oriented x3, well appearing, non toxic in NAD  Head: NC/AT  Eyes: PERRL, EOMI  Mouth: Oropharynx clear, handling secretions, no trismus, superficial laceration to the left side of the upper lip. Mild swelling noted. Teeth are intact. Neck: Supple, full ROM, no meningeal signs  Pulmonary: Lungs clear to auscultation bilaterally, no wheezes, rales, or rhonchi. Not in respiratory distress  Cardiovascular:  Regular rate and rhythm, no murmurs, gallops, or rubs. 2+ distal pulses  Abdomen: Soft, non tender, non distended,   Extremities: Moves all extremities x 4. Warm and well perfused  Skin: warm and dry without rash  Neurologic: GCS 15,  Psych: Normal Affect      ------------------------------ ED COURSE/MEDICAL DECISION MAKING----------------------  Medications   bacitracin zinc ointment ( Topical Given 12/28/21 8265)         Medical Decision Making:    Wound care instructions provided advised to use antibiotic ointment topically will start on prophylactic antibiotics as well as Peridex. He was provided with information to follow-up with primary care physician as well as the trauma surgery. Counseling: The emergency provider has spoken with the patient and discussed todays results, in addition to providing specific details for the plan of care and counseling regarding the diagnosis and prognosis. Questions are answered at this time and they are agreeable with the plan.      --------------------------------- IMPRESSION AND DISPOSITION ---------------------------------    IMPRESSION  1. Injury of head, initial encounter    2.  Facial laceration, subsequent encounter        DISPOSITION  Disposition: Discharge to home  Patient condition is good                 Archibald Homans, APRN - CNP  12/28/21 0497

## 2021-12-28 NOTE — ED PROVIDER NOTES
Exam  Constitutional:       General: He is not in acute distress. Appearance: He is well-developed. He is not diaphoretic. HENT:      Head: Normocephalic and atraumatic. Mouth/Throat:      Dentition: Abnormal dentition. Eyes:      Pupils: Pupils are equal, round, and reactive to light. Neck:      Vascular: No JVD. Trachea: No tracheal deviation. Cardiovascular:      Rate and Rhythm: Regular rhythm. Heart sounds: No murmur heard. No friction rub. No gallop. Pulmonary:      Effort: Pulmonary effort is normal. No respiratory distress. Breath sounds: Normal breath sounds. No stridor. No wheezing or rales. Chest:      Chest wall: No tenderness. Abdominal:      General: Bowel sounds are normal. There is no distension. Palpations: Abdomen is soft. Tenderness: There is no abdominal tenderness. There is no guarding. Musculoskeletal:         General: Normal range of motion. Cervical back: Normal range of motion. Skin:     General: Skin is warm and dry. Neurological:      Mental Status: He is alert. Cranial Nerves: No cranial nerve deficit. Psychiatric:         Behavior: Behavior normal.          Procedures     MDM  Number of Diagnoses or Management Options  Nonintractable headache, unspecified chronicity pattern, unspecified headache type  Diagnosis management comments: Emergency Department evaluation was notable for reassuring work-up in the setting of headache. The patient's CT scan reveals no obvious evidence of intracranial abnormality that is acute in nature. Patient symptoms were improved with supportive therapies. Patient was considered stable for discharge to home with outpatient follow-up. They were advised to return to the emergency department should they develop fever, chills, night sweats, nausea, vomiting, diarrhea, chest pain, shortness of breath, or worsening of their symptoms despite treatment from this emergency department visit.   They were instructed to follow-up with their primary care provider in 2 days. This information was relayed to the patient who understood this plan of care and was amenable to the plan.                 --------------------------------------------- PAST HISTORY ---------------------------------------------  Past Medical History:  has a past medical history of Headache and Hypertension. Past Surgical History:  has no past surgical history on file. Social History:  reports that he has been smoking cigarettes. He has a 4.60 pack-year smoking history. He has never used smokeless tobacco. He reports current alcohol use. He reports that he does not use drugs. Family History: family history is not on file. The patients home medications have been reviewed. Allergies: Ace inhibitors and Fish-derived products    -------------------------------------------------- RESULTS -------------------------------------------------  Labs:  No results found for this visit on 12/26/21. Radiology:  CT HEAD WO CONTRAST   Final Result   No acute intracranial abnormality. 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 Mild atrophy and chronic white matter ischemic changes. Overall, no significant change. RECOMMENDATIONS:   Unavailable             ------------------------- NURSING NOTES AND VITALS REVIEWED ---------------------------  Date / Time Roomed:  12/26/2021  6:49 AM  ED Bed Assignment:  SHELLEY/SHELLEY    The nursing notes within the ED encounter and vital signs as below have been reviewed.    BP (!) 190/104   Pulse 74   Temp 97.2 °F (36.2 °C) (Oral)   Resp 16   SpO2 96%   Oxygen Saturation Interpretation: Normal      ------------------------------------------ PROGRESS NOTES ------------------------------------------  12:03 PM EST  I have spoken with the patient and discussed todays results, in addition to providing specific details for the plan of care and counseling regarding the diagnosis and prognosis. Their questions are answered at this time and they are agreeable with the plan. I discussed at length with them reasons for immediate return here for re evaluation. They will followup with their primary care physician by calling their office tomorrow. --------------------------------- ADDITIONAL PROVIDER NOTES ---------------------------------  At this time the patient is without objective evidence of an acute process requiring hospitalization or inpatient management. They have remained hemodynamically stable throughout their entire ED visit and are stable for discharge with outpatient follow-up. The plan has been discussed in detail and they are aware of the specific conditions for emergent return, as well as the importance of follow-up. Discharge Medication List as of 12/26/2021  9:00 AM          Diagnosis:  1. Nonintractable headache, unspecified chronicity pattern, unspecified headache type        Disposition:  Patient's disposition: Discharge to home  Patient's condition is stable.        [address] DO  Resident  12/28/21 1210

## 2021-12-28 NOTE — ED PROVIDER NOTES
ED Attending shared visit  CC: No    HPI:  12/28/21, Time: 10:32 AM ALEJANDRO Glass is a 60 y.o. male presenting to the ED for presents for complaints of a headache and concern regarding the laceration to the left upper lip, beginning few days ago. The complaint has been persistent, mild in severity, and worsened by nothing. Patient presents with complaints of a headache and concern regarding the laceration to left upper lip which he states began 2 days ago. The time of examination the patient presents with a Hep-Lock in his left forearm which he states has been there for the last 2 days. He was admitted here on December 26 after a fall with a subdural hematoma. He subsequently left the department and eloped before his disposition was completed yesterday on December 27. He denies any new fall or new injury. He denies any chest pain or shortness of breath. Denies any fever. Complaining of pain to the left side of his face and lip and also complaining of a mild headache. ROS:   Pertinent positives and negatives are stated within HPI, all other systems reviewed and are negative.  --------------------------------------------- PAST HISTORY ---------------------------------------------  Past Medical History:  has a past medical history of Headache and Hypertension. Past Surgical History:  has no past surgical history on file. Social History:  reports that he has been smoking cigarettes. He has a 4.60 pack-year smoking history. He has never used smokeless tobacco. He reports current alcohol use. He reports that he does not use drugs. Family History: family history is not on file. The patients home medications have been reviewed.     Allergies: Ace inhibitors and Fish-derived products    ---------------------------------------------------PHYSICAL EXAM--------------------------------------    Constitutional/General: Alert and oriented x3, well appearing, non toxic in NAD  Head: Normocephalic and atraumatic  Eyes: PERRL, EOMI, pupils equal round reactive to light and accommodation at 3 mm briskly reactive. No nystagmus. Mouth: Oropharynx clear, handling secretions, no trismus, teeth are intact however he has a laceration to the left upper lip which is edematous no visible drainage or discharge noted. Neck: Supple, full ROM, non tender to palpation in the midline, no stridor, no crepitus, no meningeal signs  Pulmonary: Lungs clear to auscultation bilaterally, no wheezes, rales, or rhonchi. Not in respiratory distress  Cardiovascular:  Regular rate. Regular rhythm. No murmurs, gallops, or rubs. 2+ distal pulses  Chest: no chest wall tenderness  Abdomen: Soft. Non tender. Non distended. +BS. No rebound, guarding, or rigidity. No pulsatile masses appreciated. Musculoskeletal: Moves all extremities x 4. Warm and well perfused, no clubbing, cyanosis, or edema. Capillary refill <3 seconds  Skin: warm and dry. No rashes. Neurologic: GCS 15, CN 2-12 grossly intact, no focal deficits, symmetric strength 5/5 in the upper and lower extremities bilaterally  Psych: Normal Affect    -------------------------------------------------- RESULTS -------------------------------------------------  I have personally reviewed all laboratory and imaging results for this patient. Results are listed below.      LABS:  Results for orders placed or performed during the hospital encounter of 12/28/21   CBC Auto Differential   Result Value Ref Range    WBC 8.0 4.5 - 11.5 E9/L    RBC 4.47 3.80 - 5.80 E12/L    Hemoglobin 13.0 12.5 - 16.5 g/dL    Hematocrit 34.3 (L) 37.0 - 54.0 %    MCV 76.7 (L) 80.0 - 99.9 fL    MCH 29.1 26.0 - 35.0 pg    MCHC 37.9 (H) 32.0 - 34.5 %    RDW 17.2 (H) 11.5 - 15.0 fL    Platelets 048 850 - 440 E9/L    MPV 11.1 7.0 - 12.0 fL    Neutrophils % 71.3 43.0 - 80.0 %    Immature Granulocytes % 0.5 0.0 - 5.0 %    Lymphocytes % 16.6 (L) 20.0 - 42.0 %    Monocytes % 10.7 2.0 - 12.0 % Eosinophils % 0.7 0.0 - 6.0 %    Basophils % 0.2 0.0 - 2.0 %    Neutrophils Absolute 5.70 1.80 - 7.30 E9/L    Immature Granulocytes # 0.04 E9/L    Lymphocytes Absolute 1.33 (L) 1.50 - 4.00 E9/L    Monocytes Absolute 0.86 0.10 - 0.95 E9/L    Eosinophils Absolute 0.06 0.05 - 0.50 E9/L    Basophils Absolute 0.02 0.00 - 0.20 E9/L   Comprehensive Metabolic Panel   Result Value Ref Range    Sodium 132 132 - 146 mmol/L    Potassium 6.5 (H) 3.5 - 5.0 mmol/L    Chloride 98 98 - 107 mmol/L    CO2 24 22 - 29 mmol/L    Anion Gap 10 7 - 16 mmol/L    Glucose 93 74 - 99 mg/dL    BUN 9 6 - 20 mg/dL    CREATININE 0.8 0.7 - 1.2 mg/dL    GFR Non-African American >60 >=60 mL/min/1.73    GFR African American >60     Calcium 8.3 (L) 8.6 - 10.2 mg/dL    Total Protein 7.3 6.4 - 8.3 g/dL    Albumin 4.0 3.5 - 5.2 g/dL    Total Bilirubin 0.9 0.0 - 1.2 mg/dL    Alkaline Phosphatase 62 40 - 129 U/L    ALT 43 (H) 0 - 40 U/L     (H) 0 - 39 U/L   Serum Drug Screen   Result Value Ref Range    Ethanol Lvl 16 mg/dL    Acetaminophen Level <5.0 (L) 10.0 - 38.3 mcg/mL    Salicylate, Serum <5.0 0.0 - 30.0 mg/dL    TCA Scrn NEGATIVE Cutoff:300 ng/mL   Potassium   Result Value Ref Range    Potassium 4.2 3.5 - 5.0 mmol/L   EKG 12 Lead   Result Value Ref Range    Ventricular Rate 74 BPM    Atrial Rate 74 BPM    P-R Interval 110 ms    QRS Duration 84 ms    Q-T Interval 408 ms    QTc Calculation (Bazett) 452 ms    P Axis 27 degrees    R Axis 11 degrees    T Axis 48 degrees       RADIOLOGY:  Interpreted by Radiologist.  CT HEAD WO CONTRAST   Final Result   1. Left tentorial subdural hematoma and left temporal small cerebral   cortical contusion which appear stable. No new hemorrhage or acute disease   identified. No significant mass effect. 2.  Chronic changes include left craniotomy and left temporoparietal chronic   encephalomalacia.       RECOMMENDATIONS:   Unavailable                     ------------------------- NURSING NOTES AND VITALS REVIEWED ---------------------------   The nursing notes within the ED encounter and vital signs as below have been reviewed by myself. BP (!) 166/96   Pulse 86   Temp 98 °F (36.7 °C)   Resp 18   SpO2 99%   Oxygen Saturation Interpretation: Normal    The patients available past medical records and past encounters were reviewed. ------------------------------ ED COURSE/MEDICAL DECISION MAKING----------------------  Medications   amLODIPine (NORVASC) tablet 10 mg (10 mg Oral Given 21 1143)   hydrALAZINE (APRESOLINE) tablet 25 mg (25 mg Oral Given 21 1144)   0.9 % sodium chloride bolus (0 mLs IntraVENous Stopped 21 1302)   levETIRAcetam (KEPPRA) tablet 500 mg (500 mg Oral Given 21 1144)             Medical Decision Makin-call to surgical resident Dr. Juanis Monsalve  advised and will be over to evaluate the patient once he is placed in a proper room for examination. 1005-patient was examined by Dr. Amanda Pineda continue to monitor and reevaluate   1330-patient was RAMIRO from the department and was found roaming around the lobby he was redirected back to the room and advised to continue wait for his test results. 1520- unable to be locATED, apparently had eloped from the department before disposition was completed. Re-Evaluations:             Re-evaluation. Patients symptoms show no change      Consultations:             Trauma surgery    Critical Care: 0        This patient's ED course included: a personal history and physicial examination, re-evaluation prior to disposition, multiple bedside re-evaluations and a personal history and physicial eaxmination    This patient has remained hemodynamically stable and remained unchanged during their ED course. Counseling: The emergency provider has spoken with the patient and discussed todays results, in addition to providing specific details for the plan of care and counseling regarding the diagnosis and prognosis.   Questions are answered at this time and they are agreeable with the plan.       --------------------------------- IMPRESSION AND DISPOSITION ---------------------------------    IMPRESSION  1. Nonintractable headache, unspecified chronicity pattern, unspecified headache type    2. SDH (subdural hematoma) (HCC)    3. Facial laceration, subsequent encounter        DISPOSITION  Disposition: Other Disposition: Eloped  Patient condition is good        NOTE: This report was transcribed using voice recognition software.  Every effort was made to ensure accuracy; however, inadvertent computerized transcription errors may be present         ROSY Barker CNP  12/28/21 0991

## 2021-12-28 NOTE — ED NOTES
Patient noted to have left his room again. Patient had been told that he is not free to enter and exit the ER as he pleases. He was advised that if he wanted to seek treatment, he needed to remain in his room. Patient stated, \"I have important people to talk to and call so I have to keep leaving. \"  Patient was advised that was not appropriate and was requested to remain in his room. Patient stated, \"I'm sorry brother but I just have to leave when I have to leave. \"  Patient was also advised that he suffered a head injury and had some concerning serum lab values and that if he left before definitive treatment and evaluation could be completed that he could suffer further injury up to, and including death. Patient said he understood.        Jyoti Sanderson RN  12/28/21 4513

## 2021-12-31 PROCEDURE — 85025 COMPLETE CBC W/AUTO DIFF WBC: CPT

## 2021-12-31 PROCEDURE — 84484 ASSAY OF TROPONIN QUANT: CPT

## 2021-12-31 PROCEDURE — 99284 EMERGENCY DEPT VISIT MOD MDM: CPT

## 2021-12-31 PROCEDURE — 80053 COMPREHEN METABOLIC PANEL: CPT

## 2021-12-31 PROCEDURE — 93005 ELECTROCARDIOGRAM TRACING: CPT | Performed by: NURSE PRACTITIONER

## 2022-01-01 ENCOUNTER — APPOINTMENT (OUTPATIENT)
Dept: GENERAL RADIOLOGY | Age: 57
End: 2022-01-01
Payer: COMMERCIAL

## 2022-01-01 ENCOUNTER — HOSPITAL ENCOUNTER (EMERGENCY)
Age: 57
Discharge: HOME | End: 2022-01-01
Attending: EMERGENCY MEDICINE
Payer: COMMERCIAL

## 2022-01-01 VITALS
TEMPERATURE: 99.2 F | HEART RATE: 94 BPM | DIASTOLIC BLOOD PRESSURE: 79 MMHG | BODY MASS INDEX: 23.54 KG/M2 | WEIGHT: 150 LBS | HEIGHT: 67 IN | RESPIRATION RATE: 18 BRPM | SYSTOLIC BLOOD PRESSURE: 134 MMHG | OXYGEN SATURATION: 99 %

## 2022-01-01 DIAGNOSIS — R07.89 CHEST WALL PAIN: ICD-10-CM

## 2022-01-01 DIAGNOSIS — R07.9 CHEST PAIN, UNSPECIFIED TYPE: Primary | ICD-10-CM

## 2022-01-01 DIAGNOSIS — S22.31XA CLOSED FRACTURE OF ONE RIB OF RIGHT SIDE, INITIAL ENCOUNTER: ICD-10-CM

## 2022-01-01 LAB
ALBUMIN SERPL-MCNC: 3.7 G/DL (ref 3.5–5.2)
ALP BLD-CCNC: 68 U/L (ref 40–129)
ALT SERPL-CCNC: 24 U/L (ref 0–40)
ANION GAP SERPL CALCULATED.3IONS-SCNC: 17 MMOL/L (ref 7–16)
AST SERPL-CCNC: 77 U/L (ref 0–39)
BASOPHILS ABSOLUTE: 0.04 E9/L (ref 0–0.2)
BASOPHILS RELATIVE PERCENT: 0.5 % (ref 0–2)
BILIRUB SERPL-MCNC: 0.6 MG/DL (ref 0–1.2)
BUN BLDV-MCNC: 16 MG/DL (ref 6–20)
CALCIUM SERPL-MCNC: 8.7 MG/DL (ref 8.6–10.2)
CHLORIDE BLD-SCNC: 102 MMOL/L (ref 98–107)
CO2: 22 MMOL/L (ref 22–29)
CREAT SERPL-MCNC: 1.4 MG/DL (ref 0.7–1.2)
EKG ATRIAL RATE: 100 BPM
EKG P AXIS: 60 DEGREES
EKG P-R INTERVAL: 142 MS
EKG Q-T INTERVAL: 364 MS
EKG QRS DURATION: 96 MS
EKG QTC CALCULATION (BAZETT): 469 MS
EKG R AXIS: -15 DEGREES
EKG T AXIS: 55 DEGREES
EKG VENTRICULAR RATE: 100 BPM
EOSINOPHILS ABSOLUTE: 0.07 E9/L (ref 0.05–0.5)
EOSINOPHILS RELATIVE PERCENT: 0.9 % (ref 0–6)
GFR AFRICAN AMERICAN: >60
GFR NON-AFRICAN AMERICAN: >60 ML/MIN/1.73
GLUCOSE BLD-MCNC: 123 MG/DL (ref 74–99)
HCT VFR BLD CALC: 29.9 % (ref 37–54)
HEMOGLOBIN: 11.3 G/DL (ref 12.5–16.5)
IMMATURE GRANULOCYTES #: 0.05 E9/L
IMMATURE GRANULOCYTES %: 0.6 % (ref 0–5)
LYMPHOCYTES ABSOLUTE: 2.04 E9/L (ref 1.5–4)
LYMPHOCYTES RELATIVE PERCENT: 25.5 % (ref 20–42)
MCH RBC QN AUTO: 29.4 PG (ref 26–35)
MCHC RBC AUTO-ENTMCNC: 37.8 % (ref 32–34.5)
MCV RBC AUTO: 77.9 FL (ref 80–99.9)
MONOCYTES ABSOLUTE: 1.16 E9/L (ref 0.1–0.95)
MONOCYTES RELATIVE PERCENT: 14.5 % (ref 2–12)
NEUTROPHILS ABSOLUTE: 4.65 E9/L (ref 1.8–7.3)
NEUTROPHILS RELATIVE PERCENT: 58 % (ref 43–80)
PDW BLD-RTO: 19 FL (ref 11.5–15)
PLATELET # BLD: 168 E9/L (ref 130–450)
PMV BLD AUTO: 10.9 FL (ref 7–12)
POTASSIUM SERPL-SCNC: 3.5 MMOL/L (ref 3.5–5)
RBC # BLD: 3.84 E12/L (ref 3.8–5.8)
SODIUM BLD-SCNC: 141 MMOL/L (ref 132–146)
TOTAL PROTEIN: 6.6 G/DL (ref 6.4–8.3)
TROPONIN, HIGH SENSITIVITY: 22 NG/L (ref 0–11)
TROPONIN, HIGH SENSITIVITY: 23 NG/L (ref 0–11)
WBC # BLD: 8 E9/L (ref 4.5–11.5)

## 2022-01-01 PROCEDURE — 6370000000 HC RX 637 (ALT 250 FOR IP): Performed by: STUDENT IN AN ORGANIZED HEALTH CARE EDUCATION/TRAINING PROGRAM

## 2022-01-01 PROCEDURE — 84484 ASSAY OF TROPONIN QUANT: CPT

## 2022-01-01 PROCEDURE — 93010 ELECTROCARDIOGRAM REPORT: CPT | Performed by: INTERNAL MEDICINE

## 2022-01-01 PROCEDURE — 71045 X-RAY EXAM CHEST 1 VIEW: CPT

## 2022-01-01 RX ORDER — IBUPROFEN 400 MG/1
400 TABLET ORAL ONCE
Status: COMPLETED | OUTPATIENT
Start: 2022-01-01 | End: 2022-01-01

## 2022-01-01 RX ADMIN — IBUPROFEN 400 MG: 400 TABLET, FILM COATED ORAL at 08:13

## 2022-01-01 ASSESSMENT — ENCOUNTER SYMPTOMS
SINUS PRESSURE: 0
NAUSEA: 0
SORE THROAT: 0
WHEEZING: 0
COUGH: 0
ABDOMINAL PAIN: 0
EYE REDNESS: 0
EYE PAIN: 0
BACK PAIN: 0
FACIAL SWELLING: 1
DIARRHEA: 0
EYE DISCHARGE: 0
SHORTNESS OF BREATH: 0
VOMITING: 0

## 2022-01-01 ASSESSMENT — PAIN DESCRIPTION - FREQUENCY: FREQUENCY: CONTINUOUS

## 2022-01-01 ASSESSMENT — PAIN SCALES - GENERAL
PAINLEVEL_OUTOF10: 10
PAINLEVEL_OUTOF10: 5

## 2022-01-01 ASSESSMENT — PAIN DESCRIPTION - PAIN TYPE: TYPE: ACUTE PAIN

## 2022-01-01 ASSESSMENT — PAIN DESCRIPTION - LOCATION: LOCATION: CHEST

## 2022-01-01 ASSESSMENT — PAIN DESCRIPTION - ORIENTATION: ORIENTATION: MID

## 2022-01-01 ASSESSMENT — PAIN DESCRIPTION - DESCRIPTORS: DESCRIPTORS: OTHER (COMMENT)

## 2022-01-01 NOTE — ED NOTES
Bed: 12  Expected date:   Expected time:   Means of arrival:   Comments:  Triage      Charles Encarnacion, DOROTHY  01/01/22 7964

## 2022-01-01 NOTE — ED NOTES
Department of Emergency Medicine  FIRST PROVIDER TRIAGE NOTE             [facility]           12/31/21  11:45 PM EST    Date of Encounter: 12/31/21   MRN: [id number]      HPI: Eliza Connolly is a 60 y.o. male who presents to the ED for No chief complaint on file. Patient presents emergency department with right-sided chest pain. Patient reports chest pain started 2 days ago, reports has been constant. Does not radiate anywhere else. Denies take anything for pain relief. Denies any shortness of breath with this. No recent fevers. ROS: Negative for abd pain or back pain. PE: Gen Appearance/Constitutional: alert  CV: regular rate     Initial Plan of Care: All treatment areas with department are currently occupied. Plan to order/Initiate the following while awaiting opening in ED: labs, EKG and imaging studies.     Initial Plan of Care: Initiate Treatment-Testing, Proceed toTreatment Area When Bed Available for ED Attending/MLP to Continue Care    Electronically signed by ROSY Link CNP   DD: 12/31/21         ROSY Link CNP  12/31/21 1084

## 2022-01-01 NOTE — ED PROVIDER NOTES
Patient presents with chest and lip pain. Patient states that he fell approximately 3 days ago on hit his face. He states that since then has lip has progressively swollen and continues to hurt. He also endorses some chest pain. He states is been ongoing for 5 days. He describes as a squeezing in his chest.  It does not radiate anywhere. He rates his pain as a 10 out of 10. He has not done anything to make his symptoms better nor worse. He denies any accompanying symptoms including but not limited to fevers, chills, shortness of breath, nausea, or vomiting. Patient denies any drug use. The history is provided by the patient. No  was used. Review of Systems   Constitutional: Negative for chills and fever. HENT: Positive for facial swelling. Negative for ear pain, sinus pressure and sore throat. Eyes: Negative for pain, discharge and redness. Respiratory: Negative for cough, shortness of breath and wheezing. Cardiovascular: Positive for chest pain. Gastrointestinal: Negative for abdominal pain, diarrhea, nausea and vomiting. Genitourinary: Negative for dysuria and frequency. Musculoskeletal: Negative for arthralgias and back pain. Skin: Negative for rash and wound. Neurological: Negative for weakness and headaches. Hematological: Negative for adenopathy. All other systems reviewed and are negative. Physical Exam  Vitals and nursing note reviewed. Constitutional:       General: He is not in acute distress. Appearance: He is well-developed. HENT:      Head: Normocephalic and atraumatic. Eyes:      Conjunctiva/sclera: Conjunctivae normal.   Cardiovascular:      Rate and Rhythm: Normal rate and regular rhythm. Heart sounds: Normal heart sounds. No murmur heard. Comments: Tenderness on the right chest  Pulmonary:      Effort: Pulmonary effort is normal. No respiratory distress. Breath sounds: Normal breath sounds.  No wheezing or rales. Abdominal:      General: Bowel sounds are normal.      Palpations: Abdomen is soft. Tenderness: There is no abdominal tenderness. There is no guarding or rebound. Musculoskeletal:         General: No tenderness or deformity. Cervical back: Normal range of motion and neck supple. Skin:     General: Skin is warm and dry. Neurological:      Mental Status: He is alert and oriented to person, place, and time. Cranial Nerves: No cranial nerve deficit. Coordination: Coordination normal.          Procedures     MDM  Number of Diagnoses or Management Options  Chest pain, unspecified type  Chest wall pain  Closed fracture of one rib of right side, initial encounter  Diagnosis management comments: Patient presents with chest pain. CBC showed patient's baseline anemia. CMP showed his baseline creatinine at 1.4. Initial and repeat troponin were 23 and 22 respectively. Chest x-ray did show a right seventh rib fracture. EKG did not show any signs of ischemia. Patient was given Advil for his pain. At this point does not appear to be any emergent processes ongoing. Patient's vitals been stable throughout his stay. Patient was informed the results and plan and is agreeable. Patient be discharged home with instruction to follow-up with his PCP for further evaluation care. Amount and/or Complexity of Data Reviewed  Decide to obtain previous medical records or to obtain history from someone other than the patient: yes         ED Course as of 01/01/22 0812   Sat Jan 01, 2022   0811 EKG shows normal sinus rhythm with a ventricular rate of 100 bpm.  Normal axis. There are no obvious ST elevations or T wave inversions. Comparable to previous EKG on 12/20/2021. [BB]      ED Course User Index  [BB] Hernandez Gonzalez DO          ED Course as of 01/01/22 6983   Sat Jan 01, 2022   0811 EKG shows normal sinus rhythm with a ventricular rate of 100 bpm.  Normal axis.   There are no obvious ST elevations or T wave inversions. Comparable to previous EKG on 12/20/2021. [BB]      ED Course User Index  [BB] Juana Beltrán DO       --------------------------------------------- PAST HISTORY ---------------------------------------------  Past Medical History:  has a past medical history of Headache and Hypertension. Past Surgical History:  has no past surgical history on file. Social History:  reports that he has been smoking cigarettes. He has a 4.60 pack-year smoking history. He has never used smokeless tobacco. He reports current alcohol use. He reports that he does not use drugs. Family History: family history is not on file. The patients home medications have been reviewed.     Allergies: Ace inhibitors and Fish-derived products    -------------------------------------------------- RESULTS -------------------------------------------------  Labs:  Results for orders placed or performed during the hospital encounter of 01/01/22   Troponin   Result Value Ref Range    Troponin, High Sensitivity 23 (H) 0 - 11 ng/L   CBC Auto Differential   Result Value Ref Range    WBC 8.0 4.5 - 11.5 E9/L    RBC 3.84 3.80 - 5.80 E12/L    Hemoglobin 11.3 (L) 12.5 - 16.5 g/dL    Hematocrit 29.9 (L) 37.0 - 54.0 %    MCV 77.9 (L) 80.0 - 99.9 fL    MCH 29.4 26.0 - 35.0 pg    MCHC 37.8 (H) 32.0 - 34.5 %    RDW 19.0 (H) 11.5 - 15.0 fL    Platelets 458 972 - 412 E9/L    MPV 10.9 7.0 - 12.0 fL    Neutrophils % 58.0 43.0 - 80.0 %    Immature Granulocytes % 0.6 0.0 - 5.0 %    Lymphocytes % 25.5 20.0 - 42.0 %    Monocytes % 14.5 (H) 2.0 - 12.0 %    Eosinophils % 0.9 0.0 - 6.0 %    Basophils % 0.5 0.0 - 2.0 %    Neutrophils Absolute 4.65 1.80 - 7.30 E9/L    Immature Granulocytes # 0.05 E9/L    Lymphocytes Absolute 2.04 1.50 - 4.00 E9/L    Monocytes Absolute 1.16 (H) 0.10 - 0.95 E9/L    Eosinophils Absolute 0.07 0.05 - 0.50 E9/L    Basophils Absolute 0.04 0.00 - 0.20 E9/L   Comprehensive Metabolic Panel   Result Value Ref Range Sodium 141 132 - 146 mmol/L    Potassium 3.5 3.5 - 5.0 mmol/L    Chloride 102 98 - 107 mmol/L    CO2 22 22 - 29 mmol/L    Anion Gap 17 (H) 7 - 16 mmol/L    Glucose 123 (H) 74 - 99 mg/dL    BUN 16 6 - 20 mg/dL    CREATININE 1.4 (H) 0.7 - 1.2 mg/dL    GFR Non-African American >60 >=60 mL/min/1.73    GFR African American >60     Calcium 8.7 8.6 - 10.2 mg/dL    Total Protein 6.6 6.4 - 8.3 g/dL    Albumin 3.7 3.5 - 5.2 g/dL    Total Bilirubin 0.6 0.0 - 1.2 mg/dL    Alkaline Phosphatase 68 40 - 129 U/L    ALT 24 0 - 40 U/L    AST 77 (H) 0 - 39 U/L   Troponin   Result Value Ref Range    Troponin, High Sensitivity 22 (H) 0 - 11 ng/L   EKG 12 Lead   Result Value Ref Range    Ventricular Rate 100 BPM    Atrial Rate 100 BPM    P-R Interval 142 ms    QRS Duration 96 ms    Q-T Interval 364 ms    QTc Calculation (Bazett) 469 ms    P Axis 60 degrees    R Axis -15 degrees    T Axis 55 degrees       Radiology:  XR CHEST PORTABLE   Final Result   Fracture right 7th rib of unknown acuity. This was not present on prior   study 07/03/2021.             ------------------------- NURSING NOTES AND VITALS REVIEWED ---------------------------  Date / Time Roomed:  1/1/2022  7:44 AM  ED Bed Assignment:  12/12    The nursing notes within the ED encounter and vital signs as below have been reviewed. /79   Pulse 94   Temp 99.2 °F (37.3 °C) (Oral)   Resp 18   Ht 5' 7\" (1.702 m)   Wt 150 lb (68 kg)   SpO2 99%   BMI 23.49 kg/m²   Oxygen Saturation Interpretation: Normal      ------------------------------------------ PROGRESS NOTES ------------------------------------------  8:12 AM EST  I have spoken with the patient and discussed todays results, in addition to providing specific details for the plan of care and counseling regarding the diagnosis and prognosis. Their questions are answered at this time and they are agreeable with the plan. I discussed at length with them reasons for immediate return here for re evaluation. They will followup with their primary care physician by calling their office tomorrow. --------------------------------- ADDITIONAL PROVIDER NOTES ---------------------------------  At this time the patient is without objective evidence of an acute process requiring hospitalization or inpatient management. They have remained hemodynamically stable throughout their entire ED visit and are stable for discharge with outpatient follow-up. The plan has been discussed in detail and they are aware of the specific conditions for emergent return, as well as the importance of follow-up. New Prescriptions    No medications on file       Diagnosis:  1. Chest pain, unspecified type    2. Chest wall pain    3. Closed fracture of one rib of right side, initial encounter        Disposition:  Patient's disposition: Discharge to home  Patient's condition is stable.     Patient was seen and evaluated by both myself and Marycruz Ann, [address], DO  Resident  01/01/22 7718

## 2022-01-01 NOTE — ED PROVIDER NOTES
ATTENDING PROVIDER ATTESTATION:     Mignon Wang presented to the emergency department for evaluation of Chest Pain (x 2 days mid sternal. [facility] he was on the sidewalk drinking and when approched he asked to see the drWilton )   and was initially evaluated by the Medical Resident. See Original ED Note for H&P and ED course above. I have reviewed and discussed the case, including pertinent history (medical, surgical, family and social) and exam findings with the Medical Resident assigned to Mignon Wang. I have personally performed and/or participated in the history, exam, medical decision making, and procedures and agree with all pertinent clinical information and any additional changes or corrections are noted below in my assessment and plan. I have discussed this patient in detail with the resident, and provided the instruction and education,       I have reviewed my findings and recommendations with the assigned Medical Resident, Mignon Wang and members of family present at the time of disposition. I have performed a history and physical examination of this patient and directly supervised the resident caring for this patient      History of Present Illness:    Presents to the ED for fall and right sided chest pain, beginning 2 days ago. The complaint has been constant, moderate in severity, and worsened by movement and palpation. Reports chest pain for 2 days. Reports he fell prior to the onset of his pain. Reports pain along his right lateral chest. Pain with movement and palpation. Reports it continues to hurt and he wanted to get it checked out. He denies any other injuries from his fall. He denies head injury. He denies abdominal pain. No back pain. Denies any extremity complaints.         Review of Systems:   A complete review of systems was performed and pertinent positives and negatives are stated within HPI, all other systems reviewed and are negative.    --------------------------------------------- PAST HISTORY ---------------------------------------------  Past Medical History:  has a past medical history of Headache and Hypertension. Past Surgical History:  has no past surgical history on file. Social History:  reports that he has been smoking cigarettes. He has a 4.60 pack-year smoking history. He has never used smokeless tobacco. He reports current alcohol use. He reports that he does not use drugs. Family History: family history is not on file. Unless otherwise noted, family history is non contributory    The patients home medications have been reviewed. Allergies: Ace inhibitors and Fish-derived products    EKG Interpretation  Interpreted by emergency department physician, Dr. Soila Fregoso     12/31/21  Time: 1670    Rhythm: normal sinus   Rate: normal  Axis: left  Conduction: normal  ST Segments: no acute change  T Waves: no acute change    Clinical Impression: Normal sinus rhythm, no acute ischemic changes  Comparison to Old EKG stable from prior      Physical Exam:  Constitutional/General: Alert and oriented x3  Head: Normocephalic and atraumatic  Eyes: PERRL, EOMI, sclera non icteric  ENT: Oropharynx clear, handling secretions  Neck: Supple, full ROM, no stridor, no meningeal signs  Respiratory: Lungs clear to auscultation bilaterally, no wheezes, rales, or rhonchi. Not in respiratory distress  Cardiovascular:  Regular rate. Regular rhythm. No murmurs, no gallops, no rubs. 2+ distal pulses. Equal extremity pulses. Chest wall: Pinpoint tenderness along the right seventh rib. Palpation completely reproduces his pain. GI:  Abdomen Soft, Non tender, specifically no right upper quadrant tenderness at all. Non distended. No rebound, guarding, or rigidity. No pulsatile masses. Musculoskeletal: Moves all extremities x 4. Warm and well perfused,  no clubbing, no cyanosis, no edema.  Palpable peripheral pulses  Integument: skin warm and dry. No rashes. Neurologic: GCS 15, no focal deficits  Psychiatric: Normal Affect      I directly supervised any procedures performed by the resident and was present for the procedure including all critical portions of the procedure      I, Dr. Nalini Foy, am the primary provider of record    My Medical Decision Making:         Chest pain, suspicious this is related to his broken rib as he has pinpoint tenderness on palpation and has a broken rib on his chest x-ray. No pneumothorax. EKG without acute ischemic changes. Troponin with nonsignificant delta. No right upper quadrant pain and no abdominal pain. Plan for pain control supportive care. Nothing to suggest acute coronary syndrome, nonexertional pain. 1. Chest pain, unspecified type    2. Chest wall pain    3.  Closed fracture of one rib of right side, initial encounter           Mireille Abrams MD  01/01/22 7818

## 2022-01-02 ENCOUNTER — HOSPITAL ENCOUNTER (EMERGENCY)
Age: 57
Discharge: HOME | End: 2022-01-02
Payer: COMMERCIAL

## 2022-01-02 VITALS
SYSTOLIC BLOOD PRESSURE: 142 MMHG | OXYGEN SATURATION: 98 % | DIASTOLIC BLOOD PRESSURE: 70 MMHG | BODY MASS INDEX: 23.49 KG/M2 | WEIGHT: 150 LBS | RESPIRATION RATE: 18 BRPM | HEART RATE: 78 BPM | TEMPERATURE: 98.2 F

## 2022-01-02 DIAGNOSIS — S01.81XD FACIAL LACERATION, SUBSEQUENT ENCOUNTER: Primary | ICD-10-CM

## 2022-01-02 PROCEDURE — 99282 EMERGENCY DEPT VISIT SF MDM: CPT

## 2022-01-02 RX ORDER — DIAPER,BRIEF,INFANT-TODD,DISP
EACH MISCELLANEOUS 2 TIMES DAILY
Status: DISCONTINUED | OUTPATIENT
Start: 2022-01-02 | End: 2022-01-02 | Stop reason: HOSPADM

## 2022-01-02 NOTE — ED PROVIDER NOTES
[facility]    HPI:  1/2/22, Time: 8:16 AM ALEJANDRO Singh is a 60 y.o. male presenting to the ED for upper lip pain and swelling, beginning several days ago after a fall while intoxicated. The complaint has been persistent, mild in severity, and worsened by nothing. Patient has been seen several times in the emergency department since the fall and has a known subdural hematoma. Denies any headache, neck pain, dizziness, or visual symptoms today. States that his upper lip is hurting and he is concerned it will become infected. There is a known laceration to the upper lip from the fall of which was not repaired per his report. Denies any drainage from the wound. Swelling has not worsened since the fall-just not improved. Afebrile without recent travel. Patient denies all other symptoms at this time. Review of Systems:   A complete review of systems was performed and pertinent positives and negatives are stated within HPI, all other systems reviewed and are negative.          --------------------------------------------- PAST HISTORY ---------------------------------------------  Past Medical History:  has a past medical history of Headache and Hypertension. Past Surgical History:  has no past surgical history on file. Social History:  reports that he has been smoking cigarettes. He has a 4.60 pack-year smoking history. He has never used smokeless tobacco. He reports current alcohol use. He reports that he does not use drugs. Family History: family history is not on file. The patients home medications have been reviewed. Allergies: Ace inhibitors and Fish-derived products    -------------------------------------------------- RESULTS -------------------------------------------------  All laboratory and radiology results have been personally reviewed by myself   LABS:  No results found for this visit on 01/02/22.     RADIOLOGY:  Interpreted by Radiologist.  No orders to display ------------------------- NURSING NOTES AND VITALS REVIEWED ---------------------------   The nursing notes within the ED encounter and vital signs as below have been reviewed. BP (!) 142/70   Pulse 78   Temp 98.2 °F (36.8 °C)   Resp 18   Wt 150 lb (68 kg)   SpO2 98%   BMI 23.49 kg/m²   Oxygen Saturation Interpretation: Normal      ---------------------------------------------------PHYSICAL EXAM--------------------------------------      Constitutional/General: Alert and oriented x3, well appearing, non toxic in NAD  Head: Normocephalic and atraumatic  Eyes: PERRL, EOMI  Mouth: Oropharynx clear, handling secretions, no trismus  Neck: Supple, full ROM,   Pulmonary: Lungs clear to auscultation bilaterally, no wheezes, rales, or rhonchi. Not in respiratory distress  Cardiovascular:  Regular rate and rhythm, no murmurs, gallops, or rubs. 2+ distal pulses  Abdomen: Soft, non tender, non distended,   Extremities: Moves all extremities x 4. Warm and well perfused  Skin: warm and dry without rash. Healing laceration noted to the upper lip with some scabbing present. No drainage or fluctuance. Mild upper lip swelling. No intra oral lip laceration. Neurologic: GCS 15,  Psych: Normal Affect      ------------------------------ ED COURSE/MEDICAL DECISION MAKING----------------------  Medications   bacitracin zinc ointment (has no administration in time range)         ED COURSE:       Medical Decision Making:    Patient is a 60year old male presenting to the ED with a lip laceration and pain. Laceration is several days old and not amenable to repair at this time. There is some upper lip swelling but no fluctuance or other symptoms concerning for abscess. Otherwise, patient is neurovascularly intact, nontoxic appearing and in no acute distress. Will plan for outpatient management of lip laceration with bacitracin ointment.  Advised to return to the ED with new or worsening symptoms and follow up with PCP for recheck. Patient voiced understanding and is agreeable to the above treatment plan. Counseling: The emergency provider has spoken with the patient and discussed todays results, in addition to providing specific details for the plan of care and counseling regarding the diagnosis and prognosis. Questions are answered at this time and they are agreeable with the plan.      --------------------------------- IMPRESSION AND DISPOSITION ---------------------------------    IMPRESSION  1. Facial laceration, subsequent encounter        DISPOSITION  Disposition: Discharge to home  Patient condition is stable      NOTE: This report was transcribed using voice recognition software.  Every effort was made to ensure accuracy; however, inadvertent computerized transcription errors may be present        Guerrero Ryder  01/02/22 7498

## 2022-01-03 PROCEDURE — 99284 EMERGENCY DEPT VISIT MOD MDM: CPT

## 2022-01-03 PROCEDURE — 87635 SARS-COV-2 COVID-19 AMP PRB: CPT

## 2022-01-03 RX ORDER — ACETAMINOPHEN 500 MG
1000 TABLET ORAL ONCE
Status: COMPLETED | OUTPATIENT
Start: 2022-01-04 | End: 2022-01-04

## 2022-01-03 ASSESSMENT — PAIN SCALES - GENERAL: PAINLEVEL_OUTOF10: 10

## 2022-01-03 ASSESSMENT — PAIN DESCRIPTION - PAIN TYPE: TYPE: ACUTE PAIN

## 2022-01-03 ASSESSMENT — PAIN DESCRIPTION - LOCATION: LOCATION: HEAD

## 2022-01-03 ASSESSMENT — PAIN DESCRIPTION - FREQUENCY: FREQUENCY: CONTINUOUS

## 2022-01-04 ENCOUNTER — HOSPITAL ENCOUNTER (EMERGENCY)
Age: 57
Discharge: HOME | End: 2022-01-04
Attending: EMERGENCY MEDICINE
Payer: COMMERCIAL

## 2022-01-04 ENCOUNTER — APPOINTMENT (OUTPATIENT)
Dept: CT IMAGING | Age: 57
End: 2022-01-04
Payer: COMMERCIAL

## 2022-01-04 VITALS
DIASTOLIC BLOOD PRESSURE: 87 MMHG | HEIGHT: 67 IN | SYSTOLIC BLOOD PRESSURE: 136 MMHG | WEIGHT: 150 LBS | HEART RATE: 80 BPM | BODY MASS INDEX: 23.54 KG/M2 | TEMPERATURE: 98.6 F | OXYGEN SATURATION: 94 % | RESPIRATION RATE: 16 BRPM

## 2022-01-04 DIAGNOSIS — Z51.89 VISIT FOR WOUND CHECK: ICD-10-CM

## 2022-01-04 DIAGNOSIS — I62.03 CHRONIC SUBDURAL HEMATOMA (HCC): Primary | ICD-10-CM

## 2022-01-04 LAB
ALBUMIN SERPL-MCNC: 3.8 G/DL (ref 3.5–5.2)
ALP BLD-CCNC: 59 U/L (ref 40–129)
ALT SERPL-CCNC: 23 U/L (ref 0–40)
ANION GAP SERPL CALCULATED.3IONS-SCNC: 15 MMOL/L (ref 7–16)
AST SERPL-CCNC: 83 U/L (ref 0–39)
BASOPHILS ABSOLUTE: 0.04 E9/L (ref 0–0.2)
BASOPHILS RELATIVE PERCENT: 0.7 % (ref 0–2)
BILIRUB SERPL-MCNC: 0.7 MG/DL (ref 0–1.2)
BUN BLDV-MCNC: 11 MG/DL (ref 6–20)
CALCIUM SERPL-MCNC: 8.6 MG/DL (ref 8.6–10.2)
CHLORIDE BLD-SCNC: 100 MMOL/L (ref 98–107)
CO2: 22 MMOL/L (ref 22–29)
CREAT SERPL-MCNC: 1.1 MG/DL (ref 0.7–1.2)
EKG ATRIAL RATE: 82 BPM
EKG P AXIS: 47 DEGREES
EKG P-R INTERVAL: 116 MS
EKG Q-T INTERVAL: 408 MS
EKG QRS DURATION: 88 MS
EKG QTC CALCULATION (BAZETT): 476 MS
EKG R AXIS: -16 DEGREES
EKG T AXIS: 58 DEGREES
EKG VENTRICULAR RATE: 82 BPM
EOSINOPHILS ABSOLUTE: 0.06 E9/L (ref 0.05–0.5)
EOSINOPHILS RELATIVE PERCENT: 1 % (ref 0–6)
GFR AFRICAN AMERICAN: >60
GFR NON-AFRICAN AMERICAN: >60 ML/MIN/1.73
GLUCOSE BLD-MCNC: 99 MG/DL (ref 74–99)
HCT VFR BLD CALC: 34.2 % (ref 37–54)
HEMOGLOBIN: 12.8 G/DL (ref 12.5–16.5)
IMMATURE GRANULOCYTES #: 0.02 E9/L
IMMATURE GRANULOCYTES %: 0.3 % (ref 0–5)
LYMPHOCYTES ABSOLUTE: 1.99 E9/L (ref 1.5–4)
LYMPHOCYTES RELATIVE PERCENT: 32.9 % (ref 20–42)
MCH RBC QN AUTO: 29.6 PG (ref 26–35)
MCHC RBC AUTO-ENTMCNC: 37.4 % (ref 32–34.5)
MCV RBC AUTO: 79.2 FL (ref 80–99.9)
MONOCYTES ABSOLUTE: 0.87 E9/L (ref 0.1–0.95)
MONOCYTES RELATIVE PERCENT: 14.4 % (ref 2–12)
NEUTROPHILS ABSOLUTE: 3.06 E9/L (ref 1.8–7.3)
NEUTROPHILS RELATIVE PERCENT: 50.7 % (ref 43–80)
PDW BLD-RTO: 20 FL (ref 11.5–15)
PLATELET # BLD: 235 E9/L (ref 130–450)
PMV BLD AUTO: 10.2 FL (ref 7–12)
POTASSIUM SERPL-SCNC: 3.6 MMOL/L (ref 3.5–5)
RBC # BLD: 4.32 E12/L (ref 3.8–5.8)
SARS-COV-2, NAAT: NOT DETECTED
SODIUM BLD-SCNC: 137 MMOL/L (ref 132–146)
TOTAL PROTEIN: 7.1 G/DL (ref 6.4–8.3)
TROPONIN, HIGH SENSITIVITY: 26 NG/L (ref 0–11)
WBC # BLD: 6 E9/L (ref 4.5–11.5)

## 2022-01-04 PROCEDURE — 70450 CT HEAD/BRAIN W/O DYE: CPT

## 2022-01-04 PROCEDURE — 6370000000 HC RX 637 (ALT 250 FOR IP): Performed by: EMERGENCY MEDICINE

## 2022-01-04 PROCEDURE — 84484 ASSAY OF TROPONIN QUANT: CPT

## 2022-01-04 PROCEDURE — 93005 ELECTROCARDIOGRAM TRACING: CPT | Performed by: EMERGENCY MEDICINE

## 2022-01-04 PROCEDURE — 80053 COMPREHEN METABOLIC PANEL: CPT

## 2022-01-04 PROCEDURE — 6370000000 HC RX 637 (ALT 250 FOR IP): Performed by: NURSE PRACTITIONER

## 2022-01-04 PROCEDURE — 85025 COMPLETE CBC W/AUTO DIFF WBC: CPT

## 2022-01-04 RX ORDER — CEPHALEXIN 250 MG/1
500 CAPSULE ORAL 3 TIMES DAILY
Qty: 21 CAPSULE | Refills: 0 | Status: SHIPPED | OUTPATIENT
Start: 2022-01-04 | End: 2022-01-11

## 2022-01-04 RX ORDER — SODIUM CHLORIDE 9 MG/ML
INJECTION, SOLUTION INTRAVENOUS CONTINUOUS
Status: DISCONTINUED | OUTPATIENT
Start: 2022-01-04 | End: 2022-01-04 | Stop reason: HOSPADM

## 2022-01-04 RX ORDER — CEPHALEXIN 250 MG/1
250 CAPSULE ORAL ONCE
Status: COMPLETED | OUTPATIENT
Start: 2022-01-04 | End: 2022-01-04

## 2022-01-04 RX ADMIN — CEPHALEXIN 250 MG: 250 CAPSULE ORAL at 06:59

## 2022-01-04 RX ADMIN — ACETAMINOPHEN 1000 MG: 500 TABLET ORAL at 00:17

## 2022-01-04 ASSESSMENT — PAIN SCALES - GENERAL: PAINLEVEL_OUTOF10: 10

## 2022-01-04 NOTE — ED PROVIDER NOTES
ED Physician   HPI:  1/3/22, Time: 11:45 PM ALEJANDRO Kim is a 60 y.o. male presenting to the ED for 2-week history of a headache. Patient reports that over the last 2-weeks he has had a headache every single day, states that it to the top of his head. He denies any injury or trauma that he can recall. He is not on any anticoagulant therapy. Patient denies taking anything over-the-counter for symptom relief. Patient denied any visual disturbance with this. There has been no change in gait. Patient does drink alcohol daily. Patient also reporting having a sore to the top of the left upper lip. States that that has been there for several days. He denies any injury or trauma to his lip just states that it is causing his upper lip to become more swollen and irritated and burning. Patient actually was seen in the emergency department on December 26 had a facial laceration and then once again seen on January 2 facial laceration. He denies noticing any drainage from that wound. Patient otherwise denies any difficulty with ambulation no reports of confusion as well as no unusual paresthesia. Patient also denies any chest pain, shortness of breath or abdominal pain and no noted nausea, vomiting or diarrhea. Symptoms moderate in severity and persistent    Review of Systems:   A complete review of systems was performed and pertinent positives and negatives are stated within HPI, all other systems reviewed and are negative.          --------------------------------------------- PAST HISTORY ---------------------------------------------  Past Medical History:  has a past medical history of Headache and Hypertension. Past Surgical History:  has no past surgical history on file. Social History:  reports that he has been smoking cigarettes. He has a 4.60 pack-year smoking history. He has never used smokeless tobacco. He reports current alcohol use. He reports that he does not use drugs.     Family History: family history is not on file. The patients home medications have been reviewed.     Allergies: Ace inhibitors and Fish-derived products    -------------------------------------------------- RESULTS -------------------------------------------------  All laboratory and radiology results have been personally reviewed by myself   LABS:  Results for orders placed or performed during the hospital encounter of 01/04/22   COVID-19, Rapid    Specimen: Nasopharyngeal Swab   Result Value Ref Range    SARS-CoV-2, NAAT Not Detected Not Detected   CBC auto differential   Result Value Ref Range    WBC 6.0 4.5 - 11.5 E9/L    RBC 4.32 3.80 - 5.80 E12/L    Hemoglobin 12.8 12.5 - 16.5 g/dL    Hematocrit 34.2 (L) 37.0 - 54.0 %    MCV 79.2 (L) 80.0 - 99.9 fL    MCH 29.6 26.0 - 35.0 pg    MCHC 37.4 (H) 32.0 - 34.5 %    RDW 20.0 (H) 11.5 - 15.0 fL    Platelets 058 841 - 937 E9/L    MPV 10.2 7.0 - 12.0 fL    Neutrophils % 50.7 43.0 - 80.0 %    Immature Granulocytes % 0.3 0.0 - 5.0 %    Lymphocytes % 32.9 20.0 - 42.0 %    Monocytes % 14.4 (H) 2.0 - 12.0 %    Eosinophils % 1.0 0.0 - 6.0 %    Basophils % 0.7 0.0 - 2.0 %    Neutrophils Absolute 3.06 1.80 - 7.30 E9/L    Immature Granulocytes # 0.02 E9/L    Lymphocytes Absolute 1.99 1.50 - 4.00 E9/L    Monocytes Absolute 0.87 0.10 - 0.95 E9/L    Eosinophils Absolute 0.06 0.05 - 0.50 E9/L    Basophils Absolute 0.04 0.00 - 0.20 E9/L   Comprehensive Metabolic Panel   Result Value Ref Range    Sodium 137 132 - 146 mmol/L    Potassium 3.6 3.5 - 5.0 mmol/L    Chloride 100 98 - 107 mmol/L    CO2 22 22 - 29 mmol/L    Anion Gap 15 7 - 16 mmol/L    Glucose 99 74 - 99 mg/dL    BUN 11 6 - 20 mg/dL    CREATININE 1.1 0.7 - 1.2 mg/dL    GFR Non-African American >60 >=60 mL/min/1.73    GFR African American >60     Calcium 8.6 8.6 - 10.2 mg/dL    Total Protein 7.1 6.4 - 8.3 g/dL    Albumin 3.8 3.5 - 5.2 g/dL    Total Bilirubin 0.7 0.0 - 1.2 mg/dL    Alkaline Phosphatase 59 40 - 129 U/L    ALT 23 0 - 40 U/L    AST 83 (H) 0 - 39 U/L   Troponin   Result Value Ref Range    Troponin, High Sensitivity 26 (H) 0 - 11 ng/L   EKG 12 Lead   Result Value Ref Range    Ventricular Rate 82 BPM    Atrial Rate 82 BPM    P-R Interval 116 ms    QRS Duration 88 ms    Q-T Interval 408 ms    QTc Calculation (Bazett) 476 ms    P Axis 47 degrees    R Axis -16 degrees    T Axis 58 degrees       RADIOLOGY:  Interpreted by Radiologist.  CT HEAD WO CONTRAST   Final Result   No significant interval change in left-sided subdural hematoma. No new   intracranial hemorrhage. RECOMMENDATIONS:   Unavailable           EKG: This EKG is signed and interpreted by me. Rate: 82  Rhythm: Sinus  Interpretation: non-specific EKG  Comparison: stable as compared to patient's most recent EKG    ------------------------- NURSING NOTES AND VITALS REVIEWED ---------------------------   The nursing notes within the ED encounter and vital signs as below have been reviewed. /87   Pulse 80   Temp 98.6 °F (37 °C) (Oral)   Resp 16   Ht 5' 7\" (1.702 m)   Wt 150 lb (68 kg)   SpO2 94%   BMI 23.49 kg/m²   Oxygen Saturation Interpretation: Normal      ---------------------------------------------------PHYSICAL EXAM--------------------------------------      Constitutional/General: Alert and oriented x3, mildly uncomfortable  Head: Normocephalic and atraumatic  Eyes: PERRL, EOMI  Mouth: Oropharynx clear, handling secretions, no trismus, open laceration to left upper lip with delayed healing, yellow scabbing noted. Neck: Supple, full ROM,   Pulmonary: Lungs clear to auscultation bilaterally, no wheezes, rales, or rhonchi. Not in respiratory distress  Cardiovascular:  Regular rate and rhythm, no murmurs, gallops, or rubs. 2+ distal pulses  Abdomen: Soft, non tender, non distended,   Extremities: Moves all extremities x 4. Warm and well perfused  Skin: warm and dry without rash  Neurologic: GCS 15, cranial nerves II through XII grossly intact.   No acute neurovascular deficit noted. Speech clear and coherent strength strong and equal bilaterally  Psych: Normal Affect      ------------------------------ ED COURSE/MEDICAL DECISION MAKING----------------------  Medications   0.9 % sodium chloride infusion (has no administration in time range)   acetaminophen (TYLENOL) tablet 1,000 mg (1,000 mg Oral Given 1/4/22 0017)         ED COURSE:       Medical Decision Making: Plan will be for labs will also obtain imaging. CBC unremarkable, chemistry panel all within normal limits, troponin slightly elevated at 26, COVID-19 test negative, CT scan of the brain showing no significant interval change in the left-sided subdural hematoma no new intracranial hemorrhage. Counseling: The emergency provider has spoken with the patient and discussed todays results, in addition to providing specific details for the plan of care and counseling regarding the diagnosis and prognosis. Questions are answered at this time and they are agreeable with the plan.      --------------------------------- IMPRESSION AND DISPOSITION ---------------------------------    IMPRESSION  1. Chronic subdural hematoma (HCC)    2. Visit for wound check        DISPOSITION  Disposition: Discharge home  Patient condition is good      NOTE: This report was transcribed using voice recognition software. Every effort was made to ensure accuracy; however, inadvertent computerized transcription errors may be present     ROSY Mon CNP  01/04/22 [address], CNP  01/04/22 5064  ATTENDING PROVIDER ATTESTATION:     I have personally performed and/or participated in the history, exam, medical decision making, and procedures and agree with all pertinent clinical information. I have also reviewed and agree with the past medical, family and social history unless otherwise noted. My findings/Plan: Patient presenting here because of headache he has had over a week. Patient does have a history of subdural hematoma. Patient reports no recent fall. Patient reporting no chest pain no difficulty breathing or abdominal pain. Patient reporting also wound to his left upper lip area. Patient reporting no weakness or dizziness. Patient here is awake alert [place] x3 heart lung exam normal pupils are equal and reactive. Patient has noted crusted type lesion to left upper lip. Patient labs are reviewed as well as EKG CT shows no acute findings. Patient neurologically intact here gait is steady and normal.  Patient rechecked several times in emergency department vital signs stable. Patient reporting no chest pain no difficulty breathing. Patient made aware of findings he does have prescriptions for the wound on his upper lip but reports he lost one prescription Keflex. Patient will be given oral antibiotics and he does have the bacitracin cream.  Patient told to return anytime for any further problems.      Clifton Rosado MD  01/04/22 Xiao Rowan MD  01/04/22 0133

## 2022-01-04 NOTE — ED NOTES
Radiology Procedure Waiver   Name: Marleny Bhagat  : 1965  MRN: [id number]    Date:  22    Time: 2:33 AM EST    Benefits of immediately proceeding with Radiology exam(s) without pre-testing outweigh the risks or are not indicated as specified below and therefore the following is/are being waived:    [] Pregnancy test   [] Patients LMP on-time and regular.   [] Patient had Tubal Ligation or has other Contraception Device. [] Patient  is Menopausal or Premenarcheal.    [] Patient had Full or Partial Hysterectomy. [] Protocol for Iodine allergy    [] MRI Questionnaire     [x] BUN/Creatinine   [] Patient age w/no hx of renal dysfunction. [] Patient on Dialysis. [] Recent Normal Labs.   Electronically signed by Dong Dupont MD on 22 at 2:33 AM ALEJANDRO Dupont MD  22 [id number]

## 2022-01-08 ENCOUNTER — HOSPITAL ENCOUNTER (EMERGENCY)
Age: 57
Discharge: HOME | End: 2022-01-08
Attending: STUDENT IN AN ORGANIZED HEALTH CARE EDUCATION/TRAINING PROGRAM
Payer: COMMERCIAL

## 2022-01-08 VITALS
BODY MASS INDEX: 23.54 KG/M2 | DIASTOLIC BLOOD PRESSURE: 88 MMHG | HEIGHT: 67 IN | WEIGHT: 150 LBS | OXYGEN SATURATION: 98 % | TEMPERATURE: 98.3 F | SYSTOLIC BLOOD PRESSURE: 148 MMHG | RESPIRATION RATE: 18 BRPM | HEART RATE: 90 BPM

## 2022-01-08 DIAGNOSIS — F32.A DEPRESSION, UNSPECIFIED DEPRESSION TYPE: Primary | ICD-10-CM

## 2022-01-08 DIAGNOSIS — F10.920 ACUTE ALCOHOLIC INTOXICATION WITHOUT COMPLICATION (HCC): ICD-10-CM

## 2022-01-08 LAB
ACETAMINOPHEN LEVEL: <5 MCG/ML (ref 10–30)
ALBUMIN SERPL-MCNC: 3.9 G/DL (ref 3.5–5.2)
ALP BLD-CCNC: 63 U/L (ref 40–129)
ALT SERPL-CCNC: 23 U/L (ref 0–40)
AMPHETAMINE SCREEN, URINE: NOT DETECTED
ANION GAP SERPL CALCULATED.3IONS-SCNC: 17 MMOL/L (ref 7–16)
ANISOCYTOSIS: ABNORMAL
AST SERPL-CCNC: 93 U/L (ref 0–39)
BACTERIA: ABNORMAL /HPF
BARBITURATE SCREEN URINE: NOT DETECTED
BASOPHILS ABSOLUTE: 0 E9/L (ref 0–0.2)
BASOPHILS RELATIVE PERCENT: 0.2 % (ref 0–2)
BENZODIAZEPINE SCREEN, URINE: NOT DETECTED
BILIRUB SERPL-MCNC: 0.6 MG/DL (ref 0–1.2)
BILIRUBIN URINE: NEGATIVE
BLOOD, URINE: ABNORMAL
BUN BLDV-MCNC: 20 MG/DL (ref 6–20)
CALCIUM SERPL-MCNC: 8.8 MG/DL (ref 8.6–10.2)
CANNABINOID SCREEN URINE: NOT DETECTED
CHLORIDE BLD-SCNC: 97 MMOL/L (ref 98–107)
CLARITY: CLEAR
CO2: 22 MMOL/L (ref 22–29)
COCAINE METABOLITE SCREEN URINE: NOT DETECTED
COLOR: YELLOW
CREAT SERPL-MCNC: 1.2 MG/DL (ref 0.7–1.2)
EKG ATRIAL RATE: 78 BPM
EKG P AXIS: 69 DEGREES
EKG P-R INTERVAL: 138 MS
EKG Q-T INTERVAL: 402 MS
EKG QRS DURATION: 106 MS
EKG QTC CALCULATION (BAZETT): 458 MS
EKG R AXIS: -13 DEGREES
EKG T AXIS: 57 DEGREES
EKG VENTRICULAR RATE: 78 BPM
EOSINOPHILS ABSOLUTE: 0 E9/L (ref 0.05–0.5)
EOSINOPHILS RELATIVE PERCENT: 0.2 % (ref 0–6)
ETHANOL: 16 MG/DL (ref 0–0.08)
ETHANOL: 251 MG/DL (ref 0–0.08)
FENTANYL SCREEN, URINE: NOT DETECTED
GFR AFRICAN AMERICAN: >60
GFR NON-AFRICAN AMERICAN: >60 ML/MIN/1.73
GLUCOSE BLD-MCNC: 62 MG/DL (ref 74–99)
GLUCOSE URINE: NEGATIVE MG/DL
HCT VFR BLD CALC: 38.2 % (ref 37–54)
HEMOGLOBIN: 14.1 G/DL (ref 12.5–16.5)
HYALINE CASTS: ABNORMAL /LPF (ref 0–2)
INFLUENZA A: NOT DETECTED
INFLUENZA B: NOT DETECTED
KETONES, URINE: NEGATIVE MG/DL
LEUKOCYTE ESTERASE, URINE: NEGATIVE
LYMPHOCYTES ABSOLUTE: 0.98 E9/L (ref 1.5–4)
LYMPHOCYTES RELATIVE PERCENT: 23.7 % (ref 20–42)
Lab: NORMAL
MCH RBC QN AUTO: 30.1 PG (ref 26–35)
MCHC RBC AUTO-ENTMCNC: 36.9 % (ref 32–34.5)
MCV RBC AUTO: 81.4 FL (ref 80–99.9)
METHADONE SCREEN, URINE: NOT DETECTED
MONOCYTES ABSOLUTE: 0.41 E9/L (ref 0.1–0.95)
MONOCYTES RELATIVE PERCENT: 9.6 % (ref 2–12)
MUCUS: PRESENT /LPF
NEUTROPHILS ABSOLUTE: 2.75 E9/L (ref 1.8–7.3)
NEUTROPHILS RELATIVE PERCENT: 66.7 % (ref 43–80)
NITRITE, URINE: NEGATIVE
OPIATE SCREEN URINE: NOT DETECTED
OXYCODONE URINE: NOT DETECTED
PDW BLD-RTO: 21.7 FL (ref 11.5–15)
PH UA: 6 (ref 5–9)
PHENCYCLIDINE SCREEN URINE: NOT DETECTED
PLATELET # BLD: 262 E9/L (ref 130–450)
PMV BLD AUTO: 9.8 FL (ref 7–12)
POIKILOCYTES: ABNORMAL
POLYCHROMASIA: ABNORMAL
POTASSIUM REFLEX MAGNESIUM: 4.1 MMOL/L (ref 3.5–5)
PROTEIN UA: 100 MG/DL
RBC # BLD: 4.69 E12/L (ref 3.8–5.8)
RBC UA: ABNORMAL /HPF (ref 0–2)
SALICYLATE, SERUM: <0.3 MG/DL (ref 0–30)
SARS-COV-2 RNA, RT PCR: NOT DETECTED
SODIUM BLD-SCNC: 136 MMOL/L (ref 132–146)
SPECIFIC GRAVITY UA: >=1.03 (ref 1–1.03)
TARGET CELLS: ABNORMAL
TOTAL PROTEIN: 7.3 G/DL (ref 6.4–8.3)
TRICYCLIC ANTIDEPRESSANTS SCREEN SERUM: NEGATIVE NG/ML
UROBILINOGEN, URINE: 0.2 E.U./DL
WBC # BLD: 4.1 E9/L (ref 4.5–11.5)
WBC UA: ABNORMAL /HPF (ref 0–5)

## 2022-01-08 PROCEDURE — 87636 SARSCOV2 & INF A&B AMP PRB: CPT

## 2022-01-08 PROCEDURE — 93010 ELECTROCARDIOGRAM REPORT: CPT | Performed by: INTERNAL MEDICINE

## 2022-01-08 PROCEDURE — 80179 DRUG ASSAY SALICYLATE: CPT

## 2022-01-08 PROCEDURE — 80143 DRUG ASSAY ACETAMINOPHEN: CPT

## 2022-01-08 PROCEDURE — 85025 COMPLETE CBC W/AUTO DIFF WBC: CPT

## 2022-01-08 PROCEDURE — 80053 COMPREHEN METABOLIC PANEL: CPT

## 2022-01-08 PROCEDURE — 99283 EMERGENCY DEPT VISIT LOW MDM: CPT

## 2022-01-08 PROCEDURE — 93005 ELECTROCARDIOGRAM TRACING: CPT | Performed by: PHYSICIAN ASSISTANT

## 2022-01-08 PROCEDURE — 80307 DRUG TEST PRSMV CHEM ANLYZR: CPT

## 2022-01-08 PROCEDURE — 36415 COLL VENOUS BLD VENIPUNCTURE: CPT

## 2022-01-08 PROCEDURE — 81001 URINALYSIS AUTO W/SCOPE: CPT

## 2022-01-08 PROCEDURE — 82077 ASSAY SPEC XCP UR&BREATH IA: CPT

## 2022-01-08 ASSESSMENT — ENCOUNTER SYMPTOMS
EYE REDNESS: 0
DIARRHEA: 0
SORE THROAT: 0
ABDOMINAL PAIN: 0
WHEEZING: 0
EYE PAIN: 0
SHORTNESS OF BREATH: 0
VOMITING: 0
SINUS PRESSURE: 0
NAUSEA: 0
EYE DISCHARGE: 0
BACK PAIN: 0
COUGH: 0

## 2022-01-08 NOTE — ED PROVIDER NOTES
Chief Complaint   Patient presents with    Depression     states that he has been depressed for 2 weeks. denies SI/HI. Patient is a 73-year-old male with a history of depression presents today as he is more depressed than usual.  Symptoms have been ongoing for the past several weeks. Nothing specifically changed today that led him to be evaluated. Symptoms have been persistent and moderate in nature. Are not aggravated or alleviated by any specific factors. He denies suicidal or homicidal ideations. He and his hallucinations. States he has been on medication in the past for depression however has not been taking this for quite some time. He has no other complaints at this time. The history is provided by the patient. No  was used. Review of Systems   Constitutional: Negative for chills and fever. HENT: Negative for ear pain, sinus pressure and sore throat. Eyes: Negative for pain, discharge and redness. Respiratory: Negative for cough, shortness of breath and wheezing. Cardiovascular: Negative for chest pain. Gastrointestinal: Negative for abdominal pain, diarrhea, nausea and vomiting. Genitourinary: Negative for dysuria and frequency. Musculoskeletal: Negative for arthralgias and back pain. Skin: Negative for rash and wound. Neurological: Negative for weakness and headaches. Hematological: Negative for adenopathy. Psychiatric/Behavioral: Negative for behavioral problems, confusion, self-injury and suicidal ideas. All other systems reviewed and are negative. Physical Exam  Vitals and nursing note reviewed. Constitutional:       Appearance: He is well-developed. HENT:      Head: Normocephalic and atraumatic. Eyes:      Pupils: Pupils are equal, round, and reactive to light. Cardiovascular:      Rate and Rhythm: Normal rate and regular rhythm. Heart sounds: Normal heart sounds. No murmur heard.       Pulmonary:      Effort: Pulmonary effort is normal. No respiratory distress. Breath sounds: Normal breath sounds. No wheezing or rales. Abdominal:      General: Bowel sounds are normal.      Palpations: Abdomen is soft. Tenderness: There is no abdominal tenderness. There is no guarding or rebound. Musculoskeletal:      Cervical back: Normal range of motion and neck supple. Skin:     General: Skin is warm and dry. Neurological:      Mental Status: He is alert and oriented to person, place, and time. Cranial Nerves: No cranial nerve deficit. Coordination: Coordination normal.   Psychiatric:         Mood and Affect: Affect is blunt. Thought Content: Thought content is not paranoid. Thought content does not include homicidal or suicidal ideation. Procedures     Labs Reviewed   CBC WITH AUTO DIFFERENTIAL - Abnormal; Notable for the following components:       Result Value    WBC 4.1 (*)     MCHC 36.9 (*)     RDW 21.7 (*)     All other components within normal limits   COMPREHENSIVE METABOLIC PANEL W/ REFLEX TO MG FOR LOW K - Abnormal; Notable for the following components:    Chloride 97 (*)     Anion Gap 17 (*)     Glucose 62 (*)     AST 93 (*)     All other components within normal limits   URINALYSIS - Abnormal; Notable for the following components:    Blood, Urine MODERATE (*)     Protein,  (*)     All other components within normal limits   SERUM DRUG SCREEN - Abnormal; Notable for the following components:    Acetaminophen Level <5.0 (*)     All other components within normal limits   COVID-19 & INFLUENZA COMBO   URINE DRUG SCREEN   MICROSCOPIC URINALYSIS     No orders to display         MDM  Number of Diagnoses or Management Options  Acute alcoholic intoxication without complication (HCC)  Depression, unspecified depression type  Diagnosis management comments: Patient is a 52-year-old male presents today for depression. He does endorse alcohol use. Labs were obtained.   He is noted to be intoxicated with a ethanol of 251. He denies suicidal homicidal ideations. Denies any complaints at this time. He was not pink slipped as he does not want to hurt himself or anyone else, no history of suicidal attempts. Remainder of labs are normal.  Patient will be reevaluated when clinically sober, discharge pending reevaluation at the time       Amount and/or Complexity of Data Reviewed  Clinical lab tests: reviewed  Tests in the medicine section of CPT®: reviewed                --------------------------------------------- PAST HISTORY ---------------------------------------------  Past Medical History:  has a past medical history of Headache and Hypertension. Past Surgical History:  has no past surgical history on file. Social History:  reports that he has been smoking cigarettes. He has a 4.60 pack-year smoking history. He has never used smokeless tobacco. He reports current alcohol use. He reports that he does not use drugs. Family History: family history is not on file. The patients home medications have been reviewed.     Allergies: Ace inhibitors and Fish-derived products    -------------------------------------------------- RESULTS -------------------------------------------------  Labs:  Results for orders placed or performed during the hospital encounter of 01/08/22   COVID-19 & Influenza Combo    Specimen: Nasopharyngeal Swab   Result Value Ref Range    SARS-CoV-2 RNA, RT PCR NOT DETECTED NOT DETECTED    INFLUENZA A NOT DETECTED NOT DETECTED    INFLUENZA B NOT DETECTED NOT DETECTED   CBC Auto Differential   Result Value Ref Range    WBC 4.1 (L) 4.5 - 11.5 E9/L    RBC 4.69 3.80 - 5.80 E12/L    Hemoglobin 14.1 12.5 - 16.5 g/dL    Hematocrit 38.2 37.0 - 54.0 %    MCV 81.4 80.0 - 99.9 fL    MCH 30.1 26.0 - 35.0 pg    MCHC 36.9 (H) 32.0 - 34.5 %    RDW 21.7 (H) 11.5 - 15.0 fL    Platelets 045 870 - 507 E9/L    MPV 9.8 7.0 - 12.0 fL   Comprehensive Metabolic Panel w/ Reflex to MG   Result Value Ref Range    Sodium 136 132 - 146 mmol/L    Potassium reflex Magnesium 4.1 3.5 - 5.0 mmol/L    Chloride 97 (L) 98 - 107 mmol/L    CO2 22 22 - 29 mmol/L    Anion Gap 17 (H) 7 - 16 mmol/L    Glucose 62 (L) 74 - 99 mg/dL    BUN 20 6 - 20 mg/dL    CREATININE 1.2 0.7 - 1.2 mg/dL    GFR Non-African American >60 >=60 mL/min/1.73    GFR African American >60     Calcium 8.8 8.6 - 10.2 mg/dL    Total Protein 7.3 6.4 - 8.3 g/dL    Albumin 3.9 3.5 - 5.2 g/dL    Total Bilirubin 0.6 0.0 - 1.2 mg/dL    Alkaline Phosphatase 63 40 - 129 U/L    ALT 23 0 - 40 U/L    AST 93 (H) 0 - 39 U/L   Urinalysis, reflex to microscopic   Result Value Ref Range    Color, UA Yellow Straw/Yellow    Clarity, UA Clear Clear    Glucose, Ur Negative Negative mg/dL    Bilirubin Urine Negative Negative    Ketones, Urine Negative Negative mg/dL    Specific Gravity, UA >=1.030 1.005 - 1.030    Blood, Urine MODERATE (A) Negative    pH, UA 6.0 5.0 - 9.0    Protein,  (A) Negative mg/dL    Urobilinogen, Urine 0.2 <2.0 E.U./dL    Nitrite, Urine Negative Negative    Leukocyte Esterase, Urine Negative Negative   Serum Drug Screen   Result Value Ref Range    Ethanol Lvl 251 mg/dL    Acetaminophen Level <5.0 (L) 10.0 - 96.3 mcg/mL    Salicylate, Serum <5.3 0.0 - 30.0 mg/dL    TCA Scrn NEGATIVE Cutoff:300 ng/mL   URINE DRUG SCREEN   Result Value Ref Range    Amphetamine Screen, Urine NOT DETECTED Negative <1000 ng/mL    Barbiturate Screen, Ur NOT DETECTED Negative < 200 ng/mL    Benzodiazepine Screen, Urine NOT DETECTED Negative < 200 ng/mL    Cannabinoid Scrn, Ur NOT DETECTED Negative < 50ng/mL    Cocaine Metabolite Screen, Urine NOT DETECTED Negative < 300 ng/mL    Opiate Scrn, Ur NOT DETECTED Negative < 300ng/mL    PCP Screen, Urine NOT DETECTED Negative < 25 ng/mL    Methadone Screen, Urine NOT DETECTED Negative <300 ng/mL    Oxycodone Urine NOT DETECTED Negative <100 ng/mL    FENTANYL SCREEN, URINE NOT DETECTED Negative <1 ng/mL    Drug Screen Comment: see below    EKG 12 Lead   Result Value Ref Range    Ventricular Rate 78 BPM    Atrial Rate 78 BPM    P-R Interval 138 ms    QRS Duration 106 ms    Q-T Interval 402 ms    QTc Calculation (Bazett) 458 ms    P Axis 69 degrees    R Axis -13 degrees    T Axis 57 degrees       Radiology:  No orders to display       ------------------------- NURSING NOTES AND VITALS REVIEWED ---------------------------  Date / Time Roomed:  1/8/2022  2:28 AM  ED Bed Assignment:  [facility]/-[id number]    The nursing notes within the ED encounter and vital signs as below have been reviewed. BP (!) 158/79   Pulse 93   Temp 98.3 °F (36.8 °C) (Oral)   Resp 16   Ht 5' 7\" (1.702 m)   Wt 150 lb (68 kg)   SpO2 98%   BMI 23.49 kg/m²   Oxygen Saturation Interpretation: Normal      ------------------------------------------ PROGRESS NOTES ------------------------------------------  I have spoken with the patient and discussed todays results, in addition to providing specific details for the plan of care and counseling regarding the diagnosis and prognosis. Their questions are answered at this time and they are agreeable with the plan. I discussed at length with them reasons for immediate return here for re evaluation. They will followup with primary care by calling their office tomorrow. Diagnosis:  1. Depression, unspecified depression type    2. Acute alcoholic intoxication without complication ([facility] [address]        Disposition:  Patient's disposition: Discharge pending reevaluation when clinically sober  Patient's condition is stable.             Timbo Velez DO  Resident  01/08/22 6728

## 2022-01-08 NOTE — ED NOTES
Emergency Department [facility] AN AFFILIATE OF [facility] Emergency Biopsychosocial Assessment Note    Chief Complaint:   Pt presented into the ED for Depression - states that he has been depressed for 2 weeks. denies SI/HI. MSE:  Pt is alert oriented x 4, behavior cooperative and calm, no signs of agitation, congruent affect, slightly guarded and resistant in answering questions but forthcoming, thought process appears logical, speech clear and within normal range, normal eye contact, well groomed. Pt reports to normal appetite and fair sleep patterns. Pt denies to having any auditory/visual hallucinations, delusions, paranoia and none evident in interview. Clinical Summary/History:   Pt is a 59 yo male who presented into the ED for as a walk in after taking the [address] bus here due to having an increase in depression. Pt was asked what prompted the increase in depression. Pt reported \"certain things in my life\". Pt refuse to elaborate and stated he cant explain it. Pt denies to any SI, HI and has no hx of attempts or self injurious behaviors. Pt admits to drinking beer yesterday in the amount of 2x 24 oz cans. Pt reports to drinking twice weekly. Pt denies to any drug use. Pt denies to any hx of detox/rehav treatment. Pt reports is he diagnosed with depression and currently not receiving any outpatient [place] [address] services nor is on medications. Pt further explained that the last time he received any treatment was years ago when he lived in [facility]. Pt has no hx of inpatient  hospitalizations. Pt denies to having a legal guardian/POA, legal issues, hx of aggressive behaviors or abuse. Pt reports to living in home alone and currently not having a source of income. Pt does report that his sister Rip Tirado is supportive and sends him money. Gender  [x] Male [] Female [] Transgender  [] Other    Sexual Orientation    [x] Heterosexual [] Homosexual [] Bisexual [] Other    Suicidal Behavioral: CSSR-S Complete.   [] Reports:    [] Past [] Present   [x] Denies    Homicidal/ Violent Behavior  [] Reports:   [] Past [] Present   [x] Denies     Violence Risk Screenin. Have you ever engaged in a physical fight? []  Yes [x]  No  2. Have you ever had an order of protection taken out against you? []  Yes []  No  3. Have you ever been arrested due to violence? []  Yes [x]  No  4. Have you ever been cruel to animals? []  Yes [x]  No    If any of the above questions are affirmative, please complete these questions:  1. Have you ever thought about hurting someone? [x]  No  []  Yes (Ask the questions listed below)   When?  Did you follow through with the thoughts? [x]  No  []  Yes - What happened? 2.  Have you ever threatened anyone? [x]  No  []  Yes (Ask the questions listed below)   When and what happened?  Have you ever threatened someone with a gun, knife or other weapon? [x]  No  []  Yes - When and what happened? 3. Have you ever physically hurt someone? [x]  No  []  Yes (Ask the questions listed below)   When and what happened?  How many times have you physically hurt someone in the past?    Hallucinations/Delusions   [] Reports:   [x] Denies     Substance Use/Alcohol Use/Addiction: SBIRT Screen Complete. [x] Reports:  alcohol   [] Denies     Trauma History  [] Reports:  [x] Denies     Collateral Information:   Pt provided SW contact information for sister Dorene Hercules [phone] but does not give permission to contact her at this time. Level of Care/Disposition Plan  [x] Home:   [] Outpatient Provider:   [] Crisis Unit:   [] Inpatient Psychiatric Unit:  [] Other:     Patient is not currently presenting with any acute psychiatric symptoms that would warrant inpatient or crisis unit referral. Patient symptoms can be effectively managed with an outpatient provider. Once Pt is clinically sober, Pt can be D/C with resources.           NA [place], [place]  22 [phone]

## 2022-01-08 NOTE — ED NOTES
SW spoke to Pt and he still is denying any SI/HI or hallucinations and feels safe to return back home. SW spoke to ED physician and confirmed D/C to follow up with resources provided.       NA [place], [place]  01/08/22 6440

## 2022-01-08 NOTE — ED NOTES
Pt denies any harmful thoughts to himselfor anyone else at this time. Pt reports no audio visual hallucinations.   Rest of assesment unremarkable     Chepe Dillon RN  01/08/22 7314

## 2022-01-08 NOTE — ED NOTES
SW reached out to [employer] at [phone] to arrange transportation. ETA 30 minutes to a hour.       NA Barrios, [facility]  01/08/22 2165

## 2022-01-09 ENCOUNTER — HOSPITAL ENCOUNTER (EMERGENCY)
Age: 57
Discharge: HOME | End: 2022-01-09
Payer: COMMERCIAL

## 2022-01-09 ENCOUNTER — APPOINTMENT (OUTPATIENT)
Dept: CT IMAGING | Age: 57
End: 2022-01-09
Payer: COMMERCIAL

## 2022-01-09 ENCOUNTER — HOSPITAL ENCOUNTER (EMERGENCY)
Age: 57
Discharge: LEFT BEFORE DISCHARGE | End: 2022-01-10
Attending: STUDENT IN AN ORGANIZED HEALTH CARE EDUCATION/TRAINING PROGRAM
Payer: COMMERCIAL

## 2022-01-09 VITALS
DIASTOLIC BLOOD PRESSURE: 114 MMHG | OXYGEN SATURATION: 97 % | RESPIRATION RATE: 18 BRPM | SYSTOLIC BLOOD PRESSURE: 193 MMHG | TEMPERATURE: 98 F | HEART RATE: 93 BPM

## 2022-01-09 VITALS
HEART RATE: 85 BPM | TEMPERATURE: 98.3 F | DIASTOLIC BLOOD PRESSURE: 74 MMHG | HEIGHT: 67 IN | SYSTOLIC BLOOD PRESSURE: 145 MMHG | OXYGEN SATURATION: 96 % | WEIGHT: 150 LBS | RESPIRATION RATE: 16 BRPM | BODY MASS INDEX: 23.54 KG/M2

## 2022-01-09 DIAGNOSIS — F10.920 ACUTE ALCOHOLIC INTOXICATION WITHOUT COMPLICATION (HCC): ICD-10-CM

## 2022-01-09 DIAGNOSIS — I62.03 CHRONIC SUBDURAL HEMATOMA (HCC): ICD-10-CM

## 2022-01-09 DIAGNOSIS — R51.9 NONINTRACTABLE HEADACHE, UNSPECIFIED CHRONICITY PATTERN, UNSPECIFIED HEADACHE TYPE: Primary | ICD-10-CM

## 2022-01-09 LAB — ETHANOL: 55 MG/DL (ref 0–0.08)

## 2022-01-09 PROCEDURE — 36415 COLL VENOUS BLD VENIPUNCTURE: CPT

## 2022-01-09 PROCEDURE — 70450 CT HEAD/BRAIN W/O DYE: CPT

## 2022-01-09 PROCEDURE — 6370000000 HC RX 637 (ALT 250 FOR IP): Performed by: NURSE PRACTITIONER

## 2022-01-09 PROCEDURE — 99283 EMERGENCY DEPT VISIT LOW MDM: CPT

## 2022-01-09 PROCEDURE — 99285 EMERGENCY DEPT VISIT HI MDM: CPT

## 2022-01-09 PROCEDURE — 82077 ASSAY SPEC XCP UR&BREATH IA: CPT

## 2022-01-09 RX ORDER — ACETAMINOPHEN 500 MG
1000 TABLET ORAL ONCE
Status: COMPLETED | OUTPATIENT
Start: 2022-01-09 | End: 2022-01-09

## 2022-01-09 RX ORDER — ACETAMINOPHEN 500 MG
1000 TABLET ORAL ONCE
Status: DISCONTINUED | OUTPATIENT
Start: 2022-01-09 | End: 2022-01-10 | Stop reason: HOSPADM

## 2022-01-09 RX ADMIN — ACETAMINOPHEN 1000 MG: 500 TABLET ORAL at 01:15

## 2022-01-09 ASSESSMENT — PAIN DESCRIPTION - PAIN TYPE: TYPE: ACUTE PAIN;CHRONIC PAIN

## 2022-01-09 ASSESSMENT — PAIN SCALES - GENERAL
PAINLEVEL_OUTOF10: 5
PAINLEVEL_OUTOF10: 10
PAINLEVEL_OUTOF10: 7

## 2022-01-09 ASSESSMENT — PAIN DESCRIPTION - DESCRIPTORS: DESCRIPTORS: HEADACHE

## 2022-01-09 ASSESSMENT — PAIN DESCRIPTION - LOCATION
LOCATION: HEAD
LOCATION: HEAD

## 2022-01-09 NOTE — ED NOTES
Report was not given to this RN. This RN is assuming care of patient.      Jovanny Adair RN  01/09/22 4575

## 2022-01-09 NOTE — ED PROVIDER NOTES
Independent   HPI:  1/9/22, Time: 1:38 AM ALEJANDRO Méndez is a 60 y.o. male presenting to the ED for complaints of a headache. Patient with history of chronic subdural hematoma. Patient reports frequent headaches, he was actually seen just days ago and had a full work-up and no changes noted in the subdural.  Patient reports headache once again continuing. States that he last took something 3 days ago does not currently have any Tylenol or Motrin at home and reports he does not have any money to buy any over-the-counter meds to help with symptom relief. Patient also has not followed up with his primary care doctor. Patient does not have any nausea or vomiting denies any visual changes so no unilateral weakness. Patient does have a strong smell of alcohol on. Patient reports drinking alcohol. Denying drug or any recent falls or injuries. Patient with symptoms mild in severity and persistent  Review of Systems:   A complete review of systems was performed and pertinent positives and negatives are stated within HPI, all other systems reviewed and are negative.          --------------------------------------------- PAST HISTORY ---------------------------------------------  Past Medical History:  has a past medical history of Headache and Hypertension. Past Surgical History:  has no past surgical history on file. Social History:  reports that he has been smoking cigarettes. He has a 4.60 pack-year smoking history. He has never used smokeless tobacco. He reports current alcohol use. He reports that he does not use drugs. Family History: family history is not on file. The patients home medications have been reviewed.     Allergies: Ace inhibitors and Fish-derived products    -------------------------------------------------- RESULTS -------------------------------------------------  All laboratory and radiology results have been personally reviewed by myself   LABS:  No results found for this visit on 01/09/22. RADIOLOGY:  Interpreted by Radiologist.  [address]   Final Result   No significant change in the size of the patient's known left-sided subdural   hematoma. RECOMMENDATIONS:   Unavailable             ------------------------- NURSING NOTES AND VITALS REVIEWED ---------------------------   The nursing notes within the ED encounter and vital signs as below have been reviewed. BP (!) 167/109   Pulse 84   Temp 98.3 °F (36.8 °C) (Oral)   Resp 16   Ht 5' 7\" (1.702 m)   Wt 150 lb (68 kg)   SpO2 95%   BMI 23.49 kg/m²   Oxygen Saturation Interpretation: Normal      ---------------------------------------------------PHYSICAL EXAM--------------------------------------      Constitutional/General: Alert and oriented x3, intoxicated, strong smell of alcohol. Able to answer questions and follow commands. Head: Normocephalic and atraumatic  Eyes: PERRL, EOMI  Mouth: Oropharynx clear, handling secretions, no trismus  Neck: Supple, full ROM,   Pulmonary: Lungs clear to auscultation bilaterally, no wheezes, rales, or rhonchi. Not in respiratory distress  Cardiovascular:  Regular rate and rhythm, no murmurs, gallops, or rubs. 2+ distal pulses  Abdomen: Soft, non tender, non distended,   Extremities: Moves all extremities x 4. Warm and well perfused  Skin: warm and dry without rash  Neurologic: GCS 15, cranial nerves II through XII grossly intact. No acute neurovascular deficit noted. Speech clear and coherent strength strong and equal bilaterally  Psych: Normal Affect      ------------------------------ ED COURSE/MEDICAL DECISION MAKING----------------------  Medications   acetaminophen (TYLENOL) tablet 1,000 mg (1,000 mg Oral Given 1/9/22 0115)         ED COURSE:       Medical Decision Making: Plan will be to obtain CAT scan of the brain to verify no worsening and notable subdural.  Patient also will be medicated here with Tylenol.   CAT scan showed no significant change in the size of the patient's known left subdural hematoma. Will also obtain alcohol level to make sure when patient can be safely discharged home. Charge nurse updated on patient's concern for alcohol intoxication. Awaiting bed assignment, will obtain alcohol level likely will need to obtain repeat alcohol level. We will need to observe patient and then once his alcohol level is down he will then safely be able to be discharged home. Counseling: The emergency provider has spoken with the patient and discussed todays results, in addition to providing specific details for the plan of care and counseling regarding the diagnosis and prognosis. Questions are answered at this time and they are agreeable with the plan.      --------------------------------- IMPRESSION AND DISPOSITION ---------------------------------    IMPRESSION  1. Nonintractable headache, unspecified chronicity pattern, unspecified headache type    2. Acute alcoholic intoxication without complication ([facility]    3. Chronic subdural hematoma ([facility])        DISPOSITION  Disposition: Discharge to home  Patient condition is good      NOTE: This report was transcribed using voice recognition software.  Every effort was made to ensure accuracy; however, inadvertent computerized transcription errors may be present     ROSY Grey - MERARI  01/09/22 0233

## 2022-01-09 NOTE — ED NOTES
Bed: HD  Expected date:   Expected time:   Means of arrival:   Comments:  10 Christa Bailey RN  01/09/22 2405

## 2022-01-09 NOTE — ED NOTES
Department of Emergency Medicine  FIRST PROVIDER TRIAGE NOTE             Independent MLP           1/9/22  12:53 AM EST    Date of Encounter: 1/9/22   MRN: [id number]      HPI: Valentino Covey is a 60 y.o. male who presents to the ED for Headache (x 1 week. states he last took something about 3 days ago but ran out and didn't have any money to buy anymore OTC meds to treat pain. denies n/v, denies visual disturbances)      ROS: Negative for cp or sob. PE: Gen Appearance/Constitutional: alert  HEENT: NC/NT. PERRLA,  Airway patent. Initial Plan of Care: All treatment areas with department are currently occupied. Plan to order/Initiate the following while awaiting opening in ED: imaging studies.     Initial Plan of Care: Initiate Treatment-Testing, Proceed toTreatment Area When Bed Available for ED Attending/MLP to Continue Care    Electronically signed by ROSY Banerjee CNP   DD: 1/9/22         ROSY Banerjee CNP  01/09/22 4607

## 2022-01-10 NOTE — ED NOTES
Patient refused blood work for a second time. Provider notified.         Kristy Schreiber RN  01/09/22 1265

## 2022-01-10 NOTE — ED NOTES
Bed: HDD  Expected date:   Expected time:   Means of arrival:   Comments:  [address], RN  01/09/22 9835

## 2022-01-10 NOTE — ED PROVIDER NOTES
ATTENDING PROVIDER ATTESTATION:     Serafin Schofield presented to the emergency department for evaluation of Headache (PT last drink was 2 hours ago- 2 24oz can)  . The patient was initially evaluated by the Midlevel Provider Please see their note for further details, HPI, and ED course. I have reviewed & discussed the patient's doreen in details, including pertinent history & exam findings, with the Midlevel provider and have personally participated in and/or performed the history, physical examination, medical decision-making, and procedure(s). I agree with all pertinent clinical information and any changes or corrections are noted below. I have reviewed my findings and recommendations with the assigned Midlevel provider, patient, and family member(s) present at the time of disposition. I have performed a history and physical examination of this patient and directly supervised the midlevel provider caring for this patient. I have directly supervised any procedures performed by the midlevel provider and was present for the procedure including all critical portions of the procedure(s). Alexis Lloyd MD am the provider of record.       -----------------------[place]-------------------------------------    Patient presenting with headache and requesting food in the setting of recent ED visit this morning for headache. Patient does have a chronic subdural after fall 12/26, and states that he has had headaches since. He denies any changes in his headache, he has not taken anything at home because he states he did not have any money, so no relieving factors. Denies any exacerbating factors, denies any vision changes, neck pain or stiffness, chest pain or shortness of breath, abdominal pain, nausea or vomiting diarrhea or constipation. No falls or additional trauma. Patient did have a CAT scan on January 9 [this morning] which was negative for any new intracranial abnormality.     Review of Systems:   A complete review of systems was performed and pertinent positives and negatives are stated within HPI, all other systems reviewed and are negative.      ---------------------------------------------------PHYSICAL EXAM--------------------------------------    Constitutional/General: Alert and oriented x3  Head: Normocephalic and atraumatic  Eyes: PERRL, EOMI, sclera non icteric  ENT: Oropharynx clear, handling secretions, no trismus, no asymmetry of the posterior oropharynx or uvular edema  Neck: Supple, full ROM, no stridor, no meningismus  Respiratory: Lungs clear to auscultation bilaterally, no wheezes, rales, or rhonchi  Cardiovascular: RRR, no R/G/M, 2+ peripheral pulses  Chest: No chest wall tenderness, equal chest rise  Gastrointestinal:  Abdomen Soft, Non tender, Non distended. No rebound, guarding, or rigidity. No pulsatile masses. Musculoskeletal: Extremities warm and well perfused, no clubbing, cyanosis, or edema. Capillary refill <3 seconds, moving all extremities  Skin: skin warm and dry. No rashes. Neurologic: No focal deficits, strength and sensation grossly intact   Psychiatric: Normal Affect, behavior normal    -------------------------------------MEDICAL DECISION-MAKING------------------------------------    Patient presents with persistent headache in the setting of known chronic subdural hematoma. I do not believe that is clinically warranted to be imaged this patient who has had no change in his headache no falls or trauma since being seen earlier this morning in the emergency department. Patient is requesting food, so we will p.o. challenge him. We will give him Tylenol for his pain.   Patient has no gross neuro deficits, ambulated in the emergency department difficulty, and though he does state that he did have a 24 ounce can of beer couple of hours ago, he is not clinically intoxicated, no slurred speech, no altered mental status.         --------------------------------- IMPRESSION AND DISPOSITION ---------------------------------    IMPRESSION  1. Nonintractable headache, unspecified chronicity pattern, unspecified headache type        DISPOSITION  Disposition: Discharge to home  Patient condition is stable      This report was transcribed using voice recognition software. Every effort was made to ensure accuracy; however, transcription errors may be present.              Mei Larsen MD  01/10/22 0063

## 2022-01-10 NOTE — ED NOTES
Patient refusing to let me draw his blood work. Provider notified.       Kristy Schreiber RN  01/09/22 0243

## 2022-01-10 NOTE — ED PROVIDER NOTES
ED physician  HPI:  1/9/22, Time: 10:32 PM ALEJANDRO Marie is a 60 y.o. male presenting to the ED for headache. Patient presents to emergency department with continued headache. Patient with chronic subdural hematoma after having a fall after the holidays, Christmas, December 26, 2021 Patient does drink alcohol daily, states that he drank to 2 -  24 ounces cans of beer today. Denies any fall or injury. Also denies taking any Tylenol or Motrin for symptom relief. No unilateral weakness no facial asymmetry. Blood pressure noted to be elevated. Patient otherwise denies any drug use he also denies any chest pain, shortness of breath or abdominal pain as well as no noted nausea, vomiting or diarrhea. Patient reports otherwise in normal state of health. He reports having no money to buy Tylenol or Motrin at home. Patient pleasant in conversation. Patient denying any fall or injury or trauma. As stated above patient with chronic subdural after a fall on December 26, he reports headache has been every day since then. Reports that the headache has not changed at all in etiology or location. Patient is not on any anticoagulant use. Patient was actually just seen here in the early morning hours of January 9 th and at that time had a CAT scan of his brain which was negative for any new intracranial abnormality.       Review of Systems:   A complete review of systems was performed and pertinent positives and negatives are stated within HPI, all other systems reviewed and are negative.          --------------------------------------------- PAST HISTORY ---------------------------------------------  Past Medical History:  has a past medical history of Headache and Hypertension. Past Surgical History:  has no past surgical history on file. Social History:  reports that he has been smoking cigarettes. He has a 4.60 pack-year smoking history.  He has never used smokeless tobacco. He reports current alcohol use. He reports that he does not use drugs. Family History: family history is not on file. The patients home medications have been reviewed. Allergies: Ace inhibitors and Fish-derived products    -------------------------------------------------- RESULTS -------------------------------------------------  All laboratory and radiology results have been personally reviewed by myself   LABS:  No results found for this visit on 01/09/22. RADIOLOGY:  Interpreted by Radiologist.  No orders to display       ------------------------- NURSING NOTES AND VITALS REVIEWED ---------------------------   The nursing notes within the ED encounter and vital signs as below have been reviewed. BP (!) 193/114   Pulse 93   Temp 98 °F (36.7 °C) (Oral)   Resp 18   SpO2 97%   Oxygen Saturation Interpretation: Normal      ---------------------------------------------------PHYSICAL EXAM--------------------------------------      Constitutional/General: Alert and oriented x3, well appearing, non toxic in NAD  Head: Normocephalic and atraumatic  Eyes: PERRL, EOMI  Mouth: Oropharynx clear, handling secretions, no trismus  Neck: Supple, full ROM,   Pulmonary: Lungs clear to auscultation bilaterally, no wheezes, rales, or rhonchi. Not in respiratory distress  Cardiovascular:  Regular rate and rhythm, no murmurs, gallops, or rubs. 2+ distal pulses  Abdomen: Soft, non tender, non distended,   Extremities: Moves all extremities x 4. Warm and well perfused  Skin: warm and dry without rash  Neurologic: GCS 15, awake alert oriented x4. Clinically sober. Eating and drinking at bedside. Cranial nerves II through XII grossly intact. No acute neurovascular deficit noted.   Speech clear and coherent strength strong and equal bilaterally  Psych: Normal Affect      ------------------------------ ED COURSE/MEDICAL DECISION MAKING----------------------  Medications   acetaminophen (TYLENOL) tablet 1,000 mg (1,000 mg Oral Not Given 1/10/22 0001)         ED COURSE:       Medical Decision Making: We will be for labs, patient clinically sober, eating and drinking food at bedside. Patient refusing blood work, I did go up to explain to him the reasoning why without any effect. Patient rating nursing staff as well as this provider calling nursing staff stupid as well as hillbilies. Patient provided with Tylenol 1 g for complaints of his headache.  0200, reevaluation. Patient awake alert oriented x4. Sitting upright in bed. No slurring of speech no concerning behavior noted. Able to ambulate around hallway DDU bed without difficulty. Patient reports improvement in headache after receiving Tylenol. We will continue to monitor. Nursing walked by patient's bed at 02 25. Patient no longer sitting upright in bed. All of his food wrappers are completely empty. Patient was looked for throughout the emergency department as well as in the emergency department waiting room and patient is nowhere to be found. Patient eloped       Counseling: The emergency provider has spoken with the patient and discussed todays results, in addition to providing specific details for the plan of care and counseling regarding the diagnosis and prognosis. Questions are answered at this time and they are agreeable with the plan.      --------------------------------- IMPRESSION AND DISPOSITION ---------------------------------    IMPRESSION  1. Nonintractable headache, unspecified chronicity pattern, unspecified headache type        DISPOSITION  Disposition: Eloped   patient condition is good      NOTE: This report was transcribed using voice recognition software.  Every effort was made to ensure accuracy; however, inadvertent computerized transcription errors may be present     ROSY Loyola CNP  01/10/22 540 ROSY Jalloh - CNP  01/10/22 8857

## 2022-01-10 NOTE — ED NOTES
Department of Emergency Medicine  FIRST PROVIDER TRIAGE NOTE             Independent MLP           1/9/22  10:18 PM EST    Date of Encounter: 1/9/22   MRN: [id number]      HPI: Libby Javed is a 60 y.o. male who presents to the ED for No chief complaint on file. Patient presents to emergency department with continued headache. Patient with chronic subdural hematoma after having a fall after the holidays, Christmas. Patient does drink alcohol daily, states that he drank to 24 ounces of beer today. Denies any fall or injury. Also denies taking any Tylenol or Motrin for symptom relief. No unilateral weakness no facial asymmetry. Blood pressure noted to be elevated. ROS: Negative for cp or sob. PE: Gen Appearance/Constitutional: alert  HEENT: NC/NT. PERRLA,  Airway patent. Initial Plan of Care: All treatment areas with department are currently occupied. Plan to order/Initiate the following while awaiting opening in ED: .     Initial Plan of Care: Initiate Treatment-Testing, Proceed toTreatment Area When Bed Available for ED Attending/MLP to Continue Care    Electronically signed by ROSY Gordillo CNP   DD: 1/9/22         ROSY Gordillo CNP  01/09/22 8772

## 2022-01-11 ENCOUNTER — HOSPITAL ENCOUNTER (EMERGENCY)
Age: 57
Discharge: HOME | End: 2022-01-11
Attending: EMERGENCY MEDICINE
Payer: COMMERCIAL

## 2022-01-11 ENCOUNTER — APPOINTMENT (OUTPATIENT)
Dept: CT IMAGING | Age: 57
End: 2022-01-11
Payer: COMMERCIAL

## 2022-01-11 ENCOUNTER — HOSPITAL ENCOUNTER (EMERGENCY)
Age: 57
Discharge: HOME | End: 2022-01-12
Attending: EMERGENCY MEDICINE
Payer: COMMERCIAL

## 2022-01-11 VITALS
TEMPERATURE: 98.1 F | DIASTOLIC BLOOD PRESSURE: 92 MMHG | HEART RATE: 65 BPM | SYSTOLIC BLOOD PRESSURE: 180 MMHG | OXYGEN SATURATION: 98 % | RESPIRATION RATE: 16 BRPM

## 2022-01-11 DIAGNOSIS — I10 ESSENTIAL HYPERTENSION: ICD-10-CM

## 2022-01-11 DIAGNOSIS — F39 MOOD DISORDER (HCC): Primary | ICD-10-CM

## 2022-01-11 DIAGNOSIS — Z86.79 HISTORY OF SUBDURAL HEMATOMA: ICD-10-CM

## 2022-01-11 DIAGNOSIS — R51.9 HEADACHE, UNSPECIFIED HEADACHE TYPE: Primary | ICD-10-CM

## 2022-01-11 LAB
ALBUMIN SERPL-MCNC: 3.7 G/DL (ref 3.5–5.2)
ALP BLD-CCNC: 73 U/L (ref 40–129)
ALT SERPL-CCNC: 26 U/L (ref 0–40)
ANION GAP SERPL CALCULATED.3IONS-SCNC: 14 MMOL/L (ref 7–16)
ANISOCYTOSIS: ABNORMAL
AST SERPL-CCNC: 102 U/L (ref 0–39)
BASOPHILS ABSOLUTE: 0 E9/L (ref 0–0.2)
BASOPHILS RELATIVE PERCENT: 0.8 % (ref 0–2)
BILIRUB SERPL-MCNC: 0.9 MG/DL (ref 0–1.2)
BUN BLDV-MCNC: 15 MG/DL (ref 6–20)
CALCIUM SERPL-MCNC: 8.7 MG/DL (ref 8.6–10.2)
CHLORIDE BLD-SCNC: 100 MMOL/L (ref 98–107)
CO2: 26 MMOL/L (ref 22–29)
CREAT SERPL-MCNC: 1 MG/DL (ref 0.7–1.2)
EKG ATRIAL RATE: 94 BPM
EKG P AXIS: 0 DEGREES
EKG P-R INTERVAL: 110 MS
EKG Q-T INTERVAL: 406 MS
EKG QRS DURATION: 98 MS
EKG QTC CALCULATION (BAZETT): 435 MS
EKG R AXIS: 58 DEGREES
EKG T AXIS: 21 DEGREES
EKG VENTRICULAR RATE: 69 BPM
EOSINOPHILS ABSOLUTE: 0.05 E9/L (ref 0.05–0.5)
EOSINOPHILS RELATIVE PERCENT: 0.9 % (ref 0–6)
GFR AFRICAN AMERICAN: >60
GFR NON-AFRICAN AMERICAN: >60 ML/MIN/1.73
GLUCOSE BLD-MCNC: 82 MG/DL (ref 74–99)
HCT VFR BLD CALC: 38.5 % (ref 37–54)
HEMOGLOBIN: 14.1 G/DL (ref 12.5–16.5)
LYMPHOCYTES ABSOLUTE: 1.22 E9/L (ref 1.5–4)
LYMPHOCYTES RELATIVE PERCENT: 20 % (ref 20–42)
MCH RBC QN AUTO: 30.5 PG (ref 26–35)
MCHC RBC AUTO-ENTMCNC: 36.6 % (ref 32–34.5)
MCV RBC AUTO: 83.2 FL (ref 80–99.9)
MONOCYTES ABSOLUTE: 0.67 E9/L (ref 0.1–0.95)
MONOCYTES RELATIVE PERCENT: 11.3 % (ref 2–12)
NEUTROPHILS ABSOLUTE: 4.15 E9/L (ref 1.8–7.3)
NEUTROPHILS RELATIVE PERCENT: 67.8 % (ref 43–80)
PDW BLD-RTO: 22.2 FL (ref 11.5–15)
PLATELET # BLD: 246 E9/L (ref 130–450)
PMV BLD AUTO: 10.3 FL (ref 7–12)
POIKILOCYTES: ABNORMAL
POLYCHROMASIA: ABNORMAL
POTASSIUM SERPL-SCNC: 4 MMOL/L (ref 3.5–5)
RBC # BLD: 4.63 E12/L (ref 3.8–5.8)
SCHISTOCYTES: ABNORMAL
SODIUM BLD-SCNC: 140 MMOL/L (ref 132–146)
TARGET CELLS: ABNORMAL
TOTAL PROTEIN: 7.2 G/DL (ref 6.4–8.3)
TROPONIN, HIGH SENSITIVITY: 30 NG/L (ref 0–11)
WBC # BLD: 6.1 E9/L (ref 4.5–11.5)

## 2022-01-11 PROCEDURE — 93010 ELECTROCARDIOGRAM REPORT: CPT | Performed by: INTERNAL MEDICINE

## 2022-01-11 PROCEDURE — 6370000000 HC RX 637 (ALT 250 FOR IP): Performed by: EMERGENCY MEDICINE

## 2022-01-11 PROCEDURE — 84484 ASSAY OF TROPONIN QUANT: CPT

## 2022-01-11 PROCEDURE — 85025 COMPLETE CBC W/AUTO DIFF WBC: CPT

## 2022-01-11 PROCEDURE — 70450 CT HEAD/BRAIN W/O DYE: CPT

## 2022-01-11 PROCEDURE — 93005 ELECTROCARDIOGRAM TRACING: CPT | Performed by: EMERGENCY MEDICINE

## 2022-01-11 PROCEDURE — 6370000000 HC RX 637 (ALT 250 FOR IP): Performed by: PHYSICIAN ASSISTANT

## 2022-01-11 PROCEDURE — 99284 EMERGENCY DEPT VISIT MOD MDM: CPT

## 2022-01-11 PROCEDURE — 80053 COMPREHEN METABOLIC PANEL: CPT

## 2022-01-11 RX ORDER — DIAPER,BRIEF,INFANT-TODD,DISP
EACH MISCELLANEOUS ONCE
Status: COMPLETED | OUTPATIENT
Start: 2022-01-11 | End: 2022-01-11

## 2022-01-11 RX ORDER — AMLODIPINE BESYLATE 10 MG/1
10 TABLET ORAL DAILY
Qty: 30 TABLET | Refills: 0 | Status: SHIPPED | OUTPATIENT
Start: 2022-01-11 | End: 2022-01-16

## 2022-01-11 RX ORDER — DIAPER,BRIEF,INFANT-TODD,DISP
EACH MISCELLANEOUS
Status: DISCONTINUED
Start: 2022-01-11 | End: 2022-01-11 | Stop reason: HOSPADM

## 2022-01-11 RX ORDER — CLONIDINE HYDROCHLORIDE 0.1 MG/1
0.1 TABLET ORAL ONCE
Status: COMPLETED | OUTPATIENT
Start: 2022-01-11 | End: 2022-01-11

## 2022-01-11 RX ADMIN — BACITRACIN ZINC: 500 OINTMENT TOPICAL at 08:50

## 2022-01-11 RX ADMIN — CLONIDINE HYDROCHLORIDE 0.1 MG: 0.1 TABLET ORAL at 07:24

## 2022-01-11 NOTE — ED PROVIDER NOTES
HPI:  1/11/22, Time: 3:02 AM ALEJANDRO Connolly is a 60 y.o. male presenting to the ED for elevated blood pressure with headache, beginning hours ago. The complaint has been persistent, moderate in severity, and worsened by nothing. Patient presenting here because of headache frontal.  Patient reporting no nausea vomiting photophobia. Patient reporting no chest pain or difficulty breathing. Patient reporting no fever no chills. Patient reporting no upper or lower extremity weakness. Patient reports that he is not on any blood pressure medication. Patient has been seen before and I have actually were not prescriptions for him for his blood pressure. Patient reporting no fall or injury. ROS:   Pertinent positives and negatives are stated within HPI, all other systems reviewed and are negative.  --------------------------------------------- PAST HISTORY ---------------------------------------------  Past Medical History:  has a past medical history of Headache and Hypertension. Past Surgical History:  has no past surgical history on file. Social History:  reports that he has been smoking cigarettes. He has a 4.60 pack-year smoking history. He has never used smokeless tobacco. He reports current alcohol use. He reports that he does not use drugs. Family History: family history is not on file. The patients home medications have been reviewed. Allergies: Ace inhibitors and Fish-derived products    ---------------------------------------------------PHYSICAL EXAM--------------------------------------    Constitutional/General: Alert and oriented x3, well appearing, non toxic in NAD  Head: Normocephalic and atraumatic  Eyes: PERRL, EOMI  Mouth: Oropharynx clear, handling secretions, no trismus  Neck: Supple, full ROM, non tender to palpation in the midline, no stridor, no crepitus, no meningeal signs  Pulmonary: Lungs clear to auscultation bilaterally, no wheezes, rales, or rhonchi.  Not in respiratory distress  Cardiovascular:  Regular rate. Regular rhythm. No murmurs, gallops, or rubs. 2+ distal pulses  Chest: no chest wall tenderness  Abdomen: Soft. Non tender. Non distended. +BS. No rebound, guarding, or rigidity. No pulsatile masses appreciated. Musculoskeletal: Moves all extremities x 4. Warm and well perfused, no clubbing, cyanosis, or edema. Capillary refill <3 seconds  Skin: warm and dry. No rashes. Neurologic: GCS 15, CN 2-12 grossly intact, no focal deficits, symmetric strength 5/5 in the upper and lower extremities bilaterally  Psych: Normal Affect    -------------------------------------------------- RESULTS -------------------------------------------------  I have personally reviewed all laboratory and imaging results for this patient. Results are listed below.      LABS:  Results for orders placed or performed during the hospital encounter of 01/11/22   CBC auto differential   Result Value Ref Range    WBC 6.1 4.5 - 11.5 E9/L    RBC 4.63 3.80 - 5.80 E12/L    Hemoglobin 14.1 12.5 - 16.5 g/dL    Hematocrit 38.5 37.0 - 54.0 %    MCV 83.2 80.0 - 99.9 fL    MCH 30.5 26.0 - 35.0 pg    MCHC 36.6 (H) 32.0 - 34.5 %    RDW 22.2 (H) 11.5 - 15.0 fL    Platelets 340 634 - 334 E9/L    MPV 10.3 7.0 - 12.0 fL   Comprehensive Metabolic Panel   Result Value Ref Range    Sodium 140 132 - 146 mmol/L    Potassium 4.0 3.5 - 5.0 mmol/L    Chloride 100 98 - 107 mmol/L    CO2 26 22 - 29 mmol/L    Anion Gap 14 7 - 16 mmol/L    Glucose 82 74 - 99 mg/dL    BUN 15 6 - 20 mg/dL    CREATININE 1.0 0.7 - 1.2 mg/dL    GFR Non-African American >60 >=60 mL/min/1.73    GFR African American >60     Calcium 8.7 8.6 - 10.2 mg/dL    Total Protein 7.2 6.4 - 8.3 g/dL    Albumin 3.7 3.5 - 5.2 g/dL    Total Bilirubin 0.9 0.0 - 1.2 mg/dL    Alkaline Phosphatase 73 40 - 129 U/L    ALT 26 0 - 40 U/L     (H) 0 - 39 U/L   Troponin   Result Value Ref Range    Troponin, High Sensitivity 30 (H) 0 - 11 ng/L   EKG 12 Lead Result Value Ref Range    Ventricular Rate 69 BPM    Atrial Rate 94 BPM    P-R Interval 110 ms    QRS Duration 98 ms    Q-T Interval 406 ms    QTc Calculation (Bazett) 435 ms    P Axis 0 degrees    R Axis 58 degrees    T Axis 21 degrees       RADIOLOGY:  Interpreted by Radiologist.  CT HEAD WO CONTRAST   Final Result   No significant change including persistence of small subdural hematoma   layering on the left tentorium and extending along the posterior falx. Other findings are also unchanged. MRI would be useful if symptoms persist.      RECOMMENDATIONS:   Unavailable             EKG:  This EKG is signed and interpreted by me. Rate: 69  Rhythm: Sinus  Interpretation: non-specific EKG  Comparison: stable as compared to patient's most recent EKG          ------------------------- NURSING NOTES AND VITALS REVIEWED ---------------------------   The nursing notes within the ED encounter and vital signs as below have been reviewed by myself. BP (!) 179/108   Pulse 75   Temp 98.1 °F (36.7 °C)   Resp 18   SpO2 97%   Oxygen Saturation Interpretation: Normal    The patients available past medical records and past encounters were reviewed. ------------------------------ ED COURSE/MEDICAL DECISION MAKING----------------------  Medications   cloNIDine (CATAPRES) tablet 0.1 mg (0.1 mg Oral Given 1/11/22 0724)             Medical Decision Making:    Patient presenting here because of elevated blood pressure. Patient reportedly not taking his blood pressure medication. Patient reporting no chest pain no difficulty breathing reports no upper or lower extremity weakness she reports no recent fall. Patient does have a history of subdural.  Patient is not on anticoagulation. EKG noted reviewed CT of the head shows no acute findings. Patient labs will be reviewed patient will be medicated. His last blood pressure does improve here. Patient will be discharged home.     Re-Evaluations: Re-evaluation. Patients symptoms show no change  Patient was ordered clonidine here in the emergency department. Patient was made aware of blood pressure does improve plan will be to discharge home. Patient reporting no chest pain or difficulty breathing. Patient was made aware of need to take his blood pressure medications as prescribed. Consultations:                 Critical Care: This patient's ED course included: a personal history and physicial eaxmination    This patient has been closely monitored during their ED course. Counseling: The emergency provider has spoken with the patient and discussed todays results, in addition to providing specific details for the plan of care and counseling regarding the diagnosis and prognosis. Questions are answered at this time and they are agreeable with the plan.       --------------------------------- IMPRESSION AND DISPOSITION ---------------------------------    IMPRESSION  1. Headache, unspecified headache type    2. History of subdural hematoma        DISPOSITION  Disposition: To be discharged home  Patient condition is stable        NOTE: This report was transcribed using voice recognition software.  Every effort was made to ensure accuracy; however, inadvertent computerized transcription errors may be present          Laura Cui MD  01/11/22 58 Ania Kasper MD  01/11/22 58 Ania Kasper MD  01/11/22 6165

## 2022-01-12 VITALS
RESPIRATION RATE: 18 BRPM | SYSTOLIC BLOOD PRESSURE: 169 MMHG | OXYGEN SATURATION: 97 % | HEIGHT: 67 IN | BODY MASS INDEX: 23.54 KG/M2 | TEMPERATURE: 98.9 F | HEART RATE: 88 BPM | WEIGHT: 150 LBS | DIASTOLIC BLOOD PRESSURE: 93 MMHG

## 2022-01-12 LAB
AMPHETAMINE SCREEN, URINE: NOT DETECTED
ANISOCYTOSIS: ABNORMAL
BARBITURATE SCREEN URINE: NOT DETECTED
BASOPHILIC STIPPLING: ABNORMAL
BASOPHILS ABSOLUTE: 0.06 E9/L (ref 0–0.2)
BASOPHILS RELATIVE PERCENT: 0.9 % (ref 0–2)
BENZODIAZEPINE SCREEN, URINE: NOT DETECTED
CANNABINOID SCREEN URINE: NOT DETECTED
COCAINE METABOLITE SCREEN URINE: NOT DETECTED
EOSINOPHILS ABSOLUTE: 0 E9/L (ref 0.05–0.5)
EOSINOPHILS RELATIVE PERCENT: 0.8 % (ref 0–6)
FENTANYL SCREEN, URINE: NOT DETECTED
HCT VFR BLD CALC: 35.9 % (ref 37–54)
HEMOGLOBIN: 13.1 G/DL (ref 12.5–16.5)
LYMPHOCYTES ABSOLUTE: 1.87 E9/L (ref 1.5–4)
LYMPHOCYTES RELATIVE PERCENT: 26.3 % (ref 20–42)
Lab: NORMAL
MCH RBC QN AUTO: 30.6 PG (ref 26–35)
MCHC RBC AUTO-ENTMCNC: 36.5 % (ref 32–34.5)
MCV RBC AUTO: 83.9 FL (ref 80–99.9)
METHADONE SCREEN, URINE: NOT DETECTED
MONOCYTES ABSOLUTE: 0.36 E9/L (ref 0.1–0.95)
MONOCYTES RELATIVE PERCENT: 5.3 % (ref 2–12)
NEUTROPHILS ABSOLUTE: 4.9 E9/L (ref 1.8–7.3)
NEUTROPHILS RELATIVE PERCENT: 67.5 % (ref 43–80)
NUCLEATED RED BLOOD CELLS: 1.8 /100 WBC
OPIATE SCREEN URINE: NOT DETECTED
OXYCODONE URINE: NOT DETECTED
PDW BLD-RTO: 21.5 FL (ref 11.5–15)
PHENCYCLIDINE SCREEN URINE: NOT DETECTED
PLATELET # BLD: 241 E9/L (ref 130–450)
PMV BLD AUTO: 10.6 FL (ref 7–12)
POIKILOCYTES: ABNORMAL
POLYCHROMASIA: ABNORMAL
RBC # BLD: 4.28 E12/L (ref 3.8–5.8)
TARGET CELLS: ABNORMAL
WBC # BLD: 7.2 E9/L (ref 4.5–11.5)

## 2022-01-12 PROCEDURE — 85025 COMPLETE CBC W/AUTO DIFF WBC: CPT

## 2022-01-12 PROCEDURE — 80307 DRUG TEST PRSMV CHEM ANLYZR: CPT

## 2022-01-12 NOTE — ED NOTES
Patient rambling on and on about staff being hit on the head with whiskey bottles as babies and making racists commits. Patient being uncooperative about having blood draw and changing into hospital attire. Patient smells of alcohol. Patient has a hx of alcohol intoxication. Patient will not answer any questions for assessment other then he is depressed. Patient will be allowed to sleep and assessment will be completed after he wakes up and hopefully is sober.      Millie Dumont, NA, LSW  01/12/22 [id number]

## 2022-01-12 NOTE — ED NOTES
Pt walk in with depression. +ETOH to breath. Calm and cooperative with care.  Pt not sure of medications      Silvestre Garcia RN  01/11/22 0512

## 2022-01-12 NOTE — ED PROVIDER NOTES
HPI:  1/11/22, Time: 11:53 PM ALEJANDRO Escobar is a 60 y.o. male presenting to the ED for feeling depressed, beginning days ago. The complaint has been persistent, moderate in severity, and worsened by nothing. Patient reporting feeling depressed having hallucinations. Patient reporting no plan to harm himself or others. Patient morning no chest pain no difficulty breathing no abdominal pain there is no vomiting. Patient reporting no weakness or dizziness. ROS:   Pertinent positives and negatives are stated within HPI, all other systems reviewed and are negative.  --------------------------------------------- PAST HISTORY ---------------------------------------------  Past Medical History:  has a past medical history of Headache and Hypertension. Past Surgical History:  has no past surgical history on file. Social History:  reports that he has been smoking cigarettes. He has a 4.60 pack-year smoking history. He has never used smokeless tobacco. He reports current alcohol use. He reports that he does not use drugs. Family History: family history is not on file. The patients home medications have been reviewed. Allergies: Ace inhibitors and Fish-derived products    ---------------------------------------------------PHYSICAL EXAM--------------------------------------    Constitutional/General: Alert and oriented x3, well appearing, non toxic in NAD  Head: Normocephalic and atraumatic  Eyes: PERRL, EOMI  Mouth: Oropharynx clear, handling secretions, no trismus  Neck: Supple, full ROM, non tender to palpation in the midline, no stridor, no crepitus, no meningeal signs  Pulmonary: Lungs clear to auscultation bilaterally, no wheezes, rales, or rhonchi. Not in respiratory distress  Cardiovascular:  Regular rate. Regular rhythm. No murmurs, gallops, or rubs. 2+ distal pulses  Chest: no chest wall tenderness  Abdomen: Soft. Non tender. Non distended. +BS.   No rebound, guarding, or rigidity. No pulsatile masses appreciated. Musculoskeletal: Moves all extremities x 4. Warm and well perfused, no clubbing, cyanosis, or edema. Capillary refill <3 seconds  Skin: warm and dry. No rashes. Neurologic: GCS 15, CN 2-12 grossly intact, no focal deficits, symmetric strength 5/5 in the upper and lower extremities bilaterally  Psych: Affect flat depressed not homicidal or suicidal    -------------------------------------------------- RESULTS -------------------------------------------------  I have personally reviewed all laboratory and imaging results for this patient. Results are listed below. LABS:  Results for orders placed or performed during the hospital encounter of 01/11/22   CBC auto differential   Result Value Ref Range    WBC 7.2 4.5 - 11.5 E9/L    RBC 4.28 3.80 - 5.80 E12/L    Hemoglobin 13.1 12.5 - 16.5 g/dL    Hematocrit 35.9 (L) 37.0 - 54.0 %    MCV 83.9 80.0 - 99.9 fL    MCH 30.6 26.0 - 35.0 pg    MCHC 36.5 (H) 32.0 - 34.5 %    RDW 21.5 (H) 11.5 - 15.0 fL    Platelets 334 336 - 599 E9/L    MPV 10.6 7.0 - 12.0 fL       RADIOLOGY:  Interpreted by Radiologist.  No orders to display         EKG Interpretation        ------------------------- NURSING NOTES AND VITALS REVIEWED ---------------------------   The nursing notes within the ED encounter and vital signs as below have been reviewed by myself. BP (!) 167/104   Pulse 80   Temp 99.1 °F (37.3 °C) (Oral)   Resp 18   Ht 5' 7\" (1.702 m)   Wt 150 lb (68 kg)   SpO2 98%   BMI 23.49 kg/m²   Oxygen Saturation Interpretation: Normal    The patients available past medical records and past encounters were reviewed. ------------------------------ ED COURSE/MEDICAL DECISION MAKING----------------------  Medications - No data to display          Medical Decision Making:      Patient presenting here because feeling depressed he reports no homicidal suicidal thoughts.   Patient reports no complaints of chest pain or shortness of breath he just seen here the other day for hypertension and noncompliance with medications. Patient currently denying any headache. Patient does admit to drinking alcohol today. Labs will be obtained plan will be for  to evaluate  Re-Evaluations:             Re-evaluation. Patients symptoms show no change      Consultations:                 Critical Care: This patient's ED course included: a personal history and physicial eaxmination    This patient has been closely monitored during their ED course. Counseling: The emergency provider has spoken with the patient and discussed todays results, in addition to providing specific details for the plan of care and counseling regarding the diagnosis and prognosis. Questions are answered at this time and they are agreeable with the plan.       --------------------------------- IMPRESSION AND DISPOSITION ---------------------------------    IMPRESSION  1. Mood disorder ([facility] [address]        DISPOSITION  Disposition:  to evaluate  Patient condition is stable        NOTE: This report was transcribed using voice recognition software.  Every effort was made to ensure accuracy; however, inadvertent computerized transcription errors may be present          Phoebe Hayward MD  01/11/22 [address], [place]  01/12/22 6308

## 2022-01-12 NOTE — ED NOTES
Emergency Department [facility] AN AFFILIATE OF [facility] Biopsychosocial Assessment Note    Chief Complaint:   Depression +ETOH to breath     MSE & Clinical Summary/History:   Pt presenting to the ER, reporting to be intoxicated and having some depression. Pt was able to complete this assessment with me. He was cooperative, alert, oriented x's 3, shared good eye contact, has clear speech, thoughts are stable and lucid, denies SI, no HI and no hallucinatins, mood is stable. Pt explained that he has been feeling depressed over \"life\" in general, identifying any other stressors. Pt admits that he has been drinking beer lately but said \"just occasionally\". Pt is open and willing to talking with [facility]. Pt lives alone, not employed, has no children, has family out of state. Pt has no MH services, has a hx of treating with [facility] and is willing top return back. Walk-in treatment explained and placed in D/C plan. Gender  [x] Male [] Female [] Transgender  [] Other    Sexual Orientation    [x] Heterosexual [] Homosexual [] Bisexual [] Other    Suicidal Behavioral: CSSR-S Complete. [] Reports:    [] Past [] Present   [x] Denies    Homicidal/ Violent Behavior  [] Reports:   [] Past [] Present   [x] Denies     Violence Risk Screenin. Have you ever engaged in a physical fight? []  Yes [x]  No  2. Have you ever had an order of protection taken out against you? []  Yes [x]  No  3. Have you ever been arrested due to violence? []  Yes [x]  No  4. Have you ever been cruel to animals? []  Yes [x]  No    If any of the above questions are affirmative, please complete these questions:  1. Have you ever thought about hurting someone? []  No  []  Yes (Ask the questions listed below)   When?  Did you follow through with the thoughts? []  No  []  Yes - What happened? 2.  Have you ever threatened anyone? []  No  []  Yes (Ask the questions listed below)   When and what happened?     Have you ever threatened someone with a gun, knife or other weapon? []  No  []  Yes - When and what happened? 3. Have you ever physically hurt someone? []  No  []  Yes (Ask the questions listed below)   When and what happened?  How many times have you physically hurt someone in the past?    Hallucinations/Delusions   [] Reports:   [x] Denies     Substance Use/Alcohol Use/Addiction: SBIRT Screen Complete.    [x] Reports:   [] Denies     Trauma History  [] Reports:  [x] Denies     Collateral Information:   Na    Level of Care/Disposition Plan  [x] Home:   [] Outpatient Provider:   [] Crisis Unit:   [] Inpatient Psychiatric Unit:  [x] Other: possibly detox rehab luisa Estevez, TRAVIS  01/12/22 6384

## 2022-01-12 NOTE — ED NOTES
Attempted blood x2 and successful . Pt refused further attempts and refusing to change clothes.       Chiara Hein RN  01/12/22 0038

## 2022-01-12 NOTE — CARE COORDINATION
Peer Recovery Support Note    Name: Mignon Wang  Date: 1/12/2022    Chief Complaint   Patient presents with    Depression     +ETOH to breath       Peer Support met with patient. [] Support and education provided  [] Resources provided   [] Treatment referral: [facility]  [] Other:  [] Patient declined peer recovery services     Referred By:Teresa (EMMA)    Notes: Patient is accepted to [address] [facility] and will be picked up in the next half hour.     Signed: Richard Hodge, 1/12/2022

## 2022-01-12 NOTE — ED NOTES
Pt to go to [facility]. [facility] is setting up transport for patient.       Bayron Larsen RN  01/12/22 [id number]

## 2022-01-16 ENCOUNTER — HOSPITAL ENCOUNTER (EMERGENCY)
Age: 57
Discharge: HOME | End: 2022-01-16
Attending: EMERGENCY MEDICINE
Payer: COMMERCIAL

## 2022-01-16 VITALS
DIASTOLIC BLOOD PRESSURE: 83 MMHG | TEMPERATURE: 97.1 F | RESPIRATION RATE: 16 BRPM | OXYGEN SATURATION: 98 % | HEART RATE: 90 BPM | SYSTOLIC BLOOD PRESSURE: 128 MMHG

## 2022-01-16 DIAGNOSIS — R51.9 NONINTRACTABLE HEADACHE, UNSPECIFIED CHRONICITY PATTERN, UNSPECIFIED HEADACHE TYPE: Primary | ICD-10-CM

## 2022-01-16 PROCEDURE — 6370000000 HC RX 637 (ALT 250 FOR IP): Performed by: PHYSICIAN ASSISTANT

## 2022-01-16 PROCEDURE — 99283 EMERGENCY DEPT VISIT LOW MDM: CPT

## 2022-01-16 RX ORDER — ACETAMINOPHEN 500 MG
1000 TABLET ORAL ONCE
Status: COMPLETED | OUTPATIENT
Start: 2022-01-16 | End: 2022-01-16

## 2022-01-16 RX ORDER — CLONIDINE HYDROCHLORIDE 0.1 MG/1
0.1 TABLET ORAL ONCE
Status: COMPLETED | OUTPATIENT
Start: 2022-01-16 | End: 2022-01-16

## 2022-01-16 RX ADMIN — CLONIDINE HYDROCHLORIDE 0.1 MG: 0.1 TABLET ORAL at 00:50

## 2022-01-16 RX ADMIN — ACETAMINOPHEN 1000 MG: 500 TABLET ORAL at 00:50

## 2022-01-16 ASSESSMENT — PAIN SCALES - GENERAL: PAINLEVEL_OUTOF10: 5

## 2022-01-16 NOTE — ED PROVIDER NOTES
ED Attending  CC: [id number]  Department of Emergency Medicine   ED  Encounter Note  Admit Date/RoomTime: 2022  1:35 AM  ED Room: CHAIR01/    NAME: Marleny Bhagat  : 1965  MRN: [id number]     Chief Complaint:  Headache (head pain onset 2 days. denies injury. )    History of Present Illness      Marleny Bhagat is a 60 y.o. old male who presents to the emergency department by private vehicle, for complaint of intermittent headache x2 days. Patient denies recent fall or injury to his head. Patient has history of chronic subdural. Patient states his symptoms are mild in severity and describes as an aching pain. Patient states it feels like his typical headache. Patient states he did not take any OTC medications because he does not have any at home. Patient denies anything making it worse. Denies fever/chills, vision change, dizziness, chest pain, dyspnea, abdominal pain, NVD, numbness/weakness. ROS   Pertinent positives and negatives are stated within HPI, all other systems reviewed and are negative. Past Medical History:  has a past medical history of Headache and Hypertension. Surgical History:  has no past surgical history on file. Social History:  reports that he has been smoking cigarettes. He has a 4.60 pack-year smoking history. He has never used smokeless tobacco. He reports current alcohol use. He reports that he does not use drugs. Family History: family history is not on file. Allergies: Ace inhibitors and Fish-derived products    Physical Exam   Oxygen Saturation Interpretation: Normal.        ED Triage Vitals   BP Temp Temp Source Pulse Resp SpO2 Height Weight   22 0005 22 0001 22 0001 22 0001 22 0005 22 0001 -- --   (!) 163/105 97.1 °F (36.2 °C) Oral 90 16 98 %           Constitutional:  Alert, development consistent with age. HEENT:  NC/NT. PERRLA. Airway patent. Neck:  Normal ROM. Supple.   No meningeal signs  Respiratory:  Clear to auscultation and breath sounds equal.  CV:  Regular rate and rhythm, normal heart sounds, without pathological murmurs, ectopy, gallops, or rubs. GI:  Abdomen Soft, nontender, good bowel sounds. No firm or pulsatile mass. Back:  No costovertebral tenderness. Extremities: No tenderness or edema noted. Integument:  Normal turgor. Warm, dry, without visible rash, unless noted elsewhere. Lymphatics: No lymphangitis or adenopathy noted. Neurological:  Oriented x 3, GCS 15. CNII-XII grossly intact. Motor functions intact. Lab / Imaging Results   (All laboratory and radiology results have been personally reviewed by myself)  Labs:  No results found for this visit on 01/16/22. Imaging: All Radiology results interpreted by Radiologist unless otherwise noted. No orders to display     ED Course / Medical Decision Making     Medications   acetaminophen (TYLENOL) tablet 1,000 mg (1,000 mg Oral Given 1/16/22 0050)   cloNIDine (CATAPRES) tablet 0.1 mg (0.1 mg Oral Given 1/16/22 0050)          Consult(s):   None    Procedure(s):   none    MDM:   Patient presenting with headache. Patient is in no acute distress, afebrile, nontoxic appearance. Patient's headache and BP improved after Tylenol and clonidine. Patient to follow-up with PCP. Recommend patient return to the ED with new or worsening of symptoms. Plan of Care/Counseling:  AMINTA Nunez reviewed today's visit with the patient in addition to providing specific details for the plan of care and counseling regarding the diagnosis and prognosis. Questions are answered at this time and are agreeable with the plan. Assessment      1. Nonintractable headache, unspecified chronicity pattern, unspecified headache type      Plan   Discharged home.   Patient condition is stable    New Medications     New Prescriptions    No medications on file     Electronically signed by Angie Haq MD   DD: 1/16/22  **This report was transcribed using voice recognition software. Every effort was made to ensure accuracy; however, inadvertent computerized transcription errors may be present. END OF ED PROVIDER NOTE     Sunnie Canavan, PA-C  01/16/22 [id number]  ATTENDING PROVIDER ATTESTATION:     I have personally performed and/or participated in the history, exam, medical decision making, and procedures and agree with all pertinent clinical information. I have also reviewed and agree with the past medical, family and social history unless otherwise noted. My findings/Plan: Patient presenting here because of headache mainly frontal.  Patient does have prior history of subdural hematoma. Patient reports no fall recently. Patient reporting no chest pain no difficulty breathing. Patient reporting no abdominal pain. Reports no neck or back pain. He reports no vomiting. Patient here is awake alert oriented x3 heart lung exam normal abdomen soft and nontender. Gait is steady. Patient has no meningeal signs. Patient has been here before for the same complaint. He has had multiple CTs of his head. Patient is at his baseline and neurologically intact. Patient medicated here. Patient is noncompliant with his hypertensive medications as well. He was ordered clonidine as well as Tylenol. Patient's blood pressure did improve here in the emergency department. Patient was improved here and comfortable being discharged home . Waldemar Foley Patient to return anytime for any further problems.        Kathy Ortiz MD  01/16/22 MD Rakesh  01/16/22 [id number]

## 2022-01-17 ENCOUNTER — HOSPITAL ENCOUNTER (EMERGENCY)
Age: 57
Discharge: HOME | End: 2022-01-17
Attending: STUDENT IN AN ORGANIZED HEALTH CARE EDUCATION/TRAINING PROGRAM
Payer: COMMERCIAL

## 2022-01-17 ENCOUNTER — APPOINTMENT (OUTPATIENT)
Dept: CT IMAGING | Age: 57
End: 2022-01-17
Payer: COMMERCIAL

## 2022-01-17 VITALS
TEMPERATURE: 98.9 F | OXYGEN SATURATION: 95 % | HEART RATE: 77 BPM | DIASTOLIC BLOOD PRESSURE: 103 MMHG | SYSTOLIC BLOOD PRESSURE: 185 MMHG | RESPIRATION RATE: 16 BRPM

## 2022-01-17 VITALS
BODY MASS INDEX: 23.54 KG/M2 | TEMPERATURE: 98.3 F | SYSTOLIC BLOOD PRESSURE: 170 MMHG | HEART RATE: 88 BPM | WEIGHT: 150 LBS | DIASTOLIC BLOOD PRESSURE: 102 MMHG | OXYGEN SATURATION: 95 % | HEIGHT: 67 IN | RESPIRATION RATE: 16 BRPM

## 2022-01-17 DIAGNOSIS — R51.9 ACUTE NONINTRACTABLE HEADACHE, UNSPECIFIED HEADACHE TYPE: Primary | ICD-10-CM

## 2022-01-17 LAB — SARS-COV-2, NAAT: NOT DETECTED

## 2022-01-17 PROCEDURE — 6370000000 HC RX 637 (ALT 250 FOR IP): Performed by: STUDENT IN AN ORGANIZED HEALTH CARE EDUCATION/TRAINING PROGRAM

## 2022-01-17 PROCEDURE — 99282 EMERGENCY DEPT VISIT SF MDM: CPT

## 2022-01-17 PROCEDURE — 99283 EMERGENCY DEPT VISIT LOW MDM: CPT

## 2022-01-17 PROCEDURE — 87635 SARS-COV-2 COVID-19 AMP PRB: CPT

## 2022-01-17 PROCEDURE — 70450 CT HEAD/BRAIN W/O DYE: CPT

## 2022-01-17 RX ORDER — ACETAMINOPHEN 500 MG
500 TABLET ORAL 4 TIMES DAILY PRN
Qty: 40 TABLET | Refills: 0 | Status: SHIPPED | OUTPATIENT
Start: 2022-01-17 | End: 2022-04-24 | Stop reason: SDUPTHER

## 2022-01-17 RX ORDER — IBUPROFEN 800 MG/1
800 TABLET ORAL ONCE
Status: COMPLETED | OUTPATIENT
Start: 2022-01-18 | End: 2022-01-18

## 2022-01-17 RX ORDER — ACETAMINOPHEN 500 MG
1000 TABLET ORAL ONCE
Status: COMPLETED | OUTPATIENT
Start: 2022-01-17 | End: 2022-01-17

## 2022-01-17 RX ADMIN — ACETAMINOPHEN 1000 MG: 500 TABLET ORAL at 06:56

## 2022-01-17 ASSESSMENT — ENCOUNTER SYMPTOMS
EYE REDNESS: 0
VOMITING: 0
NAUSEA: 0
SINUS PRESSURE: 0
SORE THROAT: 0
DIARRHEA: 0
COUGH: 0
ABDOMINAL PAIN: 0
BACK PAIN: 0
SHORTNESS OF BREATH: 0
WHEEZING: 0
EYE PAIN: 0
EYE DISCHARGE: 0

## 2022-01-17 ASSESSMENT — PAIN SCALES - GENERAL: PAINLEVEL_OUTOF10: 7

## 2022-01-17 NOTE — ED PROVIDER NOTES
Patient presents with headache. Patient states this has been ongoing for the past two weeks. It progressively worsened and progressed to worsening pain. Chart review shows patient has a history of alcoholism, subdural bleed, and seizures. Patient states he has not been taking his medication. He has not follow up regarding his bleed. He has not taken anything to make his symptoms better nor worse. Patient denies any vision changes, syncope, seizures, chest pain, shortness of breath, nausea, vomiting, numbness, tingling, weakness, bowel or bladder incontinence. Patient is not on any blood thinners. He last drank yesterday. The history is provided by the patient. No  was used. Review of Systems   Constitutional: Negative for chills and fever. HENT: Negative for ear pain, sinus pressure and sore throat. Eyes: Negative for pain, discharge and redness. Respiratory: Negative for cough, shortness of breath and wheezing. Cardiovascular: Negative for chest pain. Gastrointestinal: Negative for abdominal pain, diarrhea, nausea and vomiting. Genitourinary: Negative for dysuria and frequency. Musculoskeletal: Negative for arthralgias and back pain. Skin: Negative for rash and wound. Neurological: Positive for headaches. Negative for weakness. Hematological: Negative for adenopathy. All other systems reviewed and are negative. Physical Exam  Vitals and nursing note reviewed. Constitutional:       General: He is not in acute distress. Appearance: He is well-developed. HENT:      Head: Normocephalic. Comments: Incisional scar left head  Eyes:      Extraocular Movements: Extraocular movements intact. Conjunctiva/sclera: Conjunctivae normal.      Pupils: Pupils are equal, round, and reactive to light. Cardiovascular:      Rate and Rhythm: Normal rate and regular rhythm. Heart sounds: Normal heart sounds. No murmur heard.       Pulmonary: Effort: Pulmonary effort is normal. No respiratory distress. Breath sounds: Normal breath sounds. No wheezing or rales. Abdominal:      General: Bowel sounds are normal.      Palpations: Abdomen is soft. Tenderness: There is no abdominal tenderness. There is no guarding or rebound. Musculoskeletal:         General: No tenderness or deformity. Cervical back: Normal range of motion and neck supple. No rigidity or tenderness. Skin:     General: Skin is warm and dry. Neurological:      General: No focal deficit present. Mental Status: He is alert and oriented to person, place, and time. Cranial Nerves: No cranial nerve deficit. Sensory: No sensory deficit. Motor: No weakness. Coordination: Coordination normal.          Procedures     MDM  Number of Diagnoses or Management Options  Acute nonintractable headache, unspecified headache type  Diagnosis management comments: Patient presents with a headache. This is been ongoing for the past 2 weeks. He does have a known history of a subdural.  CT of the head was obtained and did show near resolution of this bleed. Tylenol was given for his headache. This time does not appear to be any emergent processes ongoing. Patient's vitals have been stable outside of some hypertension. Patient was informed the results and plan and is agreeable. Patient be discharged home with instructions to follow-up with his PCP for further evaluation and care.                    --------------------------------------------- PAST HISTORY ---------------------------------------------  Past Medical History:  has a past medical history of Headache and Hypertension. Past Surgical History:  has no past surgical history on file. Social History:  reports that he has been smoking cigarettes. He has a 4.60 pack-year smoking history. He has never used smokeless tobacco. He reports current alcohol use. He reports that he does not use drugs.     Family History: family history is not on file. The patients home medications have been reviewed. Allergies: Ace inhibitors and Fish-derived products    -------------------------------------------------- RESULTS -------------------------------------------------  Labs:  Results for orders placed or performed during the hospital encounter of 01/17/22   COVID-19, Rapid    Specimen: Nasopharyngeal Swab   Result Value Ref Range    SARS-CoV-2, NAAT Not Detected Not Detected       Radiology:  CT Head WO Contrast   Final Result   1. There is no acute intracranial abnormality. Specifically, there is no   acute intracranial hemorrhage   2. Near complete interval resolution of the previously noted small left   subdural hematoma which was previously noted overlying the left tentorium. The residual vague subdural hematoma is vaguely seen on the sagittal images. 3. Previous left craniotomy with encephalomalacia noted within the left   frontotemporal lobe. 4. Periventricular leukomalacia             ------------------------- NURSING NOTES AND VITALS REVIEWED ---------------------------  Date / Time Roomed:  1/17/2022  7:54 AM  ED Bed Assignment:  [id number]    The nursing notes within the ED encounter and vital signs as below have been reviewed. BP (!) 185/103   Pulse (S) 77 Comment: HR 70's-80's during ambulation  Temp 98.9 °F (37.2 °C) (Oral)   Resp 16   SpO2 95% Comment: resting spo2  Oxygen Saturation Interpretation: Normal      ------------------------------------------ PROGRESS NOTES ------------------------------------------  3:21 PM EST  I have spoken with the patient and discussed todays results, in addition to providing specific details for the plan of care and counseling regarding the diagnosis and prognosis. Their questions are answered at this time and they are agreeable with the plan. I discussed at length with them reasons for immediate return here for re evaluation.  They will followup with their primary care physician by calling their office tomorrow. --------------------------------- ADDITIONAL PROVIDER NOTES ---------------------------------  At this time the patient is without objective evidence of an acute process requiring hospitalization or inpatient management. They have remained hemodynamically stable throughout their entire ED visit and are stable for discharge with outpatient follow-up. The plan has been discussed in detail and they are aware of the specific conditions for emergent return, as well as the importance of follow-up. Discharge Medication List as of 1/17/2022  7:45 AM      START taking these medications    Details   acetaminophen (TYLENOL) 500 MG tablet Take 1 tablet by mouth 4 times daily as needed for Pain, Disp-40 tablet, R-0Print             Diagnosis:  1. Acute nonintractable headache, unspecified headache type        Disposition:  Patient's disposition: Discharge to home  Patient's condition is stable.     Patient was seen and evaluated by both myself and Stephani Rogers, DO  Resident  01/17/22 0855

## 2022-01-17 NOTE — ED PROVIDER NOTES
ATTENDING PROVIDER ATTESTATION:     Reyna Heaton presented to the emergency department for evaluation of Headache (10/10 constant HA onset 3days )   and was initially evaluated by the Medical Resident. See Original ED Note for H&P and ED course above. I have reviewed and discussed the case, including pertinent history (medical, surgical, family and social) and exam findings with the Medical Resident assigned to Reyna Heaton. I have personally performed and/or participated in the history, exam, medical decision making, and procedures and agree with all pertinent clinical information and any additional changes or corrections are noted below in my assessment and plan. I have discussed this patient in detail with the resident, and provided the instruction and education,    I have reviewed my findings and recommendations with the assigned Medical Resident, Reyna Heaton and members of family present at the time of disposition. I have performed a history and physical examination of this patient and directly supervised the resident caring for this patient. History of Present Illness: This patient presents to the ED for Patient presents with headache. Chronic in nature. Is been going on for 2 weeks. Patient has a known subdural hematoma left-sided. Reports no new trauma. He is not on blood thinners. He is mildly hypoxic at triage and denies any shortness of breath cough, fevers chills or myalgias as well. He is not on any antihypertensive medicine. He is [place]. Headache is left-sided in location, mild in severity. Not worsening from baseline. Of note, the interview the patient has had multiple CT scans that reveal stable left-sided subdural hemorrhage at that juncture.   He denies any numbness paresthesias weakness in extremities    Review of Systems:   A complete review of systems was performed and pertinent positives and negatives are stated within HPI, all other systems reviewed and are negative.    --------------------------------------------- PAST HISTORY ---------------------------------------------  Past Medical History:  has a past medical history of Headache and Hypertension. Past Surgical History:  has no past surgical history on file. Social History:  reports that he has been smoking cigarettes. He has a 4.60 pack-year smoking history. He has never used smokeless tobacco. He reports current alcohol use. He reports that he does not use drugs. Family History: family history is not on file. Unless otherwise noted, family history is non contributory    The patients home medications have been reviewed. Allergies: Ace inhibitors and Fish-derived products    Physical Exam:  Constitutional: Appears in no distress  Head: Normocephalic, atraumatic  Eyes: Non-icteric slcera, no conjunctival injection  ENT: Moist mucous membranes,  Neck: Trachea midline, no JVD  Respiratory: Nonlabored respirations. Lungs clear to auscultation bilaterally, no wheezes, rales, or rhonchi. Cardiovascular: Regular rate. Regular rhythm. No murmurs, no gallops, no rubs. Gastrointestinal: Abdomen Soft, Non tender, Non distended. No rebound tenderness, guarding, or rigidity. Extremities: No lower extremity edema  Genitourinary: No CVA tenderness, no suprapubic tenderness  Musculoskeletal: Moves all extremities, no deformity  Skin: Pink, warm, dry without rash. Neurologic: Pupils are equal and reactive to light. Extraocular eye movements intact. Sensation intact bilaterally. Symmetric facies. Tongue protrudes midline. No meningismus. 5 out of 5 symmetric strength of the upper and lower extremities. Finger to nose testing is within normal limits. The patient has a normal gait. Alert and oriented. My Medical Decision Making:   Patient presents with headache chronic in nature. Incidentally found to be mildly hypoxic and 94%.   His exertional oxygen saturation is 94%. He is 95 at rest on reevaluation. Will be given Tylenol for his headache. However, I have low suspicion for worsening subdural hematoma at this time. He states he is mostly concerned as he does not have any pain medication to take at the rescue [place]. CT head negative for any acute intracranial process. Left subdural is resolving. Reassessment patient is not hypoxic. His COVID test is negative. Never endorsed any chest pain nausea vomiting shortness of breath. Long-term smoker patient could have component of COPD contributing to his mild hypoxemia at that time. He will be discharged with outpatient follow-up      IMPRESSION:   1. Acute nonintractable headache, unspecified headache type        I, Dr. Yumi Pacheco, am the primary provider of record. I directly supervised any procedures performed by the resident and was present for the procedure including all critical portions of the procedure. Yumi Pacheco DO  Emergency Medicine    NOTE: This report was transcribed using voice recognition software.  Every effort was made to ensure accuracy; however, inadvertent computerized transcription errors may be present       Bautista DO Bridget[place]  01/17/22 0758

## 2022-01-18 ENCOUNTER — HOSPITAL ENCOUNTER (EMERGENCY)
Age: 57
Discharge: HOME | End: 2022-01-18
Payer: COMMERCIAL

## 2022-01-18 DIAGNOSIS — R51.9 NONINTRACTABLE HEADACHE, UNSPECIFIED CHRONICITY PATTERN, UNSPECIFIED HEADACHE TYPE: Primary | ICD-10-CM

## 2022-01-18 PROCEDURE — 6370000000 HC RX 637 (ALT 250 FOR IP): Performed by: NURSE PRACTITIONER

## 2022-01-18 RX ADMIN — IBUPROFEN 800 MG: 800 TABLET, FILM COATED ORAL at 00:11

## 2022-01-18 ASSESSMENT — PAIN SCALES - GENERAL: PAINLEVEL_OUTOF10: 10

## 2022-01-18 NOTE — ED PROVIDER NOTES
Independent   HPI:  1/18/22, Time: 12:40 AM ALEJANDRO Raygoza is a 60 y.o. male presenting to the ED for complaints of headache. Patient presents emergency department with continued complaints of headache. Patient reports that the headache is similar in nature, states that it is to his forehead area. He denies any falls or injury. He is not on any anticoagulant therapy. Patient does not have any unilateral weakness or any facial asymmetry or any change noted in speech. Patient also denies drinking any alcohol. He states that he did not have any meds including Motrin or Tylenol to be able to take for symptom relief. Patient was history of chronic subdural hematoma, he also has had multiple emergency room visits for his headache. In fact he was already seen here earlier today, January 17, 2022 and at that time had a negative CAT scan of the brain. Symptoms mild in severity and persistent    Review of Systems:   A complete review of systems was performed and pertinent positives and negatives are stated within HPI, all other systems reviewed and are negative.          --------------------------------------------- PAST HISTORY ---------------------------------------------  Past Medical History:  has a past medical history of Headache and Hypertension. Past Surgical History:  has no past surgical history on file. Social History:  reports that he has been smoking cigarettes. He has a 4.60 pack-year smoking history. He has never used smokeless tobacco. He reports current alcohol use. He reports that he does not use drugs. Family History: family history is not on file. The patients home medications have been reviewed.     Allergies: Ace inhibitors and Fish-derived products    -------------------------------------------------- RESULTS -------------------------------------------------  All laboratory and radiology results have been personally reviewed by myself   LABS:  No results found for this visit on 01/18/22. RADIOLOGY:  Interpreted by Radiologist.  No orders to display       ------------------------- NURSING NOTES AND VITALS REVIEWED ---------------------------   The nursing notes within the ED encounter and vital signs as below have been reviewed. BP (!) 170/102   Pulse 88   Temp 98.3 °F (36.8 °C) (Oral)   Resp 16   Ht 5' 7\" (1.702 m)   Wt 150 lb (68 kg)   SpO2 95%   BMI 23.49 kg/m²   Oxygen Saturation Interpretation: Normal      ---------------------------------------------------PHYSICAL EXAM--------------------------------------      Constitutional/General: Alert and oriented x3, well appearing, non toxic in NAD  Head: Normocephalic and atraumatic  Eyes: PERRL, EOMI  Mouth: Oropharynx clear, handling secretions, no trismus  Neck: Supple, full ROM,   Pulmonary: Lungs clear to auscultation bilaterally, no wheezes, rales, or rhonchi. Not in respiratory distress  Cardiovascular:  Regular rate and rhythm, no murmurs, gallops, or rubs. 2+ distal pulses  Abdomen: Soft, non tender, non distended,   Extremities: Moves all extremities x 4. Warm and well perfused  Skin: warm and dry without rash  Neurologic: GCS 15, cranial nerves II through XII grossly intact. No acute neurovascular deficit noted. Speech clear and coherent strength strong and equal bilaterally  Psych: Normal Affect      ------------------------------ ED COURSE/MEDICAL DECISION MAKING----------------------  Medications   ibuprofen (ADVIL;MOTRIN) tablet 800 mg (800 mg Oral Given 1/18/22 0011)         ED COURSE:       Medical Decision Making: Plan will be to medicate patient with Motrin 800 mg tablet. Will observe behavior. Patient is neurovascular intact, he was provided with sandwich and soda per his request.  Patient was monitored and shows no signs of any new acute intercranial abnormality. States he feels much better after receiving the Motrin as well as eating and drinking.   Patient was encouraged to continue to take Motrin and Tylenol and follow-up with his primary care doctor. Patient neurovascular intact. Patient will be safely discharged home. Counseling: The emergency provider has spoken with the patient and discussed todays results, in addition to providing specific details for the plan of care and counseling regarding the diagnosis and prognosis. Questions are answered at this time and they are agreeable with the plan.      --------------------------------- IMPRESSION AND DISPOSITION ---------------------------------    IMPRESSION  1. Nonintractable headache, unspecified chronicity pattern, unspecified headache type        DISPOSITION  Disposition: Discharge to home  Patient condition is good      NOTE: This report was transcribed using voice recognition software.  Every effort was made to ensure accuracy; however, inadvertent computerized transcription errors may be present     ROSY Barnes - MERARI  01/18/22 0309

## 2022-04-23 ENCOUNTER — APPOINTMENT (OUTPATIENT)
Dept: CT IMAGING | Age: 57
DRG: 280 | End: 2022-04-23
Payer: COMMERCIAL

## 2022-04-23 ENCOUNTER — HOSPITAL ENCOUNTER (INPATIENT)
Age: 57
LOS: 1 days | Discharge: LEFT BEFORE DISCHARGE | DRG: 280 | End: 2022-04-23
Attending: EMERGENCY MEDICINE | Admitting: INTERNAL MEDICINE
Payer: COMMERCIAL

## 2022-04-23 VITALS
TEMPERATURE: 99.3 F | BODY MASS INDEX: 23.54 KG/M2 | HEART RATE: 90 BPM | RESPIRATION RATE: 16 BRPM | WEIGHT: 150 LBS | SYSTOLIC BLOOD PRESSURE: 133 MMHG | OXYGEN SATURATION: 100 % | HEIGHT: 67 IN | DIASTOLIC BLOOD PRESSURE: 65 MMHG

## 2022-04-23 DIAGNOSIS — N17.9 AKI (ACUTE KIDNEY INJURY) (HCC): ICD-10-CM

## 2022-04-23 DIAGNOSIS — E87.20 LACTIC ACIDOSIS: ICD-10-CM

## 2022-04-23 DIAGNOSIS — E87.29 ALCOHOLIC KETOACIDOSIS: ICD-10-CM

## 2022-04-23 DIAGNOSIS — R51.9 NONINTRACTABLE HEADACHE, UNSPECIFIED CHRONICITY PATTERN, UNSPECIFIED HEADACHE TYPE: Primary | ICD-10-CM

## 2022-04-23 PROBLEM — R74.01 TRANSAMINITIS: Status: ACTIVE | Noted: 2022-04-23

## 2022-04-23 PROBLEM — F10.939 ALCOHOL WITHDRAWAL SYNDROME WITH COMPLICATION (HCC): Status: ACTIVE | Noted: 2022-04-23

## 2022-04-23 LAB
ACETAMINOPHEN LEVEL: <5 MCG/ML (ref 10–30)
ALBUMIN SERPL-MCNC: 4 G/DL (ref 3.5–5.2)
ALP BLD-CCNC: 60 U/L (ref 40–129)
ALT SERPL-CCNC: 485 U/L (ref 0–40)
ANION GAP SERPL CALCULATED.3IONS-SCNC: 30 MMOL/L (ref 7–16)
AST SERPL-CCNC: 1518 U/L (ref 0–39)
B.E.: -12.6 MMOL/L (ref -3–3)
BASOPHILS ABSOLUTE: 0.03 E9/L (ref 0–0.2)
BASOPHILS RELATIVE PERCENT: 0.6 % (ref 0–2)
BETA-HYDROXYBUTYRATE: 1.45 MMOL/L (ref 0.02–0.27)
BILIRUB SERPL-MCNC: 1.2 MG/DL (ref 0–1.2)
BUN BLDV-MCNC: 25 MG/DL (ref 6–20)
CALCIUM SERPL-MCNC: 8.6 MG/DL (ref 8.6–10.2)
CHLORIDE BLD-SCNC: 100 MMOL/L (ref 98–107)
CO2: 14 MMOL/L (ref 22–29)
COHB: 0.8 % (ref 0–1.5)
CREAT SERPL-MCNC: 1.5 MG/DL (ref 0.7–1.2)
CRITICAL: ABNORMAL
DATE ANALYZED: ABNORMAL
DATE OF COLLECTION: ABNORMAL
EOSINOPHILS ABSOLUTE: 0.01 E9/L (ref 0.05–0.5)
EOSINOPHILS RELATIVE PERCENT: 0.2 % (ref 0–6)
ETHANOL: 94 MG/DL (ref 0–0.08)
GFR AFRICAN AMERICAN: 58
GFR NON-AFRICAN AMERICAN: 58 ML/MIN/1.73
GLUCOSE BLD-MCNC: 43 MG/DL (ref 74–99)
HCO3: 14.9 MMOL/L (ref 22–26)
HCT VFR BLD CALC: 36.7 % (ref 37–54)
HEMOGLOBIN: 13.2 G/DL (ref 12.5–16.5)
HHB: 6.7 % (ref 0–5)
IMMATURE GRANULOCYTES #: 0.01 E9/L
IMMATURE GRANULOCYTES %: 0.2 % (ref 0–5)
INR BLD: 1.1
LAB: ABNORMAL
LACTIC ACID: 11.3 MMOL/L (ref 0.5–2.2)
LACTIC ACID: 13.1 MMOL/L (ref 0.5–2.2)
LYMPHOCYTES ABSOLUTE: 0.69 E9/L (ref 1.5–4)
LYMPHOCYTES RELATIVE PERCENT: 13.9 % (ref 20–42)
Lab: ABNORMAL
MAGNESIUM: 2.5 MG/DL (ref 1.6–2.6)
MCH RBC QN AUTO: 30.4 PG (ref 26–35)
MCHC RBC AUTO-ENTMCNC: 36 % (ref 32–34.5)
MCV RBC AUTO: 84.6 FL (ref 80–99.9)
METER GLUCOSE: 108 MG/DL (ref 74–99)
METHB: 0.6 % (ref 0–1.5)
MONOCYTES ABSOLUTE: 0.48 E9/L (ref 0.1–0.95)
MONOCYTES RELATIVE PERCENT: 9.6 % (ref 2–12)
NEUTROPHILS ABSOLUTE: 3.76 E9/L (ref 1.8–7.3)
NEUTROPHILS RELATIVE PERCENT: 75.5 % (ref 43–80)
O2 CONTENT: 17.3 ML/DL
O2 SATURATION: 93.2 % (ref 92–98.5)
O2HB: 91.9 % (ref 94–97)
OPERATOR ID: 1868
OSMOLALITY: 343 MOSM/KG (ref 285–310)
PATIENT TEMP: 37 C
PCO2: 39.8 MMHG (ref 35–45)
PDW BLD-RTO: 14.1 FL (ref 11.5–15)
PH BLOOD GAS: 7.19 (ref 7.35–7.45)
PLATELET # BLD: 267 E9/L (ref 130–450)
PMV BLD AUTO: 11.7 FL (ref 7–12)
PO2: 84.4 MMHG (ref 75–100)
POTASSIUM REFLEX MAGNESIUM: 3.3 MMOL/L (ref 3.5–5)
PROTHROMBIN TIME: 11.6 SEC (ref 9.3–12.4)
RBC # BLD: 4.34 E12/L (ref 3.8–5.8)
SALICYLATE, SERUM: <0.3 MG/DL (ref 0–30)
SODIUM BLD-SCNC: 144 MMOL/L (ref 132–146)
SOURCE, BLOOD GAS: ABNORMAL
THB: 13.3 G/DL (ref 11.5–16.5)
TIME ANALYZED: 1058
TOTAL PROTEIN: 6.9 G/DL (ref 6.4–8.3)
TRICYCLIC ANTIDEPRESSANTS SCREEN SERUM: NEGATIVE NG/ML
WBC # BLD: 5 E9/L (ref 4.5–11.5)

## 2022-04-23 PROCEDURE — 80179 DRUG ASSAY SALICYLATE: CPT

## 2022-04-23 PROCEDURE — 2500000003 HC RX 250 WO HCPCS: Performed by: EMERGENCY MEDICINE

## 2022-04-23 PROCEDURE — 80307 DRUG TEST PRSMV CHEM ANLYZR: CPT

## 2022-04-23 PROCEDURE — 99284 EMERGENCY DEPT VISIT MOD MDM: CPT

## 2022-04-23 PROCEDURE — 2060000000 HC ICU INTERMEDIATE R&B

## 2022-04-23 PROCEDURE — 82805 BLOOD GASES W/O2 SATURATION: CPT

## 2022-04-23 PROCEDURE — 2580000003 HC RX 258: Performed by: EMERGENCY MEDICINE

## 2022-04-23 PROCEDURE — 82077 ASSAY SPEC XCP UR&BREATH IA: CPT

## 2022-04-23 PROCEDURE — 85610 PROTHROMBIN TIME: CPT

## 2022-04-23 PROCEDURE — G0378 HOSPITAL OBSERVATION PER HR: HCPCS

## 2022-04-23 PROCEDURE — 96375 TX/PRO/DX INJ NEW DRUG ADDON: CPT

## 2022-04-23 PROCEDURE — 83735 ASSAY OF MAGNESIUM: CPT

## 2022-04-23 PROCEDURE — 70450 CT HEAD/BRAIN W/O DYE: CPT

## 2022-04-23 PROCEDURE — 83605 ASSAY OF LACTIC ACID: CPT

## 2022-04-23 PROCEDURE — 96365 THER/PROPH/DIAG IV INF INIT: CPT

## 2022-04-23 PROCEDURE — 2580000003 HC RX 258: Performed by: INTERNAL MEDICINE

## 2022-04-23 PROCEDURE — 36415 COLL VENOUS BLD VENIPUNCTURE: CPT

## 2022-04-23 PROCEDURE — 83930 ASSAY OF BLOOD OSMOLALITY: CPT

## 2022-04-23 PROCEDURE — 99285 EMERGENCY DEPT VISIT HI MDM: CPT

## 2022-04-23 PROCEDURE — 82962 GLUCOSE BLOOD TEST: CPT

## 2022-04-23 PROCEDURE — 80143 DRUG ASSAY ACETAMINOPHEN: CPT

## 2022-04-23 PROCEDURE — 6370000000 HC RX 637 (ALT 250 FOR IP): Performed by: INTERNAL MEDICINE

## 2022-04-23 PROCEDURE — 80053 COMPREHEN METABOLIC PANEL: CPT

## 2022-04-23 PROCEDURE — 96372 THER/PROPH/DIAG INJ SC/IM: CPT

## 2022-04-23 PROCEDURE — 82010 KETONE BODYS QUAN: CPT

## 2022-04-23 PROCEDURE — 85025 COMPLETE CBC W/AUTO DIFF WBC: CPT

## 2022-04-23 PROCEDURE — 6360000002 HC RX W HCPCS: Performed by: EMERGENCY MEDICINE

## 2022-04-23 PROCEDURE — 96374 THER/PROPH/DIAG INJ IV PUSH: CPT

## 2022-04-23 RX ORDER — DEXTROSE AND SODIUM CHLORIDE 5; .9 G/100ML; G/100ML
INJECTION, SOLUTION INTRAVENOUS CONTINUOUS
Status: DISCONTINUED | OUTPATIENT
Start: 2022-04-23 | End: 2022-04-23

## 2022-04-23 RX ORDER — LORAZEPAM 1 MG/1
4 TABLET ORAL
Status: DISCONTINUED | OUTPATIENT
Start: 2022-04-23 | End: 2022-04-23 | Stop reason: HOSPADM

## 2022-04-23 RX ORDER — LORAZEPAM 1 MG/1
2 TABLET ORAL
Status: DISCONTINUED | OUTPATIENT
Start: 2022-04-23 | End: 2022-04-23 | Stop reason: HOSPADM

## 2022-04-23 RX ORDER — 0.9 % SODIUM CHLORIDE 0.9 %
1000 INTRAVENOUS SOLUTION INTRAVENOUS ONCE
Status: COMPLETED | OUTPATIENT
Start: 2022-04-23 | End: 2022-04-23

## 2022-04-23 RX ORDER — ACETAMINOPHEN 650 MG/1
650 SUPPOSITORY RECTAL EVERY 6 HOURS PRN
Status: DISCONTINUED | OUTPATIENT
Start: 2022-04-23 | End: 2022-04-23 | Stop reason: HOSPADM

## 2022-04-23 RX ORDER — SODIUM CHLORIDE 9 MG/ML
INJECTION, SOLUTION INTRAVENOUS PRN
Status: DISCONTINUED | OUTPATIENT
Start: 2022-04-23 | End: 2022-04-23 | Stop reason: HOSPADM

## 2022-04-23 RX ORDER — LORAZEPAM 2 MG/ML
3 INJECTION INTRAMUSCULAR
Status: DISCONTINUED | OUTPATIENT
Start: 2022-04-23 | End: 2022-04-23 | Stop reason: HOSPADM

## 2022-04-23 RX ORDER — LORAZEPAM 2 MG/ML
1 INJECTION INTRAMUSCULAR
Status: DISCONTINUED | OUTPATIENT
Start: 2022-04-23 | End: 2022-04-23 | Stop reason: HOSPADM

## 2022-04-23 RX ORDER — LORAZEPAM 1 MG/1
3 TABLET ORAL
Status: DISCONTINUED | OUTPATIENT
Start: 2022-04-23 | End: 2022-04-23 | Stop reason: HOSPADM

## 2022-04-23 RX ORDER — SODIUM CHLORIDE 0.9 % (FLUSH) 0.9 %
5-40 SYRINGE (ML) INJECTION PRN
Status: DISCONTINUED | OUTPATIENT
Start: 2022-04-23 | End: 2022-04-23 | Stop reason: HOSPADM

## 2022-04-23 RX ORDER — THIAMINE HYDROCHLORIDE 100 MG/ML
100 INJECTION, SOLUTION INTRAMUSCULAR; INTRAVENOUS ONCE
Status: COMPLETED | OUTPATIENT
Start: 2022-04-23 | End: 2022-04-23

## 2022-04-23 RX ORDER — LORAZEPAM 1 MG/1
1 TABLET ORAL
Status: DISCONTINUED | OUTPATIENT
Start: 2022-04-23 | End: 2022-04-23 | Stop reason: HOSPADM

## 2022-04-23 RX ORDER — ENOXAPARIN SODIUM 100 MG/ML
40 INJECTION SUBCUTANEOUS DAILY
Status: DISCONTINUED | OUTPATIENT
Start: 2022-04-23 | End: 2022-04-23 | Stop reason: HOSPADM

## 2022-04-23 RX ORDER — DEXTROSE MONOHYDRATE 25 G/50ML
25 INJECTION, SOLUTION INTRAVENOUS PRN
Status: DISCONTINUED | OUTPATIENT
Start: 2022-04-23 | End: 2022-04-23 | Stop reason: HOSPADM

## 2022-04-23 RX ORDER — ONDANSETRON 2 MG/ML
4 INJECTION INTRAMUSCULAR; INTRAVENOUS EVERY 6 HOURS PRN
Status: DISCONTINUED | OUTPATIENT
Start: 2022-04-23 | End: 2022-04-23 | Stop reason: HOSPADM

## 2022-04-23 RX ORDER — SODIUM CHLORIDE 0.9 % (FLUSH) 0.9 %
5-40 SYRINGE (ML) INJECTION EVERY 12 HOURS SCHEDULED
Status: DISCONTINUED | OUTPATIENT
Start: 2022-04-23 | End: 2022-04-23 | Stop reason: HOSPADM

## 2022-04-23 RX ORDER — NICOTINE 21 MG/24HR
1 PATCH, TRANSDERMAL 24 HOURS TRANSDERMAL DAILY
Status: DISCONTINUED | OUTPATIENT
Start: 2022-04-23 | End: 2022-04-23 | Stop reason: HOSPADM

## 2022-04-23 RX ORDER — DEXTROSE AND SODIUM CHLORIDE 5; .9 G/100ML; G/100ML
INJECTION, SOLUTION INTRAVENOUS CONTINUOUS
Status: DISCONTINUED | OUTPATIENT
Start: 2022-04-23 | End: 2022-04-23 | Stop reason: HOSPADM

## 2022-04-23 RX ORDER — LORAZEPAM 2 MG/ML
4 INJECTION INTRAMUSCULAR
Status: DISCONTINUED | OUTPATIENT
Start: 2022-04-23 | End: 2022-04-23 | Stop reason: HOSPADM

## 2022-04-23 RX ORDER — POLYETHYLENE GLYCOL 3350 17 G/17G
17 POWDER, FOR SOLUTION ORAL DAILY PRN
Status: DISCONTINUED | OUTPATIENT
Start: 2022-04-23 | End: 2022-04-23 | Stop reason: HOSPADM

## 2022-04-23 RX ORDER — ACETAMINOPHEN 325 MG/1
650 TABLET ORAL EVERY 6 HOURS PRN
Status: DISCONTINUED | OUTPATIENT
Start: 2022-04-23 | End: 2022-04-23 | Stop reason: HOSPADM

## 2022-04-23 RX ORDER — ACETAMINOPHEN 500 MG
1000 TABLET ORAL ONCE
Status: COMPLETED | OUTPATIENT
Start: 2022-04-23 | End: 2022-04-23

## 2022-04-23 RX ORDER — LORAZEPAM 2 MG/ML
2 INJECTION INTRAMUSCULAR
Status: DISCONTINUED | OUTPATIENT
Start: 2022-04-23 | End: 2022-04-23 | Stop reason: HOSPADM

## 2022-04-23 RX ORDER — FOLIC ACID 5 MG/ML
1 INJECTION, SOLUTION INTRAMUSCULAR; INTRAVENOUS; SUBCUTANEOUS DAILY
Status: DISCONTINUED | OUTPATIENT
Start: 2022-04-23 | End: 2022-04-23 | Stop reason: HOSPADM

## 2022-04-23 RX ORDER — ONDANSETRON 4 MG/1
4 TABLET, ORALLY DISINTEGRATING ORAL EVERY 8 HOURS PRN
Status: DISCONTINUED | OUTPATIENT
Start: 2022-04-23 | End: 2022-04-23 | Stop reason: HOSPADM

## 2022-04-23 RX ADMIN — SODIUM CHLORIDE 1000 ML: 9 INJECTION, SOLUTION INTRAVENOUS at 11:46

## 2022-04-23 RX ADMIN — SODIUM CHLORIDE 1000 ML: 9 INJECTION, SOLUTION INTRAVENOUS at 09:34

## 2022-04-23 RX ADMIN — THIAMINE HYDROCHLORIDE 100 MG: 100 INJECTION, SOLUTION INTRAMUSCULAR; INTRAVENOUS at 10:16

## 2022-04-23 RX ADMIN — DEXTROSE AND SODIUM CHLORIDE: 5; 900 INJECTION, SOLUTION INTRAVENOUS at 17:35

## 2022-04-23 RX ADMIN — Medication 25 G: at 10:16

## 2022-04-23 RX ADMIN — DEXTROSE AND SODIUM CHLORIDE: 5; 900 INJECTION, SOLUTION INTRAVENOUS at 10:22

## 2022-04-23 RX ADMIN — ACETAMINOPHEN 1000 MG: 500 TABLET ORAL at 09:01

## 2022-04-23 ASSESSMENT — PAIN DESCRIPTION - ONSET: ONSET: GRADUAL

## 2022-04-23 ASSESSMENT — PAIN DESCRIPTION - DESCRIPTORS
DESCRIPTORS: ACHING;THROBBING
DESCRIPTORS: ACHING

## 2022-04-23 ASSESSMENT — LIFESTYLE VARIABLES
HOW MANY STANDARD DRINKS CONTAINING ALCOHOL DO YOU HAVE ON A TYPICAL DAY: 3 OR 4
HOW OFTEN DO YOU HAVE A DRINK CONTAINING ALCOHOL: 2-3 TIMES A WEEK
HOW OFTEN DO YOU HAVE A DRINK CONTAINING ALCOHOL: NEVER

## 2022-04-23 ASSESSMENT — PAIN SCALES - GENERAL
PAINLEVEL_OUTOF10: 10
PAINLEVEL_OUTOF10: 8
PAINLEVEL_OUTOF10: 8

## 2022-04-23 ASSESSMENT — ENCOUNTER SYMPTOMS
DIARRHEA: 0
COLOR CHANGE: 0
COUGH: 0
SHORTNESS OF BREATH: 0
VOMITING: 0
BACK PAIN: 0
RHINORRHEA: 0
ABDOMINAL PAIN: 0
CONSTIPATION: 0

## 2022-04-23 ASSESSMENT — PAIN - FUNCTIONAL ASSESSMENT
PAIN_FUNCTIONAL_ASSESSMENT: 0-10
PAIN_FUNCTIONAL_ASSESSMENT: 0-10

## 2022-04-23 ASSESSMENT — PAIN DESCRIPTION - LOCATION
LOCATION: HEAD
LOCATION: HEAD

## 2022-04-23 ASSESSMENT — PAIN DESCRIPTION - PAIN TYPE
TYPE: ACUTE PAIN
TYPE: ACUTE PAIN

## 2022-04-23 ASSESSMENT — PAIN DESCRIPTION - FREQUENCY
FREQUENCY: INTERMITTENT
FREQUENCY: CONTINUOUS

## 2022-04-23 NOTE — ED PROVIDER NOTES
HPI:  4/23/22, Time: 8:32 AM EDT         Laura Roberto is a 60 y.o. male presenting to the ED for headache, beginning 1 week ago. The complaint has been constant, moderate in severity, and worsened by nothing. 25-year-old male with past medical history of chronic subdural hematoma after falls in 2021 presents to the ER with chief complaint of headache. Patient states he has had headache for 1 week, headache is\" all over the place\", severe, nonradiating, intermittent, worsening. Patient takes Tylenol and states it helps sometimes. Patient had multiple-year-old admission for chronic headache after subdural hematoma. Patient drinks couple cans of beer last night. He denies chest pain, shortness of breath, palpitation, dizziness, no tingling, weakness or numbness. Patient is somnolent and sleepy. Review of Systems   Constitutional: Negative for chills, fatigue, fever and unexpected weight change. HENT: Negative for congestion and rhinorrhea. Respiratory: Negative for cough and shortness of breath. Cardiovascular: Negative for chest pain and palpitations. Gastrointestinal: Negative for abdominal pain, constipation, diarrhea and vomiting. Genitourinary: Negative for dysuria and frequency. Musculoskeletal: Negative for arthralgias and back pain. Skin: Negative for color change, pallor and wound. Neurological: Positive for headaches. Negative for syncope and light-headedness. Psychiatric/Behavioral: Negative for dysphoric mood. The patient is not nervous/anxious.           --------------------------------------------- PAST HISTORY ---------------------------------------------  Past Medical History:  has a past medical history of Headache and Hypertension. Past Surgical History:  has no past surgical history on file. Social History:  reports that he has been smoking cigarettes. He has a 4.60 pack-year smoking history.  He has never used smokeless tobacco. He reports current alcohol use. He reports that he does not use drugs. Family History: family history is not on file. The patients home medications have been reviewed. Allergies: Ace inhibitors and Fish-derived products    -------------------------------------------------- RESULTS -------------------------------------------------  All laboratory and radiology results have been personally reviewed by myself   LABS:  No results found for this visit on 04/23/22. RADIOLOGY:  Interpreted by Radiologist.  Carlos Keith    (Results Pending)       ------------------------- NURSING NOTES AND VITALS REVIEWED ---------------------------   The nursing notes within the ED encounter and vital signs as below have been reviewed. BP (!) 125/44   Pulse 90   Temp 97.2 °F (36.2 °C) (Oral)   Resp 18   Ht 5' 7\" (1.702 m)   Wt 150 lb (68 kg)   SpO2 93%   BMI 23.49 kg/m²   Oxygen Saturation Interpretation: Normal      ---------------------------------------------------PHYSICAL EXAM--------------------------------------          Physical Exam  Constitutional:       General: He is not in acute distress. Appearance: He is well-developed. HENT:      Head: Normocephalic and atraumatic. Eyes:      General: No scleral icterus. Conjunctiva/sclera: Conjunctivae normal.   Neck:      Trachea: No tracheal deviation. Cardiovascular:      Rate and Rhythm: Normal rate. Heart sounds: No murmur heard. Pulmonary:      Effort: Pulmonary effort is normal. No respiratory distress. Breath sounds: Normal breath sounds. Abdominal:      General: Bowel sounds are normal. There is no distension. Palpations: Abdomen is soft. Tenderness: There is no abdominal tenderness. Musculoskeletal:         General: Normal range of motion. Cervical back: Normal range of motion. Skin:     General: Skin is warm and dry. Neurological:      Mental Status: He is alert and oriented to person, place, and time.    Psychiatric: Behavior: Behavior normal.         Thought Content: Thought content normal.         Judgment: Judgment normal.         ------------------------------ ED COURSE/MEDICAL DECISION MAKING----------------------  Medications   0.9 % sodium chloride bolus (has no administration in time range)   acetaminophen (TYLENOL) tablet 1,000 mg (has no administration in time range)         ED COURSE:       Medical Decision Makin-year-old male with past medical history of chronic subdural hematoma, alcohol abuse, hypertension, hyperlipidemia presented to the hospital with chief complaint of headache. Patient remains hemodynamically stable, patient is somnolent  but is easy to arouse. laboratory work-up significant for lactic acid is 13, creatinine 1.5, BUN 25, blood glucose 43. Potassium 3.3, bicarb 14, AST 1005 with 18, . Patient was given 2L IV fluid, thiamine and folate. He remained hemodynamically stable. Patient is admitted to the hospital for further management. Counseling: The emergency provider has spoken with the patient and discussed todays results, in addition to providing specific details for the plan of care and counseling regarding the diagnosis and prognosis. Questions are answered at this time and they are agreeable with the plan.      --------------------------------- IMPRESSION AND DISPOSITION ---------------------------------    IMPRESSION  1. Nonintractable headache, unspecified chronicity pattern, unspecified headache type        DISPOSITION  Disposition: Admit to telemetry  Patient condition is fair      NOTE: This report was transcribed using voice recognition software.  Every effort was made to ensure accuracy; however, inadvertent computerized transcription errors may be present         Kerri Patterson MD  Resident  [phone]

## 2022-04-23 NOTE — H&P
[facility]  Internal Medicine Residency Program  History and Physical    Patient:  Lucien Clifton 60 y.o. male MRN: [id number]     Date of Service: 4/23/2022    Hospital Day: 1      Chief complaint: had concerns including Headache (Intermittent headache x3 days; denies chest pain sob n/v/d; +ETOH; last drink yesterday evening 2300). History of Present Illness   The patient is a 60 y.o. male with a past medical history of Headache, Hypertension, Pulmonary embolism  Seizure disorder, Pancreatitis, Alcohol abuse and alcohol withdrawal syndrome with complication, Subdural Hematoma (2021 after fall), Subarachnoid Hemorrhage. Patient presented to ED with complaint of non-stop headache. His headache is 8/10 and has been waxing an waning for a period of 2 weeks and responds minimally to over the counter pain medications. He was taking 8 Ibuprofen a day for several days. Patient is unable to describe the location of his headache merely saying it's everywhere, but mostly on top. He endorses drinking prior to coming to ED. He denies dizziness, chest pain, palpitation, shortness of breath. He also denies any neurological symptoms. Patient also complains of bilateral left leg swelling, left leg being much worse. It started a year ago, but it's been really bad for the last 2 weeks. It's swollen all day and elevation does not appear to help. It can be painful sometimes. He's not taking any medication for this. Patient is taking Keppra 2 time daily for his seizure. Patient smokes 3 cigarettes a day and drinks about 6 pack a week. ED Course: Patient was somnolent, but hemodynamically stable  Labs: Lactic Acid 11.3, Beta-Hydroxybutyrate 1.45, Serum DS unremarkable, Serum , pH 7.192, PCO2 39.8, PO2 84.4, HCO3 14.9, AG 30, K 3.3    Past Medical History:      Diagnosis Date    Headache     Hypertension        Past Surgical History:    History reviewed.  No pertinent surgical history. Medications Prior to Admission:    Prior to Admission medications    Medication Sig Start Date End Date Taking? Authorizing Provider   acetaminophen (TYLENOL) 500 MG tablet Take 1 tablet by mouth 4 times daily as needed for Pain  Patient not taking: Reported on 4/23/2022 1/17/22   Sarah Villar DO   meloxicam [facility]) 7.5 MG tablet Take 1 tablet by mouth daily 6/8/21 6/12/21  Addison Lung, APRN - CNP       Allergies:  Ace inhibitors and Fish-derived products    Social History:   TOBACCO:   reports that he has been smoking cigarettes. He has a 4.60 pack-year smoking history. He has never used smokeless tobacco.  ETOH:   reports current alcohol use. OCCUPATION:      Family History:   History reviewed. No pertinent family history. REVIEW OF SYSTEMS:    · Constitutional: No fever, no chills, no change in weight; good appetite, + HA  · HEENT: No blurred vision, no ear problems, no sore throat, no rhinorrhea. · Respiratory: No cough, no sputum production, no pleuritic chest pain, no shortness of breath  · Cardiology: No angina, no dyspnea on exertion, no paroxysmal nocturnal dyspnea, no orthopnea, no palpitation, no leg swelling. · Gastroenterology: No dysphagia, no reflux; no abdominal pain, no nausea or vomiting; no constipation or diarrhea.  No hematochezia   · Genitourinary: No dysuria, no frequency, hesitancy; no hematuria  · Musculoskeletal: no joint pain, no myalgia, no change in range of movement  · Neurology: no focal weakness in extremities, no slurred speech, no double vision, no tingling or numbness sensation  · Endocrinology: no temperature intolerance, no polyphagia, polydipsia or polyuria  · Hematology: no increased bleeding, no bruising, no lymphadenopathy  · Skin: no skin changes noticed by patient  · Psychology: no depressed mood, no suicidal ideation    Physical Exam   · Vitals: /65   Pulse 90   Temp 99.3 °F (37.4 °C) (Oral)   Resp 16   Ht 5' 7\" (1.702 m)   Wt 150 lb (68 kg)   SpO2 100%   BMI 23.49 kg/m²     · General Appearance: alert and oriented to person, place and time, well developed and well- nourished, in no acute distress  · Skin: warm and dry, no rash or erythema  · Head: normocephalic and atraumatic  · Eyes: pupils equal, round, and reactive to light, extraocular eye movements intact, conjunctivae normal  · ENT: tympanic membrane, external ear and ear canal normal bilaterally, nose without deformity, nasal mucosa and turbinates normal without polyps  · Neck: supple and non-tender without mass, no thyromegaly or thyroid nodules, no cervical lymphadenopathy  · Pulmonary/Chest: clear to auscultation bilaterally- no wheezes, rales or rhonchi, normal air movement, no respiratory distress  · Cardiovascular: normal rate, regular rhythm, normal S1 and S2, no murmurs, rubs, clicks, or gallops, distal pulses intact, no carotid bruits  · Abdomen: soft, non-tender, non-distended, normal bowel sounds, no masses or organomegaly  · Extremities: no cyanosis, clubbing. + BLE edema, 4+ pitting left, 1+ pitting right  · Musculoskeletal: normal range of motion, no joint swelling, deformity or tenderness  · Neurologic: reflexes normal and symmetric, no cranial nerve deficit, gait, coordination and speech normal   Labs and Imaging Studies   Basic Labs  Recent Labs     04/23/22  0832      K 3.3*      CO2 14*   BUN 25*   CREATININE 1.5*   GLUCOSE 43*   CALCIUM 8.6       Recent Labs     04/23/22  0832   WBC 5.0   RBC 4.34   HGB 13.2   HCT 36.7*   MCV 84.6   MCH 30.4   MCHC 36.0*   RDW 14.1      MPV 11.7         Imaging Studies:  CT Head WO Contrast   Final Result   Stable appearance of left frontotemporal encephalomalacia. No acute intracranial findings. No acute intracranial hemorrhage. EKG: NA    Resident's Assessment and Plan     Assessment:  Hx of Seizure is on Keppra BID  CT head Stable appearance of left frontotemporal encephalomalacia.  No acute intracranial findings. No acute intracranial hemorrhage. Hypokalemia  CHAYITO, baseline:1.0, likely 2/2 over use of NSAID's at home  HAGMA likely 2/2 to lactic acidosis  Transaminates 2/2 excessive alcohol use - rule out Tylenol toxicity, liver cirrhosis     Hx of HTN  Bilateral leg edema, left much worse - rule out DVD, CHF     Plan:  Check keppra level  Neurology consult  Potassium replacement, follow BMP and Mg at Q6h and replace electrolyte  Urine electrolyte,   Continue IV fluid  Trend lactic acid Q6hr and  , if elevated trend CK q6hr  Hepatitis panel and CK  Seizure precaution  Check bed side swallow  Monitor BP, hydralazine PRN  Thiamine, Folate  US of left leg  Echo?       PT/OT evaluation: Not indicated   DVT prophylaxis/ GI prophylaxis:   Disposition: admit to IM for Tank Magallon MD, PGY-1  Attending physician: Dr. Rupa Matamoros

## 2022-04-23 NOTE — PLAN OF CARE
Problem: Discharge Planning  Goal: Discharge to home or other facility with appropriate resources  Outcome: Completed     Problem: Pain  Goal: Verbalizes/displays adequate comfort level or baseline comfort level  Outcome: Completed

## 2022-04-23 NOTE — Clinical Note
Discharge Plan[de-identified] Other/[facility])   Telemetry/Cardiac Monitoring Required?: No

## 2022-04-23 NOTE — PROGRESS NOTES
IM [facility] resident messaged via perfect serve to notify that the patient is leaving AMA.   Dr Emmanuel Walker taking messages

## 2022-04-24 ENCOUNTER — HOSPITAL ENCOUNTER (EMERGENCY)
Age: 57
Discharge: HOME | End: 2022-04-24
Attending: EMERGENCY MEDICINE
Payer: COMMERCIAL

## 2022-04-24 VITALS
OXYGEN SATURATION: 95 % | HEART RATE: 71 BPM | HEIGHT: 68 IN | WEIGHT: 150 LBS | SYSTOLIC BLOOD PRESSURE: 140 MMHG | TEMPERATURE: 98.1 F | RESPIRATION RATE: 16 BRPM | DIASTOLIC BLOOD PRESSURE: 71 MMHG | BODY MASS INDEX: 22.73 KG/M2

## 2022-04-24 DIAGNOSIS — E87.6 HYPOKALEMIA: ICD-10-CM

## 2022-04-24 DIAGNOSIS — F10.20 UNCOMPLICATED ALCOHOL DEPENDENCE (HCC): ICD-10-CM

## 2022-04-24 DIAGNOSIS — R51.9 ACUTE NONINTRACTABLE HEADACHE, UNSPECIFIED HEADACHE TYPE: Primary | ICD-10-CM

## 2022-04-24 PROBLEM — K70.10 ALCOHOLIC HEPATITIS WITHOUT ASCITES: Status: ACTIVE | Noted: 2022-04-24

## 2022-04-24 LAB
ACETAMINOPHEN LEVEL: <5 MCG/ML (ref 10–30)
ALBUMIN SERPL-MCNC: 4.1 G/DL (ref 3.5–5.2)
ALP BLD-CCNC: 57 U/L (ref 40–129)
ALT SERPL-CCNC: 363 U/L (ref 0–40)
AMPHETAMINE SCREEN, URINE: NOT DETECTED
ANION GAP SERPL CALCULATED.3IONS-SCNC: 18 MMOL/L (ref 7–16)
AST SERPL-CCNC: 487 U/L (ref 0–39)
B.E.: 0 MMOL/L (ref -3–3)
BACTERIA: NORMAL /HPF
BARBITURATE SCREEN URINE: NOT DETECTED
BASOPHILS ABSOLUTE: 0.02 E9/L (ref 0–0.2)
BASOPHILS RELATIVE PERCENT: 0.2 % (ref 0–2)
BENZODIAZEPINE SCREEN, URINE: NOT DETECTED
BILIRUB SERPL-MCNC: 1.2 MG/DL (ref 0–1.2)
BILIRUBIN URINE: NEGATIVE
BLOOD, URINE: NORMAL
BUN BLDV-MCNC: 20 MG/DL (ref 6–20)
CALCIUM SERPL-MCNC: 8.9 MG/DL (ref 8.6–10.2)
CANNABINOID SCREEN URINE: NOT DETECTED
CHLORIDE BLD-SCNC: 104 MMOL/L (ref 98–107)
CHP ED QC CHECK: YES
CLARITY: CLEAR
CO2: 24 MMOL/L (ref 22–29)
COCAINE METABOLITE SCREEN URINE: NOT DETECTED
COHB: 3.9 % (ref 0–1.5)
COLOR: YELLOW
CREAT SERPL-MCNC: 1.1 MG/DL (ref 0.7–1.2)
CRITICAL: ABNORMAL
DATE ANALYZED: ABNORMAL
DATE OF COLLECTION: ABNORMAL
EOSINOPHILS ABSOLUTE: 0.04 E9/L (ref 0.05–0.5)
EOSINOPHILS RELATIVE PERCENT: 0.4 % (ref 0–6)
ETHANOL: 149 MG/DL (ref 0–0.08)
FENTANYL SCREEN, URINE: NOT DETECTED
GFR AFRICAN AMERICAN: >60
GFR NON-AFRICAN AMERICAN: >60 ML/MIN/1.73
GLUCOSE BLD-MCNC: 70 MG/DL (ref 74–99)
GLUCOSE BLD-MCNC: 86 MG/DL
GLUCOSE URINE: NEGATIVE MG/DL
HCO3: 25.4 MMOL/L (ref 22–26)
HCT VFR BLD CALC: 34.7 % (ref 37–54)
HEMOGLOBIN: 12.8 G/DL (ref 12.5–16.5)
HHB: 6.9 % (ref 0–5)
IMMATURE GRANULOCYTES #: 0.02 E9/L
IMMATURE GRANULOCYTES %: 0.2 % (ref 0–5)
KETONES, URINE: NEGATIVE MG/DL
LAB: ABNORMAL
LACTIC ACID: 2.7 MMOL/L (ref 0.5–2.2)
LACTIC ACID: 3 MMOL/L (ref 0.5–2.2)
LEUKOCYTE ESTERASE, URINE: NEGATIVE
LYMPHOCYTES ABSOLUTE: 2.25 E9/L (ref 1.5–4)
LYMPHOCYTES RELATIVE PERCENT: 24.1 % (ref 20–42)
Lab: ABNORMAL
Lab: NORMAL
MAGNESIUM: 2.4 MG/DL (ref 1.6–2.6)
MCH RBC QN AUTO: 29.9 PG (ref 26–35)
MCHC RBC AUTO-ENTMCNC: 36.9 % (ref 32–34.5)
MCV RBC AUTO: 81.1 FL (ref 80–99.9)
METER GLUCOSE: 149 MG/DL (ref 74–99)
METER GLUCOSE: 86 MG/DL (ref 74–99)
METHADONE SCREEN, URINE: NOT DETECTED
METHB: 0.4 % (ref 0–1.5)
MODE: ABNORMAL
MONOCYTES ABSOLUTE: 0.78 E9/L (ref 0.1–0.95)
MONOCYTES RELATIVE PERCENT: 8.4 % (ref 2–12)
NEUTROPHILS ABSOLUTE: 6.21 E9/L (ref 1.8–7.3)
NEUTROPHILS RELATIVE PERCENT: 66.7 % (ref 43–80)
NITRITE, URINE: NEGATIVE
O2 CONTENT: 16.2 ML/DL
O2 SATURATION: 92.8 % (ref 92–98.5)
O2HB: 88.8 % (ref 94–97)
OPERATOR ID: 1210
OPIATE SCREEN URINE: NOT DETECTED
OXYCODONE URINE: NOT DETECTED
PATIENT TEMP: 37 C
PCO2: 44.3 MMHG (ref 35–45)
PDW BLD-RTO: 13.5 FL (ref 11.5–15)
PH BLOOD GAS: 7.38 (ref 7.35–7.45)
PH UA: 7 (ref 5–9)
PHENCYCLIDINE SCREEN URINE: NOT DETECTED
PLATELET # BLD: 227 E9/L (ref 130–450)
PMV BLD AUTO: 11.4 FL (ref 7–12)
PO2: 73.3 MMHG (ref 75–100)
POTASSIUM SERPL-SCNC: 3.2 MMOL/L (ref 3.5–5)
PROTEIN UA: NEGATIVE MG/DL
RBC # BLD: 4.28 E12/L (ref 3.8–5.8)
RBC UA: NORMAL /HPF (ref 0–2)
SALICYLATE, SERUM: <0.3 MG/DL (ref 0–30)
SODIUM BLD-SCNC: 146 MMOL/L (ref 132–146)
SOURCE, BLOOD GAS: ABNORMAL
SPECIFIC GRAVITY UA: 1.01 (ref 1–1.03)
THB: 12.9 G/DL (ref 11.5–16.5)
TIME ANALYZED: 620
TOTAL PROTEIN: 7 G/DL (ref 6.4–8.3)
TRICYCLIC ANTIDEPRESSANTS SCREEN SERUM: NEGATIVE NG/ML
UROBILINOGEN, URINE: 0.2 E.U./DL
WBC # BLD: 9.3 E9/L (ref 4.5–11.5)
WBC UA: NORMAL /HPF (ref 0–5)

## 2022-04-24 PROCEDURE — 85025 COMPLETE CBC W/AUTO DIFF WBC: CPT

## 2022-04-24 PROCEDURE — 80307 DRUG TEST PRSMV CHEM ANLYZR: CPT

## 2022-04-24 PROCEDURE — 80053 COMPREHEN METABOLIC PANEL: CPT

## 2022-04-24 PROCEDURE — 96366 THER/PROPH/DIAG IV INF ADDON: CPT

## 2022-04-24 PROCEDURE — 83605 ASSAY OF LACTIC ACID: CPT

## 2022-04-24 PROCEDURE — 2580000003 HC RX 258: Performed by: EMERGENCY MEDICINE

## 2022-04-24 PROCEDURE — 96372 THER/PROPH/DIAG INJ SC/IM: CPT

## 2022-04-24 PROCEDURE — 6360000004 HC RX CONTRAST MEDICATION

## 2022-04-24 PROCEDURE — 93005 ELECTROCARDIOGRAM TRACING: CPT | Performed by: EMERGENCY MEDICINE

## 2022-04-24 PROCEDURE — 96365 THER/PROPH/DIAG IV INF INIT: CPT

## 2022-04-24 PROCEDURE — 2500000003 HC RX 250 WO HCPCS: Performed by: EMERGENCY MEDICINE

## 2022-04-24 PROCEDURE — 83735 ASSAY OF MAGNESIUM: CPT

## 2022-04-24 PROCEDURE — 6360000002 HC RX W HCPCS: Performed by: EMERGENCY MEDICINE

## 2022-04-24 PROCEDURE — 82805 BLOOD GASES W/O2 SATURATION: CPT

## 2022-04-24 PROCEDURE — 82077 ASSAY SPEC XCP UR&BREATH IA: CPT

## 2022-04-24 PROCEDURE — 80179 DRUG ASSAY SALICYLATE: CPT

## 2022-04-24 PROCEDURE — 6370000000 HC RX 637 (ALT 250 FOR IP): Performed by: EMERGENCY MEDICINE

## 2022-04-24 PROCEDURE — 82962 GLUCOSE BLOOD TEST: CPT

## 2022-04-24 PROCEDURE — 80143 DRUG ASSAY ACETAMINOPHEN: CPT

## 2022-04-24 PROCEDURE — 81001 URINALYSIS AUTO W/SCOPE: CPT

## 2022-04-24 RX ORDER — ACETAMINOPHEN 500 MG
500 TABLET ORAL 4 TIMES DAILY PRN
Qty: 40 TABLET | Refills: 0 | Status: ON HOLD | OUTPATIENT
Start: 2022-04-24 | End: 2022-04-27 | Stop reason: HOSPADM

## 2022-04-24 RX ORDER — DROPERIDOL 2.5 MG/ML
1.25 INJECTION, SOLUTION INTRAMUSCULAR; INTRAVENOUS ONCE
Status: COMPLETED | OUTPATIENT
Start: 2022-04-24 | End: 2022-04-24

## 2022-04-24 RX ORDER — 0.9 % SODIUM CHLORIDE 0.9 %
500 INTRAVENOUS SOLUTION INTRAVENOUS ONCE
Status: COMPLETED | OUTPATIENT
Start: 2022-04-24 | End: 2022-04-24

## 2022-04-24 RX ADMIN — DROPERIDOL 1.25 MG: 2.5 INJECTION, SOLUTION INTRAMUSCULAR; INTRAVENOUS at 07:35

## 2022-04-24 RX ADMIN — GLUCAGON HYDROCHLORIDE 1 MG: KIT at 07:35

## 2022-04-24 RX ADMIN — THIAMINE HYDROCHLORIDE: 100 INJECTION, SOLUTION INTRAMUSCULAR; INTRAVENOUS at 05:57

## 2022-04-24 RX ADMIN — SODIUM CHLORIDE 500 ML: 9 INJECTION, SOLUTION INTRAVENOUS at 05:53

## 2022-04-24 RX ADMIN — POTASSIUM BICARBONATE 40 MEQ: 782 TABLET, EFFERVESCENT ORAL at 07:36

## 2022-04-24 ASSESSMENT — PAIN SCALES - GENERAL: PAINLEVEL_OUTOF10: 0

## 2022-04-24 ASSESSMENT — PAIN - FUNCTIONAL ASSESSMENT: PAIN_FUNCTIONAL_ASSESSMENT: NONE - DENIES PAIN

## 2022-04-24 NOTE — ED NOTES
Department of Emergency Medicine  FIRST PROVIDER TRIAGE NOTE             Independent MLP           4/24/22  12:10 AM EDT    Date of Encounter: 4/24/22   MRN: [id number]      HPI: Satish Maria is a 60 y.o. male who presents to the ED for Headache (H/A started 2 weeks ago states that is is worse tonight. )  Pt was here yesterday for the same and had head CT yesterday. ROS: Negative for cp or sob. PE: Gen Appearance/Constitutional: alert  Musculoskeletal: moves all extremities x 4     Initial Plan of Care: All treatment areas with department are currently occupied. Plan to order/Initiate the following while awaiting opening in ED: deferred to provider.   Initiate Treatment-Testing, Proceed toTreatment Area When Bed Available for ED Attending/MLP to Continue Care    Electronically signed by Leeann Esposito PA-C   DD: 4/24/22         Leeann Esposito PA-C  04/24/22 0010

## 2022-04-24 NOTE — ED NOTES
Pt sleeping and did not want to be gowned.  Denied pain at this time     Darryl Hobson, RN  04/24/22 9927

## 2022-04-24 NOTE — ED PROVIDER NOTES
Department of Emergency Medicine   ED  Provider Note  Admit Date/RoomTime: 4/24/2022  3:50 AM  ED Room: [facility]          History of Present Illness:  4/24/22, Time: 6:47 AM EDT  Chief Complaint   Patient presents with    Headache     H/A started 2 weeks ago states that is is worse tonight. Peter Nath is a 60 y.o. male presenting to the ED for headache, beginning 2 weeks. The complaint has been persistent, moderate in severity, and worsened by nothing. Patient presents for headache 2 weeks. Has seen this department recently, seen this department yesterday, was found to have significant metabolic derangement including hypokalemia and significant lactic acidosis, he was admitted but then left [address] Complains of bitemporal headache denies visual disturbances or new traumas falls or injuries. Review of Systems:   Pertinent positives and negatives are stated within HPI, all other systems reviewed and are negative.        --------------------------------------------- PAST HISTORY ---------------------------------------------  Past Medical History:  has a past medical history of Headache and Hypertension. Past Surgical History:  has no past surgical history on file. Social History:  reports that he has been smoking cigarettes. He has a 4.60 pack-year smoking history. He has never used smokeless tobacco. He reports current alcohol use. He reports that he does not use drugs. Family History: family history is not on file. . Unless otherwise noted, family history is non contributory    The patients home medications have been reviewed.     Allergies: Ace inhibitors and Fish-derived products        ---------------------------------------------------PHYSICAL EXAM--------------------------------------    Constitutional/General: Alert and oriented x3  Head: Normocephalic and atraumatic  Eyes: PERRL, EOMI, sclera non icteric  Mouth: Oropharynx clear, handling secretions, no trismus, no asymmetry of the posterior oropharynx or uvular edema  Neck: Supple, full ROM, no stridor, no meningeal signs  Respiratory: Lungs clear to auscultation bilaterally,Not in respiratory distress  Cardiovascular:  Regular rate. Regular rhythm. 2+ distal pulses. Equal extremity pulses. Chest: No chest wall tenderness  GI:  Abdomen Soft, Non tender, Non distended. No rebound, guarding, or rigidity. Musculoskeletal: Moves all extremities x 4. Warm and well perfused, no clubbing, cyanosis, or edema. Capillary refill <3 seconds  Integument: skin warm and dry. No rashes. Neurologic: GCS 15, no focal deficits,   Psychiatric: Normal Affect      EKG: Interpreted by emergency department physician, Dr. Beuford Cockayne   This EKG is signed and interpreted by me. Rate: 66  Rhythm: Sinus  Interpretation: Sinus rhythm, normal axis, KY is 146, QRS is 112, QTc is 461, nonspecific T changes  Comparison: stable as compared to patient's most recent EKG      -------------------------------------------------- RESULTS -------------------------------------------------  I have personally reviewed all laboratory and imaging results for this patient. Results are listed below.      LABS: (Lab results interpreted by me)  Results for orders placed or performed during the hospital encounter of 04/24/22   CBC with Auto Differential   Result Value Ref Range    WBC 9.3 4.5 - 11.5 E9/L    RBC 4.28 3.80 - 5.80 E12/L    Hemoglobin 12.8 12.5 - 16.5 g/dL    Hematocrit 34.7 (L) 37.0 - 54.0 %    MCV 81.1 80.0 - 99.9 fL    MCH 29.9 26.0 - 35.0 pg    MCHC 36.9 (H) 32.0 - 34.5 %    RDW 13.5 11.5 - 15.0 fL    Platelets 324 084 - 755 E9/L    MPV 11.4 7.0 - 12.0 fL    Neutrophils % 66.7 43.0 - 80.0 %    Immature Granulocytes % 0.2 0.0 - 5.0 %    Lymphocytes % 24.1 20.0 - 42.0 %    Monocytes % 8.4 2.0 - 12.0 %    Eosinophils % 0.4 0.0 - 6.0 %    Basophils % 0.2 0.0 - 2.0 %    Neutrophils Absolute 6.21 1.80 - 7.30 E9/L    Immature Granulocytes # 0.02 E9/L    Lymphocytes Absolute 2.25 1.50 - 4.00 E9/L    Monocytes Absolute 0.78 0.10 - 0.95 E9/L    Eosinophils Absolute 0.04 (L) 0.05 - 0.50 E9/L    Basophils Absolute 0.02 0.00 - 0.20 E9/L   Comprehensive Metabolic Panel   Result Value Ref Range    Sodium 146 132 - 146 mmol/L    Potassium 3.2 (L) 3.5 - 5.0 mmol/L    Chloride 104 98 - 107 mmol/L    CO2 24 22 - 29 mmol/L    Anion Gap 18 (H) 7 - 16 mmol/L    Glucose 70 (L) 74 - 99 mg/dL    BUN 20 6 - 20 mg/dL    CREATININE 1.1 0.7 - 1.2 mg/dL    GFR Non-African American >60 >=60 mL/min/1.73    GFR African American >60     Calcium 8.9 8.6 - 10.2 mg/dL    Total Protein 7.0 6.4 - 8.3 g/dL    Albumin 4.1 3.5 - 5.2 g/dL    Total Bilirubin 1.2 0.0 - 1.2 mg/dL    Alkaline Phosphatase 57 40 - 129 U/L     (H) 0 - 40 U/L     (H) 0 - 39 U/L   Magnesium   Result Value Ref Range    Magnesium 2.4 1.6 - 2.6 mg/dL   Lactic Acid   Result Value Ref Range    Lactic Acid 3.0 (H) 0.5 - 2.2 mmol/L   Serum Drug Screen   Result Value Ref Range    Ethanol Lvl 149 mg/dL    Acetaminophen Level <5.0 (L) 10.0 - 80.4 mcg/mL    Salicylate, Serum <0.0 0.0 - 30.0 mg/dL    TCA Scrn NEGATIVE Cutoff:300 ng/mL   Blood Gas, Arterial   Result Value Ref Range    Date Analyzed [id number]     Time Analyzed 0620     Source: Blood Arterial     pH, Blood Gas 7.376 7.350 - 7.450    PCO2 44.3 35.0 - 45.0 mmHg    PO2 73.3 (L) 75.0 - 100.0 mmHg    HCO3 25.4 22.0 - 26.0 mmol/L    B.E. 0.0 -3.0 - 3.0 mmol/L    O2 Sat 92.8 92.0 - 98.5 %    O2Hb 88.8 (L) 94.0 - 97.0 %    COHb 3.9 (H) 0.0 - 1.5 %    MetHb 0.4 0.0 - 1.5 %    O2 Content 16.2 mL/dL    HHb 6.9 (H) 0.0 - 5.0 %    tHb (est) 12.9 11.5 - 16.5 g/dL    Mode RA     Date Of Collection      Time Collected      Pt Temp 37.0 C     ID [id number]     Lab 90045     Critical(s) Notified .  No Critical Values    EKG 12 Lead   Result Value Ref Range    Ventricular Rate 66 BPM    Atrial Rate 66 BPM    P-R Interval 146 ms    QRS Duration 112 ms    Q-T Interval 440 ms    QTc Calculation (Bazett) 461 ms    P Axis 69 degrees    R Axis -6 degrees    T Axis 39 degrees   ,       RADIOLOGY:  Interpreted by Radiologist unless otherwise specified  No orders to display                    ------------------------- NURSING NOTES AND VITALS REVIEWED ---------------------------   The nursing notes within the ED encounter and vital signs as below have been reviewed by myself  BP (!) 148/78   Pulse 71   Temp 98.2 °F (36.8 °C)   Resp 16   Ht 5' 8\" (1.727 m)   Wt 150 lb (68 kg)   SpO2 96%   BMI 22.81 kg/m²     Oxygen Saturation Interpretation: Normal    The cardiac monitor revealed NSR with a heart rate in the 80s as interpreted by me. The cardiac monitor was ordered secondary to the patient's heart rate and to monitor the patient for dysrhythmia. CPT 93274    The patients available past medical records and past encounters were reviewed. ------------------------------ ED COURSE/MEDICAL DECISION MAKING----------------------  Medications   droperidol (INAPSINE) injection 1.25 mg (has no administration in time range)   0.9 % sodium chloride bolus (0 mLs IntraVENous Stopped 4/24/22 7254)   adult multi-vitamin with vitamin k 10 mL, thiamine 388 mg, folic acid 1 mg in dextrose 5 % and 0.9 % NaCl 1,000 mL infusion ( IntraVENous New Bag 4/24/22 8196)                    Medical Decision Making:     I, Dr. Mirna Peña am the primary provider of record    Patient with headache history of alcohol abuse recent admission 24 hours ago where he left against medical vice after finding severe metabolic derangements. Work-up undertaken, this was 5 to my successor      Re-Evaluations:        Re-evaluation.   Patients symptoms show no change  Repeat physical examination is not changed        This patient's ED course included: a personal history and physicial examination, re-evaluation prior to disposition, IV medications, cardiac monitoring, continuous pulse oximetry and complex medical decision making and emergency management    This patient has remained hemodynamically stable during their ED course. Counseling: The emergency provider has spoken with the patient and discussed todays results, in addition to providing specific details for the plan of care and counseling regarding the diagnosis and prognosis. Questions are answered at this time and they are agreeable with the plan.       --------------------------------- IMPRESSION AND DISPOSITION ---------------------------------    IMPRESSION  1. Acute nonintractable headache, unspecified headache type    2. Hypokalemia    3. Uncomplicated alcohol dependence ([facility] [address]        DISPOSITION  Disposition: pending workup   Patient condition is stable        NOTE: This report was transcribed using voice recognition software.  Every effort was made to ensure accuracy; however, inadvertent computerized transcription errors may be present       Cameron Henley DO  04/25/22 2598

## 2022-04-24 NOTE — ED PROVIDER NOTES
Assumed care of patient from Dr. Anca Mcrae. Please refer to Dr. Vijaya Constantino note for full history and physical.  Presented with headache. Similar complaint yesterday when he was found to have alcoholic ketoacidosis and admitted but left AMA from medical floor. Workup thus far notable for elevated lactate 3.0, mild hypokalemia, and interval resolution of metabolic acidosis. Pending at this time is reassessment. Course under my care:  - on my evaluation patient resting comfortably in bed in no acute distress, states headache is improving.  - discussed findings and their relation to his alcohol use, he is not currently interested in alcohol detox  - patient eating and BG stable after discontinuing D5 infusion.  - given improvement in metabolic derangements compared to yesterday, patient is appropriate for discharge home and outpatient f/u with PCP.  - After discussion of findings and return precautions, patient agrees with plan for discharge and outpatient follow up with PCP and addiction medicine       --------------------------------- IMPRESSION AND DISPOSITION ---------------------------------    IMPRESSION  1. Acute nonintractable headache, unspecified headache type    2. Hypokalemia    3.  Uncomplicated alcohol dependence ([address]        DISPOSITION  Disposition: Discharge to home  Patient condition is good      Leanna Gillespie MD  04/24/22 7625

## 2022-04-25 ENCOUNTER — APPOINTMENT (OUTPATIENT)
Dept: GENERAL RADIOLOGY | Age: 57
End: 2022-04-25
Payer: COMMERCIAL

## 2022-04-25 ENCOUNTER — HOSPITAL ENCOUNTER (OUTPATIENT)
Age: 57
Setting detail: OBSERVATION
Discharge: HOME | End: 2022-04-27
Attending: EMERGENCY MEDICINE | Admitting: INTERNAL MEDICINE
Payer: COMMERCIAL

## 2022-04-25 DIAGNOSIS — R77.8 ELEVATED TROPONIN: Primary | ICD-10-CM

## 2022-04-25 LAB
ACETAMINOPHEN LEVEL: <5 MCG/ML (ref 10–30)
ACETAMINOPHEN LEVEL: <5 MCG/ML (ref 10–30)
ALBUMIN SERPL-MCNC: 3.8 G/DL (ref 3.5–5.2)
ALBUMIN SERPL-MCNC: 3.9 G/DL (ref 3.5–5.2)
ALBUMIN SERPL-MCNC: 4.3 G/DL (ref 3.5–5.2)
ALP BLD-CCNC: 58 U/L (ref 40–129)
ALP BLD-CCNC: 63 U/L (ref 40–129)
ALP BLD-CCNC: 70 U/L (ref 40–129)
ALT SERPL-CCNC: 218 U/L (ref 0–40)
ALT SERPL-CCNC: 221 U/L (ref 0–40)
ALT SERPL-CCNC: 239 U/L (ref 0–40)
AMPHETAMINE SCREEN, URINE: NOT DETECTED
ANION GAP SERPL CALCULATED.3IONS-SCNC: 11 MMOL/L (ref 7–16)
ANION GAP SERPL CALCULATED.3IONS-SCNC: 11 MMOL/L (ref 7–16)
ANION GAP SERPL CALCULATED.3IONS-SCNC: 12 MMOL/L (ref 7–16)
AST SERPL-CCNC: 269 U/L (ref 0–39)
AST SERPL-CCNC: 275 U/L (ref 0–39)
AST SERPL-CCNC: 283 U/L (ref 0–39)
BARBITURATE SCREEN URINE: NOT DETECTED
BASOPHILS ABSOLUTE: 0.02 E9/L (ref 0–0.2)
BASOPHILS ABSOLUTE: 0.06 E9/L (ref 0–0.2)
BASOPHILS RELATIVE PERCENT: 0.3 % (ref 0–2)
BASOPHILS RELATIVE PERCENT: 1 % (ref 0–2)
BENZODIAZEPINE SCREEN, URINE: NOT DETECTED
BILIRUB SERPL-MCNC: 0.6 MG/DL (ref 0–1.2)
BILIRUB SERPL-MCNC: 1.1 MG/DL (ref 0–1.2)
BILIRUB SERPL-MCNC: 1.1 MG/DL (ref 0–1.2)
BILIRUBIN DIRECT: 0.3 MG/DL (ref 0–0.3)
BILIRUBIN URINE: NEGATIVE
BILIRUBIN, INDIRECT: 0.8 MG/DL (ref 0–1)
BLOOD, URINE: NEGATIVE
BUN BLDV-MCNC: 14 MG/DL (ref 6–20)
BUN BLDV-MCNC: 18 MG/DL (ref 6–20)
BUN BLDV-MCNC: 19 MG/DL (ref 6–20)
CALCIUM IONIZED: 1.12 MMOL/L (ref 1.15–1.33)
CALCIUM SERPL-MCNC: 8.3 MG/DL (ref 8.6–10.2)
CALCIUM SERPL-MCNC: 8.3 MG/DL (ref 8.6–10.2)
CALCIUM SERPL-MCNC: 8.5 MG/DL (ref 8.6–10.2)
CANNABINOID SCREEN URINE: POSITIVE
CHLORIDE BLD-SCNC: 100 MMOL/L (ref 98–107)
CHLORIDE BLD-SCNC: 103 MMOL/L (ref 98–107)
CHLORIDE BLD-SCNC: 104 MMOL/L (ref 98–107)
CHOLESTEROL, TOTAL: 146 MG/DL (ref 0–199)
CLARITY: CLEAR
CO2: 26 MMOL/L (ref 22–29)
CO2: 29 MMOL/L (ref 22–29)
CO2: 29 MMOL/L (ref 22–29)
COCAINE METABOLITE SCREEN URINE: NOT DETECTED
COLOR: YELLOW
CREAT SERPL-MCNC: 1.1 MG/DL (ref 0.7–1.2)
CREAT SERPL-MCNC: 1.1 MG/DL (ref 0.7–1.2)
CREAT SERPL-MCNC: 1.2 MG/DL (ref 0.7–1.2)
EKG ATRIAL RATE: 66 BPM
EKG ATRIAL RATE: 68 BPM
EKG P AXIS: 69 DEGREES
EKG P AXIS: 69 DEGREES
EKG P-R INTERVAL: 142 MS
EKG P-R INTERVAL: 146 MS
EKG Q-T INTERVAL: 440 MS
EKG Q-T INTERVAL: 444 MS
EKG QRS DURATION: 106 MS
EKG QRS DURATION: 112 MS
EKG QTC CALCULATION (BAZETT): 461 MS
EKG QTC CALCULATION (BAZETT): 472 MS
EKG R AXIS: -6 DEGREES
EKG T AXIS: 39 DEGREES
EKG T AXIS: 51 DEGREES
EKG VENTRICULAR RATE: 66 BPM
EKG VENTRICULAR RATE: 68 BPM
EOSINOPHILS ABSOLUTE: 0.04 E9/L (ref 0.05–0.5)
EOSINOPHILS ABSOLUTE: 0.08 E9/L (ref 0.05–0.5)
EOSINOPHILS RELATIVE PERCENT: 0.6 % (ref 0–6)
EOSINOPHILS RELATIVE PERCENT: 1.4 % (ref 0–6)
ETHANOL: 111 MG/DL (ref 0–0.08)
ETHANOL: <10 MG/DL (ref 0–0.08)
FENTANYL SCREEN, URINE: NOT DETECTED
FOLATE: >20 NG/ML (ref 4.8–24.2)
GFR AFRICAN AMERICAN: >60
GFR NON-AFRICAN AMERICAN: >60 ML/MIN/1.73
GLUCOSE BLD-MCNC: 103 MG/DL (ref 74–99)
GLUCOSE BLD-MCNC: 107 MG/DL (ref 74–99)
GLUCOSE BLD-MCNC: 90 MG/DL (ref 74–99)
GLUCOSE URINE: NEGATIVE MG/DL
HBA1C MFR BLD: 5.2 % (ref 4–5.6)
HCT VFR BLD CALC: 33.8 % (ref 37–54)
HCT VFR BLD CALC: 37.2 % (ref 37–54)
HDLC SERPL-MCNC: 92 MG/DL
HEMOGLOBIN: 12.5 G/DL (ref 12.5–16.5)
HEMOGLOBIN: 13.6 G/DL (ref 12.5–16.5)
IMMATURE GRANULOCYTES #: 0.02 E9/L
IMMATURE GRANULOCYTES #: 0.02 E9/L
IMMATURE GRANULOCYTES %: 0.3 % (ref 0–5)
IMMATURE GRANULOCYTES %: 0.3 % (ref 0–5)
KETONES, URINE: NEGATIVE MG/DL
LACTIC ACID, SEPSIS: 2 MMOL/L (ref 0.5–1.9)
LACTIC ACID: 0.8 MMOL/L (ref 0.5–2.2)
LACTIC ACID: 3.1 MMOL/L (ref 0.5–2.2)
LDL CHOLESTEROL CALCULATED: 40 MG/DL (ref 0–99)
LEUKOCYTE ESTERASE, URINE: NEGATIVE
LYMPHOCYTES ABSOLUTE: 1.81 E9/L (ref 1.5–4)
LYMPHOCYTES ABSOLUTE: 1.81 E9/L (ref 1.5–4)
LYMPHOCYTES RELATIVE PERCENT: 27.7 % (ref 20–42)
LYMPHOCYTES RELATIVE PERCENT: 30.8 % (ref 20–42)
Lab: ABNORMAL
MAGNESIUM: 1.9 MG/DL (ref 1.6–2.6)
MAGNESIUM: 2.2 MG/DL (ref 1.6–2.6)
MCH RBC QN AUTO: 30 PG (ref 26–35)
MCH RBC QN AUTO: 30.1 PG (ref 26–35)
MCHC RBC AUTO-ENTMCNC: 36.6 % (ref 32–34.5)
MCHC RBC AUTO-ENTMCNC: 37 % (ref 32–34.5)
MCV RBC AUTO: 81.4 FL (ref 80–99.9)
MCV RBC AUTO: 81.9 FL (ref 80–99.9)
METHADONE SCREEN, URINE: NOT DETECTED
MONOCYTES ABSOLUTE: 0.68 E9/L (ref 0.1–0.95)
MONOCYTES ABSOLUTE: 0.8 E9/L (ref 0.1–0.95)
MONOCYTES RELATIVE PERCENT: 11.6 % (ref 2–12)
MONOCYTES RELATIVE PERCENT: 12.2 % (ref 2–12)
NEUTROPHILS ABSOLUTE: 3.23 E9/L (ref 1.8–7.3)
NEUTROPHILS ABSOLUTE: 3.85 E9/L (ref 1.8–7.3)
NEUTROPHILS RELATIVE PERCENT: 54.9 % (ref 43–80)
NEUTROPHILS RELATIVE PERCENT: 58.9 % (ref 43–80)
NITRITE, URINE: NEGATIVE
OPIATE SCREEN URINE: NOT DETECTED
OXYCODONE URINE: NOT DETECTED
PDW BLD-RTO: 13.6 FL (ref 11.5–15)
PDW BLD-RTO: 13.7 FL (ref 11.5–15)
PH UA: 6 (ref 5–9)
PHENCYCLIDINE SCREEN URINE: NOT DETECTED
PHOSPHORUS: 3.2 MG/DL (ref 2.5–4.5)
PLATELET # BLD: 209 E9/L (ref 130–450)
PLATELET # BLD: 227 E9/L (ref 130–450)
PMV BLD AUTO: 11.4 FL (ref 7–12)
PMV BLD AUTO: 11.7 FL (ref 7–12)
POTASSIUM REFLEX MAGNESIUM: 3.7 MMOL/L (ref 3.5–5)
POTASSIUM SERPL-SCNC: 3.2 MMOL/L (ref 3.5–5)
POTASSIUM SERPL-SCNC: 3.5 MMOL/L (ref 3.5–5)
PRO-BNP: 270 PG/ML (ref 0–125)
PROTEIN UA: NEGATIVE MG/DL
RBC # BLD: 4.15 E12/L (ref 3.8–5.8)
RBC # BLD: 4.54 E12/L (ref 3.8–5.8)
SALICYLATE, SERUM: <0.3 MG/DL (ref 0–30)
SALICYLATE, SERUM: <0.3 MG/DL (ref 0–30)
SODIUM BLD-SCNC: 141 MMOL/L (ref 132–146)
SODIUM BLD-SCNC: 141 MMOL/L (ref 132–146)
SODIUM BLD-SCNC: 143 MMOL/L (ref 132–146)
SPECIFIC GRAVITY UA: 1.01 (ref 1–1.03)
T4 FREE: 1.46 NG/DL (ref 0.93–1.7)
TOTAL PROTEIN: 6.4 G/DL (ref 6.4–8.3)
TOTAL PROTEIN: 7 G/DL (ref 6.4–8.3)
TOTAL PROTEIN: 7.2 G/DL (ref 6.4–8.3)
TRICYCLIC ANTIDEPRESSANTS SCREEN SERUM: NEGATIVE NG/ML
TRICYCLIC ANTIDEPRESSANTS SCREEN SERUM: NEGATIVE NG/ML
TRIGL SERPL-MCNC: 71 MG/DL (ref 0–149)
TROPONIN, HIGH SENSITIVITY: 130 NG/L (ref 0–11)
TROPONIN, HIGH SENSITIVITY: 135 NG/L (ref 0–11)
TROPONIN, HIGH SENSITIVITY: 135 NG/L (ref 0–11)
TROPONIN, HIGH SENSITIVITY: 138 NG/L (ref 0–11)
TSH SERPL DL<=0.05 MIU/L-ACNC: 0.26 UIU/ML (ref 0.27–4.2)
UROBILINOGEN, URINE: 0.2 E.U./DL
VITAMIN B-12: 796 PG/ML (ref 211–946)
VLDLC SERPL CALC-MCNC: 14 MG/DL
WBC # BLD: 5.9 E9/L (ref 4.5–11.5)
WBC # BLD: 6.5 E9/L (ref 4.5–11.5)

## 2022-04-25 PROCEDURE — 80177 DRUG SCRN QUAN LEVETIRACETAM: CPT

## 2022-04-25 PROCEDURE — 2500000003 HC RX 250 WO HCPCS: Performed by: EMERGENCY MEDICINE

## 2022-04-25 PROCEDURE — 80048 BASIC METABOLIC PNL TOTAL CA: CPT

## 2022-04-25 PROCEDURE — 83605 ASSAY OF LACTIC ACID: CPT

## 2022-04-25 PROCEDURE — 80307 DRUG TEST PRSMV CHEM ANLYZR: CPT

## 2022-04-25 PROCEDURE — 83036 HEMOGLOBIN GLYCOSYLATED A1C: CPT

## 2022-04-25 PROCEDURE — 81003 URINALYSIS AUTO W/O SCOPE: CPT

## 2022-04-25 PROCEDURE — 80143 DRUG ASSAY ACETAMINOPHEN: CPT

## 2022-04-25 PROCEDURE — 6360000002 HC RX W HCPCS: Performed by: INTERNAL MEDICINE

## 2022-04-25 PROCEDURE — 2580000003 HC RX 258: Performed by: INTERNAL MEDICINE

## 2022-04-25 PROCEDURE — 80061 LIPID PANEL: CPT

## 2022-04-25 PROCEDURE — 93005 ELECTROCARDIOGRAM TRACING: CPT | Performed by: INTERNAL MEDICINE

## 2022-04-25 PROCEDURE — 96375 TX/PRO/DX INJ NEW DRUG ADDON: CPT

## 2022-04-25 PROCEDURE — 6370000000 HC RX 637 (ALT 250 FOR IP): Performed by: INTERNAL MEDICINE

## 2022-04-25 PROCEDURE — 2580000003 HC RX 258: Performed by: EMERGENCY MEDICINE

## 2022-04-25 PROCEDURE — 93010 ELECTROCARDIOGRAM REPORT: CPT | Performed by: INTERNAL MEDICINE

## 2022-04-25 PROCEDURE — 71045 X-RAY EXAM CHEST 1 VIEW: CPT

## 2022-04-25 PROCEDURE — 83880 ASSAY OF NATRIURETIC PEPTIDE: CPT

## 2022-04-25 PROCEDURE — G0378 HOSPITAL OBSERVATION PER HR: HCPCS

## 2022-04-25 PROCEDURE — 82746 ASSAY OF FOLIC ACID SERUM: CPT

## 2022-04-25 PROCEDURE — 93005 ELECTROCARDIOGRAM TRACING: CPT | Performed by: NURSE PRACTITIONER

## 2022-04-25 PROCEDURE — 99285 EMERGENCY DEPT VISIT HI MDM: CPT

## 2022-04-25 PROCEDURE — 84443 ASSAY THYROID STIM HORMONE: CPT

## 2022-04-25 PROCEDURE — 6360000002 HC RX W HCPCS: Performed by: EMERGENCY MEDICINE

## 2022-04-25 PROCEDURE — 96374 THER/PROPH/DIAG INJ IV PUSH: CPT

## 2022-04-25 PROCEDURE — 80076 HEPATIC FUNCTION PANEL: CPT

## 2022-04-25 PROCEDURE — 83735 ASSAY OF MAGNESIUM: CPT

## 2022-04-25 PROCEDURE — 84484 ASSAY OF TROPONIN QUANT: CPT

## 2022-04-25 PROCEDURE — 84100 ASSAY OF PHOSPHORUS: CPT

## 2022-04-25 PROCEDURE — 82607 VITAMIN B-12: CPT

## 2022-04-25 PROCEDURE — 86703 HIV-1/HIV-2 1 RESULT ANTBDY: CPT

## 2022-04-25 PROCEDURE — 82330 ASSAY OF CALCIUM: CPT

## 2022-04-25 PROCEDURE — 36415 COLL VENOUS BLD VENIPUNCTURE: CPT

## 2022-04-25 PROCEDURE — 82077 ASSAY SPEC XCP UR&BREATH IA: CPT

## 2022-04-25 PROCEDURE — 80074 ACUTE HEPATITIS PANEL: CPT

## 2022-04-25 PROCEDURE — 80179 DRUG ASSAY SALICYLATE: CPT

## 2022-04-25 PROCEDURE — 85025 COMPLETE CBC W/AUTO DIFF WBC: CPT

## 2022-04-25 PROCEDURE — 80053 COMPREHEN METABOLIC PANEL: CPT

## 2022-04-25 PROCEDURE — 84439 ASSAY OF FREE THYROXINE: CPT

## 2022-04-25 RX ORDER — ACETAMINOPHEN 325 MG/1
650 TABLET ORAL EVERY 6 HOURS PRN
Status: DISCONTINUED | OUTPATIENT
Start: 2022-04-25 | End: 2022-04-27 | Stop reason: HOSPADM

## 2022-04-25 RX ORDER — POTASSIUM CHLORIDE 20 MEQ/1
20 TABLET, EXTENDED RELEASE ORAL ONCE
Status: COMPLETED | OUTPATIENT
Start: 2022-04-25 | End: 2022-04-26

## 2022-04-25 RX ORDER — THIAMINE HYDROCHLORIDE 100 MG/ML
100 INJECTION, SOLUTION INTRAMUSCULAR; INTRAVENOUS DAILY
Status: DISCONTINUED | OUTPATIENT
Start: 2022-04-25 | End: 2022-04-26

## 2022-04-25 RX ORDER — HYDRALAZINE HYDROCHLORIDE 20 MG/ML
10 INJECTION INTRAMUSCULAR; INTRAVENOUS EVERY 4 HOURS PRN
Status: DISCONTINUED | OUTPATIENT
Start: 2022-04-25 | End: 2022-04-27 | Stop reason: HOSPADM

## 2022-04-25 RX ORDER — SODIUM CHLORIDE 0.9 % (FLUSH) 0.9 %
5-40 SYRINGE (ML) INJECTION EVERY 12 HOURS SCHEDULED
Status: DISCONTINUED | OUTPATIENT
Start: 2022-04-25 | End: 2022-04-27 | Stop reason: HOSPADM

## 2022-04-25 RX ORDER — MAGNESIUM SULFATE 1 G/100ML
1000 INJECTION INTRAVENOUS ONCE
Status: COMPLETED | OUTPATIENT
Start: 2022-04-25 | End: 2022-04-26

## 2022-04-25 RX ORDER — ASPIRIN 81 MG/1
81 TABLET, CHEWABLE ORAL DAILY
Status: DISCONTINUED | OUTPATIENT
Start: 2022-04-26 | End: 2022-04-27 | Stop reason: HOSPADM

## 2022-04-25 RX ORDER — POLYETHYLENE GLYCOL 3350 17 G/17G
17 POWDER, FOR SOLUTION ORAL DAILY PRN
Status: DISCONTINUED | OUTPATIENT
Start: 2022-04-25 | End: 2022-04-27 | Stop reason: HOSPADM

## 2022-04-25 RX ORDER — ACETAMINOPHEN 650 MG/1
650 SUPPOSITORY RECTAL EVERY 6 HOURS PRN
Status: DISCONTINUED | OUTPATIENT
Start: 2022-04-25 | End: 2022-04-27 | Stop reason: HOSPADM

## 2022-04-25 RX ORDER — SODIUM CHLORIDE 9 MG/ML
INJECTION, SOLUTION INTRAVENOUS PRN
Status: DISCONTINUED | OUTPATIENT
Start: 2022-04-25 | End: 2022-04-27 | Stop reason: HOSPADM

## 2022-04-25 RX ORDER — LABETALOL HYDROCHLORIDE 5 MG/ML
10 INJECTION, SOLUTION INTRAVENOUS EVERY 6 HOURS PRN
Status: DISCONTINUED | OUTPATIENT
Start: 2022-04-25 | End: 2022-04-27 | Stop reason: HOSPADM

## 2022-04-25 RX ORDER — ENOXAPARIN SODIUM 100 MG/ML
40 INJECTION SUBCUTANEOUS DAILY
Status: DISCONTINUED | OUTPATIENT
Start: 2022-04-25 | End: 2022-04-27 | Stop reason: HOSPADM

## 2022-04-25 RX ORDER — FOLIC ACID 1 MG/1
1 TABLET ORAL DAILY
Status: DISCONTINUED | OUTPATIENT
Start: 2022-04-25 | End: 2022-04-27 | Stop reason: HOSPADM

## 2022-04-25 RX ORDER — SODIUM CHLORIDE 0.9 % (FLUSH) 0.9 %
5-40 SYRINGE (ML) INJECTION PRN
Status: DISCONTINUED | OUTPATIENT
Start: 2022-04-25 | End: 2022-04-27 | Stop reason: HOSPADM

## 2022-04-25 RX ADMIN — THIAMINE HYDROCHLORIDE: 100 INJECTION, SOLUTION INTRAMUSCULAR; INTRAVENOUS at 05:29

## 2022-04-25 RX ADMIN — Medication 10 ML: at 22:04

## 2022-04-25 RX ADMIN — FOLIC ACID 1 MG: 1 TABLET ORAL at 22:08

## 2022-04-25 RX ADMIN — HYDRALAZINE HYDROCHLORIDE 10 MG: 20 INJECTION INTRAMUSCULAR; INTRAVENOUS at 22:04

## 2022-04-25 RX ADMIN — THIAMINE HYDROCHLORIDE 100 MG: 100 INJECTION, SOLUTION INTRAMUSCULAR; INTRAVENOUS at 22:05

## 2022-04-25 ASSESSMENT — PAIN SCALES - GENERAL
PAINLEVEL_OUTOF10: 0
PAINLEVEL_OUTOF10: 7
PAINLEVEL_OUTOF10: 0
PAINLEVEL_OUTOF10: 8

## 2022-04-25 ASSESSMENT — ENCOUNTER SYMPTOMS
DIARRHEA: 0
SORE THROAT: 0
SHORTNESS OF BREATH: 0
EYE PAIN: 0
WHEEZING: 0
SINUS PRESSURE: 0
ABDOMINAL PAIN: 0
BACK PAIN: 0
VOMITING: 0
NAUSEA: 0
EYE REDNESS: 0
COUGH: 0
EYE DISCHARGE: 0

## 2022-04-25 ASSESSMENT — PAIN - FUNCTIONAL ASSESSMENT: PAIN_FUNCTIONAL_ASSESSMENT: 0-10

## 2022-04-25 ASSESSMENT — PAIN DESCRIPTION - FREQUENCY: FREQUENCY: INTERMITTENT

## 2022-04-25 ASSESSMENT — PAIN DESCRIPTION - LOCATION: LOCATION: HEAD

## 2022-04-25 ASSESSMENT — PAIN DESCRIPTION - DESCRIPTORS: DESCRIPTORS: POUNDING

## 2022-04-25 NOTE — ED NOTES
Attempted to call patient from waiting room for xrays and was unable to locate the patient.  Triage nurse also unsure of where he went

## 2022-04-25 NOTE — PROGRESS NOTES
Attempting to get admission orders for patient, messages sent to multiple physicians regarding who is covering

## 2022-04-25 NOTE — ED PROVIDER NOTES
ED  Provider Note  Admit Date/RoomTime: 4/25/2022  4:37 AM  ED Room: 18/18B-18     HPI:   Rayne Severance is a 60 y.o. male presenting to the ED for headache, beginning last night ago. History comes primarily from the patient. Patient Active Problem List:     Hypertensive emergency without congestive heart failure     Pulmonary embolism on right (HCC)     Suicidal ideation     Seizure ([address]     Pancreatitis, unspecified pancreatitis type     Lactic acidosis     ETOH abuse     Essential hypertension     Hypokalemia     Seizure disorder (HCC)     Trauma     SDH (subdural hematoma) (HCC)     SAH (subarachnoid hemorrhage) (HCC)     Transaminitis     Alcohol withdrawal syndrome with complication (HCC)     Alcoholic hepatitis without ascites  . The complaint has been persistent, mild in severity, improved by nothing and worsened by nothing. Associated symptoms include none. Sohan's had a headache for approximately last week, and has been evaluated multiple times for the same over the course of the last week. This included a CT scan of his head less than 48 hours ago. This scan was negative. The patient is a known alcoholic and was drinking as recently as last night before arrival in the emergency department. Due to the persistent nature of his symptoms, he presented [facility] for further evaluation and treatment             Review of Systems   Constitutional: Negative for chills and fever. HENT: Negative for ear pain, sinus pressure and sore throat. Eyes: Negative for pain, discharge and redness. Respiratory: Negative for cough, shortness of breath and wheezing. Cardiovascular: Negative for chest pain. Gastrointestinal: Negative for abdominal pain, diarrhea, nausea and vomiting. Genitourinary: Negative for dysuria and frequency. Musculoskeletal: Negative for arthralgias and back pain. Skin: Negative for rash and wound. Neurological: Positive for headaches. Negative for weakness. Hematological: Negative for adenopathy. All other systems reviewed and are negative. Physical Exam  Constitutional:       General: He is not in acute distress. Appearance: He is well-developed. He is not ill-appearing or diaphoretic. HENT:      Head: Normocephalic and atraumatic. Mouth/Throat:      Dentition: Abnormal dentition. Eyes:      Pupils: Pupils are equal, round, and reactive to light. Neck:      Vascular: No JVD. Trachea: No tracheal deviation. Cardiovascular:      Rate and Rhythm: Regular rhythm. Heart sounds: No murmur heard. No friction rub. No gallop. Pulmonary:      Effort: Pulmonary effort is normal. No respiratory distress. Breath sounds: Normal breath sounds. No stridor. No wheezing or rales. Chest:      Chest wall: No tenderness. Abdominal:      General: Bowel sounds are normal. There is no distension. Palpations: Abdomen is soft. Tenderness: There is no abdominal tenderness. There is no guarding. Musculoskeletal:         General: Normal range of motion. Cervical back: Normal range of motion. Skin:     General: Skin is warm and dry. Neurological:      General: No focal deficit present. Mental Status: He is alert. Cranial Nerves: No cranial nerve deficit. Sensory: No sensory deficit. Psychiatric:         Behavior: Behavior normal.        Heart Score for Risk of Major Adverse Cardiac Events    HISTORY  Highly suspicious  +2  Moderately suspicious +1  Slightly suspicious  +0    EKG  Significant ST depression +2  Non specific repolarization +1  Normal    +0    AGE  >=65    +2  45-65    +1  <45    +0    RISK FACTORS*  > or = 3 risk factors or +2    history of atherosclerotic    disease.    1-2 risk factors  +1  No risk factors   +0    TROPONIN  >= 3 x normal limit  +2  1-3 x normal limit  +1  Within lormal range  +0    Total    5    *Risk factors  Risk factors: HTN, hypercholesterolemia, DM, obesity (BMI >30 kg/m²), smoking (current, or smoking cessation =3 mo), positive family history (parent or sibling with CVD before age 72); atherosclerotic disease: prior MI, PCI/CABG, CVA/TIA, or peripheral arterial disease    INTERPRETATION  0-3: 0.9-1.7% risk of adverse cardiac event. In the HEART Score, these patients were discharged. (0.99% retrospective)  (1.7% prospective)  4-6: 12-16.6% risk of adverse cardiac event. In the HEART Score, these patients were admitted to the hospital.    (11.6% retrospective) (16.6% prospective)  > or =7: 50-65% risk of adverse cardiac event. In the HEART Score, these patients were candidates for early invasive  measures.    (65.2% retrospective) (50.1% prospective)    MACE (Major Adverse Cardiac Events) is defined as: all-cause mortality, myocardial infarction, or coronary revascularization. Procedures     MDM  Number of Diagnoses or Management Options  Diagnosis management comments: Emergency department evaluation was notable for elevated troponin in a patient with numerous risk factors for cardiac disease. Patient will require further evaluation in the inpatient setting to be evaluated by cardiology given this newly elevated troponin     This information was relayed to the patient who understood this plan of care and was amenable to the plan. Patient was discussed with the admitting service (Dr. Grant Ambriz) who concurred with the decision for admission, and have agreed to admit the patient to telemetry.           --------------------------------------------- PAST HISTORY ---------------------------------------------  Past Medical History:  has a past medical history of Headache and Hypertension. Past Surgical History:  has no past surgical history on file. Social History:  reports that he has been smoking cigarettes. He has a 4.60 pack-year smoking history. He has never used smokeless tobacco. He reports current alcohol use.  He reports that he does not use drugs. Family History: family history is not on file. The patients home medications have been reviewed.     Allergies: Ace inhibitors and Fish-derived products    -------------------------------------------------- RESULTS -------------------------------------------------    LABS:  Results for orders placed or performed during the hospital encounter of 04/25/22   CBC with Auto Differential   Result Value Ref Range    WBC 5.9 4.5 - 11.5 E9/L    RBC 4.15 3.80 - 5.80 E12/L    Hemoglobin 12.5 12.5 - 16.5 g/dL    Hematocrit 33.8 (L) 37.0 - 54.0 %    MCV 81.4 80.0 - 99.9 fL    MCH 30.1 26.0 - 35.0 pg    MCHC 37.0 (H) 32.0 - 34.5 %    RDW 13.7 11.5 - 15.0 fL    Platelets 007 704 - 159 E9/L    MPV 11.4 7.0 - 12.0 fL    Neutrophils % 54.9 43.0 - 80.0 %    Immature Granulocytes % 0.3 0.0 - 5.0 %    Lymphocytes % 30.8 20.0 - 42.0 %    Monocytes % 11.6 2.0 - 12.0 %    Eosinophils % 1.4 0.0 - 6.0 %    Basophils % 1.0 0.0 - 2.0 %    Neutrophils Absolute 3.23 1.80 - 7.30 E9/L    Immature Granulocytes # 0.02 E9/L    Lymphocytes Absolute 1.81 1.50 - 4.00 E9/L    Monocytes Absolute 0.68 0.10 - 0.95 E9/L    Eosinophils Absolute 0.08 0.05 - 0.50 E9/L    Basophils Absolute 0.06 0.00 - 0.20 E9/L   Comprehensive Metabolic Panel   Result Value Ref Range    Sodium 141 132 - 146 mmol/L    Potassium 3.2 (L) 3.5 - 5.0 mmol/L    Chloride 104 98 - 107 mmol/L    CO2 26 22 - 29 mmol/L    Anion Gap 11 7 - 16 mmol/L    Glucose 90 74 - 99 mg/dL    BUN 14 6 - 20 mg/dL    CREATININE 1.2 0.7 - 1.2 mg/dL    GFR Non-African American >60 >=60 mL/min/1.73    GFR African American >60     Calcium 8.3 (L) 8.6 - 10.2 mg/dL    Total Protein 6.4 6.4 - 8.3 g/dL    Albumin 3.8 3.5 - 5.2 g/dL    Total Bilirubin 0.6 0.0 - 1.2 mg/dL    Alkaline Phosphatase 58 40 - 129 U/L     (H) 0 - 40 U/L     (H) 0 - 39 U/L   Magnesium   Result Value Ref Range    Magnesium 2.2 1.6 - 2.6 mg/dL   Lactic Acid   Result Value Ref Range    Lactic Acid 3.1 (H) 0.5 - 2.2 mmol/L   Urinalysis   Result Value Ref Range    Color, UA Yellow Straw/Yellow    Clarity, UA Clear Clear    Glucose, Ur Negative Negative mg/dL    Bilirubin Urine Negative Negative    Ketones, Urine Negative Negative mg/dL    Specific Gravity, UA 1.015 1.005 - 1.030    Blood, Urine Negative Negative    pH, UA 6.0 5.0 - 9.0    Protein, UA Negative Negative mg/dL    Urobilinogen, Urine 0.2 <2.0 E.U./dL    Nitrite, Urine Negative Negative    Leukocyte Esterase, Urine Negative Negative   Troponin   Result Value Ref Range    Troponin, High Sensitivity 135 (H) 0 - 11 ng/L   Urine Drug Screen   Result Value Ref Range    Amphetamine Screen, Urine NOT DETECTED Negative <1000 ng/mL    Barbiturate Screen, Ur NOT DETECTED Negative < 200 ng/mL    Benzodiazepine Screen, Urine NOT DETECTED Negative < 200 ng/mL    Cannabinoid Scrn, Ur POSITIVE (A) Negative < 50ng/mL    Cocaine Metabolite Screen, Urine NOT DETECTED Negative < 300 ng/mL    Opiate Scrn, Ur NOT DETECTED Negative < 300ng/mL    PCP Screen, Urine NOT DETECTED Negative < 25 ng/mL    Methadone Screen, Urine NOT DETECTED Negative <300 ng/mL    Oxycodone Urine NOT DETECTED Negative <100 ng/mL    FENTANYL SCREEN, URINE NOT DETECTED Negative <1 ng/mL    Drug Screen Comment: see below    Serum Drug Screen   Result Value Ref Range    Ethanol Lvl 111 mg/dL    Acetaminophen Level <5.0 (L) 10.0 - 33.2 mcg/mL    Salicylate, Serum <1.9 0.0 - 30.0 mg/dL    TCA Scrn NEGATIVE Cutoff:300 ng/mL   Troponin   Result Value Ref Range    Troponin, High Sensitivity 130 (H) 0 - 11 ng/L   Brain Natriuretic Peptide   Result Value Ref Range    Pro- (H) 0 - 125 pg/mL   Lactate, Sepsis   Result Value Ref Range    Lactic Acid, Sepsis 2.0 (H) 0.5 - 1.9 mmol/L   EKG 12 Lead   Result Value Ref Range    Ventricular Rate 68 BPM    Atrial Rate 68 BPM    P-R Interval 142 ms    QRS Duration 106 ms    Q-T Interval 444 ms    QTc Calculation (Bazett) 472 ms    P Axis 69 degrees    T Axis 51 degrees RADIOLOGY:  XR CHEST PORTABLE   Final Result   No significant change. PA and lateral views would be useful for further   assessment, if symptoms persist.             EKG:  EKG interpretation  Rate 68  Sinus rhythm  Normal axis  No AK, QRS, QT prolongation  No ST segment elevation or depression  Q waves noted in precordial leads V2, V3    ------------------------- NURSING NOTES AND VITALS REVIEWED ---------------------------  Date / Time Roomed:  4/25/2022  4:37 AM  ED Bed Assignment:  18B/18B-18    The nursing notes within the ED encounter and vital signs as below have been reviewed. Patient Vitals for the past 24 hrs:   BP Temp Temp src Pulse Resp SpO2 Weight   04/25/22 0500 (!) 152/88   59 14 96 %    04/25/22 0112  97.6 °F (36.4 °C) Oral       04/25/22 0110       140 lb (63.5 kg)   04/25/22 0109 119/65    18     04/25/22 0025    75  94 %        Oxygen Saturation Interpretation: Normal    ------------------------------------------ PROGRESS NOTES ------------------------------------------  Re-evaluation(s):  Time: 7:33 AM EDT  Patients symptoms show no change  Repeat physical examination is not changed    Counseling:  I have spoken with the patient and discussed todays results, in addition to providing specific details for the plan of care and counseling regarding the diagnosis and prognosis. Their questions are answered at this time and they are agreeable with the plan of admission.    --------------------------------- ADDITIONAL PROVIDER NOTES ---------------------------------  Consultations:  Time: 7:36 AM EDT. Spoke with Dr. Den Bolton. Discussed case. They will admit the patient. This patient's ED course included: a personal history and physicial examination, re-evaluation prior to disposition, multiple bedside re-evaluations, cardiac monitoring and continuous pulse oximetry    This patient has remained hemodynamically stable during their ED course. Diagnosis:  1. Elevated troponin        Disposition:  Patient's disposition: Admit to telemetry  Patient's condition is fair.               Magan Mosley 41., DO  Resident  04/25/22 1503

## 2022-04-25 NOTE — ED NOTES
Department of Emergency Medicine  FIRST PROVIDER TRIAGE NOTE             Independent MLP           4/25/22  1:10 AM EDT    Date of Encounter: 4/25/22   MRN: [id number]      HPI: Chelsie Turner is a 60 y.o. male who presents to the ED for Headache (headache for \"awhile\", drank beer, 8/10, pounding, falling asleep during triage)  Patient presents with return of headache that is been ongoing. Does admit to once again drinking alcohol but states he only had 1 beer. Patient denies any recent falls or injuries. Patient falling asleep during triage process. Denies drug use. Patient recently admitted. Also recent abnormal labs. No unilateral weakness no facial asymmetry no change noted in gait. ROS: Negative for cp or sob. PE: Gen Appearance/Constitutional: alert  HEENT: NC/NT. PERRLA,  Airway patent. Initial Plan of Care: All treatment areas with department are currently occupied. Plan to order/Initiate the following while awaiting opening in ED: labs and EKG.   Initiate Treatment-Testing, Proceed toTreatment Area When Bed Available for ED Attending/MLP to Continue Care    Electronically signed by ROSY Zavala CNP   DD: 4/25/22         ROSY Zavala CNP  04/25/22 0111

## 2022-04-26 ENCOUNTER — APPOINTMENT (OUTPATIENT)
Dept: GENERAL RADIOLOGY | Age: 57
End: 2022-04-26
Payer: COMMERCIAL

## 2022-04-26 ENCOUNTER — APPOINTMENT (OUTPATIENT)
Dept: ULTRASOUND IMAGING | Age: 57
End: 2022-04-26
Payer: COMMERCIAL

## 2022-04-26 LAB
ALBUMIN SERPL-MCNC: 4.4 G/DL (ref 3.5–5.2)
ALP BLD-CCNC: 73 U/L (ref 40–129)
ALT SERPL-CCNC: 212 U/L (ref 0–40)
ANION GAP SERPL CALCULATED.3IONS-SCNC: 16 MMOL/L (ref 7–16)
AST SERPL-CCNC: 230 U/L (ref 0–39)
BASOPHILS ABSOLUTE: 0.03 E9/L (ref 0–0.2)
BASOPHILS RELATIVE PERCENT: 0.5 % (ref 0–2)
BILIRUB SERPL-MCNC: 1.3 MG/DL (ref 0–1.2)
BILIRUBIN DIRECT: 0.3 MG/DL (ref 0–0.3)
BILIRUBIN, INDIRECT: 1 MG/DL (ref 0–1)
BUN BLDV-MCNC: 14 MG/DL (ref 6–20)
CALCIUM IONIZED: 1.21 MMOL/L (ref 1.15–1.33)
CALCIUM SERPL-MCNC: 9.3 MG/DL (ref 8.6–10.2)
CHLORIDE BLD-SCNC: 98 MMOL/L (ref 98–107)
CO2: 23 MMOL/L (ref 22–29)
CREAT SERPL-MCNC: 1 MG/DL (ref 0.7–1.2)
EKG ATRIAL RATE: 67 BPM
EKG ATRIAL RATE: 73 BPM
EKG P AXIS: 19 DEGREES
EKG P AXIS: 27 DEGREES
EKG P-R INTERVAL: 112 MS
EKG P-R INTERVAL: 112 MS
EKG Q-T INTERVAL: 398 MS
EKG Q-T INTERVAL: 424 MS
EKG QRS DURATION: 100 MS
EKG QRS DURATION: 86 MS
EKG QTC CALCULATION (BAZETT): 438 MS
EKG QTC CALCULATION (BAZETT): 448 MS
EKG R AXIS: -17 DEGREES
EKG R AXIS: -3 DEGREES
EKG T AXIS: 39 DEGREES
EKG T AXIS: 51 DEGREES
EKG VENTRICULAR RATE: 67 BPM
EKG VENTRICULAR RATE: 73 BPM
EOSINOPHILS ABSOLUTE: 0.05 E9/L (ref 0.05–0.5)
EOSINOPHILS RELATIVE PERCENT: 0.8 % (ref 0–6)
GFR AFRICAN AMERICAN: >60
GFR NON-AFRICAN AMERICAN: >60 ML/MIN/1.73
GLUCOSE BLD-MCNC: 108 MG/DL (ref 74–99)
HAV IGM SER IA-ACNC: NORMAL
HCT VFR BLD CALC: 42.2 % (ref 37–54)
HEMOGLOBIN: 15.2 G/DL (ref 12.5–16.5)
HEPATITIS B CORE IGM ANTIBODY: NORMAL
HEPATITIS B SURFACE ANTIGEN INTERPRETATION: NORMAL
HEPATITIS C ANTIBODY INTERPRETATION: NORMAL
HIV-1 AND HIV-2 ANTIBODIES: NORMAL
IMMATURE GRANULOCYTES #: 0.03 E9/L
IMMATURE GRANULOCYTES %: 0.5 % (ref 0–5)
LV EF: 58 %
LVEF MODALITY: NORMAL
LYMPHOCYTES ABSOLUTE: 1.12 E9/L (ref 1.5–4)
LYMPHOCYTES RELATIVE PERCENT: 18.6 % (ref 20–42)
MAGNESIUM: 2.1 MG/DL (ref 1.6–2.6)
MCH RBC QN AUTO: 29.9 PG (ref 26–35)
MCHC RBC AUTO-ENTMCNC: 36 % (ref 32–34.5)
MCV RBC AUTO: 82.9 FL (ref 80–99.9)
MONOCYTES ABSOLUTE: 0.5 E9/L (ref 0.1–0.95)
MONOCYTES RELATIVE PERCENT: 8.3 % (ref 2–12)
NEUTROPHILS ABSOLUTE: 4.29 E9/L (ref 1.8–7.3)
NEUTROPHILS RELATIVE PERCENT: 71.3 % (ref 43–80)
PDW BLD-RTO: 13.7 FL (ref 11.5–15)
PHOSPHORUS: 3.3 MG/DL (ref 2.5–4.5)
PLATELET # BLD: 210 E9/L (ref 130–450)
PMV BLD AUTO: 11.1 FL (ref 7–12)
POTASSIUM SERPL-SCNC: 4.1 MMOL/L (ref 3.5–5)
RBC # BLD: 5.09 E12/L (ref 3.8–5.8)
SODIUM BLD-SCNC: 137 MMOL/L (ref 132–146)
TOTAL PROTEIN: 8 G/DL (ref 6.4–8.3)
TROPONIN, HIGH SENSITIVITY: 150 NG/L (ref 0–11)
WBC # BLD: 6 E9/L (ref 4.5–11.5)

## 2022-04-26 PROCEDURE — 36415 COLL VENOUS BLD VENIPUNCTURE: CPT

## 2022-04-26 PROCEDURE — 99244 OFF/OP CNSLTJ NEW/EST MOD 40: CPT | Performed by: INTERNAL MEDICINE

## 2022-04-26 PROCEDURE — 93005 ELECTROCARDIOGRAM TRACING: CPT | Performed by: INTERNAL MEDICINE

## 2022-04-26 PROCEDURE — 84484 ASSAY OF TROPONIN QUANT: CPT

## 2022-04-26 PROCEDURE — 84100 ASSAY OF PHOSPHORUS: CPT

## 2022-04-26 PROCEDURE — 93306 TTE W/DOPPLER COMPLETE: CPT

## 2022-04-26 PROCEDURE — 93971 EXTREMITY STUDY: CPT | Performed by: RADIOLOGY

## 2022-04-26 PROCEDURE — 71045 X-RAY EXAM CHEST 1 VIEW: CPT

## 2022-04-26 PROCEDURE — 83735 ASSAY OF MAGNESIUM: CPT

## 2022-04-26 PROCEDURE — 93970 EXTREMITY STUDY: CPT

## 2022-04-26 PROCEDURE — 96372 THER/PROPH/DIAG INJ SC/IM: CPT

## 2022-04-26 PROCEDURE — 6360000002 HC RX W HCPCS: Performed by: INTERNAL MEDICINE

## 2022-04-26 PROCEDURE — 82330 ASSAY OF CALCIUM: CPT

## 2022-04-26 PROCEDURE — G0378 HOSPITAL OBSERVATION PER HR: HCPCS

## 2022-04-26 PROCEDURE — 6370000000 HC RX 637 (ALT 250 FOR IP): Performed by: INTERNAL MEDICINE

## 2022-04-26 PROCEDURE — 85025 COMPLETE CBC W/AUTO DIFF WBC: CPT

## 2022-04-26 PROCEDURE — 2580000003 HC RX 258: Performed by: INTERNAL MEDICINE

## 2022-04-26 PROCEDURE — 76705 ECHO EXAM OF ABDOMEN: CPT

## 2022-04-26 PROCEDURE — 93010 ELECTROCARDIOGRAM REPORT: CPT | Performed by: INTERNAL MEDICINE

## 2022-04-26 PROCEDURE — 96368 THER/DIAG CONCURRENT INF: CPT

## 2022-04-26 PROCEDURE — 96376 TX/PRO/DX INJ SAME DRUG ADON: CPT

## 2022-04-26 PROCEDURE — 80076 HEPATIC FUNCTION PANEL: CPT

## 2022-04-26 PROCEDURE — 6370000000 HC RX 637 (ALT 250 FOR IP): Performed by: NURSE PRACTITIONER

## 2022-04-26 PROCEDURE — 96365 THER/PROPH/DIAG IV INF INIT: CPT

## 2022-04-26 PROCEDURE — 80048 BASIC METABOLIC PNL TOTAL CA: CPT

## 2022-04-26 PROCEDURE — APPSS60 APP SPLIT SHARED TIME 46-60 MINUTES: Performed by: NURSE PRACTITIONER

## 2022-04-26 RX ORDER — PHENOBARBITAL SODIUM 130 MG/ML
130 INJECTION INTRAMUSCULAR 2 TIMES DAILY
Status: CANCELLED | OUTPATIENT
Start: 2022-04-26 | End: 2022-04-27

## 2022-04-26 RX ORDER — GAUZE BANDAGE 2" X 2"
100 BANDAGE TOPICAL DAILY
Status: DISCONTINUED | OUTPATIENT
Start: 2022-04-27 | End: 2022-04-27 | Stop reason: HOSPADM

## 2022-04-26 RX ORDER — PHENOBARBITAL SODIUM 65 MG/ML
65 INJECTION INTRAMUSCULAR 2 TIMES DAILY
Status: CANCELLED | OUTPATIENT
Start: 2022-04-26 | End: 2022-04-27

## 2022-04-26 RX ORDER — LORAZEPAM 2 MG/ML
1 INJECTION INTRAMUSCULAR EVERY 4 HOURS PRN
Status: CANCELLED | OUTPATIENT
Start: 2022-04-26

## 2022-04-26 RX ORDER — PHENOBARBITAL SODIUM 130 MG/ML
65 INJECTION INTRAMUSCULAR 2 TIMES DAILY
Status: CANCELLED | OUTPATIENT
Start: 2022-04-26 | End: 2022-04-27

## 2022-04-26 RX ORDER — AMLODIPINE BESYLATE 5 MG/1
5 TABLET ORAL DAILY
Status: DISCONTINUED | OUTPATIENT
Start: 2022-04-26 | End: 2022-04-27

## 2022-04-26 RX ADMIN — CALCIUM GLUCONATE 1000 MG: 98 INJECTION, SOLUTION INTRAVENOUS at 00:08

## 2022-04-26 RX ADMIN — Medication 10 ML: at 20:10

## 2022-04-26 RX ADMIN — ENOXAPARIN SODIUM 40 MG: 100 INJECTION SUBCUTANEOUS at 10:20

## 2022-04-26 RX ADMIN — MAGNESIUM SULFATE 1000 MG: 1 INJECTION INTRAVENOUS at 00:11

## 2022-04-26 RX ADMIN — THIAMINE HYDROCHLORIDE 100 MG: 100 INJECTION, SOLUTION INTRAMUSCULAR; INTRAVENOUS at 10:20

## 2022-04-26 RX ADMIN — ACETAMINOPHEN 650 MG: 325 TABLET ORAL at 20:09

## 2022-04-26 RX ADMIN — FOLIC ACID 1 MG: 1 TABLET ORAL at 10:19

## 2022-04-26 RX ADMIN — Medication 10 ML: at 10:24

## 2022-04-26 RX ADMIN — AMLODIPINE BESYLATE 5 MG: 5 TABLET ORAL at 10:18

## 2022-04-26 RX ADMIN — ASPIRIN 81 MG 81 MG: 81 TABLET ORAL at 10:18

## 2022-04-26 RX ADMIN — POTASSIUM CHLORIDE 20 MEQ: 20 TABLET, EXTENDED RELEASE ORAL at 00:11

## 2022-04-26 ASSESSMENT — PAIN DESCRIPTION - LOCATION: LOCATION: HEAD

## 2022-04-26 ASSESSMENT — PAIN DESCRIPTION - ORIENTATION: ORIENTATION: LEFT

## 2022-04-26 ASSESSMENT — PAIN SCALES - GENERAL
PAINLEVEL_OUTOF10: 8
PAINLEVEL_OUTOF10: 0

## 2022-04-26 ASSESSMENT — PAIN DESCRIPTION - DESCRIPTORS: DESCRIPTORS: ACHING

## 2022-04-26 NOTE — PROGRESS NOTES
Physical Therapy    PT order received and medical chart reviewed 4/26 AM. Per RN, pt is independent and ambulating around room without difficulty. Will remove pt from PT caseload. Please re-consult if necessary. Thank you.     Morris Coyle, PT, DPT  [id number]

## 2022-04-26 NOTE — PROGRESS NOTES
Patient currently refusing all medications and IV sticks as well as lab draws. informed this will only hold up his care and that and IV site is needed to continue his care.  He stated \"I will not get repeatedly stuck and y'all are going to wait a few hours and until  Am ready:\"

## 2022-04-26 NOTE — PROGRESS NOTES
New iv started left arm as per flowsheet. Pt. States that he is sick and tired of being stuck repeatedly. Education given. Pt. Appears uninterested.

## 2022-04-26 NOTE — CARE COORDINATION
4/26 Care Coordination: Pt in ED with c/o headache. He was seen on 4/24 in hospital and was admitted but patient eloped only to return the next day. Patient was found to be hypertensive with /100 mmHg, troponin 130. Has a past medical history of HTN, seizure disorder, PE, pancreatitis, alcohol abuse alcohol withdrawal syndrome, subdural hematoma (2021, following mechanical fall), subarachnoid hemorrhage. Pt in as OBS. Cardiology consult. PTA pt is Independent. No needs at this time. Discharge plan is Home. CM/SW will continue to follow for discharge planning.    Justin MIR,RN-CV-BC  [phone]

## 2022-04-26 NOTE — H&P
[facility]  Internal Medicine Residency Program  History and Physical    Patient:  Grant Xiong 60 y.o. male MRN: [id number]     Date of Service: 4/25/2022    Hospital Day: 1      Chief complaint: had concerns including Headache (headache for \"awhile\", drank beer, 8/10, pounding, falling asleep during triage). History of Present Illness   The patient is a 60 y.o. male with a past medical history of HTN, seizure disorder, PE, pancreatitis, alcohol abuse alcohol withdrawal syndrome, subdural hematoma (2021, following mechanical fall), subarachnoid hemorrhage presented with chief complaint of continuous pounding headache since 2 weeks. He describes his headache to be located in the frontal region, 8/10 in intensity, nonradiating, better with ibuprofen, worse with nothing. He denies any blurry vision, cold and flu symptoms, chest pain, difficulty breathing, palpitations, urinary or bladder complaints, numbness, tingling or weakness in extremities. Patient reports drinking alcohol on 4/24/2022    Patient also complains of bilateral leg swelling, left worse than right. Patient endorses leg swelling since 1 year but notices that it has worsened over the last 2 weeks. Leg swelling is associated with on and off pain and swelling does not get better with leg elevation. He was seen yesterday in hospital and was admitted but patient eloped only to return the next day. On-call team made aware of patient on 4/25/2022 at 4:15 PM.    Patient was found to be hypertensive with /100 mmHg  Labs: BMP unremarkable except K3.2, troponin 130, , . UDS positive for cannabinoid, EtOH level 111. WBC 6.5, urinalysis negative  CT head(4/23/2022): Frontotemporal encephalomalacia, negative for acute intracranial findings    Patient was admitted to house team 1 for further management and treatment.       Past Medical History:      Diagnosis Date    Headache     Hypertension        Past Surgical History:    No past surgical history on file. Medications Prior to Admission:    Prior to Admission medications    Medication Sig Start Date End Date Taking? Authorizing Provider   acetaminophen (TYLENOL) 500 MG tablet Take 1 tablet by mouth 4 times daily as needed for Pain  Patient not taking: Reported on 4/25/2022 4/24/22   Michelle Norris MD   meloxicam [facility]) 7.5 MG tablet Take 1 tablet by mouth daily 6/8/21 6/12/21  ROSY Rogers CNP       Allergies:  Ace inhibitors and Fish-derived products    Social History:   TOBACCO:   reports that he has been smoking cigarettes. He has a 4.60 pack-year smoking history. He has never used smokeless tobacco.  ETOH:   reports current alcohol use. OCCUPATION:      Family History:   No family history on file. REVIEW OF SYSTEMS:    · Constitutional: No fever, no chills, no change in weight  · HEENT: No blurred vision, no ear problems, no sore throat, no rhinorrhea, (+) headache  · Respiratory: No cough, no sputum production, no pleuritic chest pain, no shortness of breath  · Cardiology: No angina, no dyspnea on exertion, no paroxysmal nocturnal dyspnea, no orthopnea, no palpitation, (+) leg swelling. · Gastroenterology: No dysphagia, no reflux; no abdominal pain, no nausea or vomiting; no constipation or diarrhea.  No hematochezia   · Genitourinary: No dysuria, no frequency, hesitancy; no hematuria  · Musculoskeletal: no joint pain, no myalgia, no change in range of movement  · Neurology: no focal weakness in extremities, no slurred speech, no double vision, no tingling or numbness sensation  · Endocrinology: no temperature intolerance, no polyphagia, polydipsia or polyuria  · Hematology: no increased bleeding, no bruising, no lymphadenopathy  · Skin: no skin changes noticed by patient  · Psychology: no depressed mood, no suicidal ideation    Physical Exam   · Vitals: BP (!) 170/100   Pulse 75   Temp 98.3 °F (36.8 °C) (Temporal)   Resp 18   Ht 5' 8\" (1.727 m)   Wt 140 lb (63.5 kg)   SpO2 96%   BMI 21.29 kg/m²     · General Appearance: alert and oriented to person, place and time, well developed and well- nourished, in no acute distress  · Skin: warm and dry, no rash or erythema  · Head: normocephalic and atraumatic  · Eyes: pupils equal, round, and reactive to light, extraocular eye movements intact, conjunctivae normal  · ENT: tympanic membrane, external ear and ear canal normal bilaterally, nose without deformity, nasal mucosa and turbinates normal without polyps  · Neck: supple and non-tender without mass, no thyromegaly or thyroid nodules, no cervical lymphadenopathy  · Pulmonary/Chest: clear to auscultation bilaterally- no wheezes, rales or rhonchi, normal air movement, no respiratory distress  · Cardiovascular: normal rate, regular rhythm, normal S1 and S2, no murmurs, rubs, clicks, or gallops, distal pulses intact, no carotid bruits  · Abdomen: soft, non-tender, non-distended, normal bowel sounds, no masses or organomegaly  · Extremities: no cyanosis, clubbing or edema  · Musculoskeletal: normal range of motion, no joint swelling, deformity or tenderness  · Neurologic: reflexes normal and symmetric, no cranial nerve deficit, gait, coordination and speech normal   Labs and Imaging Studies   Basic Labs  Recent Labs     04/25/22 0217 04/25/22 1814 04/25/22 1915    143 141   K 3.2* 3.7 3.5    103 100   CO2 26 29 29   BUN 14 19 18   CREATININE 1.2 1.1 1.1   GLUCOSE 90 107* 103*   CALCIUM 8.3* 8.5* 8.3*       Recent Labs     04/24/22  0539 04/25/22 0217 04/25/22 1914   WBC 9.3 5.9 6.5   RBC 4.28 4.15 4.54   HGB 12.8 12.5 13.6   HCT 34.7* 33.8* 37.2   MCV 81.1 81.4 81.9   MCH 29.9 30.1 30.0   MCHC 36.9* 37.0* 36.6*   RDW 13.5 13.7 13.6    209 227   MPV 11.4 11.4 11.7         Imaging Studies:  XR CHEST PORTABLE   Final Result   No significant change.   PA and lateral views would be useful for further   assessment, if symptoms persist. XR CHEST PORTABLE    (Results Pending)   US DUP LOWER EXTREMITIES BILATERAL VENOUS    (Results Pending)   US ABDOMEN LIMITED Specify organ? LIVER, PANCREAS    (Results Pending)        EKG: normal sinus rhythm. Resident's Assessment and Plan     Assessment:    1. Uncontrolled HTN  2. Non-intractable headache 2/2 uncontrolled HTN  3. CHAYITO 2/2 HTN/poor oral intake  4. HAGMA 2/2 Lactic acidosis   5. Elevated troponin with concern of developing ischemia with bilateral lower extremity edema  · no chest pain, EKG normal  · Heart score 5, 12 to 16.6% risk of adverse event  6. Bilateral lower extremity edema and pain  7. Alcoholic ketoacidosis in setting of EtOH abuse  8. Transaminitis 2/2 excessive alcohol use  9. Hx of EtOH abuse  10.  Hx of seizures on Keppra    Plan  · Trend troponin, repeat EKG  · Cardiology consult  · Follow SDS, UDS  · Send urine lyted, calculate FeNa  · Risk stratification-send lipid panel, TSH, A1c  · Echo  · Watch for alcohol withdrawal, CIWA assessment ordered  · Start folic acid and thiamine replacement  · Trend lactic acid every 6 hourly  · Normal saline 100 cc/h x 10 hours  · Follow Keppra level  · Follow DVT ultrasound bilateral lower extremity  · Follow hepatitis and HIV panel  · Injection labetalol and hydralazine as needed for blood pressure control            PT/OT evaluation:   DVT prophylaxis/ GI prophylaxis: Lovenox/diet  Disposition: admit to house team 1    Nadja Willett MD, PGY-1  Attending physician: Dr. Debby Falcon  Case discussed with Dr. Dheeraj Álvarez

## 2022-04-26 NOTE — CONSULTS
Inpatient Cardiology Consultation      Reason for Consult:  Elevated troponin     Consulting Physician: Dr. Taylor Johnson    Requesting Physician:  Dr. Jameson Ansari     Date of Consultation: 4/26/2022    HISTORY OF PRESENT ILLNESS:   Mr. Tatiana Tapia is a 64-year-old male who is new to [id number] [place] [address] [facility]. Denies prior cardiac testing. PMHx: Hypertension, chronic headaches, alcohol use, continued tobacco abuse, medical noncompliance     [facility] on 4/23/2022 with complaints of constant headache for nearly 1 week, 8/10, waxing and waning, minimally responsive to over-the-counter ibuprofen (up to 8/day). He also complained of bilateral lower extremity edema, left leg worse than the right, on and off x2 weeks. He continues to smoke 3 cigarettes a day and drinks approximately 6 pack of beer per week. He denies marijuana use, heroine use and cocaine use. UDS +Marijuana. BUN 25, creatinine 1.5, potassium 3.3, lactic acid 11.1, ALT 45, AST 1518, normal bilirubin, ethanol level 94. WBC 5.0, H/H stable, platelet count 039. UA: Negative. Ultrasound Doppler lower extremities: Ordered (not completed due to patient leaving [place]). CT head: Stable appearance of left frontotemporal encephalomalacia. Patient left [facility]ED on 4/24/2022 again with complaints of headache. Patient had left A on 4/23/2022 was noted to have significant lactic acid and hypokalemia. She admits to drinking alcohol prior to coming into the ED. Patient had eloped and then returned to ED on 4/25/2022. He was noted to be hypertensive 152/88, heart rate 59. Labs: Sodium 141, potassium 3.2, BUN 14, creatinine 1.2, magnesium 2.2, lactic acid 3.1, proBNP 270. High-sensitivity troponin 135, 130, 138, 150. proBNP 270. , . Ethanol level 111. Urine tox: Positive marijuana. WBC 5.9, H/H stable, platelet count 254. UA: Negative. TSH 0.264 (normal free T4). Cholesterol panel: Cholesterol 146, HDL 92, LDL 40, triglycerides 71.   Chest x-ray, Doppler ultrasound and ultrasound of abdomen: Pending. EK2022 SR, LVH, rate 67 bpm.   Patient was admitted to a telemetry monitored unit. He continues to refuse medications, IV access but is okay with proceeding with ECHO during this admission. Please note: past medical records were reviewed per electronic medical record (EMR) - see detailed reports under Past Medical/ Surgical History. Past Medical History:    1. Hypertension  2. Chronic headaches  3. Current smoker: 3 cigarettes per day since age 22.  3. Alcohol use: Admits to drinking 2-3 beers per day since age 22.  11. History of a subdural hematoma status post fall ()  6. History of pancreatitis  7. History of seizure disorder: On Keppra  8. History of PE ()  9. Hx of small old left thalamic infarct on CT Head (2021)   10. Medical noncompliance    Medications Prior to admit:  Prior to Admission medications    Medication Sig Start Date End Date Taking?  Authorizing Provider   acetaminophen (TYLENOL) 500 MG tablet Take 1 tablet by mouth 4 times daily as needed for Pain  Patient not taking: Reported on 2022   Phuc Pierson MD   meloxicam BONNIE PETERSON [facility]) 7.5 MG tablet Take 1 tablet by mouth daily 21  ROSY Gallardo - CNP       Current Medications:    Current Facility-Administered Medications: sodium chloride flush 0.9 % injection 5-40 mL, 5-40 mL, IntraVENous, 2 times per day  sodium chloride flush 0.9 % injection 5-40 mL, 5-40 mL, IntraVENous, PRN  0.9 % sodium chloride infusion, , IntraVENous, PRN  enoxaparin (LOVENOX) injection 40 mg, 40 mg, SubCUTAneous, Daily  polyethylene glycol (GLYCOLAX) packet 17 g, 17 g, Oral, Daily PRN  acetaminophen (TYLENOL) tablet 650 mg, 650 mg, Oral, Q6H PRN **OR** acetaminophen (TYLENOL) suppository 650 mg, 650 mg, Rectal, B0W PRN  folic acid (FOLVITE) tablet 1 mg, 1 mg, Oral, Daily  thiamine (B-1) injection 100 mg, 100 mg, IntraVENous, Daily  perflutren lipid microspheres (DEFINITY) injection 1.65 mg, 1.5 mL, IntraVENous, ONCE PRN  trimethobenzamide (TIGAN) injection 200 mg, 200 mg, IntraMUSCular, Q6H PRN  hydrALAZINE (APRESOLINE) injection 10 mg, 10 mg, IntraVENous, Q4H PRN  labetalol (NORMODYNE;TRANDATE) injection 10 mg, 10 mg, IntraVENous, Q6H PRN  aspirin chewable tablet 81 mg, 81 mg, Oral, Daily    Allergies:    ACE inhibitor allergy: Cough / hives     Social History:    Denies marijuana use: Tested positive on UDS 4/25/2022. Denies cocaine and heroin use  Alcohol use: 2-3 beers per day since age 22  Current smoker 3 cigarettes/day since age 22  Lives alone in a one-story home and denies using an assistive device. Unemployed: X10 years    Family History: Patient is unaware of his past family history. REVIEW OF SYSTEMS:     · Constitutional: + fatigue. Denies fevers, chills or night sweats  · Eyes: Denies visual changes or drainage  · ENT: + Headaches. Denies hearing loss. No mouth sores or sore throat. No epistaxis   · Cardiovascular: See HPI. · Respiratory: +VELA. Denies cough, orthopnea or PND. No hemoptysis   · Gastrointestinal: Denies hematemesis or anorexia. No hematochezia or melena    · Genitourinary: + Increased urination. Denies urgency, dysuria or hematuria. · Musculoskeletal: Denies gait disturbance, weakness or joint complaints  · Integumentary: Denies rash, hives or pruritis   · Neurological: Denies dizziness, headaches or seizures. No numbness or tingling  · Psychiatric: Denies anxiety or depression. · Endocrine: Denies temperature intolerance. No recent weight change. · Hematologic/Lymphatic: Denies abnormal bruising or bleeding. No swollen lymph nodes    PHYSICAL EXAM:   BP (!) 151/88   Pulse 72   Temp 97.3 °F (36.3 °C) (Temporal)   Resp 18   Ht 5' 8\" (1.727 m)   Wt 136 lb (61.7 kg)   SpO2 95%   BMI 20.68 kg/m²   CONST:  Well developed, well nourished middle-aged AA male who appears of stated age. Awake, alert and cooperative.  No apparent distress. HEENT:   Head- Normocephalic, atraumatic   Eyes- Conjunctivae pink, anicteric  Throat- Oral mucosa pink and moist  Neck-  No stridor, trachea midline, no jugular venous distention. No carotid bruit. CHEST: Chest symmetrical and non-tender to palpation. No accessory muscle use or intercostal retractions  RESPIRATORY: Lung sounds -diminished in bases. On room air. CARDIOVASCULAR:     Heart Inspection- shows no noted pulsations  Heart Palpation- no heaves or thrills; PMI is non-displaced   Heart Ausculation- Regular rate and rhythm, no murmur. No s3, s4 or rub   PV: Trace left lower extremity edema. No right lower extremity edema no varicosities. Pedal pulses palpable, no clubbing or cyanosis   ABDOMEN: Soft, non-tender to light palpation. Bowel sounds present. No palpable masses no organomegaly; no abdominal bruit  MS: Good muscle strength and tone. No atrophy or abnormal movements. : Deferred  SKIN: Warm and dry no statis dermatitis or ulcers   NEURO / PSYCH: Oriented to person, place and time. Speech clear and appropriate. Follows all commands. Flat affect. DATA:    EC2022 normal sinus rhythm, LVH, rate 67 bpm, QT/QTc 424/448 ms. Tele strips: Sinus rhythm with PVCs, heart rate 60-beats per minute  Diagnostic:    CT Head WO Contrast: 2022   Stable appearance of left frontotemporal encephalomalacia.       No acute intracranial findings.       No acute intracranial hemorrhage.        Chest x-ray: 2022:  No significant change.  PA and lateral views would be useful for further   assessment, if symptoms persist.             Intake/Output Summary (Last 24 hours) at 2022 0704  Last data filed at 2022 2355  Gross per 24 hour   Intake 600 ml   Output    Net 600 ml       Labs:   CBC:   Recent Labs     22  0217 22  1914   WBC 5.9 6.5   HGB 12.5 13.6   HCT 33.8* 37.2    227     BMP:   Recent Labs     22  1814 22    141   K 3.7 3.5 CO2 29 29   BUN 19 18   CREATININE 1.1 1.1   LABGLOM >60 >60   CALCIUM 8.5* 8.3*     Mag:   Recent Labs     04/25/22  0217 04/25/22 1915   MG 2.2 1.9     Phos:   Recent Labs     04/25/22 1915   PHOS 3.2     TFT:   Lab Results   Component Value Date    TSH 0.264 (L) 04/25/2022    W2XFUGI 126.60 01/29/2017    J3NRDJC 5.7 06/21/2017    T4FREE 1.46 04/25/2022      HgA1c:   Lab Results   Component Value Date    LABA1C 5.2 04/25/2022     No results found for: EAG  proBNP:   Recent Labs     04/25/22  0416   PROBNP 270*     PT/INR:   Recent Labs     04/23/22  1144   PROTIME 11.6   INR 1.1     APTT:No results for input(s): APTT in the last 72 hours. CARDIAC ENZYMES:  Recent Labs     04/25/22  0217 04/25/22  0416 04/25/22 1814 04/25/22 1914   TROPHS 135* 130* 138* 135*     FASTING LIPID PANEL:  Lab Results   Component Value Date    CHOL 146 04/25/2022    HDL 92 04/25/2022    LDLCALC 40 04/25/2022    TRIG 71 04/25/2022     LIVER PROFILE:  Recent Labs     04/25/22 1814 04/25/22 1915   * 269*   * 218*   LABALBU 4.3 3.9     Echocardiogram: Pending. The 10-year ASCVD risk score (Madelin Soto et al., 2013) is: 13%    Values used to calculate the score:      Age: 60 years      Sex: Male      Is Non- : Yes      Diabetic: No      Tobacco smoker: Yes      Systolic Blood Pressure: 508 mmHg      Is BP treated: No      HDL Cholesterol: 92 mg/dL      Total Cholesterol: 146 mg/dL      Assessment/Plan:   1. Elevated troponin in the setting of poorly controlled HTN, marijuana use, electrolyte abnormalities and lactic acidosis: Most likely consistent with myocardial injury. Chest pain free with no acute EKG changes. 2. HTN: poorly controlled   3. Non-intractable headache most likely secondary to uncontrolled hypertension. CTA head showing no acute abnormalities. 4. No clinical evidence of heart failure  5. CHAYITO secondary to dehydration and poor oral intake and ETOH use   6.  Transaminitis secondary to excessive alcohol use. 7. History of seizures: On Keppra  8. Tobacco use  9. Medical Noncompliance   10. Hypokalemia: resolved  11. Hypomagnesemia: resolved. 12. ACE-I allergy (hives, cough)   13. Hx of subdural hematoma  14. Hx of Pancreatitis   15. Hx of Seizure disorder   16. Hx of PE (2017)    · Agree with starting aspirin  · Recommend starting Lipitor 40 mg daily once liver panel improves. · Check 2D echocardiogram to assess for WMA and LV Function. · Stress test as outpatient; patient declines test while inpatient. · Tobacco, ETOH and marijuana cessation. · Start Norvasc 5 mg QD: up titrate as needed. · Rest as per primary and other consultants. · Will follow      The above assessment/plan as per Dr. Gregg Erickson.    Electronically signed by ROSY Polanco CNP on 4/26/22 at 10:04 AM EDT

## 2022-04-26 NOTE — PROGRESS NOTES
[facility]  Internal Medicine Residency Program  Progress Note - House Team     Patient:  Peter Nath 60 y.o. male MRN: [id number]     Date of Service: 4/26/2022     Subjective     Patient was seen and examined this morning at bedside in no acute distress. Overnight. Patient complains of mild headache and lightheadedness. He had to use walls to keep his balance. HA was resolved this morning. He did not get enough sleep, because he was \"thinking about important stuff\". He denies chest pain, cough, nausea, vomiting, diarrhea or constipation. Echo pending. Possible discharge today. Objective     Physical Exam:  · Vitals: BP (!) 151/88   Pulse 72   Temp 97.3 °F (36.3 °C) (Temporal)   Resp 18   Ht 5' 8\" (1.727 m)   Wt 136 lb (61.7 kg)   SpO2 95%   BMI 20.68 kg/m²     · I & O - 24hr: No intake/output data recorded.    · General Appearance: alert and oriented to person, place and time, well developed and well- nourished, in no acute distress  · Skin: warm and dry, no rash or erythema  · Head: normocephalic and atraumatic  · Eyes: pupils equal, round, and reactive to light, extraocular eye movements intact, conjunctivae normal  · ENT: tympanic membrane, external ear and ear canal normal bilaterally, nose without deformity, nasal mucosa and turbinates normal without polyps  · Neck: supple and non-tender without mass, no thyromegaly or thyroid nodules, no cervical lymphadenopathy  · Pulmonary/Chest: clear to auscultation bilaterally- no wheezes, rales or rhonchi, normal air movement, no respiratory distress  · Cardiovascular: normal rate, regular rhythm, normal S1 and S2, no murmurs, rubs, clicks, or gallops, distal pulses intact, no carotid bruits  · Abdomen: soft, non-tender, non-distended, normal bowel sounds, no masses or organomegaly  · Extremities: no cyanosis, clubbing or edema  · Musculoskeletal: normal range of motion, no joint swelling, deformity or tenderness  · Neurologic: reflexes normal and symmetric, no cranial nerve deficit, gait, coordination and speech normal   Subject  Pertinent Labs & Imaging Studies   harlan  CBC with Differential:    Lab Results   Component Value Date    WBC 6.0 04/26/2022    RBC 5.09 04/26/2022    HGB 15.2 04/26/2022    HCT 42.2 04/26/2022     04/26/2022    MCV 82.9 04/26/2022    MCH 29.9 04/26/2022    MCHC 36.0 04/26/2022    RDW 13.7 04/26/2022    NRBC 1.8 01/12/2022    LYMPHOPCT 18.6 04/26/2022    MONOPCT 8.3 04/26/2022    BASOPCT 0.5 04/26/2022    MONOSABS 0.50 04/26/2022    LYMPHSABS 1.12 04/26/2022    EOSABS 0.05 04/26/2022    BASOSABS 0.03 04/26/2022     BMP:    Lab Results   Component Value Date     04/26/2022    K 4.1 04/26/2022    K 3.7 04/25/2022    CL 98 04/26/2022    CO2 23 04/26/2022    BUN 14 04/26/2022    LABALBU 4.4 04/26/2022    CREATININE 1.0 04/26/2022    CALCIUM 9.3 04/26/2022    GFRAA >60 04/26/2022    LABGLOM >60 04/26/2022    GLUCOSE 108 04/26/2022     Hepatic Function Panel:    Lab Results   Component Value Date    ALKPHOS 73 04/26/2022     04/26/2022     04/26/2022    PROT 8.0 04/26/2022    BILITOT 1.3 04/26/2022    BILIDIR 0.3 04/26/2022    IBILI 1.0 04/26/2022    LABALBU 4.4 04/26/2022     HgBA1c:    Lab Results   Component Value Date    LABA1C 5.2 04/25/2022     TSH:    Lab Results   Component Value Date    TSH 0.264 04/25/2022       XR CHEST PORTABLE   Final Result   No significant change. PA and lateral views would be useful for further   assessment, if symptoms persist.         XR CHEST PORTABLE    (Results Pending)   US DUP LOWER EXTREMITIES BILATERAL VENOUS    (Results Pending)   US ABDOMEN LIMITED Specify organ? LIVER, PANCREAS    (Results Pending)        Resident's Assessment and Plan     Assessment:     1. Uncontrolled HTN  2. Non-intractable headache 2/2 uncontrolled HTN  3. CHAYITO 2/2 HTN/poor oral intake  4. HAGMA 2/2 Lactic acidosis   5.   Elevated troponin with concern of developing ischemia with bilateral lower extremity edema, no chest pain, EKG normal, Heart score 5, 12 to 16.6% risk of adverse event  6. Bilateral lower extremity edema and pain  7. Alcoholic ketoacidosis in setting of EtOH abuse  8. Transaminitis 2/2 excessive alcohol use  9. Hx of EtOH abuse  10. Hx of seizures on Keppra     Plan:  · Trend troponin, repeat EKG  · Cardiology consult  · Follow SDS, UDS  · Send urine lyted, calculate FeNa  · Risk stratification-send lipid panel, TSH, A1c  · Echo  · Watch for alcohol withdrawal, CIWA assessment ordered  · Start folic acid and thiamine replacement  · Trend lactic acid every 6 hourly  · Normal saline 100 cc/h x 10 hours  · Follow Keppra level  · Follow DVT ultrasound bilateral lower extremity  · Follow hepatitis and HIV panel  · Injection labetalol and hydralazine as needed for blood pressure control  · Started aspirin  · Consider starting statins once liver panel improves. · Stress test as outpatient; patient declines test while inpatient. · Started Norvasc 5 mg QD: up titrate as needed.   · Started regular diet        PT/OT evaluation:   DVT prophylaxis/ GI prophylaxis: Lovenox/diet  Disposition: admit to house team 1  Harman Cordero MD, PGY-1  Attending physician: Dr. Jodelle Saint

## 2022-04-26 NOTE — PROGRESS NOTES
Occupational Therapy  OT SESSION ATTEMPT     Date:2022  Patient Name: Corinna Rudd  MRN: [id number]  : 1965  Room: [address] [place] [address]     OT order received and appreciated. Chart review completed. This therapist observed pt ambulating in room independently and pt verbalized that has been completing self-care tasks w/o assist. RN confirmed. Additional OT not indicated at this time, no home going needs. Please re-consult if change.     Josee OTR/L #[id number]

## 2022-04-26 NOTE — PROGRESS NOTES
Comprehensive Nutrition Assessment    Type and Reason for Visit:  Initial,Consult (ONS)    Nutrition Recommendations/Plan:   1. Continue NPO. Recommend and start Ensure Enlive BID upon diet advancement to optimize intake. Malnutrition Assessment:  Malnutrition Status: At risk for malnutrition (Comment) (04/26/22 1013)    Context:  Acute Illness     Findings of the 6 clinical characteristics of malnutrition:  Energy Intake:  75% or less of estimated energy requirements for 7 or more days  Weight Loss:  No significant weight loss     Body Fat Loss:  Unable to assess     Muscle Mass Loss:  Unable to assess    Fluid Accumulation:  No significant fluid accumulation     Strength:  Not Performed    Nutrition Assessment:    Pt. presented for Non-intractable headache most likely 2/2 to uncontrolled HTN ; CTA head showing no acute abnormalities. Noted CHAYITO 2/2 dehydration and poor oral intake/ ETOH use. PMHx noted for HTN, ETOH abuse, seizures. No intakes to assess at this time/ Pt. currently NPO. WIll start ONS upon nutrition progression. Nutrition Related Findings:    A&O x4, +I/O, GI WDL, No edema, elevated bili Wound Type: None       Current Nutrition Intake & Therapies:    Average Meal Intake: NPO (no intakes to assess)  Average Supplements Intake: NPO  Diet NPO Exceptions are: Sips of Water with Meds    Anthropometric Measures:  Height: 5' 8\" (172.7 cm)  Ideal Body Weight (IBW): 154 lbs (70 kg)    Admission Body Weight: 136 lb (61.7 kg) (4/26 S. S First measured)  Current Body Weight: 136 lb (61.7 kg) (S.S 4/26), 88.3 % IBW. Current BMI (kg/m2): 20.7  Usual Body Weight: 133 lb 5 oz (60.5 kg)  % Weight Change (Calculated): 2  Weight Adjustment For: No Adjustment                 BMI Categories: Normal Weight (BMI 18.5-24. 9)    Estimated Daily Nutrient Needs:  Energy Requirements Based On: Kcal/kg  Weight Used for Energy Requirements: Current  Energy (kcal/day): 0278-6941 kcal  Weight Used for Protein Requirements: Current  Protein (g/day): 74-86g (1.2-1.4g/kgCBW)  Method Used for Fluid Requirements: 1 ml/kcal  Fluid (ml/day): per renal management    Nutrition Diagnosis:   · Inadequate oral intake related to inadequate protein-energy intake (2/2 ETOH abuse) as evidenced by poor intake prior to admission      Nutrition Interventions:   Food and/or Nutrient Delivery: Continue NPO,Start Oral Nutrition Supplement (Ensure Enlive BID upon diet advancement)  Nutrition Education/Counseling: No recommendation at this time  Coordination of Nutrition Care: Continue to monitor while inpatient       Goals:     Goals: PO intake 50% or greater       Nutrition Monitoring and Evaluation:   Behavioral-Environmental Outcomes: None Identified  Food/Nutrient Intake Outcomes: Food and Nutrient Intake,Supplement Intake,Diet Advancement/Tolerance  Physical Signs/Symptoms Outcomes: Biochemical Data,GI Status,Hemodynamic Status,Nutrition Focused Physical Findings,Skin,Weight    Discharge Planning:     Too soon to determine     Rowan Lugo, [address]  Contact: [phone]

## 2022-04-26 NOTE — PROGRESS NOTES
[address] [facility]  Internal Medicine Residency / [address]    Attending Physician Statement  I have discussed the case, including pertinent history and exam findings with the resident and the team.  I have seen and examined the patient and the key elements of the encounter have been performed by me. I agree with the assessment, plan and orders as documented by the resident. Cephalgia resolved  Liver enzymes may be from ETOH  Having 2 D ECHO during rounds    Remainder of medical problems as per resident note.       Adelaida Christiansen MD [facility]  Internal Medicine Residency Faculty

## 2022-04-27 VITALS
WEIGHT: 138.4 LBS | SYSTOLIC BLOOD PRESSURE: 147 MMHG | OXYGEN SATURATION: 98 % | DIASTOLIC BLOOD PRESSURE: 91 MMHG | BODY MASS INDEX: 20.98 KG/M2 | TEMPERATURE: 97 F | HEART RATE: 70 BPM | RESPIRATION RATE: 18 BRPM | HEIGHT: 68 IN

## 2022-04-27 PROBLEM — I10 UNCONTROLLED HYPERTENSION: Status: RESOLVED | Noted: 2021-06-23 | Resolved: 2022-04-27

## 2022-04-27 LAB
ALBUMIN SERPL-MCNC: 4 G/DL (ref 3.5–5.2)
ALP BLD-CCNC: 67 U/L (ref 40–129)
ALT SERPL-CCNC: 129 U/L (ref 0–40)
ANION GAP SERPL CALCULATED.3IONS-SCNC: 12 MMOL/L (ref 7–16)
AST SERPL-CCNC: 99 U/L (ref 0–39)
BASOPHILS ABSOLUTE: 0.03 E9/L (ref 0–0.2)
BASOPHILS RELATIVE PERCENT: 0.5 % (ref 0–2)
BILIRUB SERPL-MCNC: 0.8 MG/DL (ref 0–1.2)
BILIRUBIN DIRECT: 0.2 MG/DL (ref 0–0.3)
BILIRUBIN, INDIRECT: 0.6 MG/DL (ref 0–1)
BUN BLDV-MCNC: 22 MG/DL (ref 6–20)
CALCIUM IONIZED: 1.24 MMOL/L (ref 1.15–1.33)
CALCIUM SERPL-MCNC: 9.1 MG/DL (ref 8.6–10.2)
CHLORIDE BLD-SCNC: 97 MMOL/L (ref 98–107)
CO2: 28 MMOL/L (ref 22–29)
CREAT SERPL-MCNC: 1 MG/DL (ref 0.7–1.2)
EOSINOPHILS ABSOLUTE: 0.1 E9/L (ref 0.05–0.5)
EOSINOPHILS RELATIVE PERCENT: 1.6 % (ref 0–6)
GFR AFRICAN AMERICAN: >60
GFR NON-AFRICAN AMERICAN: >60 ML/MIN/1.73
GLUCOSE BLD-MCNC: 109 MG/DL (ref 74–99)
HCT VFR BLD CALC: 39.2 % (ref 37–54)
HEMOGLOBIN: 14.2 G/DL (ref 12.5–16.5)
IMMATURE GRANULOCYTES #: 0.01 E9/L
IMMATURE GRANULOCYTES %: 0.2 % (ref 0–5)
KEPPRA: <2 UG/ML (ref 10–40)
LYMPHOCYTES ABSOLUTE: 1.8 E9/L (ref 1.5–4)
LYMPHOCYTES RELATIVE PERCENT: 29 % (ref 20–42)
MAGNESIUM: 2.2 MG/DL (ref 1.6–2.6)
MCH RBC QN AUTO: 29.5 PG (ref 26–35)
MCHC RBC AUTO-ENTMCNC: 36.2 % (ref 32–34.5)
MCV RBC AUTO: 81.3 FL (ref 80–99.9)
MONOCYTES ABSOLUTE: 0.55 E9/L (ref 0.1–0.95)
MONOCYTES RELATIVE PERCENT: 8.9 % (ref 2–12)
NEUTROPHILS ABSOLUTE: 3.71 E9/L (ref 1.8–7.3)
NEUTROPHILS RELATIVE PERCENT: 59.8 % (ref 43–80)
PDW BLD-RTO: 13.6 FL (ref 11.5–15)
PHOSPHORUS: 3.3 MG/DL (ref 2.5–4.5)
PLATELET # BLD: 217 E9/L (ref 130–450)
PMV BLD AUTO: 11.4 FL (ref 7–12)
POTASSIUM SERPL-SCNC: 3.9 MMOL/L (ref 3.5–5)
RBC # BLD: 4.82 E12/L (ref 3.8–5.8)
SODIUM BLD-SCNC: 137 MMOL/L (ref 132–146)
TOTAL PROTEIN: 7 G/DL (ref 6.4–8.3)
WBC # BLD: 6.2 E9/L (ref 4.5–11.5)

## 2022-04-27 PROCEDURE — 99217 PR OBSERVATION CARE DISCHARGE MANAGEMENT: CPT | Performed by: INTERNAL MEDICINE

## 2022-04-27 PROCEDURE — 85025 COMPLETE CBC W/AUTO DIFF WBC: CPT

## 2022-04-27 PROCEDURE — 2580000003 HC RX 258: Performed by: INTERNAL MEDICINE

## 2022-04-27 PROCEDURE — 82330 ASSAY OF CALCIUM: CPT

## 2022-04-27 PROCEDURE — 6370000000 HC RX 637 (ALT 250 FOR IP): Performed by: INTERNAL MEDICINE

## 2022-04-27 PROCEDURE — 83735 ASSAY OF MAGNESIUM: CPT

## 2022-04-27 PROCEDURE — 36415 COLL VENOUS BLD VENIPUNCTURE: CPT

## 2022-04-27 PROCEDURE — 6360000002 HC RX W HCPCS: Performed by: INTERNAL MEDICINE

## 2022-04-27 PROCEDURE — 96372 THER/PROPH/DIAG INJ SC/IM: CPT

## 2022-04-27 PROCEDURE — 80048 BASIC METABOLIC PNL TOTAL CA: CPT

## 2022-04-27 PROCEDURE — G0378 HOSPITAL OBSERVATION PER HR: HCPCS

## 2022-04-27 PROCEDURE — 80076 HEPATIC FUNCTION PANEL: CPT

## 2022-04-27 PROCEDURE — 84100 ASSAY OF PHOSPHORUS: CPT

## 2022-04-27 RX ORDER — FOLIC ACID 1 MG/1
1 TABLET ORAL DAILY
Qty: 90 TABLET | Refills: 3 | Status: SHIPPED | OUTPATIENT
Start: 2022-04-27

## 2022-04-27 RX ORDER — AMLODIPINE BESYLATE 10 MG/1
10 TABLET ORAL DAILY
Status: DISCONTINUED | OUTPATIENT
Start: 2022-04-27 | End: 2022-04-27 | Stop reason: HOSPADM

## 2022-04-27 RX ORDER — AMLODIPINE BESYLATE 10 MG/1
10 TABLET ORAL DAILY
Qty: 30 TABLET | Refills: 3 | Status: ON HOLD | OUTPATIENT
Start: 2022-04-28 | End: 2022-04-29 | Stop reason: SDUPTHER

## 2022-04-27 RX ORDER — AMLODIPINE BESYLATE 5 MG/1
5 TABLET ORAL DAILY
Qty: 30 TABLET | Refills: 3 | Status: CANCELLED | OUTPATIENT
Start: 2022-04-27

## 2022-04-27 RX ORDER — ADHESIVE BANDAGE 3/4"
BANDAGE TOPICAL
Qty: 1 EACH | Refills: 0 | Status: SHIPPED | OUTPATIENT
Start: 2022-04-27

## 2022-04-27 RX ORDER — ASPIRIN 81 MG/1
81 TABLET, CHEWABLE ORAL DAILY
Qty: 30 TABLET | Refills: 3 | Status: SHIPPED | OUTPATIENT
Start: 2022-04-27

## 2022-04-27 RX ORDER — THIAMINE MONONITRATE (VIT B1) 100 MG
100 TABLET ORAL DAILY
Qty: 90 TABLET | Refills: 2 | Status: SHIPPED | OUTPATIENT
Start: 2022-04-27

## 2022-04-27 RX ADMIN — FOLIC ACID 1 MG: 1 TABLET ORAL at 10:51

## 2022-04-27 RX ADMIN — THIAMINE HCL TAB 100 MG 100 MG: 100 TAB at 08:23

## 2022-04-27 RX ADMIN — AMLODIPINE BESYLATE 10 MG: 10 TABLET ORAL at 08:23

## 2022-04-27 RX ADMIN — Medication 10 ML: at 08:23

## 2022-04-27 RX ADMIN — ASPIRIN 81 MG 81 MG: 81 TABLET ORAL at 08:23

## 2022-04-27 RX ADMIN — ENOXAPARIN SODIUM 40 MG: 100 INJECTION SUBCUTANEOUS at 08:23

## 2022-04-27 NOTE — PROGRESS NOTES
[address]   Internal Medicine Residency / [address]    Attending Physician Statement  I have discussed the case, including pertinent history and exam findings with the resident and the team.  I have seen and examined the patient and the key elements of the encounter have been performed by me. I agree with the assessment, plan and orders as documented by the resident. A&O  VS stable   Cephalgia cleared and seems tension   or musculoskeletal in origin  Mobile  2D ECHO negative for high Troponin   Plan:Per Cardiology to have outpatient nuclear stress    Remainder of medical problems as per resident note.       Aniya Wyman MD [facility] SPECIALTY [facility]  Internal Medicine Residency Faculty

## 2022-04-27 NOTE — PROGRESS NOTES
Discharge paperwork, Meds and follow up appointments reviewed with pt. IV and tele removed. Pharmacist up to educate pt on Meds. All belongings sent with pt. Script for B/p cuff given to pt.

## 2022-04-27 NOTE — PROGRESS NOTES
CLINICAL PHARMACY NOTE: MEDS TO BEDS    Total # of Prescriptions Filled: 3   The following medications were delivered to the patient:  · Aspirin low dose 81 mg  · Folic acid 1 mg  · Amlodipine 10 mg    Additional Documentation:  meds delivered to patient

## 2022-04-27 NOTE — DISCHARGE SUMMARY
[place] [id number]  Department of Internal Medicine   Internal Medicine Residency Program   Discharge Summary    PCP: None    House Team: 1    Admit Date:4/25/2022  Discharge Date:    Admission Diagnosis:  1. Uncontrolled HTN  2. Non-intractable headache 2/2 uncontrolled HTN  3. CHAYITO 2/2 HTN/poor oral intake  4. HAGMA 2/2 Lactic acidosis   5. Elevated troponin   · no chest pain, EKG normal  · Heart score 5, 12 to 16.6% risk of adverse event  6. Bilateral lower extremity edema and pain  7. Alcoholic ketoacidosis in setting of EtOH abuse  8. Transaminitis 2/2 excessive alcohol use  9. Hx of EtOH abuse  10. Hx of seizures on [place]    Discharge Diagnosis:    1. Uncontrolled HTN  2. Headache, likely tension   3. CHAYITO 2/2 poor oral intake, resolved  4. HAGMA 2/2 Lactic acidosis + ketoacidosis, resolved  5. Elevated troponin with normal EKG, no chest pain, refused inpatient stress test initially   6. Bilateral lower extremity edema and pain  7. Alcoholic ketoacidosis in setting of EtOH abuse, resolved   8. Transaminitis 2/2 excessive alcohol use  9. Hx of EtOH abuse  10. Hx of seizures on [address] [facility]. Course:  60 y.o. male with a past medical history of HTN, seizure disorder, PE, pancreatitis, alcohol abuse alcohol withdrawal syndrome, subdural hematoma (2021, following mechanical fall), subarachnoid hemorrhage presented with chief complaint of continuous pounding headache since 2 weeks. Located in the frontal region, 8/10 in intensity, nonradiating, improves with ibuprofen, no exacerbating.  Patient also complained of bilateral leg swelling, left worse than right that started 1 year ago but worsened over the last 2 weeks. Leg swelling associated with on and off pain and does not improve with leg elevation. Pt was noted to hypertensive in the ED with /100, UDS positive for cannabinoid, EtOH level 111.  CT head(4/23/2022): Frontotemporal encephalomalacia, negative for acute intracranial findings. EKG: normal sinus rhythm. US DVT - negative, US abdomen - fatty liver disease. Patient was started on amlodipine for hypertension. Ibuprofen relieves headache. Cardiology was consulted, pt refused to undergo stress test inpatient initially so Echo was ordered. Plan to do stress test outpatient was discussed with the patient. Echo was significant for normal 55%, mild to moderately left atrial enlargement. Right before discharge pt stated he is agreable to do stress test inpatient . Discussed with Cardiology, pt cleared to do stress test in outpatient setting and follow with Cardiology in 2-3 weeks.         Significant findings (history and exam, laboratory, radiological, pathology, other tests):  CBC:   Lab Results   Component Value Date    WBC 6.2 04/27/2022    RBC 4.82 04/27/2022    HGB 14.2 04/27/2022    HCT 39.2 04/27/2022    MCV 81.3 04/27/2022    MCH 29.5 04/27/2022    MCHC 36.2 04/27/2022    RDW 13.6 04/27/2022     04/27/2022    MPV 11.4 04/27/2022     BMP:    Lab Results   Component Value Date     04/27/2022    K 3.9 04/27/2022    K 3.7 04/25/2022    CL 97 04/27/2022    CO2 28 04/27/2022    BUN 22 04/27/2022    LABALBU 4.0 04/27/2022    CREATININE 1.0 04/27/2022    CALCIUM 9.1 04/27/2022    GFRAA >60 04/27/2022    LABGLOM >60 04/27/2022    GLUCOSE 109 04/27/2022        Echo Complete    Result Date: 4/26/2022  Transthoracic Echocardiography Report (TTE)  Demographics   Patient Name        Pop Mathis  Gender               Male                      J   Medical Record      [id number]       Room Number          8203  Number   Account #           [de-identified]      Procedure Date       04/26/2022   Corporate ID                       Ordering Physician   Jimenez Martinez   Accession Number    [id number]     Referring Physician   Date of Birth       1965     Sonographer          Lilian Ochoa RDCS   Age                 60 year(s) Interpreting         Candelario Terrazas MD                                     Physician                                      Any Other  Procedure Type of Study   TTE procedure:Echo Complete W/Doppler & Color Flow. Procedure Date Date: 04/26/2022 Start: 02:25 PM Study Location: Portable Technical Quality: Adequate visualization Indications:Edema. Patient Status: Routine Contrast Medium: Bubble Study. Height: 68 inches Weight: 140 pounds BSA: 1.76 m^2 BMI: 21.29 kg/m^2 BP: 140/90 mmHg  Findings   Left Ventricle  Normal left ventricular chamber size. Normal left ventricular systolic function. Visually estimated LVEF is 55-60 %. No wall motion abnormalities. Normal diastolic function. Normal left atrial pressure. Right Ventricle  Normal right ventricle structure and function. Left Atrium  The left atrium is mild-moderately dilated. Right Atrium  Normal right atrial size   Mitral Valve  Physiologic and/or trace mitral regurgitation is present. No evidence of hemodynamically significant mitral stenosis. Tricuspid Valve  The tricuspid valve appears structurally normal.  Physiologic and/or trace tricuspid regurgitation. Aortic Valve  Trileaflet aortic valve. Normal leaflet mobility. No aortic stenosis. No aortic regurgitation. Pulmonic Valve  Normal pulmonic valve structure and function. Physiologic and/or trace pulmonic regurgitation present. Pericardial Effusion  No evidence for hemodynamically significant pericardial effusion. Pleural Effusion  No evidence of pleural effusion. Aorta  Normal aortic root and ascending aorta. Miscellaneous  The inferior vena cava diameter is normal with normal respiratory  variation. Unable to estimate PA pressure. Conclusions   Summary  Normal left ventricular chamber size. Normal left ventricular systolic function. Visually estimated LVEF is 55-60 %. No wall motion abnormalities. Normal diastolic function. Normal left atrial pressure.   Normal right ventricle structure and function. The left atrium is mild-moderately dilated. Normal right atrial size. Unable to estimate PA pressure. Less than mild valvular regurgitation/ stenosis. No comparison study available.    Signature   ----------------------------------------------------------------  Electronically signed by Caryn Martini MD(Interpreting  physician) on 04/26/2022 04:28 PM  ----------------------------------------------------------------  M-Mode/2D Measurements & Calculations   LV Diastolic   LV Systolic Dimension: 1.3   AV Cusp Separation: 2.4 cmLA  Dimension: 4   cm                           Dimension: 3.4 cmAO Root  cm             LV Volume Diastolic: 38.8 ml Dimension: 3.4 cm  LV FS:67.5 %   LV Volume Systolic: 4.3 ml  LV PW          LV EDV/LV EDV Index: 26.4  Diastolic: 1.4 BZ/78 CP/V^1RA ESV/LV ESV  cm             Index: 4.3 ml/2ml/ m^2       RV Diastolic Dimension: 3 cm  LV PW          EF Calculated: 94 %  Systolic: 2.4  LV Mass Index: 112 l/min*m^2 Ascending Aorta: 3 cm  cm             LV Length: 8.6 cm            LA volume/Index: 75.7 ml  Septum                                      /71.73HB/P^1  Diastolic: 1.3 LVOT: 2.4 cm                 RA Area: 17.2 cm^2  cm  Septum                                      IVC Expiration: 1.6 cm  Systolic: 2 cm   LV Mass:  197.59 g  Doppler Measurements & Calculations   MV Peak E-Wave:    AV Peak Velocity: 1.55 m/s      LVOT Peak Velocity: 1  0.51 m/s           AV Peak Gradient: 9.6 mmHg      m/s  MV Peak A-Wave:    AV Mean Velocity: 1.13 m/s      LVOT Mean Velocity:  0.69 m/s           AV Mean Gradient: 5.7 mmHg      0.73 m/s  MV E/A Ratio: 0.73 AV VTI: 21.4 cm                 LVOT Peak Gradient: 4  MV Peak Gradient:  AV Area (Continuity):4.39 cm^2  mmHgLVOT Mean Gradient:  2.8 mmHg                                           2.3 mmHg  MV Mean Gradient:  LVOT VTI: 20.8 cm               Estimated RAP:3 mmHg  1.1 mmHg           IVRT: 152.2 msec  MV Mean Velocity: 0.5 m/s            Pulm. Vein A Reversal  MV Deceleration    Duration:110.7 msec  Time: 231.2 msec   Pulm. Vein D Velocity:0.38      PV Peak Velocity: 1.03  MV P1/2t: 63.1     m/sPulm. Vein A Reversal        m/s  msec               Velocity:0.46 m/s               PV Peak Gradient: 4.27  MVA by PHT:3.49    Pulm. Vein S Velocity: 0.84 m/s mmHg  cm^2                                               PV Mean Velocity: 0.71  MV Area                                            m/s  (continuity): 5.1                                  PV Mean Gradient: 2.4  cm^2                                               mmHg  MV E' Septal  Velocity: 0.05 m/s                                 NC ED Velocity: 1.15  MV E' Lateral                                      m/s  Velocity: 12 m/s  [email or web address] [id number]    CT Head WO Contrast    Result Date: 4/23/2022  EXAMINATION: CT OF THE HEAD WITHOUT CONTRAST  4/23/2022 9:37 am TECHNIQUE: CT of the head was performed without the administration of intravenous contrast. Dose modulation, iterative reconstruction, and/or weight based adjustment of the mA/kV was utilized to reduce the radiation dose to as low as reasonably achievable. COMPARISON: CT of the head January 17, 2022 HISTORY: ORDERING SYSTEM PROVIDED HISTORY: headache TECHNOLOGIST PROVIDED HISTORY: Reason for exam:->headache Has a \"code stroke\" or \"stroke alert\" been called? ->No Decision Support Exception - unselect if not a suspected or confirmed emergency medical condition->Emergency Medical Condition (MA) What reading provider will be dictating this exam?->CRC FINDINGS: BRAIN/VENTRICLES: There is no acute intracranial hemorrhage, mass effect or midline shift. No abnormal extra-axial fluid collection. Encephalomalacia again identified in the left frontoparietal lobe. There is no evidence of hydrocephalus.  ORBITS: The visualized portion of the orbits demonstrate no acute abnormality. SINUSES: The visualized paranasal sinuses and mastoid air cells demonstrate no acute abnormality. SOFT TISSUES/SKULL: Postop changes of prior left frontal craniotomy. No acute abnormality of the visualized skull or soft tissues. Stable appearance of left frontotemporal encephalomalacia. No acute intracranial findings. No acute intracranial hemorrhage. XR CHEST PORTABLE    Result Date: 4/26/2022  EXAMINATION: ONE XRAY VIEW OF THE CHEST 4/26/2022 10:33 am COMPARISON: 01/01/2022 and 04/25/2022 HISTORY: ORDERING SYSTEM PROVIDED HISTORY: r/o pleural effusions TECHNOLOGIST PROVIDED HISTORY: Reason for exam:->r/o pleural effusions What reading provider will be dictating this exam?->CRC FINDINGS: The cardiac silhouette is within normals. There are no findings of failure. The lungs are clear. There is no focal infiltrate or effusion. There is chronic blunting the right costophrenic angle. This finding is unchanged when compared to prior study of 01/01/2022 and likely represents chronic pleuroparenchymal scarring. There is no appreciable right or left pleural effusion. 1. There is no evidence of pneumonia or failure 2. There is no appreciable right or left pleural effusion 3. Chronic blunting of the right costophrenic angle. XR CHEST PORTABLE    Result Date: 4/25/2022  EXAMINATION: ONE XRAY VIEW OF THE CHEST 4/25/2022 5:01 am COMPARISON: 01/01/2022 HISTORY: ORDERING SYSTEM PROVIDED HISTORY: Headache TECHNOLOGIST PROVIDED HISTORY: Reason for exam:->Headache What reading provider will be dictating this exam?->CRC No other provided history. FINDINGS: EKG leads overlie the chest.  Heart size is normal.  Scattered vascular calcifications. Deformity of the right 7th rib is unchanged. Blunting of right costophrenic angle is also unchanged and favors pleural scarring. Effusion less likely. No new consolidation or other change. No significant change. PA and lateral views would be useful for further assessment, if symptoms persist.     US ABDOMEN LIMITED Specify organ? LIVER, PANCREAS    Result Date: 2022  EXAMINATION: RIGHT UPPER QUADRANT ULTRASOUND 2022 10:44 am COMPARISON: None. HISTORY: ORDERING SYSTEM PROVIDED HISTORY: r/o cirrhosis of the liver TECHNOLOGIST PROVIDED HISTORY: Reason for exam:->r/o cirrhosis of the liver Specify organ?->LIVER Specify organ?->PANCREAS What reading provider will be dictating this exam?->CRC FINDINGS: LIVER:  The liver demonstrates mildly increased echogenicity without evidence of intrahepatic biliary ductal dilatation. BILIARY SYSTEM:  Gallbladder is unremarkable without evidence of pericholecystic fluid, wall thickening or stones. Negative sonographic Sprague's sign. Common bile duct is within normal limits measuring 4.4 mm. RIGHT KIDNEY: The right kidney is grossly unremarkable without evidence of hydronephrosis. It measures 11.4 x 5.5 cm. PANCREAS:  Visualized portions of the pancreas are unremarkable. OTHER: No evidence of right upper quadrant ascites. Fatty infiltration liver     US DUP LOWER EXTREMITIES BILATERAL VENOUS    Result Date: 2022  Patient MRN:  [id number] : 1965 Age: 60 years Gender: Male Order Date:  2022 10:44 AM EXAM: US DUP LOWER EXTREMITIES BILATERAL VENOUS NUMBER OF IMAGES:  47 INDICATION:  r/o dvt r/o dvt What reading provider will be dictating this exam?->[facility] COMPARISON: None Within the visualized vessels, there is no evidence for deep venous thrombosis There is good compressibility, there is good augmentation, there is good color flow.      Within the visualized vessels there is no evidence for deep venous thrombosis       Pending test results: None    Consults: Cardiology     Procedures: None    Condition at discharge: Improved, stable    Disposition: home    Discharge Medications:  Current Discharge Medication List      CONTINUE these medications which have NOT CHANGED Details   acetaminophen (TYLENOL) 500 MG tablet Take 1 tablet by mouth 4 times daily as needed for Pain  Qty: 40 tablet, Refills: 0         STOP taking these medications       meloxicam (MOBIC) 7.5 MG tablet Comments:   Reason for Stopping:               Discharge to: home  Diet: regular diet  Activity: activity as tolerated      Be compliant with medications. We started you on Amlodipine, it is a blood pressure medication. Please continue to take aspirin, and  Vitamins. We are also writing a script for blood pressure cuff for you. Follow [facility]  [facility] on May 4 at 9 am . Call [phone] ([address]  [facility] Clinic) if there are any appointment changes or other issues. It is very important that you keep this appointment.     Please Follow with Dr. Angelica Burnett in 2-3 weeks (cardiology)      Hans Thomas MD M.D., PGY - 3   1:59 PM  4/27/2022    Attending physician: Dr. Jean Claude Bledsoe

## 2022-04-29 ENCOUNTER — APPOINTMENT (OUTPATIENT)
Dept: NON INVASIVE DIAGNOSTICS | Age: 57
End: 2022-04-29
Payer: COMMERCIAL

## 2022-04-29 ENCOUNTER — HOSPITAL ENCOUNTER (OUTPATIENT)
Age: 57
Setting detail: OBSERVATION
Discharge: HOME | End: 2022-04-29
Attending: EMERGENCY MEDICINE | Admitting: INTERNAL MEDICINE
Payer: COMMERCIAL

## 2022-04-29 ENCOUNTER — APPOINTMENT (OUTPATIENT)
Dept: NUCLEAR MEDICINE | Age: 57
End: 2022-04-29
Payer: COMMERCIAL

## 2022-04-29 ENCOUNTER — APPOINTMENT (OUTPATIENT)
Dept: GENERAL RADIOLOGY | Age: 57
End: 2022-04-29
Payer: COMMERCIAL

## 2022-04-29 VITALS
SYSTOLIC BLOOD PRESSURE: 160 MMHG | TEMPERATURE: 98.2 F | BODY MASS INDEX: 21.97 KG/M2 | OXYGEN SATURATION: 96 % | RESPIRATION RATE: 18 BRPM | HEART RATE: 75 BPM | DIASTOLIC BLOOD PRESSURE: 92 MMHG | WEIGHT: 140 LBS | HEIGHT: 67 IN

## 2022-04-29 DIAGNOSIS — R07.9 ACUTE CHEST PAIN: Primary | ICD-10-CM

## 2022-04-29 DIAGNOSIS — G89.29 CHRONIC NONINTRACTABLE HEADACHE, UNSPECIFIED HEADACHE TYPE: ICD-10-CM

## 2022-04-29 DIAGNOSIS — R51.9 CHRONIC NONINTRACTABLE HEADACHE, UNSPECIFIED HEADACHE TYPE: ICD-10-CM

## 2022-04-29 LAB
ACETAMINOPHEN LEVEL: <5 MCG/ML (ref 10–30)
ALBUMIN SERPL-MCNC: 4.4 G/DL (ref 3.5–5.2)
ALP BLD-CCNC: 58 U/L (ref 40–129)
ALT SERPL-CCNC: 90 U/L (ref 0–40)
AMPHETAMINE SCREEN, URINE: NOT DETECTED
ANION GAP SERPL CALCULATED.3IONS-SCNC: 12 MMOL/L (ref 7–16)
ANION GAP SERPL CALCULATED.3IONS-SCNC: 16 MMOL/L (ref 7–16)
AST SERPL-CCNC: 66 U/L (ref 0–39)
BARBITURATE SCREEN URINE: NOT DETECTED
BASOPHILS ABSOLUTE: 0.05 E9/L (ref 0–0.2)
BASOPHILS ABSOLUTE: 0.06 E9/L (ref 0–0.2)
BASOPHILS RELATIVE PERCENT: 0.8 % (ref 0–2)
BASOPHILS RELATIVE PERCENT: 0.9 % (ref 0–2)
BENZODIAZEPINE SCREEN, URINE: NOT DETECTED
BILIRUB SERPL-MCNC: 0.8 MG/DL (ref 0–1.2)
BUN BLDV-MCNC: 19 MG/DL (ref 6–20)
BUN BLDV-MCNC: 20 MG/DL (ref 6–20)
CALCIUM SERPL-MCNC: 8.9 MG/DL (ref 8.6–10.2)
CALCIUM SERPL-MCNC: 9 MG/DL (ref 8.6–10.2)
CANNABINOID SCREEN URINE: NOT DETECTED
CHLORIDE BLD-SCNC: 94 MMOL/L (ref 98–107)
CHLORIDE BLD-SCNC: 99 MMOL/L (ref 98–107)
CO2: 24 MMOL/L (ref 22–29)
CO2: 25 MMOL/L (ref 22–29)
COCAINE METABOLITE SCREEN URINE: NOT DETECTED
CREAT SERPL-MCNC: 1 MG/DL (ref 0.7–1.2)
CREAT SERPL-MCNC: 1 MG/DL (ref 0.7–1.2)
EKG ATRIAL RATE: 72 BPM
EKG P AXIS: 62 DEGREES
EKG P-R INTERVAL: 144 MS
EKG Q-T INTERVAL: 408 MS
EKG QRS DURATION: 86 MS
EKG QTC CALCULATION (BAZETT): 446 MS
EKG R AXIS: -28 DEGREES
EKG T AXIS: 39 DEGREES
EKG VENTRICULAR RATE: 72 BPM
EOSINOPHILS ABSOLUTE: 0.16 E9/L (ref 0.05–0.5)
EOSINOPHILS ABSOLUTE: 0.16 E9/L (ref 0.05–0.5)
EOSINOPHILS RELATIVE PERCENT: 2.4 % (ref 0–6)
EOSINOPHILS RELATIVE PERCENT: 2.6 % (ref 0–6)
ETHANOL: 57 MG/DL (ref 0–0.08)
FENTANYL SCREEN, URINE: NOT DETECTED
GFR AFRICAN AMERICAN: >60
GFR AFRICAN AMERICAN: >60
GFR NON-AFRICAN AMERICAN: >60 ML/MIN/1.73
GFR NON-AFRICAN AMERICAN: >60 ML/MIN/1.73
GLUCOSE BLD-MCNC: 73 MG/DL (ref 74–99)
GLUCOSE BLD-MCNC: 79 MG/DL (ref 74–99)
HCT VFR BLD CALC: 36.9 % (ref 37–54)
HCT VFR BLD CALC: 37.5 % (ref 37–54)
HEMOGLOBIN: 13.7 G/DL (ref 12.5–16.5)
HEMOGLOBIN: 13.7 G/DL (ref 12.5–16.5)
IMMATURE GRANULOCYTES #: 0.01 E9/L
IMMATURE GRANULOCYTES #: 0.03 E9/L
IMMATURE GRANULOCYTES %: 0.2 % (ref 0–5)
IMMATURE GRANULOCYTES %: 0.4 % (ref 0–5)
LV EF: 66 %
LVEF MODALITY: NORMAL
LYMPHOCYTES ABSOLUTE: 1.73 E9/L (ref 1.5–4)
LYMPHOCYTES ABSOLUTE: 1.94 E9/L (ref 1.5–4)
LYMPHOCYTES RELATIVE PERCENT: 28.3 % (ref 20–42)
LYMPHOCYTES RELATIVE PERCENT: 28.6 % (ref 20–42)
Lab: NORMAL
MAGNESIUM: 2.3 MG/DL (ref 1.6–2.6)
MAGNESIUM: 2.5 MG/DL (ref 1.6–2.6)
MCH RBC QN AUTO: 29.4 PG (ref 26–35)
MCH RBC QN AUTO: 30 PG (ref 26–35)
MCHC RBC AUTO-ENTMCNC: 36.5 % (ref 32–34.5)
MCHC RBC AUTO-ENTMCNC: 37.1 % (ref 32–34.5)
MCV RBC AUTO: 80.5 FL (ref 80–99.9)
MCV RBC AUTO: 80.7 FL (ref 80–99.9)
METHADONE SCREEN, URINE: NOT DETECTED
MONOCYTES ABSOLUTE: 0.6 E9/L (ref 0.1–0.95)
MONOCYTES ABSOLUTE: 0.64 E9/L (ref 0.1–0.95)
MONOCYTES RELATIVE PERCENT: 9.4 % (ref 2–12)
MONOCYTES RELATIVE PERCENT: 9.8 % (ref 2–12)
NEUTROPHILS ABSOLUTE: 3.56 E9/L (ref 1.8–7.3)
NEUTROPHILS ABSOLUTE: 3.95 E9/L (ref 1.8–7.3)
NEUTROPHILS RELATIVE PERCENT: 58.3 % (ref 43–80)
NEUTROPHILS RELATIVE PERCENT: 58.3 % (ref 43–80)
OPIATE SCREEN URINE: NOT DETECTED
OXYCODONE URINE: NOT DETECTED
PDW BLD-RTO: 14.3 FL (ref 11.5–15)
PDW BLD-RTO: 14.3 FL (ref 11.5–15)
PHENCYCLIDINE SCREEN URINE: NOT DETECTED
PHOSPHORUS: 4.2 MG/DL (ref 2.5–4.5)
PLATELET # BLD: 225 E9/L (ref 130–450)
PLATELET # BLD: 238 E9/L (ref 130–450)
PMV BLD AUTO: 11 FL (ref 7–12)
PMV BLD AUTO: 11.4 FL (ref 7–12)
POTASSIUM REFLEX MAGNESIUM: 4.8 MMOL/L (ref 3.5–5)
POTASSIUM SERPL-SCNC: 4.5 MMOL/L (ref 3.5–5)
RBC # BLD: 4.57 E12/L (ref 3.8–5.8)
RBC # BLD: 4.66 E12/L (ref 3.8–5.8)
SALICYLATE, SERUM: 1.5 MG/DL (ref 0–30)
SODIUM BLD-SCNC: 134 MMOL/L (ref 132–146)
SODIUM BLD-SCNC: 136 MMOL/L (ref 132–146)
TOTAL PROTEIN: 7.3 G/DL (ref 6.4–8.3)
TRICYCLIC ANTIDEPRESSANTS SCREEN SERUM: NEGATIVE NG/ML
TROPONIN, HIGH SENSITIVITY: 41 NG/L (ref 0–11)
TROPONIN, HIGH SENSITIVITY: 44 NG/L (ref 0–11)
TROPONIN, HIGH SENSITIVITY: 49 NG/L (ref 0–11)
WBC # BLD: 6.1 E9/L (ref 4.5–11.5)
WBC # BLD: 6.8 E9/L (ref 4.5–11.5)

## 2022-04-29 PROCEDURE — 93005 ELECTROCARDIOGRAM TRACING: CPT | Performed by: NURSE PRACTITIONER

## 2022-04-29 PROCEDURE — 93018 CV STRESS TEST I&R ONLY: CPT | Performed by: INTERNAL MEDICINE

## 2022-04-29 PROCEDURE — 82077 ASSAY SPEC XCP UR&BREATH IA: CPT

## 2022-04-29 PROCEDURE — 6370000000 HC RX 637 (ALT 250 FOR IP): Performed by: NURSE PRACTITIONER

## 2022-04-29 PROCEDURE — 85025 COMPLETE CBC W/AUTO DIFF WBC: CPT

## 2022-04-29 PROCEDURE — 80143 DRUG ASSAY ACETAMINOPHEN: CPT

## 2022-04-29 PROCEDURE — G0378 HOSPITAL OBSERVATION PER HR: HCPCS

## 2022-04-29 PROCEDURE — 84100 ASSAY OF PHOSPHORUS: CPT

## 2022-04-29 PROCEDURE — 36415 COLL VENOUS BLD VENIPUNCTURE: CPT

## 2022-04-29 PROCEDURE — 84484 ASSAY OF TROPONIN QUANT: CPT

## 2022-04-29 PROCEDURE — 2140000000 HC CCU INTERMEDIATE R&B

## 2022-04-29 PROCEDURE — 93017 CV STRESS TEST TRACING ONLY: CPT

## 2022-04-29 PROCEDURE — 6360000002 HC RX W HCPCS: Performed by: INTERNAL MEDICINE

## 2022-04-29 PROCEDURE — 80307 DRUG TEST PRSMV CHEM ANLYZR: CPT

## 2022-04-29 PROCEDURE — 99285 EMERGENCY DEPT VISIT HI MDM: CPT

## 2022-04-29 PROCEDURE — 80179 DRUG ASSAY SALICYLATE: CPT

## 2022-04-29 PROCEDURE — 78452 HT MUSCLE IMAGE SPECT MULT: CPT | Performed by: INTERNAL MEDICINE

## 2022-04-29 PROCEDURE — 83735 ASSAY OF MAGNESIUM: CPT

## 2022-04-29 PROCEDURE — 80048 BASIC METABOLIC PNL TOTAL CA: CPT

## 2022-04-29 PROCEDURE — 3430000000 HC RX DIAGNOSTIC RADIOPHARMACEUTICAL: Performed by: RADIOLOGY

## 2022-04-29 PROCEDURE — 78452 HT MUSCLE IMAGE SPECT MULT: CPT

## 2022-04-29 PROCEDURE — 80053 COMPREHEN METABOLIC PANEL: CPT

## 2022-04-29 PROCEDURE — 71045 X-RAY EXAM CHEST 1 VIEW: CPT

## 2022-04-29 PROCEDURE — 93016 CV STRESS TEST SUPVJ ONLY: CPT | Performed by: INTERNAL MEDICINE

## 2022-04-29 PROCEDURE — A9500 TC99M SESTAMIBI: HCPCS | Performed by: RADIOLOGY

## 2022-04-29 RX ORDER — ACETAMINOPHEN 325 MG/1
650 TABLET ORAL EVERY 6 HOURS PRN
Status: DISCONTINUED | OUTPATIENT
Start: 2022-04-29 | End: 2022-04-29 | Stop reason: HOSPADM

## 2022-04-29 RX ORDER — ENOXAPARIN SODIUM 100 MG/ML
40 INJECTION SUBCUTANEOUS DAILY
Status: DISCONTINUED | OUTPATIENT
Start: 2022-04-29 | End: 2022-04-29 | Stop reason: HOSPADM

## 2022-04-29 RX ORDER — GAUZE BANDAGE 2" X 2"
100 BANDAGE TOPICAL DAILY
Status: DISCONTINUED | OUTPATIENT
Start: 2022-04-29 | End: 2022-04-29 | Stop reason: HOSPADM

## 2022-04-29 RX ORDER — DEXTROSE MONOHYDRATE 50 MG/ML
100 INJECTION, SOLUTION INTRAVENOUS PRN
Status: DISCONTINUED | OUTPATIENT
Start: 2022-04-29 | End: 2022-04-29 | Stop reason: HOSPADM

## 2022-04-29 RX ORDER — ASPIRIN 81 MG/1
324 TABLET, CHEWABLE ORAL ONCE
Status: COMPLETED | OUTPATIENT
Start: 2022-04-29 | End: 2022-04-29

## 2022-04-29 RX ORDER — ACETAMINOPHEN 650 MG/1
650 SUPPOSITORY RECTAL EVERY 6 HOURS PRN
Status: DISCONTINUED | OUTPATIENT
Start: 2022-04-29 | End: 2022-04-29 | Stop reason: HOSPADM

## 2022-04-29 RX ORDER — AMLODIPINE BESYLATE 5 MG/1
10 TABLET ORAL DAILY
Status: DISCONTINUED | OUTPATIENT
Start: 2022-04-29 | End: 2022-04-29 | Stop reason: HOSPADM

## 2022-04-29 RX ORDER — ATORVASTATIN CALCIUM 40 MG/1
40 TABLET, FILM COATED ORAL NIGHTLY
Status: DISCONTINUED | OUTPATIENT
Start: 2022-04-29 | End: 2022-04-29 | Stop reason: HOSPADM

## 2022-04-29 RX ORDER — ATORVASTATIN CALCIUM 40 MG/1
40 TABLET, FILM COATED ORAL NIGHTLY
Qty: 30 TABLET | Refills: 1 | Status: SHIPPED | OUTPATIENT
Start: 2022-04-29

## 2022-04-29 RX ORDER — ONDANSETRON 2 MG/ML
4 INJECTION INTRAMUSCULAR; INTRAVENOUS EVERY 6 HOURS PRN
Status: DISCONTINUED | OUTPATIENT
Start: 2022-04-29 | End: 2022-04-29 | Stop reason: HOSPADM

## 2022-04-29 RX ORDER — FOLIC ACID 1 MG/1
1 TABLET ORAL DAILY
Status: DISCONTINUED | OUTPATIENT
Start: 2022-04-29 | End: 2022-04-29 | Stop reason: HOSPADM

## 2022-04-29 RX ORDER — POLYETHYLENE GLYCOL 3350 17 G/17G
17 POWDER, FOR SOLUTION ORAL DAILY PRN
Status: DISCONTINUED | OUTPATIENT
Start: 2022-04-29 | End: 2022-04-29 | Stop reason: HOSPADM

## 2022-04-29 RX ORDER — SODIUM CHLORIDE 0.9 % (FLUSH) 0.9 %
5-40 SYRINGE (ML) INJECTION PRN
Status: DISCONTINUED | OUTPATIENT
Start: 2022-04-29 | End: 2022-04-29 | Stop reason: HOSPADM

## 2022-04-29 RX ORDER — NICOTINE POLACRILEX 4 MG
15 LOZENGE BUCCAL PRN
Status: DISCONTINUED | OUTPATIENT
Start: 2022-04-29 | End: 2022-04-29 | Stop reason: HOSPADM

## 2022-04-29 RX ORDER — ONDANSETRON 4 MG/1
4 TABLET, ORALLY DISINTEGRATING ORAL EVERY 8 HOURS PRN
Status: DISCONTINUED | OUTPATIENT
Start: 2022-04-29 | End: 2022-04-29 | Stop reason: HOSPADM

## 2022-04-29 RX ORDER — SODIUM CHLORIDE 0.9 % (FLUSH) 0.9 %
5-40 SYRINGE (ML) INJECTION EVERY 12 HOURS SCHEDULED
Status: DISCONTINUED | OUTPATIENT
Start: 2022-04-29 | End: 2022-04-29 | Stop reason: HOSPADM

## 2022-04-29 RX ORDER — DEXTROSE MONOHYDRATE 25 G/50ML
12.5 INJECTION, SOLUTION INTRAVENOUS PRN
Status: DISCONTINUED | OUTPATIENT
Start: 2022-04-29 | End: 2022-04-29 | Stop reason: HOSPADM

## 2022-04-29 RX ORDER — AMLODIPINE BESYLATE 10 MG/1
10 TABLET ORAL DAILY
Qty: 30 TABLET | Refills: 1 | Status: SHIPPED | OUTPATIENT
Start: 2022-04-29

## 2022-04-29 RX ORDER — ASPIRIN 81 MG/1
81 TABLET, CHEWABLE ORAL DAILY
Status: DISCONTINUED | OUTPATIENT
Start: 2022-04-29 | End: 2022-04-29 | Stop reason: HOSPADM

## 2022-04-29 RX ORDER — SODIUM CHLORIDE 9 MG/ML
INJECTION, SOLUTION INTRAVENOUS PRN
Status: DISCONTINUED | OUTPATIENT
Start: 2022-04-29 | End: 2022-04-29 | Stop reason: HOSPADM

## 2022-04-29 RX ADMIN — Medication 36.8 MILLICURIE: at 10:25

## 2022-04-29 RX ADMIN — Medication 12 MILLICURIE: at 09:00

## 2022-04-29 RX ADMIN — REGADENOSON 0.4 MG: 0.08 INJECTION, SOLUTION INTRAVENOUS at 10:23

## 2022-04-29 RX ADMIN — ASPIRIN 81 MG 324 MG: 81 TABLET ORAL at 01:46

## 2022-04-29 ASSESSMENT — PAIN SCALES - GENERAL: PAINLEVEL_OUTOF10: 9

## 2022-04-29 ASSESSMENT — PAIN DESCRIPTION - ONSET: ONSET: GRADUAL

## 2022-04-29 ASSESSMENT — PAIN DESCRIPTION - DESCRIPTORS: DESCRIPTORS: ACHING

## 2022-04-29 ASSESSMENT — ENCOUNTER SYMPTOMS
ALLERGIC/IMMUNOLOGIC NEGATIVE: 1
EYES NEGATIVE: 1
RESPIRATORY NEGATIVE: 1
GASTROINTESTINAL NEGATIVE: 1

## 2022-04-29 ASSESSMENT — PAIN DESCRIPTION - FREQUENCY: FREQUENCY: INTERMITTENT

## 2022-04-29 ASSESSMENT — PAIN DESCRIPTION - ORIENTATION: ORIENTATION: UPPER

## 2022-04-29 ASSESSMENT — PAIN DESCRIPTION - LOCATION: LOCATION: HEAD

## 2022-04-29 ASSESSMENT — HEART SCORE: ECG: 0

## 2022-04-29 ASSESSMENT — PAIN - FUNCTIONAL ASSESSMENT: PAIN_FUNCTIONAL_ASSESSMENT: 0-10

## 2022-04-29 NOTE — Clinical Note
Discharge Plan[de-identified] Other/[facility])   Telemetry/Cardiac Monitoring Required?: Yes

## 2022-04-29 NOTE — ED NOTES
Department of Emergency Medicine  FIRST PROVIDER TRIAGE NOTE             Independent MLP           4/29/22  12:33 AM EDT    Date of Encounter: 4/29/22   MRN: [id number]      HPI: Niki Butcher is a 60 y.o. male who presents to the ED for Chest Pain (C/O intermittent chest pain mid chest started two weeks ago ) and Headache (C/O constant H/A x month )  Patient was most recently admitted for chest pain with elevated troponin, plan was for an outpatient stress test.    ROS: Negative for diarrhea or urinary complaints. PE: Gen Appearance/Constitutional: alert  CV: regular rate     Initial Plan of Care: All treatment areas with department are currently occupied. Plan to order/Initiate the following while awaiting opening in ED: labs, EKG and imaging studies.   Initiate Treatment-Testing, Proceed toTreatment Area When Bed Available for ED Attending/MLP to Continue Care    Electronically signed by ROSY Crews CNP   DD: 4/29/22         ROSY Crews CNP  04/29/22 0034

## 2022-04-29 NOTE — PROCEDURES
Lexiscan Stress EKG Report: Indication: Atypical angina    Baseline EKG: normal EKG, normal sinus rhythm. Stress EKG: No ST-T changes. Arrhythmias: None. Symptoms: None. Summary:  Unremarkable lexiscan stress EKG. See separate report for stress perfusion results.      Nolberto Stanley MD  Cardiologist  Cardiology, [facility]) Physicians

## 2022-04-29 NOTE — DISCHARGE SUMMARY
[address]  Department of Internal Medicine   Internal Medicine Residency Program   Discharge Summary    PCP: Dr. Steven aMrquez Team: 1    Admit Date:4/29/2022  Discharge Date:4/29/2022    Admission Diagnosis:  1. Atypical chest pain  2. Hx of hypertension  3. Hx of seizure disorder  4. Hx of alcohol abuse / withdrawals  5. Hx of tobacco abuse  6. Hx of SDH/SAH  7. Hx of chronic headache      Discharge Diagnosis:  1. Atypical chest pain, non-cardiac, improved  2. Hx of hypertension  3. Hx of seizure disorder  4. Hx of alcohol abuse / withdrawals  5. Hx of tobacco abuse  6. Hx of SDH/SAH  7. Hx of chronic headache    Hospital Course:  Ayse Huitron is a 60 y.o. male has a past medical history of Headache and Hypertension. ,  History of seizure disorder on Keppra, history of PE, history of pancreatitis, alcohol and tobacco use, history of alcohol withdrawal, history of SDH [2021], SAH, with recent admission on 4/25-4/27 for worsening headache and uncontrolled hypertension presented with CC of chest pain since 1 day     Patient was recently admitted from 4/25-4/27 for headache and uncontrolled hypertension, bilateral leg edema. In the previous admission he refused stress test for further evaluation but underwent echo which showed EF 68%, normal diastolic function, mild to moderate left atrial dilation. He was then discharged with amlodipine 10 mg.     He now presents with chest pain since 1 day. It started on 4/28 at 1 PM while patient was sitting. It was sudden onset, central, pressure-like sensation, 8/10 intensity, lasted 2 hours. There was no nausea, vomiting, diaphoresis, shortness of breath, orthopnea, loss of consciousness, radiation of pain. Review of other symptoms negative. Patient smokes couple of cigarettes a day and admits to drinking alcohol yesterday. He denies use of cocaine or IV drugs.     ED Course: Vitals were unremarkable.   BMP, CBC normal.  Initial troponin 49 and then 44. ALT 90, AST 66, ethanol 57 mg/dL. ED Meds: Patient was given aspirin 325 mg. Patient reported improvement of pain on day of stress test. Nuclear stress test was negative. He was then discharged with amlodipine and Lipitor.     Significant findings (history and exam, laboratory, radiological, pathology, other tests):  CBC with Differential:    Lab Results   Component Value Date    WBC 6.1 04/29/2022    RBC 4.66 04/29/2022    HGB 13.7 04/29/2022    HCT 37.5 04/29/2022     04/29/2022    MCV 80.5 04/29/2022    MCH 29.4 04/29/2022    MCHC 36.5 04/29/2022    RDW 14.3 04/29/2022    NRBC 1.8 01/12/2022    LYMPHOPCT 28.3 04/29/2022    MONOPCT 9.8 04/29/2022    BASOPCT 0.8 04/29/2022    MONOSABS 0.60 04/29/2022    LYMPHSABS 1.73 04/29/2022    EOSABS 0.16 04/29/2022    BASOSABS 0.05 04/29/2022     CMP:    Lab Results   Component Value Date     04/29/2022    K 4.8 04/29/2022    CL 99 04/29/2022    CO2 25 04/29/2022    BUN 20 04/29/2022    CREATININE 1.0 04/29/2022    GFRAA >60 04/29/2022    LABGLOM >60 04/29/2022    GLUCOSE 79 04/29/2022    PROT 7.3 04/29/2022    LABALBU 4.4 04/29/2022    CALCIUM 8.9 04/29/2022    BILITOT 0.8 04/29/2022    ALKPHOS 58 04/29/2022    AST 66 04/29/2022    ALT 90 04/29/2022        Echo Complete    Result Date: 4/26/2022  Transthoracic Echocardiography Report (TTE)  Demographics   Patient Name        Marquita Urias  Gender               Male                      J   Medical Record      [id number]       Room Number          8203  Number   Account #           [de-identified]      Procedure Date       04/26/2022   Corporate ID                       Ordering Physician   Shara Hernandes   Accession Number    [id number]     Referring Physician   Date of Birth       1965     Sonographer          P[place]ikou, Nydia Challenger   Age                 60 year(s)     Interpreting         Barak Huber MD                                     Physician                                      Any pressure. Less than mild valvular regurgitation/ stenosis. No comparison study available. Signature   ----------------------------------------------------------------  Electronically signed by Navin Pickens MD(Interpreting  physician) on 04/26/2022 04:28 PM  ----------------------------------------------------------------  M-Mode/2D Measurements & Calculations   LV Diastolic   LV Systolic Dimension: 1.3   AV Cusp Separation: 2.4 cmLA  Dimension: 4   cm                           Dimension: 3.4 cmAO Root  cm             LV Volume Diastolic: 08.1 ml Dimension: 3.4 cm  LV FS:67.5 %   LV Volume Systolic: 4.3 ml  LV PW          LV EDV/LV EDV Index: 64.8  Diastolic: 1.4 NS/80 WG/Y^6ZI ESV/LV ESV  cm             Index: 4.3 ml/2ml/ m^2       RV Diastolic Dimension: 3 cm  LV PW          EF Calculated: 94 %  Systolic: 2.4  LV Mass Index: 112 l/min*m^2 Ascending Aorta: 3 cm  cm             LV Length: 8.6 cm            LA volume/Index: 75.7 ml  Septum                                      /55.10MG/Q^7  Diastolic: 1.3 LVOT: 2.4 cm                 RA Area: 17.2 cm^2  cm  Septum                                      IVC Expiration: 1.6 cm  Systolic: 2 cm   LV Mass:  197.59 g  Doppler Measurements & Calculations   MV Peak E-Wave:    AV Peak Velocity: 1.55 m/s      LVOT Peak Velocity: 1  0.51 m/s           AV Peak Gradient: 9.6 mmHg      m/s  MV Peak A-Wave:    AV Mean Velocity: 1.13 m/s      LVOT Mean Velocity:  0.69 m/s           AV Mean Gradient: 5.7 mmHg      0.73 m/s  MV E/A Ratio: 0.73 AV VTI: 21.4 cm                 LVOT Peak Gradient: 4  MV Peak Gradient:  AV Area (Continuity):4.39 cm^2  mmHgLVOT Mean Gradient:  2.8 mmHg                                           2.3 mmHg  MV Mean Gradient:  LVOT VTI: 20.8 cm               Estimated RAP:3 mmHg  1.1 mmHg           IVRT: 152.2 msec  MV Mean Velocity:  0.5 m/s            Pulm. Vein A Reversal  MV Deceleration    Duration:110.7 msec  Time: 231.2 msec   Pulm.  Vein D Velocity:0.38 PV Peak Velocity: 1.03  MV P1/2t: 63.1     m/sPulm. Vein A Reversal        m/s  msec               Velocity:0.46 m/s               PV Peak Gradient: 4.27  MVA by PHT:3.49    Pulm. Vein S Velocity: 0.84 m/s mmHg  cm^2                                               PV Mean Velocity: 0.71  MV Area                                            m/s  (continuity): 5.1                                  PV Mean Gradient: 2.4  cm^2                                               mmHg  MV E' Septal  Velocity: 0.05 m/s                                 [place] ED Velocity: 1.15  MV E' Lateral                                      m/s  Velocity: 12 m/s  [email or web address] [id number]    CT Head WO Contrast    Result Date: 4/23/2022  EXAMINATION: CT OF THE HEAD WITHOUT CONTRAST  4/23/2022 9:37 am TECHNIQUE: CT of the head was performed without the administration of intravenous contrast. Dose modulation, iterative reconstruction, and/or weight based adjustment of the mA/kV was utilized to reduce the radiation dose to as low as reasonably achievable. COMPARISON: CT of the head January 17, 2022 HISTORY: ORDERING SYSTEM PROVIDED HISTORY: headache TECHNOLOGIST PROVIDED HISTORY: Reason for exam:->headache Has a \"code stroke\" or \"stroke alert\" been called? ->No Decision Support Exception - unselect if not a suspected or confirmed emergency medical condition->Emergency Medical Condition (MA) What reading provider will be dictating this exam?->CRC FINDINGS: BRAIN/VENTRICLES: There is no acute intracranial hemorrhage, mass effect or midline shift. No abnormal extra-axial fluid collection. Encephalomalacia again identified in the left frontoparietal lobe. There is no evidence of hydrocephalus. ORBITS: The visualized portion of the orbits demonstrate no acute abnormality.  SINUSES: The visualized paranasal sinuses and mastoid air cells demonstrate no acute abnormality. SOFT TISSUES/SKULL: Postop changes of prior left frontal craniotomy. No acute abnormality of the visualized skull or soft tissues. Stable appearance of left frontotemporal encephalomalacia. No acute intracranial findings. No acute intracranial hemorrhage. XR CHEST PORTABLE    Result Date: 4/29/2022  EXAMINATION: ONE XRAY VIEW OF THE CHEST 4/29/2022 1:14 am COMPARISON: 04/26/2022 HISTORY: ORDERING SYSTEM PROVIDED HISTORY: chest pain TECHNOLOGIST PROVIDED HISTORY: Reason for exam:->chest pain What reading provider will be dictating this exam?->CRC FINDINGS: The cardiac silhouette is normal in size. Scattered vascular calcifications. Blunting of right costophrenic angle is unchanged favoring chronic pleural scar. Some minimal ill-defined opacities in the right lung demonstrate no definite change in are also favored to represent areas of mild scarring. No pneumothorax or new consolidation identified. Similar chronic appearing findings. PA and lateral views would be useful for further assessment, if symptoms persist.     XR CHEST PORTABLE    Result Date: 4/26/2022  EXAMINATION: ONE XRAY VIEW OF THE CHEST 4/26/2022 10:33 am COMPARISON: 01/01/2022 and 04/25/2022 HISTORY: ORDERING SYSTEM PROVIDED HISTORY: r/o pleural effusions TECHNOLOGIST PROVIDED HISTORY: Reason for exam:->r/o pleural effusions What reading provider will be dictating this exam?->CRC FINDINGS: The cardiac silhouette is within normals. There are no findings of failure. The lungs are clear. There is no focal infiltrate or effusion. There is chronic blunting the right costophrenic angle. This finding is unchanged when compared to prior study of 01/01/2022 and likely represents chronic pleuroparenchymal scarring. There is no appreciable right or left pleural effusion. 1. There is no evidence of pneumonia or failure 2. There is no appreciable right or left pleural effusion 3.  Chronic blunting of the right costophrenic angle.     XR CHEST PORTABLE    Result Date: 4/25/2022  EXAMINATION: ONE XRAY VIEW OF THE CHEST 4/25/2022 5:01 am COMPARISON: 01/01/2022 HISTORY: ORDERING SYSTEM PROVIDED HISTORY: Headache TECHNOLOGIST PROVIDED HISTORY: Reason for exam:->Headache What reading provider will be dictating this exam?->CRC No other provided history. FINDINGS: EKG leads overlie the chest.  Heart size is normal.  Scattered vascular calcifications. Deformity of the right 7th rib is unchanged. Blunting of right costophrenic angle is also unchanged and favors pleural scarring. Effusion less likely. No new consolidation or other change. No significant change. PA and lateral views would be useful for further assessment, if symptoms persist.     US ABDOMEN LIMITED Specify organ? LIVER, PANCREAS    Result Date: 4/26/2022  EXAMINATION: RIGHT UPPER QUADRANT ULTRASOUND 4/26/2022 10:44 am COMPARISON: None. HISTORY: ORDERING SYSTEM PROVIDED HISTORY: r/o cirrhosis of the liver TECHNOLOGIST PROVIDED HISTORY: Reason for exam:->r/o cirrhosis of the liver Specify organ?->LIVER Specify organ?->PANCREAS What reading provider will be dictating this exam?->CRC FINDINGS: LIVER:  The liver demonstrates mildly increased echogenicity without evidence of intrahepatic biliary ductal dilatation. BILIARY SYSTEM:  Gallbladder is unremarkable without evidence of pericholecystic fluid, wall thickening or stones. Negative sonographic Sprague's sign. Common bile duct is within normal limits measuring 4.4 mm. RIGHT KIDNEY: The right kidney is grossly unremarkable without evidence of hydronephrosis. It measures 11.4 x 5.5 cm. PANCREAS:  Visualized portions of the pancreas are unremarkable. OTHER: No evidence of right upper quadrant ascites.      Fatty infiltration liver     NM MYOCARDIAL SPECT REST EXERCISE OR RX    Result Date: 4/29/2022  Pharmacologic myocardial perfusion stress test: Patient was injected with 12 mCi 99 technetium sestamibi at rest.  Patient was then injected with 0.4 mg Lexiscan following which 36.8 mCi 99 technetium sestamibi was administered. Raw spin images: Increased hepatic and gut uptake. Inferior attenuation artifact. Mild vertical motion artifact. PERFUSION IMAGES REVEALED: No perfusion deficits to suggest the presence of ischemia or infarction. Left ventricular end-diastolic volume 882 ml Left ventricular end-systolic volume 34 ml EJECTION FRACTION: 66%     1: The stress EKG report is provided separately. 2  This is a normal myocardial perfusion scan. There is no evidence of ischemia or infarction. Prominent apical thinning is present. Inferior attenuation artifact is present. 3: Left ventricle is normal in size with a preserved ejection fraction. 4: Prognostically, this is a low risk study. 5: No prior study available for comparison. US DUP LOWER EXTREMITIES BILATERAL VENOUS    Result Date: 2022  Patient MRN:  [id number] : 1965 Age: 60 years Gender: Male Order Date:  2022 10:44 AM EXAM: US DUP LOWER EXTREMITIES BILATERAL VENOUS NUMBER OF IMAGES:  47 INDICATION:  r/o dvt r/o dvt What reading provider will be dictating this exam?->[facility] COMPARISON: None Within the visualized vessels, there is no evidence for deep venous thrombosis There is good compressibility, there is good augmentation, there is good color flow.      Within the visualized vessels there is no evidence for deep venous thrombosis       Pending test results: None    Consults: None    Procedures: None    Condition at discharge: Improved, stable    Disposition: home    Discharge Medications:     Medication List      START taking these medications    atorvastatin 40 MG tablet  Commonly known as: LIPITOR  Take 1 tablet by mouth nightly        CONTINUE taking these medications    amLODIPine 10 MG tablet  Commonly known as: NORVASC  Take 1 tablet by mouth daily     aspirin 81 MG chewable tablet  Take 1 tablet by mouth daily     Blood Pressure Cuff Misc  Dx: Hypertension with labile blood pressure     folic acid 1 MG tablet  Commonly known as: FOLVITE  Take 1 tablet by mouth daily     vitamin B-1 100 MG tablet  Commonly known as: THIAMINE  Take 1 tablet by mouth daily        STOP taking these medications    meloxicam 7.5 MG tablet  Commonly known as: Mobic           Where to Get Your Medications      These medications were sent to [id number] [address] [address]    Phone: [phone]   · amLODIPine 10 MG tablet  · atorvastatin 40 MG tablet         Activity: activity as tolerated  Diet: regular diet    Follow-up appointments:   Follow up in our IM clinic on 5/4/2022    Patient Instructions:   Please take amlodipine daily for blood pressure and Lipitor daily for primary prevention      Nenita Reyna MD [facility], PGY - 2   1:46 PM  4/29/2022    Attending physician: Dr. Terry cMcauley

## 2022-04-29 NOTE — H&P
[address] [facility]  Internal Medicine Residency Program  History and Physical    Patient:  Lucien Clifton 60 y.o. male MRN: [id number]     Date of Service: 4/29/2022    Hospital Day: 1      Chief complaint: had concerns including Chest Pain (C/O intermittent chest pain mid chest started two weeks ago ) and Headache (C/O constant H/A x month ). History Obtained From:  patient    Date of Admission: 4/29/22  History of Present Illness   Lucien Clifton is a 60 y.o. male has a past medical history of Headache and Hypertension. ,  History of seizure disorder on Keppra, history of PE, history of pancreatitis, alcohol and tobacco use, history of alcohol withdrawal, history of SDH [2021], SAH, with recent admission on 4/25-4/27 for worsening headache and uncontrolled hypertension presented with CC of chest pain since 1 day    Patient was recently admitted from 4/25-4/27 for headache and uncontrolled hypertension, bilateral leg edema. In the previous admission he refused stress test for further evaluation but underwent echo which showed EF 66%, normal diastolic dysfunction, mild to moderate left atrial dilation. He was then discharged with amlodipine 10 mg. He now presents with chest pain since 1 day. It started on 4/28 at 1 PM while patient was sitting. It was sudden onset, central, pressure-like sensation, 8/10 intensity, lasted 2 hours. There was no nausea, vomiting, diaphoresis, shortness of breath, orthopnea, loss of consciousness, radiation of pain. Review of other symptoms negative. Patient smokes couple of cigarettes a day and admits to drinking alcohol yesterday. He denies use of cocaine or IV drugs. ED Course: Vitals were unremarkable. BMP, CBC normal.  Initial troponin 49 and then 44. ALT 90, AST 66, ethanol 57 mg/dL.   ED Meds: Patient was given aspirin 325 mg.      Previous Hospital Admission: As mentioned above from 4/25-4/27      Past Medical History:       Diagnosis Date    Headache     Hypertension        Past Surgical History:    No past surgical history on file. Medications Prior to Admission:    Prior to Admission medications    Medication Sig Start Date End Date Taking? Authorizing Provider   aspirin 81 MG chewable tablet Take 1 tablet by mouth daily  Patient not taking: Reported on 4/29/2022 4/27/22   Tracy Ashford MD   folic acid (FOLVITE) 1 MG tablet Take 1 tablet by mouth daily  Patient not taking: Reported on 4/29/2022 4/27/22   Tracy Ashford MD   thiamine mononitrate (THIAMINE) 100 MG tablet Take 1 tablet by mouth daily  Patient not taking: Reported on 4/29/2022 4/27/22   Tracy Ashford MD   amLODIPine (NORVASC) 10 MG tablet Take 1 tablet by mouth daily  Patient not taking: Reported on 4/29/2022 4/28/22   Tracy Ashford MD   Blood Pressure Monitoring (BLOOD PRESSURE CUFF) MISC Dx:  Hypertension with labile blood pressure  Patient not taking: Reported on 4/29/2022 4/27/22   Tracy Ashford MD   meloxicam (MOBIC) 7.5 MG tablet Take 1 tablet by mouth daily 6/8/21 6/12/21  Jarad Laughlin APRN - CNP       Allergies:  Ace inhibitors and Fish-derived products    Social History:   TOBACCO:   reports that he has been smoking cigarettes. He has a 4.60 pack-year smoking history. He has never used smokeless tobacco.  ETOH:   reports current alcohol use. OCCUPATION: -     Family History:   No family history on file. REVIEW OF SYSTEMS:    Review of Systems   Constitutional: Negative. HENT: Negative. Eyes: Negative. Respiratory: Negative. Cardiovascular: Positive for chest pain. Gastrointestinal: Negative. Endocrine: Negative. Genitourinary: Negative. Musculoskeletal: Negative. Skin: Negative. Allergic/Immunologic: Negative. Neurological: Negative. Hematological: Negative. Psychiatric/Behavioral: Negative.     ·      Physical Exam   · Vitals: BP (!) 150/89   Pulse 72   Temp 97.7 °F (36.5 °C)   Resp 20   Ht 5' 7\" (1.702 m)   Wt 140 lb (63.5 kg)   SpO2 96%   BMI 21.93 kg/m²     Physical Exam  Constitutional:       Appearance: Normal appearance. HENT:      Head: Normocephalic and atraumatic. Eyes:      Pupils: Pupils are equal, round, and reactive to light. Cardiovascular:      Rate and Rhythm: Normal rate and regular rhythm. Pulses: Normal pulses. Heart sounds: Normal heart sounds. Pulmonary:      Effort: Pulmonary effort is normal.      Breath sounds: Normal breath sounds. Abdominal:      General: Abdomen is flat. Palpations: Abdomen is soft. Musculoskeletal:         General: Normal range of motion. Skin:     General: Skin is dry. Capillary Refill: Capillary refill takes less than 2 seconds. Neurological:      General: No focal deficit present. Mental Status: He is alert and oriented to person, place, and time. Psychiatric:         Mood and Affect: Mood normal.         Behavior: Behavior normal.         Thought Content:  Thought content normal.         Judgment: Judgment normal.     ·    Labs and Imaging Studies   Basic Labs  Recent Labs     04/26/22  0650 04/27/22  0523 04/29/22  0123    137 134   K 4.1 3.9 4.5   CL 98 97* 94*   CO2 23 28 24   BUN 14 22* 19   CREATININE 1.0 1.0 1.0   GLUCOSE 108* 109* 73*   CALCIUM 9.3 9.1 9.0       Recent Labs     04/26/22  0650 04/27/22  0523 04/29/22  0123   WBC 6.0 6.2 6.8   RBC 5.09 4.82 4.57   HGB 15.2 14.2 13.7   HCT 42.2 39.2 36.9*   MCV 82.9 81.3 80.7   MCH 29.9 29.5 30.0   MCHC 36.0* 36.2* 37.1*   RDW 13.7 13.6 14.3    217 225   MPV 11.1 11.4 11.4       CBC:   Lab Results   Component Value Date    WBC 6.8 04/29/2022    RBC 4.57 04/29/2022    HGB 13.7 04/29/2022    HCT 36.9 04/29/2022    MCV 80.7 04/29/2022    RDW 14.3 04/29/2022     04/29/2022     CMP:  Lab Results   Component Value Date     04/29/2022    K 4.5 04/29/2022    K 3.7 04/25/2022    CL 94 04/29/2022    CO2 24 04/29/2022    BUN 19 04/29/2022    PROT 7.3 04/29/2022 Imaging Studies:     Echo Complete    Result Date: 4/26/2022  Transthoracic Echocardiography Report (TTE)  Demographics   Patient Name        Stacey Miles  Gender               Male                      J   Medical Record      [id number]       Room Number          8203  Number   Account #           [de-identified]      Procedure Date       04/26/2022   Corporate ID                       Ordering Physician   Humphrey Juarez   Accession Number    [id number]     Referring Physician   Date of Birth       1965     Sonographer          [facility]   Age                 60 year(s)     Interpreting         Brinda Will MD                                     Physician                                      Any Other  Procedure Type of Study   TTE procedure:Echo Complete W/Doppler & Color Flow. Procedure Date Date: 04/26/2022 Start: 02:25 PM Study Location: Portable Technical Quality: Adequate visualization Indications:Edema. Patient Status: Routine Contrast Medium: Bubble Study. Height: 68 inches Weight: 140 pounds BSA: 1.76 m^2 BMI: 21.29 kg/m^2 BP: 140/90 mmHg  Findings   Left Ventricle  Normal left ventricular chamber size. Normal left ventricular systolic function. Visually estimated LVEF is 55-60 %. No wall motion abnormalities. Normal diastolic function. Normal left atrial pressure. Right Ventricle  Normal right ventricle structure and function. Left Atrium  The left atrium is mild-moderately dilated. Right Atrium  Normal right atrial size   Mitral Valve  Physiologic and/or trace mitral regurgitation is present. No evidence of hemodynamically significant mitral stenosis. Tricuspid Valve  The tricuspid valve appears structurally normal.  Physiologic and/or trace tricuspid regurgitation. Aortic Valve  Trileaflet aortic valve. Normal leaflet mobility. No aortic stenosis. No aortic regurgitation. Pulmonic Valve  Normal pulmonic valve structure and function.   Physiologic and/or trace pulmonic regurgitation present. Pericardial Effusion  No evidence for hemodynamically significant pericardial effusion. Pleural Effusion  No evidence of pleural effusion. Aorta  Normal aortic root and ascending aorta. Miscellaneous  The inferior vena cava diameter is normal with normal respiratory  variation. Unable to estimate PA pressure. Conclusions   Summary  Normal left ventricular chamber size. Normal left ventricular systolic function. Visually estimated LVEF is 55-60 %. No wall motion abnormalities. Normal diastolic function. Normal left atrial pressure. Normal right ventricle structure and function. The left atrium is mild-moderately dilated. Normal right atrial size. Unable to estimate PA pressure. Less than mild valvular regurgitation/ stenosis. No comparison study available.    Signature   ----------------------------------------------------------------  Electronically signed by Lola Markham MD(Interpreting  physician) on 04/26/2022 04:28 PM  ----------------------------------------------------------------  M-Mode/2D Measurements & Calculations   LV Diastolic   LV Systolic Dimension: 1.3   AV Cusp Separation: 2.4 cmLA  Dimension: 4   cm                           Dimension: 3.4 cmAO Root  cm             LV Volume Diastolic: 06.9 ml Dimension: 3.4 cm  LV FS:67.5 %   LV Volume Systolic: 4.3 ml  LV PW          LV EDV/LV EDV Index: 16.2  Diastolic: 1.4 GT/92 JV/C^3KB ESV/LV ESV  cm             Index: 4.3 ml/2ml/ m^2       RV Diastolic Dimension: 3 cm  LV PW          EF Calculated: 94 %  Systolic: 2.4  LV Mass Index: 112 l/min*m^2 Ascending Aorta: 3 cm  cm             LV Length: 8.6 cm            LA volume/Index: 75.7 ml  Septum                                      /36.53IR/C^4  Diastolic: 1.3 LVOT: 2.4 cm                 RA Area: 17.2 cm^2  cm  Septum                                      IVC Expiration: 1.6 cm  Systolic: 2 cm   LV Mass:  197.59 g  Doppler Measurements & Calculations   MV Peak E-Wave:    AV Peak Velocity: 1.55 m/s      LVOT Peak Velocity: 1  0.51 m/s           AV Peak Gradient: 9.6 mmHg      m/s  MV Peak A-Wave:    AV Mean Velocity: 1.13 m/s      LVOT Mean Velocity:  0.69 m/s           AV Mean Gradient: 5.7 mmHg      0.73 m/s  MV E/A Ratio: 0.73 AV VTI: 21.4 cm                 LVOT Peak Gradient: 4  MV Peak Gradient:  AV Area (Continuity):4.39 cm^2  mmHgLVOT Mean Gradient:  2.8 mmHg                                           2.3 mmHg  MV Mean Gradient:  LVOT VTI: 20.8 cm               Estimated RAP:3 mmHg  1.1 mmHg           IVRT: 152.2 msec  MV Mean Velocity:  0.5 m/s            Pulm. Vein A Reversal  MV Deceleration    Duration:110.7 msec  Time: 231.2 msec   Pulm. Vein D Velocity:0.38      PV Peak Velocity: 1.03  MV P1/2t: 63.1     m/sPulm. Vein A Reversal        m/s  msec               Velocity:0.46 m/s               PV Peak Gradient: 4.27  MVA by PHT:3.49    Pulm. Vein S Velocity: 0.84 m/s mmHg  cm^2                                               PV Mean Velocity: 0.71  MV Area                                            m/s  (continuity): 5.1                                  PV Mean Gradient: 2.4  cm^2                                               mmHg  MV E' Septal  Velocity: 0.05 m/s                                 [facility] ED Velocity: 1.15  MV E' Lateral                                      m/s  Velocity: 12 m/s  [id number]    EKG: normal sinus rhythm, left axis deviation, left atrial enlargement.       Resident's Assessment and Plan     60 y.o. male  has a past medical history of Headache and Hypertension. ,  History of seizure disorder on Keppra, history of PE, history of pancreatitis, alcohol and tobacco use, history of alcohol withdrawal, history of SDH [2021], SAH, with recent admission on 4/25-4/27 for worsening headache and uncontrolled hypertension presented with CC of chest pain since 1 day      Assessment:  1. Cardiac chest pain-Trop 49>44, needs stress test, HEAR score 4moderate, EKG NSR, LAD, LAE, recent echo EF 85%, normal diastolic function, risk of MACE 12 to 16.6%, recent lipid panel and A1c normal, ASCVD risk 18.7%      Inactive Problems:  · Hypertension on amlodipine  · History of seizure disorder  · History of PE  · Alcohol use-history of alcohol withdrawal, last drink 4/28  · Tobacco use  · History of SDH [2021]  · History of SAH  · Chronic headache likely cephalgia  · Allergies to ACE inhibitor and fish products      Plan:    Order stress test for ischemic work-up,   Control blood pressure with amlodipine   Consider statin for ASCVD risk          PT/OT: -  DVT ppx: Lovenox  GI ppx: -    Next of Kin/ POA:   Faisal Valenzuela Niece/Nephew [phone] 1         Code Status:   Full     Bard Liz MD, PGY-2   Attending physician: Dr. Alexandra Gacria  Discussed with Dr. Paula Sanders        NOTE: This report was transcribed using voice recognition software. Every effort was made to ensure accuracy; however, inadvertent computerized transcription errors may be present.

## 2022-04-29 NOTE — PROGRESS NOTES
[address] [facility]  Internal Medicine Residency / [address]    Attending Physician Statement  I have discussed the case, including pertinent history and exam findings with the resident and the team.  I have seen and examined the patient and the key elements of the encounter have been performed by me. I agree with the assessment, plan and orders as documented by the resident. Recurrent chest pain after returning home  Troponin at 41 in a plateau  VS stable  Having nuclear stress testing today   Remainder of medical problems as per resident note.       Isadora Diamond MD [facility]  Internal Medicine Residency Faculty

## 2022-04-29 NOTE — PROGRESS NOTES
CLINICAL PHARMACY NOTE: MEDS TO BEDS    Total # of Prescriptions Filled: 1   The following medications were delivered to the patient:  · lipitor 40mg      Additional Documentation:    Delivered to patient 4/29/22 @2:47pm

## 2022-04-29 NOTE — PROGRESS NOTES
[facility]  Internal Medicine Residency Program  Progress Note - House Team     Patient:  Flores Potts 60 y.o. male MRN: [id number]     Date of Service: 4/29/2022     CC: Chest pain and headache     Days since admission: 0    Subjective     Overnight events: No acute overnight events     Patient seen and examined at bedside this morning in no acute distress. Patient states he is not having any chest pain but is complaining Of a headache. He states the chest pain started while sitting down but did say he was smoking a cigarette at that time. He states his headache is chronic, he has been getting them since he fell in 2007. He also mentioned his left leg was swollen for 3-4 months, but just went back to normal a few days ago. Denies right leg swelling. Denies SOB. Denies issues sleeping. Denies being sedentary. Objective     Physical Exam:  · Vitals: BP (!) 149/96   Pulse 72   Temp 97.8 °F (36.6 °C) (Oral)   Resp 18   Ht 5' 7\" (1.702 m)   Wt 140 lb (63.5 kg)   SpO2 97%   BMI 21.93 kg/m²     · I & O - 24hr: No intake or output data in the 24 hours ending 04/29/22 1040   · General Appearance: alert, appears stated age and cooperative  · HEENT:  Head: Normocephalic, no lesions, without obvious abnormality. · Neck: no adenopathy and supple, symmetrical, trachea midline  · Lung: clear to auscultation bilaterally  · Heart: regular rate and rhythm, S1, S2 normal, no murmur, click, rub or gallop  · Abdomen: soft, non-tender; bowel sounds normal; no masses,  no organomegaly  · Extremities:  extremities normal, atraumatic, no cyanosis or edema. No edema or swelling in right leg on exam today. · Musculokeletal: No joint swelling, no muscle tenderness. ROM normal in all joints of extremities.    · Neurologic: Mental status: Alert, oriented, thought content appropriate  Subject  Pertinent Information & Imaging Studies, Consults   harlan  CBC:   Lab Results   Component Value Date    WBC 6.1 04/29/2022    RBC 4.66 04/29/2022    HGB 13.7 04/29/2022    HCT 37.5 04/29/2022    MCV 80.5 04/29/2022    MCH 29.4 04/29/2022    MCHC 36.5 04/29/2022    RDW 14.3 04/29/2022     04/29/2022    MPV 11.0 04/29/2022     BMP:    Lab Results   Component Value Date     04/29/2022    K 4.8 04/29/2022    CL 99 04/29/2022    CO2 25 04/29/2022    BUN 20 04/29/2022    LABALBU 4.4 04/29/2022    CREATININE 1.0 04/29/2022    CALCIUM 8.9 04/29/2022    GFRAA >60 04/29/2022    LABGLOM >60 04/29/2022    GLUCOSE 79 04/29/2022       IMAGING:   Imaging Studies:    CT Head WO Contrast    Result Date: 4/23/2022  Stable appearance of left frontotemporal encephalomalacia. No acute intracranial findings. No acute intracranial hemorrhage. XR CHEST PORTABLE    Result Date: 4/29/2022  Similar chronic appearing findings. PA and lateral views would be useful for further assessment, if symptoms persist.     XR CHEST PORTABLE    Result Date: 4/26/2022  1. There is no evidence of pneumonia or failure 2. There is no appreciable right or left pleural effusion 3. Chronic blunting of the right costophrenic angle. XR CHEST PORTABLE    Result Date: 4/25/2022  No significant change. PA and lateral views would be useful for further assessment, if symptoms persist.     US ABDOMEN LIMITED Specify organ? LIVER, PANCREAS    Result Date: 4/26/2022  Fatty infiltration liver     US DUP LOWER EXTREMITIES BILATERAL VENOUS    Result Date: 4/26/2022  Within the visualized vessels there is no evidence for deep venous thrombosis                  Student's Assessment and Plan     William Quinn is a 60 y.o. male with  has a past medical history of Headache and Hypertension. came here with CC   Chief Complaint   Patient presents with    Chest Pain     C/O intermittent chest pain mid chest started two weeks ago     Headache     C/O constant H/A x month                Assessment:  1.  Cardiac chest pain 2/2 unstable angina vs vasospastic angina  · HEART score 4moderate  · recent echo EF 55%,  · Stress test today 4/29 f/u with results   · Atorvastatin 40mg    · aspirin 81 mg   2. Hx HTN   · Amlodipine 10mg   3.  Hx alcohol use disorder   · Thiamine and folate         DVT ppx: [address] , [place]  Attending physician: Dr. Nicole Wyman

## 2022-04-29 NOTE — ED PROVIDER NOTES
ED Physician   HPI:  4/29/22, Time: 2:21 AM EDT         Dana Milan is a 60 y.o. male presenting to the ED for complaints of chest pain as well as continued complaints of his chronic headache. Patient presents to the emergency department with complaints of chest pain states it started yesterday, reports its diffuse throughout his entire chest.  He denies it radiating anywhere else. He does not have any associated shortness of breath with this. Patient reports that the chest pain has now been constant. Patient denies any nausea, vomiting or diarrhea with this. No unusual weakness or fatigue. He does also complain of a headache which is to his left posterior parietal aspect. States that this is the normal presentation of his headache. He denies any falls or injury. He reports that he is extra careful to make sure that he does not fall especially since drinking alcohol. He does admit to having a few beers today. He is denying any drug or alcohol use. Patient has had several admissions throughout the month of April and has left previously AMA. He most recently was admitted and actually discharged April 25 through 27 at that time with elevated troponin and he was seen and evaluated by cardiology but it was decided and patient agreeable to have an outpatient stress test as patient refused to have an inpatient stress test at that time. Patient denies any unusual weakness, no unilateral weakness no facial asymmetry no change noted in speech. He also denies any difficulty with ambulation. Patient reports otherwise normal state of health. No noted abdominal pain he also denies flank or pain.   Patient with symptoms moderate in severity and persistent  Review of Systems:   A complete review of systems was performed and pertinent positives and negatives are stated within HPI, all other systems reviewed and are negative.          --------------------------------------------- PAST HISTORY ---------------------------------------------  Past Medical History:  has a past medical history of Headache and Hypertension. Past Surgical History:  has no past surgical history on file. Social History:  reports that he has been smoking cigarettes. He has a 4.60 pack-year smoking history. He has never used smokeless tobacco. He reports current alcohol use. He reports that he does not use drugs. Family History: family history is not on file. The patients home medications have been reviewed. Allergies: Ace inhibitors and Fish-derived products    -------------------------------------------------- RESULTS -------------------------------------------------  All laboratory and radiology results have been personally reviewed by myself   LABS:  Results for orders placed or performed during the hospital encounter of 04/29/22   Troponin   Result Value Ref Range    Troponin, High Sensitivity 49 (H) 0 - 11 ng/L   Comprehensive Metabolic Panel   Result Value Ref Range    Sodium 134 132 - 146 mmol/L    Potassium 4.5 3.5 - 5.0 mmol/L    Chloride 94 (L) 98 - 107 mmol/L    CO2 24 22 - 29 mmol/L    Anion Gap 16 7 - 16 mmol/L    Glucose 73 (L) 74 - 99 mg/dL    BUN 19 6 - 20 mg/dL    CREATININE 1.0 0.7 - 1.2 mg/dL    GFR Non-African American >60 >=60 mL/min/1.73    GFR African American >60     Calcium 9.0 8.6 - 10.2 mg/dL    Total Protein 7.3 6.4 - 8.3 g/dL    Albumin 4.4 3.5 - 5.2 g/dL    Total Bilirubin 0.8 0.0 - 1.2 mg/dL    Alkaline Phosphatase 58 40 - 129 U/L    ALT 90 (H) 0 - 40 U/L    AST 66 (H) 0 - 39 U/L   Magnesium   Result Value Ref Range    Magnesium 2.5 1.6 - 2.6 mg/dL       RADIOLOGY:  Interpreted by Radiologist.  XR CHEST PORTABLE   Final Result   Similar chronic appearing findings.   PA and lateral views would be useful for   further assessment, if symptoms persist.             ------------------------- NURSING NOTES AND VITALS REVIEWED ---------------------------   The nursing notes within the ED encounter and vital signs as below have been reviewed. BP (!) 152/84   Pulse 72   Temp 97.7 °F (36.5 °C)   Ht 5' 7\" (1.702 m)   Wt 140 lb (63.5 kg)   SpO2 97%   BMI 21.93 kg/m²   Oxygen Saturation Interpretation: Normal      ---------------------------------------------------PHYSICAL EXAM--------------------------------------      Constitutional/General: Alert and oriented x3, overall nontoxic  Head: Normocephalic and atraumatic  Eyes: PERRL, EOMI  Mouth: Oropharynx clear, handling secretions, no trismus  Neck: Supple, full ROM,   Pulmonary: Lungs clear to auscultation bilaterally, no wheezes, rales, or rhonchi. Not in respiratory distress  Cardiovascular:  Regular rate and rhythm, no murmurs, gallops, or rubs. 2+ distal pulses  Abdomen: Soft, non tender, non distended,   Extremities: Moves all extremities x 4. Warm and well perfused  Skin: warm and dry without rash  Neurologic: GCS 15, cranial nerves II through XII grossly intact. No acute neurovascular deficit noted. Speech clear and coherent strength strong and equal bilaterally  Psych: Normal Affect      ------------------------------ ED COURSE/MEDICAL DECISION MAKING----------------------  Medications   aspirin chewable tablet 324 mg (324 mg Oral Given 4/29/22 0146)         ED COURSE:       Medical Decision Making: Plan will be for labs we will also obtain imaging. Will provide patient with 4 baby aspirin. Twelve-lead EKG interpreted showed a heart rate of 72, normal sinus rhythm. No acute ST elevation or depression noted. Left axis deviation. Labs resulted chemistry panel with elevation in liver enzymes consistent with his alcoholism. Magnesium level normal, troponin is elevated at 49. Will obtain repeat troponin. Chest x-ray showing similar chronic appearing findings. No pneumothorax or any new consolidative area identified. Patient with heart score of 6.   He does actually have a downtrending troponin but with the troponin still being elevated and his significant heart score plan will be for telemetry observation admission. Counseling: The emergency provider has spoken with the patient and discussed todays results, in addition to providing specific details for the plan of care and counseling regarding the diagnosis and prognosis. Questions are answered at this time and they are agreeable with the plan.      --------------------------------- IMPRESSION AND DISPOSITION ---------------------------------    IMPRESSION  1. Acute chest pain    2. Chronic nonintractable headache, unspecified headache type        DISPOSITION  Disposition: Admit to telemetry  Patient condition is good      NOTE: This report was transcribed using voice recognition software.  Every effort was made to ensure accuracy; however, inadvertent computerized transcription errors may be present     ROSY Huertas - CNP  04/29/22 0400

## 2022-04-30 ENCOUNTER — APPOINTMENT (OUTPATIENT)
Dept: GENERAL RADIOLOGY | Age: 57
DRG: 115 | End: 2022-04-30
Payer: COMMERCIAL

## 2022-04-30 ENCOUNTER — HOSPITAL ENCOUNTER (INPATIENT)
Age: 57
LOS: 1 days | Discharge: HOME | DRG: 115 | End: 2022-05-01
Attending: EMERGENCY MEDICINE | Admitting: SURGERY
Payer: COMMERCIAL

## 2022-04-30 ENCOUNTER — APPOINTMENT (OUTPATIENT)
Dept: CT IMAGING | Age: 57
DRG: 115 | End: 2022-04-30
Payer: COMMERCIAL

## 2022-04-30 DIAGNOSIS — T14.90XA TRAUMA: Primary | ICD-10-CM

## 2022-04-30 DIAGNOSIS — S09.93XA TRAUMATIC INJURY OF MOUTH: ICD-10-CM

## 2022-04-30 DIAGNOSIS — R40.2432 GLASGOW COMA SCALE TOTAL SCORE 3-8, AT ARRIVAL TO EMERGENCY DEPARTMENT (HCC): ICD-10-CM

## 2022-04-30 DIAGNOSIS — S09.90XA INJURY OF HEAD, INITIAL ENCOUNTER: ICD-10-CM

## 2022-04-30 PROBLEM — R40.1 OBTUNDED: Status: ACTIVE | Noted: 2022-04-30

## 2022-04-30 PROBLEM — R78.0 BLOOD ALCOHOL LEVEL GREATER THAN 0.3: Status: ACTIVE | Noted: 2022-04-30
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
## 2022-04-30 LAB
ABO/RH: NORMAL
ACETAMINOPHEN LEVEL: <5 MCG/ML (ref 10–30)
ALBUMIN SERPL-MCNC: 4.5 G/DL (ref 3.5–5.2)
ALP BLD-CCNC: 62 U/L (ref 40–129)
ALT SERPL-CCNC: 69 U/L (ref 0–40)
AMPHETAMINE SCREEN, URINE: NOT DETECTED
ANGLE (CLOT STRENGTH): 66.1 DEGREE (ref 59–74)
ANION GAP SERPL CALCULATED.3IONS-SCNC: 13 MMOL/L (ref 7–16)
ANTIBODY SCREEN: NORMAL
APTT: 26.3 SEC (ref 24.5–35.1)
AST SERPL-CCNC: 60 U/L (ref 0–39)
B.E.: -3.1 MMOL/L (ref -3–3)
BARBITURATE SCREEN URINE: NOT DETECTED
BENZODIAZEPINE SCREEN, URINE: NOT DETECTED
BILIRUB SERPL-MCNC: 1.6 MG/DL (ref 0–1.2)
BUN BLDV-MCNC: 16 MG/DL (ref 6–20)
CALCIUM SERPL-MCNC: 8.7 MG/DL (ref 8.6–10.2)
CANNABINOID SCREEN URINE: NOT DETECTED
CHLORIDE BLD-SCNC: 99 MMOL/L (ref 98–107)
CO2: 25 MMOL/L (ref 22–29)
COCAINE METABOLITE SCREEN URINE: NOT DETECTED
COHB: 6.4 % (ref 0–1.5)
CREAT SERPL-MCNC: 1 MG/DL (ref 0.7–1.2)
CRITICAL: ABNORMAL
DATE ANALYZED: ABNORMAL
DATE OF COLLECTION: ABNORMAL
EPL-TEG: 0.2 % (ref 0–15)
ETHANOL: 366 MG/DL (ref 0–0.08)
FENTANYL SCREEN, URINE: NOT DETECTED
FIO2: 100 %
G-TEG: 10.1 K D/SC (ref 4.5–11)
GFR AFRICAN AMERICAN: >60
GFR NON-AFRICAN AMERICAN: >60 ML/MIN/1.73
GLUCOSE BLD-MCNC: 88 MG/DL (ref 74–99)
HCO3: 22 MMOL/L (ref 22–26)
HCT VFR BLD CALC: 36.2 % (ref 37–54)
HEMOGLOBIN: 13.5 G/DL (ref 12.5–16.5)
HHB: 0.8 % (ref 0–5)
INR BLD: 1
K (CLOTTING TIME): 1.8 MIN (ref 1–3)
LAB: ABNORMAL
LACTIC ACID: 2.9 MMOL/L (ref 0.5–2.2)
LY30 (FIBRINOLYSIS): 0.2 % (ref 0–8)
Lab: ABNORMAL
Lab: NORMAL
MA (MAX AMPLITUDE): 66.9 MM (ref 50–70)
MCH RBC QN AUTO: 29.7 PG (ref 26–35)
MCHC RBC AUTO-ENTMCNC: 37.3 % (ref 32–34.5)
MCV RBC AUTO: 79.7 FL (ref 80–99.9)
METHADONE SCREEN, URINE: NOT DETECTED
METHB: 0.4 % (ref 0–1.5)
MODE: ABNORMAL
O2 CONTENT: 19.8 ML/DL
O2 SATURATION: 99.1 % (ref 92–98.5)
O2HB: 92.4 % (ref 94–97)
OPERATOR ID: 2021
OPIATE SCREEN URINE: NOT DETECTED
OXYCODONE URINE: NOT DETECTED
PATIENT TEMP: 37 C
PCO2: 39.6 MMHG (ref 35–45)
PDW BLD-RTO: 14.2 FL (ref 11.5–15)
PFO2: 4.58 MMHG/%
PH BLOOD GAS: 7.36 (ref 7.35–7.45)
PHENCYCLIDINE SCREEN URINE: NOT DETECTED
PLATELET # BLD: 233 E9/L (ref 130–450)
PMV BLD AUTO: 10.7 FL (ref 7–12)
PO2: 457.7 MMHG (ref 75–100)
POTASSIUM SERPL-SCNC: 4.48 MMOL/L (ref 3.5–5)
POTASSIUM SERPL-SCNC: 4.6 MMOL/L (ref 3.5–5)
PROTHROMBIN TIME: 10.7 SEC (ref 9.3–12.4)
R (REACTION TIME): 4.8 MIN (ref 5–10)
RBC # BLD: 4.54 E12/L (ref 3.8–5.8)
SALICYLATE, SERUM: <0.3 MG/DL (ref 0–30)
SODIUM BLD-SCNC: 137 MMOL/L (ref 132–146)
SOURCE, BLOOD GAS: ABNORMAL
THB: 14.3 G/DL (ref 11.5–16.5)
TIME ANALYZED: 1624
TOTAL PROTEIN: 7.7 G/DL (ref 6.4–8.3)
TRICYCLIC ANTIDEPRESSANTS SCREEN SERUM: NEGATIVE NG/ML
TROPONIN, HIGH SENSITIVITY: 23 NG/L (ref 0–11)
WBC # BLD: 5.6 E9/L (ref 4.5–11.5)

## 2022-04-30 PROCEDURE — 36556 INSERT NON-TUNNEL CV CATH: CPT | Performed by: SURGERY

## 2022-04-30 PROCEDURE — 2000000000 HC ICU R&B

## 2022-04-30 PROCEDURE — 36556 INSERT NON-TUNNEL CV CATH: CPT

## 2022-04-30 PROCEDURE — 6360000002 HC RX W HCPCS

## 2022-04-30 PROCEDURE — 83605 ASSAY OF LACTIC ACID: CPT

## 2022-04-30 PROCEDURE — 2580000003 HC RX 258: Performed by: STUDENT IN AN ORGANIZED HEALTH CARE EDUCATION/TRAINING PROGRAM

## 2022-04-30 PROCEDURE — 80179 DRUG ASSAY SALICYLATE: CPT

## 2022-04-30 PROCEDURE — 36410 VNPNXR 3YR/> PHY/QHP DX/THER: CPT | Performed by: SURGERY

## 2022-04-30 PROCEDURE — 31500 INSERT EMERGENCY AIRWAY: CPT

## 2022-04-30 PROCEDURE — 85384 FIBRINOGEN ACTIVITY: CPT

## 2022-04-30 PROCEDURE — 2500000003 HC RX 250 WO HCPCS: Performed by: EMERGENCY MEDICINE

## 2022-04-30 PROCEDURE — 94002 VENT MGMT INPAT INIT DAY: CPT

## 2022-04-30 PROCEDURE — 36000 PLACE NEEDLE IN VEIN: CPT | Performed by: SURGERY

## 2022-04-30 PROCEDURE — 5A1935Z RESPIRATORY VENTILATION, LESS THAN 24 CONSECUTIVE HOURS: ICD-10-PCS | Performed by: SURGERY

## 2022-04-30 PROCEDURE — 85347 COAGULATION TIME ACTIVATED: CPT

## 2022-04-30 PROCEDURE — 84132 ASSAY OF SERUM POTASSIUM: CPT

## 2022-04-30 PROCEDURE — 82805 BLOOD GASES W/O2 SATURATION: CPT

## 2022-04-30 PROCEDURE — 93005 ELECTROCARDIOGRAM TRACING: CPT | Performed by: STUDENT IN AN ORGANIZED HEALTH CARE EDUCATION/TRAINING PROGRAM

## 2022-04-30 PROCEDURE — 72131 CT LUMBAR SPINE W/O DYE: CPT

## 2022-04-30 PROCEDURE — 6360000002 HC RX W HCPCS: Performed by: EMERGENCY MEDICINE

## 2022-04-30 PROCEDURE — 85027 COMPLETE CBC AUTOMATED: CPT

## 2022-04-30 PROCEDURE — 74018 RADEX ABDOMEN 1 VIEW: CPT

## 2022-04-30 PROCEDURE — 80053 COMPREHEN METABOLIC PANEL: CPT

## 2022-04-30 PROCEDURE — 86850 RBC ANTIBODY SCREEN: CPT

## 2022-04-30 PROCEDURE — 71045 X-RAY EXAM CHEST 1 VIEW: CPT

## 2022-04-30 PROCEDURE — 82077 ASSAY SPEC XCP UR&BREATH IA: CPT

## 2022-04-30 PROCEDURE — 6810039001 HC L1 TRAUMA PRIORITY

## 2022-04-30 PROCEDURE — 80307 DRUG TEST PRSMV CHEM ANLYZR: CPT

## 2022-04-30 PROCEDURE — 70450 CT HEAD/BRAIN W/O DYE: CPT

## 2022-04-30 PROCEDURE — 0BH17EZ INSERTION OF ENDOTRACHEAL AIRWAY INTO TRACHEA, VIA NATURAL OR ARTIFICIAL OPENING: ICD-10-PCS | Performed by: SURGERY

## 2022-04-30 PROCEDURE — 99223 1ST HOSP IP/OBS HIGH 75: CPT | Performed by: SURGERY

## 2022-04-30 PROCEDURE — 86900 BLOOD TYPING SEROLOGIC ABO: CPT

## 2022-04-30 PROCEDURE — 80143 DRUG ASSAY ACETAMINOPHEN: CPT

## 2022-04-30 PROCEDURE — A4216 STERILE WATER/SALINE, 10 ML: HCPCS | Performed by: STUDENT IN AN ORGANIZED HEALTH CARE EDUCATION/TRAINING PROGRAM

## 2022-04-30 PROCEDURE — 96374 THER/PROPH/DIAG INJ IV PUSH: CPT

## 2022-04-30 PROCEDURE — 70486 CT MAXILLOFACIAL W/O DYE: CPT

## 2022-04-30 PROCEDURE — 86901 BLOOD TYPING SEROLOGIC RH(D): CPT

## 2022-04-30 PROCEDURE — 72125 CT NECK SPINE W/O DYE: CPT

## 2022-04-30 PROCEDURE — 2500000003 HC RX 250 WO HCPCS: Performed by: STUDENT IN AN ORGANIZED HEALTH CARE EDUCATION/TRAINING PROGRAM

## 2022-04-30 PROCEDURE — 85610 PROTHROMBIN TIME: CPT

## 2022-04-30 PROCEDURE — 85576 BLOOD PLATELET AGGREGATION: CPT

## 2022-04-30 PROCEDURE — 02HV33Z INSERTION OF INFUSION DEVICE INTO SUPERIOR VENA CAVA, PERCUTANEOUS APPROACH: ICD-10-PCS | Performed by: SURGERY

## 2022-04-30 PROCEDURE — 96375 TX/PRO/DX INJ NEW DRUG ADDON: CPT

## 2022-04-30 PROCEDURE — 84484 ASSAY OF TROPONIN QUANT: CPT

## 2022-04-30 PROCEDURE — 36600 WITHDRAWAL OF ARTERIAL BLOOD: CPT | Performed by: SURGERY

## 2022-04-30 PROCEDURE — 85730 THROMBOPLASTIN TIME PARTIAL: CPT

## 2022-04-30 PROCEDURE — 72170 X-RAY EXAM OF PELVIS: CPT

## 2022-04-30 PROCEDURE — 36415 COLL VENOUS BLD VENIPUNCTURE: CPT

## 2022-04-30 PROCEDURE — 6370000000 HC RX 637 (ALT 250 FOR IP): Performed by: STUDENT IN AN ORGANIZED HEALTH CARE EDUCATION/TRAINING PROGRAM

## 2022-04-30 PROCEDURE — 72128 CT CHEST SPINE W/O DYE: CPT

## 2022-04-30 PROCEDURE — 87081 CULTURE SCREEN ONLY: CPT

## 2022-04-30 PROCEDURE — 99285 EMERGENCY DEPT VISIT HI MDM: CPT

## 2022-04-30 RX ORDER — FENTANYL CITRATE 50 UG/ML
INJECTION, SOLUTION INTRAMUSCULAR; INTRAVENOUS DAILY PRN
Status: COMPLETED | OUTPATIENT
Start: 2022-04-30 | End: 2022-04-30

## 2022-04-30 RX ORDER — ONDANSETRON 2 MG/ML
4 INJECTION INTRAMUSCULAR; INTRAVENOUS EVERY 6 HOURS PRN
Status: DISCONTINUED | OUTPATIENT
Start: 2022-04-30 | End: 2022-05-01 | Stop reason: HOSPADM

## 2022-04-30 RX ORDER — PROPOFOL 10 MG/ML
5-50 INJECTION, EMULSION INTRAVENOUS CONTINUOUS
Status: DISCONTINUED | OUTPATIENT
Start: 2022-04-30 | End: 2022-05-01

## 2022-04-30 RX ORDER — SODIUM CHLORIDE 0.9 % (FLUSH) 0.9 %
10 SYRINGE (ML) INJECTION EVERY 12 HOURS SCHEDULED
Status: DISCONTINUED | OUTPATIENT
Start: 2022-04-30 | End: 2022-05-01 | Stop reason: HOSPADM

## 2022-04-30 RX ORDER — LABETALOL HYDROCHLORIDE 5 MG/ML
INJECTION, SOLUTION INTRAVENOUS DAILY PRN
Status: COMPLETED | OUTPATIENT
Start: 2022-04-30 | End: 2022-04-30

## 2022-04-30 RX ORDER — ROCURONIUM BROMIDE 10 MG/ML
INJECTION, SOLUTION INTRAVENOUS DAILY PRN
Status: COMPLETED | OUTPATIENT
Start: 2022-04-30 | End: 2022-04-30

## 2022-04-30 RX ORDER — MINERAL OIL AND WHITE PETROLATUM 150; 830 MG/G; MG/G
OINTMENT OPHTHALMIC EVERY 4 HOURS
Status: DISCONTINUED | OUTPATIENT
Start: 2022-04-30 | End: 2022-05-01 | Stop reason: ALTCHOICE

## 2022-04-30 RX ORDER — PROPOFOL 10 MG/ML
INJECTION, EMULSION INTRAVENOUS CONTINUOUS PRN
Status: COMPLETED | OUTPATIENT
Start: 2022-04-30 | End: 2022-04-30

## 2022-04-30 RX ORDER — PROPOFOL 10 MG/ML
INJECTION, EMULSION INTRAVENOUS
Status: COMPLETED
Start: 2022-04-30 | End: 2022-04-30

## 2022-04-30 RX ORDER — ACETAMINOPHEN 160 MG/5ML
650 SOLUTION ORAL EVERY 4 HOURS PRN
Status: DISCONTINUED | OUTPATIENT
Start: 2022-04-30 | End: 2022-05-01

## 2022-04-30 RX ORDER — POLYETHYLENE GLYCOL 3350 17 G/17G
17 POWDER, FOR SOLUTION ORAL DAILY
Status: DISCONTINUED | OUTPATIENT
Start: 2022-04-30 | End: 2022-05-01 | Stop reason: HOSPADM

## 2022-04-30 RX ORDER — SODIUM CHLORIDE 0.9 % (FLUSH) 0.9 %
10 SYRINGE (ML) INJECTION PRN
Status: DISCONTINUED | OUTPATIENT
Start: 2022-04-30 | End: 2022-05-01 | Stop reason: HOSPADM

## 2022-04-30 RX ORDER — SODIUM CHLORIDE 9 MG/ML
INJECTION, SOLUTION INTRAVENOUS CONTINUOUS
Status: DISCONTINUED | OUTPATIENT
Start: 2022-04-30 | End: 2022-05-01

## 2022-04-30 RX ORDER — ETOMIDATE 2 MG/ML
INJECTION INTRAVENOUS DAILY PRN
Status: COMPLETED | OUTPATIENT
Start: 2022-04-30 | End: 2022-04-30

## 2022-04-30 RX ORDER — LABETALOL HYDROCHLORIDE 5 MG/ML
10 INJECTION, SOLUTION INTRAVENOUS EVERY 10 MIN PRN
Status: DISCONTINUED | OUTPATIENT
Start: 2022-04-30 | End: 2022-05-01 | Stop reason: HOSPADM

## 2022-04-30 RX ORDER — SODIUM CHLORIDE 9 MG/ML
INJECTION, SOLUTION INTRAVENOUS PRN
Status: DISCONTINUED | OUTPATIENT
Start: 2022-04-30 | End: 2022-05-01 | Stop reason: HOSPADM

## 2022-04-30 RX ORDER — HYDRALAZINE HYDROCHLORIDE 20 MG/ML
5 INJECTION INTRAMUSCULAR; INTRAVENOUS EVERY 10 MIN PRN
Status: DISCONTINUED | OUTPATIENT
Start: 2022-04-30 | End: 2022-05-01 | Stop reason: HOSPADM

## 2022-04-30 RX ORDER — MIDAZOLAM HYDROCHLORIDE 1 MG/ML
INJECTION INTRAMUSCULAR; INTRAVENOUS DAILY PRN
Status: COMPLETED | OUTPATIENT
Start: 2022-04-30 | End: 2022-04-30

## 2022-04-30 RX ORDER — POLYVINYL ALCOHOL 14 MG/ML
1 SOLUTION/ DROPS OPHTHALMIC EVERY 4 HOURS
Status: DISCONTINUED | OUTPATIENT
Start: 2022-04-30 | End: 2022-05-01 | Stop reason: ALTCHOICE

## 2022-04-30 RX ORDER — CHLORHEXIDINE GLUCONATE 0.12 MG/ML
15 RINSE ORAL 2 TIMES DAILY
Status: DISCONTINUED | OUTPATIENT
Start: 2022-04-30 | End: 2022-05-01

## 2022-04-30 RX ADMIN — MINERAL OIL AND WHITE PETROLATUM: 150; 830 OINTMENT OPHTHALMIC at 23:15

## 2022-04-30 RX ADMIN — PROPOFOL 10 MCG/KG/MIN: 10 INJECTION, EMULSION INTRAVENOUS at 18:15

## 2022-04-30 RX ADMIN — LIDOCAINE HYDROCHLORIDE 100 MG: 20 INJECTION INTRAVENOUS at 16:22

## 2022-04-30 RX ADMIN — FENTANYL CITRATE 100 MCG: 50 INJECTION, SOLUTION INTRAMUSCULAR; INTRAVENOUS at 16:39

## 2022-04-30 RX ADMIN — MIDAZOLAM 2 MG: 1 INJECTION INTRAMUSCULAR; INTRAVENOUS at 16:39

## 2022-04-30 RX ADMIN — FAMOTIDINE 20 MG: 10 INJECTION INTRAVENOUS at 21:25

## 2022-04-30 RX ADMIN — LABETALOL HYDROCHLORIDE 10 MG: 5 INJECTION INTRAVENOUS at 18:54

## 2022-04-30 RX ADMIN — LABETALOL HYDROCHLORIDE 10 MG: 5 INJECTION INTRAVENOUS at 16:43

## 2022-04-30 RX ADMIN — PROPOFOL 10 MCG/KG/MIN: 10 INJECTION, EMULSION INTRAVENOUS at 16:40

## 2022-04-30 RX ADMIN — ROCURONIUM BROMIDE 100 MG: 10 INJECTION, SOLUTION INTRAVENOUS at 16:23

## 2022-04-30 RX ADMIN — ETOMIDATE 20 MG: 2 INJECTION, SOLUTION INTRAVENOUS at 16:23

## 2022-04-30 RX ADMIN — POLYVINYL ALCOHOL 1 DROP: 14 SOLUTION/ DROPS OPHTHALMIC at 22:30

## 2022-04-30 RX ADMIN — 0.12% CHLORHEXIDINE GLUCONATE 15 ML: 1.2 RINSE ORAL at 21:25

## 2022-04-30 RX ADMIN — SODIUM CHLORIDE, PRESERVATIVE FREE 10 ML: 5 INJECTION INTRAVENOUS at 21:30

## 2022-04-30 RX ADMIN — SODIUM CHLORIDE: 9 INJECTION, SOLUTION INTRAVENOUS at 18:26

## 2022-04-30 ASSESSMENT — PULMONARY FUNCTION TESTS
PIF_VALUE: 21
PIF_VALUE: 23
PIF_VALUE: 19
PIF_VALUE: 18
PIF_VALUE: 19
PIF_VALUE: 18
PIF_VALUE: 17
PIF_VALUE: 18

## 2022-04-30 NOTE — FLOWSHEET NOTE
Patient is continues to reach for ett and cvc despite all education. Bilateral wrist restraints applied for patient safety.  No injury noted

## 2022-04-30 NOTE — ED NOTES
8. 0 ETT, 26 at the lip. Bilateral breath sounds. 100%.  [facility]  04/30/22 6604

## 2022-04-30 NOTE — ED NOTES
Pt arrived in ED. C-collar in place. Small amount of blood noted in mouth, airway patent.       Sherrie Patel RN  04/30/22 [id number]
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
## 2022-04-30 NOTE — ED PROVIDER NOTES
HPI     Patient is a 60 y.o. male presents with a chief complaint of ams head trauma. Patient was made a trauma team after being significantly altered. Patient had initial GCS of 8. Patient was intubated on arrival.  Patient fell forward while in the liquor store has abrasions to the front of his face. Patient c-collar in place. Patient has blood in his mouth. Patient was unable to provide further history at this time    Review of Systems   Unable to perform ROS: Mental status change         Physical Exam  Vitals and nursing note reviewed. Constitutional:       Appearance: He is well-developed. He is ill-appearing and toxic-appearing. HENT:      Head: Normocephalic and atraumatic. Mouth/Throat:      Comments: Blood in the mouth  Eyes:      Conjunctiva/sclera: Conjunctivae normal.   Cardiovascular:      Rate and Rhythm: Normal rate and regular rhythm. Heart sounds: Normal heart sounds. No murmur heard. Pulmonary:      Effort: No respiratory distress. Breath sounds: Normal breath sounds. No wheezing or rales. Comments: Intermittently breathing on his own. Abdominal:      General: Bowel sounds are normal.      Palpations: Abdomen is soft. Tenderness: There is no abdominal tenderness. There is no guarding or rebound. Musculoskeletal:         General: No tenderness or deformity. Cervical back: Normal range of motion and neck supple. Skin:     General: Skin is warm and dry. Neurological:      Mental Status: He is alert. He is disoriented. Cranial Nerves: No cranial nerve deficit. Motor: No weakness. Coordination: Coordination normal.      Comments: Patient has a GCS of 8. Not protecting his airway.           Intubation    Date/Time: 4/30/2022 4:36 PM  Performed by: Jose Raul Uribe MD  Authorized by: Blanquita Samuel DO     Consent:     Consent obtained:  Emergent situation  Pre-procedure details:     Patient status:  Altered mental status Mallampati score:  III    Pretreatment medications:  Midazolam    Paralytics:  Rocuronium  Procedure details:     Preoxygenation:  Bag valve mask    CPR in progress: no      Intubation method:  Oral    Oral intubation technique:  Video-assisted    Laryngoscope size: D Blade. Tube type:  Cuffed    Number of attempts:  1    Ventilation between attempts: yes      Cricoid pressure: yes      Tube visualized through cords: yes    Placement assessment:     ETT to lip:  25    Tube secured with:  ETT chairez    Breath sounds:  Reduced on left, absent over the epigastrium, reduced on right and diminished    Placement verification: chest rise      CXR findings:  ETT in proper place  Post-procedure details:     Patient tolerance of procedure: Tolerated well, no immediate complications         MDM         Patient is a 60 y.o. male presenting with altered mental status. Patient fell in a liquor store. Patient smells of alcohol. Patient was made a trauma team.  Patient was intubated 2/2 low GCS. Patient was pan scanned. Patient was then admitted to the surgical ICU. Patient was seen and evaluated by myself and my attending Leesa Zayas DO. Assessment and Plan discussed with attending provider, please see attestation for final plan of care. This note was done using dictation software and there may be some grammatical errors associated with this. Salu Hopkins MD            --------------------------------------------- PAST HISTORY ---------------------------------------------  Past Medical History:  has no past medical history on file. Past Surgical History:  has no past surgical history on file. Social History:      Family History: family history is not on file. The patients home medications have been reviewed.     Allergies: Patient has no allergy information on record.    -------------------------------------------------- RESULTS -------------------------------------------------    LABS:  Results for orders placed or performed during the hospital encounter of 04/30/22   URINE DRUG SCREEN   Result Value Ref Range    Amphetamine Screen, Urine NOT DETECTED Negative <1000 ng/mL    Barbiturate Screen, Ur NOT DETECTED Negative < 200 ng/mL    Benzodiazepine Screen, Urine NOT DETECTED Negative < 200 ng/mL    Cannabinoid Scrn, Ur NOT DETECTED Negative < 50ng/mL    Cocaine Metabolite Screen, Urine NOT DETECTED Negative < 300 ng/mL    Opiate Scrn, Ur NOT DETECTED Negative < 300ng/mL    PCP Screen, Urine NOT DETECTED Negative < 25 ng/mL    Methadone Screen, Urine NOT DETECTED Negative <300 ng/mL    Oxycodone Urine NOT DETECTED Negative <100 ng/mL    FENTANYL SCREEN, URINE NOT DETECTED Negative <1 ng/mL    Drug Screen Comment: see below    Blood Gas, Arterial   Result Value Ref Range    Date Analyzed 20220430     Time Analyzed 1624     Source: Blood Arterial     pH, Blood Gas 7.363 7.350 - 7.450    PCO2 39.6 35.0 - 45.0 mmHg    PO2 457.7 (H) 75.0 - 100.0 mmHg    HCO3 22.0 22.0 - 26.0 mmol/L    B.E. -3.1 (L) -3.0 - 3.0 mmol/L    O2 Sat 99.1 (H) 92.0 - 98.5 %    PO2/FIO2 4.58 mmHg/%    O2Hb 92.4 (L) 94.0 - 97.0 %    COHb 6.4 (H) 0.0 - 1.5 %    MetHb 0.4 0.0 - 1.5 %    O2 Content 19.8 mL/dL    HHb 0.8 0.0 - 5.0 %    tHb (est) 14.3 11.5 - 16.5 g/dL    Potassium 4.48 3.50 - 5.00 mmol/L    Mode NRB 15L     FIO2 100.0 %    Date Of Collection      Time Collected      Pt Temp 37.0 C     ID 2021     Lab [id number]     Critical(s) Notified .  No Critical Values    Comprehensive Metabolic Panel   Result Value Ref Range    Sodium 137 132 - 146 mmol/L    Potassium 4.6 3.5 - 5.0 mmol/L    Chloride 99 98 - 107 mmol/L    CO2 25 22 - 29 mmol/L    Anion Gap 13 7 - 16 mmol/L    Glucose 88 74 - 99 mg/dL    BUN 16 6 - 20 mg/dL    CREATININE 1.0 0.7 - 1.2 mg/dL    GFR Non-African American >60 >=60 mL/min/1.73    GFR African American >60     Calcium 8.7 8.6 - 10.2 mg/dL Total Protein 7.7 6.4 - 8.3 g/dL    Albumin 4.5 3.5 - 5.2 g/dL    Total Bilirubin 1.6 (H) 0.0 - 1.2 mg/dL    Alkaline Phosphatase 62 40 - 129 U/L    ALT 69 (H) 0 - 40 U/L    AST 60 (H) 0 - 39 U/L   CBC   Result Value Ref Range    WBC 5.6 4.5 - 11.5 E9/L    RBC 4.54 3.80 - 5.80 E12/L    Hemoglobin 13.5 12.5 - 16.5 g/dL    Hematocrit 36.2 (L) 37.0 - 54.0 %    MCV 79.7 (L) 80.0 - 99.9 fL    MCH 29.7 26.0 - 35.0 pg    MCHC 37.3 (H) 32.0 - 34.5 %    RDW 14.2 11.5 - 15.0 fL    Platelets 858 083 - 102 E9/L    MPV 10.7 7.0 - 12.0 fL   Protime-INR   Result Value Ref Range    Protime 10.7 9.3 - 12.4 sec    INR 1.0    APTT   Result Value Ref Range    aPTT 26.3 24.5 - 35.1 sec   Lactic Acid   Result Value Ref Range    Lactic Acid 2.9 (H) 0.5 - 2.2 mmol/L   TEG lab test   Result Value Ref Range    R (Reaction Time) 4.8 (L) 5.0 - 10.0 min    K (Clotting Time) 1.8 1.0 - 3.0 min    Angle (Clot Strength) 66.1 59.0 - 74.0 degree    MA (Max Amplitude) 66.9 50.0 - 70.0 mm    G-TEG 10.1 4.5 - 11.0 K d/sc    EPL-TEG 0.2 0.0 - 15.0 %    LY30 (Fibrinolysis) 0.2 0.0 - 8.0 %   Serum Drug Screen   Result Value Ref Range    Ethanol Lvl 366 (HH) mg/dL    Acetaminophen Level <5.0 (L) 10.0 - 47.5 mcg/mL    Salicylate, Serum <8.6 0.0 - 30.0 mg/dL    TCA Scrn NEGATIVE Cutoff:300 ng/mL   Troponin   Result Value Ref Range    Troponin, High Sensitivity 23 (H) 0 - 11 ng/L   EKG 12 Lead   Result Value Ref Range    Ventricular Rate 83 BPM    Atrial Rate 83 BPM    P-R Interval 144 ms    QRS Duration 90 ms    Q-T Interval 384 ms    QTc Calculation (Bazett) 451 ms    P Axis 61 degrees    R Axis 84 degrees    T Axis 35 degrees   TYPE AND SCREEN   Result Value Ref Range    ABO/Rh A POS     Antibody Screen NEG        RADIOLOGY:  CT HEAD WO CONTRAST   Final Result   No acute intracranial abnormality. Postoperative changes with craniotomy defect and encephalomalacia in the left   frontotemporal region.       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 CT maxillofacial area. There is no acute displaced fracture in the maxillofacial area. There is   mucosal thickening in the ethmoid sinuses. Eye globes are intact. There is   nasogastric tube with retained secretions in the nasopharynx. There is   irregularity of the right zygomattic arch likely old trauma. Impression      No acute fracture or dislocation in the maxillofacial area. There is slight irregularity of the right zygomattic arch which may be due to   previous trauma. CT cervical spine. There is no acute fracture or dislocation in the cervical spine. The   prevertebral soft tissues are normal.  Retained secretions are noted in the   nasopharynx with the endotracheal tube and nasogastric tube in place. Mild   degenerative changes are identified from C3-T1. Impression      No acute fractures. Degenerative changes from C3-T1. CT thoracic spine. There is no acute fracture or dislocation in the thoracic spine. Mild   degenerative changes are identified in the thoracic spine. There is   atelectasis in the lung bases. NG tube and nasogastric tube are noted. Impression      No acute fracture or dislocation in the thoracic spine. Atelectasis in the lung bases. CT lumbar spine. There is no acute fracture or dislocation in the lumbar spine. There is mild   degenerative changes in lumbar spine with disc bulges at L4-5 and L5-S1. Impression      No acute fracture or dislocation in the lumbar spine. Mild diffuse degenerative changes. RECOMMENDATIONS:   Unavailable         CT CERVICAL SPINE WO CONTRAST   Final Result   No acute intracranial abnormality. Postoperative changes with craniotomy defect and encephalomalacia in the left   frontotemporal region. 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 CT maxillofacial area.       There is no acute displaced fracture in the maxillofacial area. There is   mucosal thickening in the ethmoid sinuses. Eye globes are intact. There is   nasogastric tube with retained secretions in the nasopharynx. There is   irregularity of the right zygomattic arch likely old trauma. Impression      No acute fracture or dislocation in the maxillofacial area. There is slight irregularity of the right zygomattic arch which may be due to   previous trauma. CT cervical spine. There is no acute fracture or dislocation in the cervical spine. The   prevertebral soft tissues are normal.  Retained secretions are noted in the   nasopharynx with the endotracheal tube and nasogastric tube in place. Mild   degenerative changes are identified from C3-T1. Impression      No acute fractures. Degenerative changes from C3-T1. CT thoracic spine. There is no acute fracture or dislocation in the thoracic spine. Mild   degenerative changes are identified in the thoracic spine. There is   atelectasis in the lung bases. NG tube and nasogastric tube are noted. Impression      No acute fracture or dislocation in the thoracic spine. Atelectasis in the lung bases. CT lumbar spine. There is no acute fracture or dislocation in the lumbar spine. There is mild   degenerative changes in lumbar spine with disc bulges at L4-5 and L5-S1. Impression      No acute fracture or dislocation in the lumbar spine. Mild diffuse degenerative changes. RECOMMENDATIONS:   Unavailable         CT FACIAL BONES WO CONTRAST   Final Result   No acute intracranial abnormality. Postoperative changes with craniotomy defect and encephalomalacia in the left   frontotemporal region. 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 CT maxillofacial area. There is no acute displaced fracture in the maxillofacial area. There is   mucosal thickening in the ethmoid sinuses.   Eye globes are intact. There is   nasogastric tube with retained secretions in the nasopharynx. There is   irregularity of the right zygomattic arch likely old trauma. Impression      No acute fracture or dislocation in the maxillofacial area. There is slight irregularity of the right zygomattic arch which may be due to   previous trauma. CT cervical spine. There is no acute fracture or dislocation in the cervical spine. The   prevertebral soft tissues are normal.  Retained secretions are noted in the   nasopharynx with the endotracheal tube and nasogastric tube in place. Mild   degenerative changes are identified from C3-T1. Impression      No acute fractures. Degenerative changes from C3-T1. CT thoracic spine. There is no acute fracture or dislocation in the thoracic spine. Mild   degenerative changes are identified in the thoracic spine. There is   atelectasis in the lung bases. NG tube and nasogastric tube are noted. Impression      No acute fracture or dislocation in the thoracic spine. Atelectasis in the lung bases. CT lumbar spine. There is no acute fracture or dislocation in the lumbar spine. There is mild   degenerative changes in lumbar spine with disc bulges at L4-5 and L5-S1. Impression      No acute fracture or dislocation in the lumbar spine. Mild diffuse degenerative changes. RECOMMENDATIONS:   Unavailable         CT THORACIC SPINE WO CONTRAST   Final Result   No acute intracranial abnormality. Postoperative changes with craniotomy defect and encephalomalacia in the left   frontotemporal region. 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 CT maxillofacial area. There is no acute displaced fracture in the maxillofacial area. There is   mucosal thickening in the ethmoid sinuses. Eye globes are intact. There is   nasogastric tube with retained secretions in the nasopharynx. There is   irregularity of the right zygomattic arch likely old trauma. Impression      No acute fracture or dislocation in the maxillofacial area. There is slight irregularity of the right zygomattic arch which may be due to   previous trauma. CT cervical spine. There is no acute fracture or dislocation in the cervical spine. The   prevertebral soft tissues are normal.  Retained secretions are noted in the   nasopharynx with the endotracheal tube and nasogastric tube in place. Mild   degenerative changes are identified from C3-T1. Impression      No acute fractures. Degenerative changes from C3-T1. CT thoracic spine. There is no acute fracture or dislocation in the thoracic spine. Mild   degenerative changes are identified in the thoracic spine. There is   atelectasis in the lung bases. NG tube and nasogastric tube are noted. Impression      No acute fracture or dislocation in the thoracic spine. Atelectasis in the lung bases. CT lumbar spine. There is no acute fracture or dislocation in the lumbar spine. There is mild   degenerative changes in lumbar spine with disc bulges at L4-5 and L5-S1. Impression      No acute fracture or dislocation in the lumbar spine. Mild diffuse degenerative changes. RECOMMENDATIONS:   Unavailable         CT LUMBAR SPINE WO CONTRAST   Final Result   No acute intracranial abnormality. Postoperative changes with craniotomy defect and encephalomalacia in the left   frontotemporal region. 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 CT maxillofacial area. There is no acute displaced fracture in the maxillofacial area. There is   mucosal thickening in the ethmoid sinuses. Eye globes are intact. There is   nasogastric tube with retained secretions in the nasopharynx. There is   irregularity of the right zygomattic arch likely old trauma.       Impression      No acute fracture or dislocation in the maxillofacial area. There is slight irregularity of the right zygomattic arch which may be due to   previous trauma. CT cervical spine. There is no acute fracture or dislocation in the cervical spine. The   prevertebral soft tissues are normal.  Retained secretions are noted in the   nasopharynx with the endotracheal tube and nasogastric tube in place. Mild   degenerative changes are identified from C3-T1. Impression      No acute fractures. Degenerative changes from C3-T1. CT thoracic spine. There is no acute fracture or dislocation in the thoracic spine. Mild   degenerative changes are identified in the thoracic spine. There is   atelectasis in the lung bases. NG tube and nasogastric tube are noted. Impression      No acute fracture or dislocation in the thoracic spine. Atelectasis in the lung bases. CT lumbar spine. There is no acute fracture or dislocation in the lumbar spine. There is mild   degenerative changes in lumbar spine with disc bulges at L4-5 and L5-S1. Impression      No acute fracture or dislocation in the lumbar spine. Mild diffuse degenerative changes. RECOMMENDATIONS:   Unavailable         XR CHEST PORTABLE   Final Result   Endotracheal tube tip is 13 cm above the david, repositioning recommended. No acute process identified in the chest.         XR CHEST 1 VIEW   Final Result   Endotracheal tube tip is 12 cm above the david. XR ABDOMEN FOR NG/OG/NE TUBE PLACEMENT   Final Result   Gastric catheter is in the stomach. XR PELVIS (1-2 VIEWS)   Final Result   No acute abnormality of the pelvis. XR CHEST PORTABLE   Final Result   Gastric catheter is in the stomach. Left subclavian central venous catheter   tip is in the SVC just below the level of the david in good position.          XR CHEST PORTABLE    (Results Pending)           ------------------------- NURSING NOTES AND VITALS REVIEWED ---------------------------  Date / Time Roomed:  4/30/2022  4:14 PM  ED Bed Assignment:  [id number]    The nursing notes within the ED encounter and vital signs as below have been reviewed. Patient Vitals for the past 24 hrs:   BP Temp Temp src Pulse Resp SpO2 Height Weight   04/30/22 2046 -- -- -- 81 14 99 % -- --   04/30/22 1900 (!) 141/93 -- -- 77 14 100 % -- --   04/30/22 1800 (!) 177/110 97.3 °F (36.3 °C) Bladder 79 14 99 % -- --   04/30/22 1757 -- -- -- 86 -- -- -- --   04/30/22 1753 -- -- -- 75 -- -- -- --   04/30/22 1730 -- -- -- 75 -- -- -- --   04/30/22 1715 (!) 158/111 97 °F (36.1 °C) Bladder 74 14 100 % -- --   04/30/22 1652 (!) 176/112 -- -- 71 14 100 % -- --   04/30/22 1651 -- 97.2 °F (36.2 °C) -- -- -- -- -- --   04/30/22 1647 (!) 164/101 -- -- 75 14 100 % -- --   04/30/22 1646 (!) 153/98 -- -- 73 14 100 % -- --   04/30/22 1644 (!) 174/100 -- -- 73 14 100 % -- --   04/30/22 1642 (!) 184/101 -- -- 75 14 100 % -- --   04/30/22 1641 (!) 211/122 -- -- 76 14 100 % -- --   04/30/22 1638 (!) 174/128 -- -- 81 14 100 % -- --   04/30/22 1637 (!) 215/147 -- -- 80 14 100 % -- --   04/30/22 1635 -- -- -- 94 14 100 % -- --   04/30/22 1630 (!) 201/135 -- -- 94 18 99 % -- --   04/30/22 1628 (!) 199/133 -- -- -- -- -- -- --   04/30/22 1626 -- -- -- -- -- -- 5' 10\" (1.778 m) 176 lb 5.9 oz (80 kg)   04/30/22 1626 (!) 180/119 -- -- 97 18 98 % -- --   04/30/22 1621 -- -- -- -- -- 100 % -- --   04/30/22 1620 (!) 147/68 -- -- 63 -- -- -- --       Oxygen Saturation Interpretation: Normal    ------------------------------------------ PROGRESS NOTES ------------------------------------------  See ED course for reevaluation    Counseling:  I have spoken with the patient and discussed todays results, in addition to providing specific details for the plan of care and counseling regarding the diagnosis and prognosis.   Their questions are answered at this time and they are agreeable with the plan of admission.    --------------------------------- ADDITIONAL PROVIDER NOTES ---------------------------------  Consultations:   Spoke with Dr. Alix Hinojosa. Discussed case. They will admit the patient. This patient's ED course included: a personal history and physicial examination, re-evaluation prior to disposition, multiple bedside re-evaluations, IV medications, cardiac monitoring and continuous pulse oximetry    This patient has remained hemodynamically stable during their ED course. Diagnosis:  1. Trauma    2. Injury of head, initial encounter    3. Bartolo coma scale total score 3-8, at arrival to emergency department [facility])    4.  Traumatic injury of mouth        Disposition:  Patient's disposition: Admit to CCU/ICU  Patient's condition is Stable         Jayden Pelletier MD  Resident  04/30/22 9989

## 2022-04-30 NOTE — ED NOTES
Per EMS report, pt had a fall on the ground at the liquor store. +ETOH, unknown LOC, GCS 12.       Deidre Doe RN  04/30/22 [id number]

## 2022-05-01 ENCOUNTER — APPOINTMENT (OUTPATIENT)
Dept: GENERAL RADIOLOGY | Age: 57
DRG: 115 | End: 2022-05-01
Payer: COMMERCIAL

## 2022-05-01 VITALS
WEIGHT: 176.37 LBS | HEIGHT: 70 IN | OXYGEN SATURATION: 95 % | HEART RATE: 99 BPM | RESPIRATION RATE: 16 BRPM | SYSTOLIC BLOOD PRESSURE: 168 MMHG | BODY MASS INDEX: 25.25 KG/M2 | TEMPERATURE: 99.3 F | DIASTOLIC BLOOD PRESSURE: 97 MMHG

## 2022-05-01 LAB
ALBUMIN SERPL-MCNC: 4 G/DL (ref 3.5–5.2)
ALP BLD-CCNC: 55 U/L (ref 40–129)
ALT SERPL-CCNC: 56 U/L (ref 0–40)
ANION GAP SERPL CALCULATED.3IONS-SCNC: 17 MMOL/L (ref 7–16)
AST SERPL-CCNC: 55 U/L (ref 0–39)
BASOPHILS ABSOLUTE: 0.03 E9/L (ref 0–0.2)
BASOPHILS RELATIVE PERCENT: 0.3 % (ref 0–2)
BILIRUB SERPL-MCNC: 1 MG/DL (ref 0–1.2)
BUN BLDV-MCNC: 18 MG/DL (ref 6–20)
CALCIUM IONIZED: 1.17 MMOL/L (ref 1.15–1.33)
CALCIUM SERPL-MCNC: 8.3 MG/DL (ref 8.6–10.2)
CHLORIDE BLD-SCNC: 102 MMOL/L (ref 98–107)
CO2: 23 MMOL/L (ref 22–29)
CREAT SERPL-MCNC: 1.1 MG/DL (ref 0.7–1.2)
EOSINOPHILS ABSOLUTE: 0.04 E9/L (ref 0.05–0.5)
EOSINOPHILS RELATIVE PERCENT: 0.4 % (ref 0–6)
GFR AFRICAN AMERICAN: >60
GFR NON-AFRICAN AMERICAN: >60 ML/MIN/1.73
GLUCOSE BLD-MCNC: 66 MG/DL (ref 74–99)
HCT VFR BLD CALC: 36 % (ref 37–54)
HEMOGLOBIN: 13.5 G/DL (ref 12.5–16.5)
IMMATURE GRANULOCYTES #: 0.04 E9/L
IMMATURE GRANULOCYTES %: 0.4 % (ref 0–5)
LYMPHOCYTES ABSOLUTE: 1.38 E9/L (ref 1.5–4)
LYMPHOCYTES RELATIVE PERCENT: 12.3 % (ref 20–42)
MAGNESIUM: 2.4 MG/DL (ref 1.6–2.6)
MCH RBC QN AUTO: 29.8 PG (ref 26–35)
MCHC RBC AUTO-ENTMCNC: 37.5 % (ref 32–34.5)
MCV RBC AUTO: 79.5 FL (ref 80–99.9)
MONOCYTES ABSOLUTE: 0.79 E9/L (ref 0.1–0.95)
MONOCYTES RELATIVE PERCENT: 7.1 % (ref 2–12)
NEUTROPHILS ABSOLUTE: 8.91 E9/L (ref 1.8–7.3)
NEUTROPHILS RELATIVE PERCENT: 79.5 % (ref 43–80)
PDW BLD-RTO: 14.4 FL (ref 11.5–15)
PHOSPHORUS: 3.6 MG/DL (ref 2.5–4.5)
PLATELET # BLD: 240 E9/L (ref 130–450)
PMV BLD AUTO: 11 FL (ref 7–12)
POTASSIUM REFLEX MAGNESIUM: 4.7 MMOL/L (ref 3.5–5)
RBC # BLD: 4.53 E12/L (ref 3.8–5.8)
SODIUM BLD-SCNC: 142 MMOL/L (ref 132–146)
TOTAL PROTEIN: 7.1 G/DL (ref 6.4–8.3)
WBC # BLD: 11.2 E9/L (ref 4.5–11.5)

## 2022-05-01 PROCEDURE — 99238 HOSP IP/OBS DSCHRG MGMT 30/<: CPT | Performed by: SURGERY

## 2022-05-01 PROCEDURE — 85025 COMPLETE CBC W/AUTO DIFF WBC: CPT

## 2022-05-01 PROCEDURE — 6370000000 HC RX 637 (ALT 250 FOR IP): Performed by: STUDENT IN AN ORGANIZED HEALTH CARE EDUCATION/TRAINING PROGRAM

## 2022-05-01 PROCEDURE — 82330 ASSAY OF CALCIUM: CPT

## 2022-05-01 PROCEDURE — 36415 COLL VENOUS BLD VENIPUNCTURE: CPT

## 2022-05-01 PROCEDURE — 80179 DRUG ASSAY SALICYLATE: CPT

## 2022-05-01 PROCEDURE — 80307 DRUG TEST PRSMV CHEM ANLYZR: CPT

## 2022-05-01 PROCEDURE — 93005 ELECTROCARDIOGRAM TRACING: CPT | Performed by: NURSE PRACTITIONER

## 2022-05-01 PROCEDURE — 99285 EMERGENCY DEPT VISIT HI MDM: CPT

## 2022-05-01 PROCEDURE — 80053 COMPREHEN METABOLIC PANEL: CPT

## 2022-05-01 PROCEDURE — 82077 ASSAY SPEC XCP UR&BREATH IA: CPT

## 2022-05-01 PROCEDURE — 84484 ASSAY OF TROPONIN QUANT: CPT

## 2022-05-01 PROCEDURE — 80143 DRUG ASSAY ACETAMINOPHEN: CPT

## 2022-05-01 PROCEDURE — 84100 ASSAY OF PHOSPHORUS: CPT

## 2022-05-01 PROCEDURE — 83735 ASSAY OF MAGNESIUM: CPT

## 2022-05-01 RX ORDER — ACETAMINOPHEN 325 MG/1
650 TABLET ORAL EVERY 4 HOURS PRN
Status: DISCONTINUED | OUTPATIENT
Start: 2022-05-01 | End: 2022-05-01 | Stop reason: HOSPADM

## 2022-05-01 RX ORDER — DIMETHICONE, OXYBENZONE, AND PADIMATE O 2; 2.5; 6.6 G/100G; G/100G; G/100G
STICK TOPICAL PRN
Status: DISCONTINUED | OUTPATIENT
Start: 2022-05-01 | End: 2022-05-01 | Stop reason: HOSPADM

## 2022-05-01 RX ADMIN — ACETAMINOPHEN 650 MG: 325 TABLET ORAL at 07:15

## 2022-05-01 ASSESSMENT — PULMONARY FUNCTION TESTS
PIF_VALUE: 21
PIF_VALUE: 19
PIF_VALUE: 21

## 2022-05-01 ASSESSMENT — PAIN DESCRIPTION - DESCRIPTORS
DESCRIPTORS: ACHING
DESCRIPTORS: ACHING

## 2022-05-01 ASSESSMENT — LIFESTYLE VARIABLES
HOW MANY STANDARD DRINKS CONTAINING ALCOHOL DO YOU HAVE ON A TYPICAL DAY: 1 OR 2
HOW OFTEN DO YOU HAVE A DRINK CONTAINING ALCOHOL: 2-3 TIMES A WEEK

## 2022-05-01 ASSESSMENT — PAIN SCALES - GENERAL
PAINLEVEL_OUTOF10: 9
PAINLEVEL_OUTOF10: 10

## 2022-05-01 ASSESSMENT — PAIN DESCRIPTION - ORIENTATION
ORIENTATION: ANTERIOR
ORIENTATION: RIGHT;LEFT

## 2022-05-01 ASSESSMENT — PAIN DESCRIPTION - LOCATION
LOCATION: HEAD
LOCATION: HEAD

## 2022-05-01 ASSESSMENT — PAIN - FUNCTIONAL ASSESSMENT: PAIN_FUNCTIONAL_ASSESSMENT: 0-10

## 2022-05-01 ASSESSMENT — PAIN DESCRIPTION - FREQUENCY: FREQUENCY: CONTINUOUS

## 2022-05-01 ASSESSMENT — PAIN DESCRIPTION - PAIN TYPE: TYPE: ACUTE PAIN

## 2022-05-01 NOTE — PROGRESS NOTES
[facility]  SURGICAL INTENSIVE CARE UNIT    CRITICAL CARE ATTENDING PROGRESS NOTE    I have examined the patient, reviewed the record, and discussed the case with the APN/  Resident. I have reviewed all relevant labs and imaging data. Please refer to the  APN/ resident's note. I agree with the  assessment and plan with the following corrections/ additions. The following summarizes my clinical findings and independent assessment. CC: fall    HOSPITAL COURSE:  4/30 intubated in [facility]  5/1 self extubated overnight. Pt is awake    EXAM:  Awake and alert x 3  Nl sinus  Breathing comfortably  Aspen collar present  Abdomen soft nt nd  Moves all extremities      ASSESSMENT:  Principal Problem:    Fall (on)(from) sidewalk curb, initial encounter  Active Problems:    Blood alcohol level greater than 0.3    Obtunded    Trauma  Resolved Problems:    * No resolved hospital problems. *       PLAN:  Alcohol abuse--needs SBI by SW    Acute resp failure--self extubated overnight  Weaned to room air    General diet    No acute traumatic issues  Aspen collar cleared    Endo: Monitor Blood Sugars. Target blood glucose less than 180 in the ICU. DVT prophylaxis--SCDS,    GI Prophylaxis--on diet  Lines--piv  CODE: FULL     DISPOSITION- will discharge home    Gabe Torres MD, [facility]  5/1/2022  9:17 AM    NOTE: This report was transcribed using voice recognition software. Every effort was made to ensure accuracy; however, inadvertent computerized transcription errors may be present.

## 2022-05-01 NOTE — DISCHARGE SUMMARY
Physician Discharge Summary     Patient ID:  Lata Lyon  [id number]  24 y.o.  1965    Admit date: 4/30/2022    Discharge date and time: No discharge date for patient encounter. Admitting Physician: Jake Rouse MD     Admission Diagnoses: Trauma [T14.90XA]  Injury of head, initial encounter 33 19 21  Fall (on)(from) sidewalk curb, initial encounter [W10. 1XXA]  [place] coma scale total score 3-8, at arrival to emergency department [facility]) [R40.2432]  Traumatic injury of mouth [S09.93XA]    Discharge Diagnoses: Principal Problem:    Fall (on)(from) sidewalk curb, initial encounter  Active Problems:    Blood alcohol level greater than 0.3    Obtunded    Trauma  Resolved Problems:    * No resolved hospital problems. *      Admission Condition: critical    Discharged Condition: stable    Indication for Admission: Fall from standing    Hospital Course/Procedures/Operation/treatments:     Fall from standing height. GCS less than 8 in the trauma bay so patient was intubated. No acute injuries patient chest positive for EtOH. Self extubated in the ICU. GCS 15 this morning. Complaining of a headache but overall stable. Ready for DC. Consults:   IP CONSULT TO SOCIAL WORK    Significant Diagnostic Studies:   XR PELVIS (1-2 VIEWS)    Result Date: 4/30/2022  EXAMINATION: ONE XRAY VIEW OF THE PELVIS 4/30/2022 5:05 pm COMPARISON: None. HISTORY: ORDERING SYSTEM PROVIDED HISTORY: fall, trauma TECHNOLOGIST PROVIDED HISTORY: Reason for exam:->fall, trauma What reading provider will be dictating this exam?->CRC FINDINGS: No evidence of pelvic fracture. Bilateral hips demonstrate normal alignment. No focal osseous lesion. SI joints are symmetric. Catheter projected over the midline of the pelvis. No acute abnormality of the pelvis.      CT HEAD WO CONTRAST    Result Date: 4/30/2022  EXAMINATION: CT OF THE HEAD WITHOUT CONTRAST; CT OF THE FACE WITHOUT CONTRAST; CT OF THE CERVICAL SPINE WITHOUT CONTRAST; CT OF THE THORACIC SPINE WITHOUT CONTRAST; CT OF THE LUMBAR SPINE WITHOUT CONTRAST  4/30/2022 5:21 pm TECHNIQUE: CT of the head was performed without the administration of intravenous contrast. Dose modulation, iterative reconstruction, and/or weight based adjustment of the mA/kV was utilized to reduce the radiation dose to as low as reasonably achievable.; CT of the face was performed without the administration of intravenous contrast. Multiplanar reformatted images are provided for review. Dose modulation, iterative reconstruction, and/or weight based adjustment of the mA/kV was utilized to reduce the radiation dose to as low as reasonably achievable.; CT of the cervical spine was performed without the administration of intravenous contrast. Multiplanar reformatted images are provided for review. Dose modulation, iterative reconstruction, and/or weight based adjustment of the mA/kV was utilized to reduce the radiation dose to as low as reasonably achievable.; CT of the thoracic spine was performed without the administration of intravenous contrast. Multiplanar reformatted images are provided for review. Dose modulation, iterative reconstruction, and/or weight based adjustment of the mA/kV was utilized to reduce the radiation dose to as low as reasonably achievable.; CT of the lumbar spine was performed without the administration of intravenous contrast. Multiplanar reformatted images are provided for review. Adjustment of mA and/or kV according to patient size was utilized. Dose modulation, iterative reconstruction, and/or weight based adjustment of the mA/kV was utilized to reduce the radiation dose to as low as reasonably achievable. COMPARISON: None. HISTORY: ORDERING SYSTEM PROVIDED HISTORY: trauma TECHNOLOGIST PROVIDED HISTORY: Reason for exam:->trauma Has a \"code stroke\" or \"stroke alert\" been called? ->No Decision Support Exception - unselect if not a suspected or confirmed emergency medical condition->Emergency Medical Condition ([place]) What reading provider will be dictating this exam?->CRC FINDINGS: CT head. Postoperative changes are identified in the left frontotemporal region with encephalomalacia in the temporal region. There is diffuse atrophy with prominence of the ventricles and sulci. Confluent areas of decreased attenuation are present in the periventricular white matter and brainstem consistent with microvascular/ischemic changes. 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 There is no acute stroke, mass or hemorrhage. Retained secretions are identified in the nasopharynx with an indwelling NG tube. No acute intracranial abnormality. Postoperative changes with craniotomy defect and encephalomalacia in the left frontotemporal region. 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 CT maxillofacial area. There is no acute displaced fracture in the maxillofacial area. There is mucosal thickening in the ethmoid sinuses. Eye globes are intact. There is nasogastric tube with retained secretions in the nasopharynx. There is irregularity of the right zygomattic arch likely old trauma. Impression No acute fracture or dislocation in the maxillofacial area. There is slight irregularity of the right zygomattic arch which may be due to previous trauma. CT cervical spine. There is no acute fracture or dislocation in the cervical spine. The prevertebral soft tissues are normal.  Retained secretions are noted in the nasopharynx with the endotracheal tube and nasogastric tube in place. Mild degenerative changes are identified from C3-T1. Impression No acute fractures. Degenerative changes from C3-T1. CT thoracic spine. There is no acute fracture or dislocation in the thoracic spine. Mild degenerative changes are identified in the thoracic spine. There is atelectasis in the lung bases. NG tube and nasogastric tube are noted.  Impression No acute fracture or dislocation in the thoracic spine. Atelectasis in the lung bases. CT lumbar spine. There is no acute fracture or dislocation in the lumbar spine. There is mild degenerative changes in lumbar spine with disc bulges at L4-5 and L5-S1. Impression No acute fracture or dislocation in the lumbar spine. Mild diffuse degenerative changes. RECOMMENDATIONS: Unavailable     CT FACIAL BONES WO CONTRAST    Result Date: 4/30/2022  EXAMINATION: CT OF THE HEAD WITHOUT CONTRAST; CT OF THE FACE WITHOUT CONTRAST; CT OF THE CERVICAL SPINE WITHOUT CONTRAST; CT OF THE THORACIC SPINE WITHOUT CONTRAST; CT OF THE LUMBAR SPINE WITHOUT CONTRAST  4/30/2022 5:21 pm TECHNIQUE: CT of the head was performed without the administration of intravenous contrast. Dose modulation, iterative reconstruction, and/or weight based adjustment of the mA/kV was utilized to reduce the radiation dose to as low as reasonably achievable.; CT of the face was performed without the administration of intravenous contrast. Multiplanar reformatted images are provided for review. Dose modulation, iterative reconstruction, and/or weight based adjustment of the mA/kV was utilized to reduce the radiation dose to as low as reasonably achievable.; CT of the cervical spine was performed without the administration of intravenous contrast. Multiplanar reformatted images are provided for review. Dose modulation, iterative reconstruction, and/or weight based adjustment of the mA/kV was utilized to reduce the radiation dose to as low as reasonably achievable.; CT of the thoracic spine was performed without the administration of intravenous contrast. Multiplanar reformatted images are provided for review.  Dose modulation, iterative reconstruction, and/or weight based adjustment of the mA/kV was utilized to reduce the radiation dose to as low as reasonably achievable.; CT of the lumbar spine was performed without the administration of intravenous contrast. Multiplanar reformatted images are provided for review. Adjustment of mA and/or kV according to patient size was utilized. Dose modulation, iterative reconstruction, and/or weight based adjustment of the mA/kV was utilized to reduce the radiation dose to as low as reasonably achievable. COMPARISON: None. HISTORY: ORDERING SYSTEM PROVIDED HISTORY: trauma TECHNOLOGIST PROVIDED HISTORY: Reason for exam:->trauma Has a \"code stroke\" or \"stroke alert\" been called? ->No Decision Support Exception - unselect if not a suspected or confirmed emergency medical condition->Emergency Medical Condition ([place]) What reading provider will be dictating this exam?->CRC FINDINGS: CT head. Postoperative changes are identified in the left frontotemporal region with encephalomalacia in the temporal region. There is diffuse atrophy with prominence of the ventricles and sulci. Confluent areas of decreased attenuation are present in the periventricular white matter and brainstem consistent with microvascular/ischemic changes. 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 There is no acute stroke, mass or hemorrhage. Retained secretions are identified in the nasopharynx with an indwelling NG tube. No acute intracranial abnormality. Postoperative changes with craniotomy defect and encephalomalacia in the left frontotemporal region. 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 CT maxillofacial area. There is no acute displaced fracture in the maxillofacial area. There is mucosal thickening in the ethmoid sinuses. Eye globes are intact. There is nasogastric tube with retained secretions in the nasopharynx. There is irregularity of the right zygomattic arch likely old trauma. Impression No acute fracture or dislocation in the maxillofacial area. There is slight irregularity of the right zygomattic arch which may be due to previous trauma. CT cervical spine.  There is no acute fracture or dislocation in the cervical spine.  The prevertebral soft tissues are normal.  Retained secretions are noted in the nasopharynx with the endotracheal tube and nasogastric tube in place. Mild degenerative changes are identified from C3-T1. Impression No acute fractures. Degenerative changes from C3-T1. CT thoracic spine. There is no acute fracture or dislocation in the thoracic spine. Mild degenerative changes are identified in the thoracic spine. There is atelectasis in the lung bases. NG tube and nasogastric tube are noted. Impression No acute fracture or dislocation in the thoracic spine. Atelectasis in the lung bases. CT lumbar spine. There is no acute fracture or dislocation in the lumbar spine. There is mild degenerative changes in lumbar spine with disc bulges at L4-5 and L5-S1. Impression No acute fracture or dislocation in the lumbar spine. Mild diffuse degenerative changes. RECOMMENDATIONS: Unavailable     CT CERVICAL SPINE WO CONTRAST    Result Date: 4/30/2022  EXAMINATION: CT OF THE HEAD WITHOUT CONTRAST; CT OF THE FACE WITHOUT CONTRAST; CT OF THE CERVICAL SPINE WITHOUT CONTRAST; CT OF THE THORACIC SPINE WITHOUT CONTRAST; CT OF THE LUMBAR SPINE WITHOUT CONTRAST  4/30/2022 5:21 pm TECHNIQUE: CT of the head was performed without the administration of intravenous contrast. Dose modulation, iterative reconstruction, and/or weight based adjustment of the mA/kV was utilized to reduce the radiation dose to as low as reasonably achievable.; CT of the face was performed without the administration of intravenous contrast. Multiplanar reformatted images are provided for review. Dose modulation, iterative reconstruction, and/or weight based adjustment of the mA/kV was utilized to reduce the radiation dose to as low as reasonably achievable.; CT of the cervical spine was performed without the administration of intravenous contrast. Multiplanar reformatted images are provided for review.  Dose modulation, iterative reconstruction, and/or weight based adjustment of the mA/kV was utilized to reduce the radiation dose to as low as reasonably achievable.; CT of the thoracic spine was performed without the administration of intravenous contrast. Multiplanar reformatted images are provided for review. Dose modulation, iterative reconstruction, and/or weight based adjustment of the mA/kV was utilized to reduce the radiation dose to as low as reasonably achievable.; CT of the lumbar spine was performed without the administration of intravenous contrast. Multiplanar reformatted images are provided for review. Adjustment of mA and/or kV according to patient size was utilized. Dose modulation, iterative reconstruction, and/or weight based adjustment of the mA/kV was utilized to reduce the radiation dose to as low as reasonably achievable. COMPARISON: None. HISTORY: ORDERING SYSTEM PROVIDED HISTORY: trauma TECHNOLOGIST PROVIDED HISTORY: Reason for exam:->trauma Has a \"code stroke\" or \"stroke alert\" been called? ->No Decision Support Exception - unselect if not a suspected or confirmed emergency medical condition->Emergency Medical Condition ([place]) What reading provider will be dictating this exam?->CRC FINDINGS: CT head. Postoperative changes are identified in the left frontotemporal region with encephalomalacia in the temporal region. There is diffuse atrophy with prominence of the ventricles and sulci. Confluent areas of decreased attenuation are present in the periventricular white matter and brainstem consistent with microvascular/ischemic changes. 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 There is no acute stroke, mass or hemorrhage. Retained secretions are identified in the nasopharynx with an indwelling NG tube. No acute intracranial abnormality. Postoperative changes with craniotomy defect and encephalomalacia in the left frontotemporal region. 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  CT maxillofacial area. There is no acute displaced fracture in the maxillofacial area. There is mucosal thickening in the ethmoid sinuses. Eye globes are intact. There is nasogastric tube with retained secretions in the nasopharynx. There is irregularity of the right zygomattic arch likely old trauma. Impression No acute fracture or dislocation in the maxillofacial area. There is slight irregularity of the right zygomattic arch which may be due to previous trauma. CT cervical spine. There is no acute fracture or dislocation in the cervical spine. The prevertebral soft tissues are normal.  Retained secretions are noted in the nasopharynx with the endotracheal tube and nasogastric tube in place. Mild degenerative changes are identified from C3-T1. Impression No acute fractures. Degenerative changes from C3-T1. CT thoracic spine. There is no acute fracture or dislocation in the thoracic spine. Mild degenerative changes are identified in the thoracic spine. There is atelectasis in the lung bases. NG tube and nasogastric tube are noted. Impression No acute fracture or dislocation in the thoracic spine. Atelectasis in the lung bases. CT lumbar spine. There is no acute fracture or dislocation in the lumbar spine. There is mild degenerative changes in lumbar spine with disc bulges at L4-5 and L5-S1. Impression No acute fracture or dislocation in the lumbar spine. Mild diffuse degenerative changes.  RECOMMENDATIONS: Unavailable     CT THORACIC SPINE WO CONTRAST    Result Date: 4/30/2022  EXAMINATION: CT OF THE HEAD WITHOUT CONTRAST; CT OF THE FACE WITHOUT CONTRAST; CT OF THE CERVICAL SPINE WITHOUT CONTRAST; CT OF THE THORACIC SPINE WITHOUT CONTRAST; CT OF THE LUMBAR SPINE WITHOUT CONTRAST  4/30/2022 5:21 pm TECHNIQUE: CT of the head was performed without the administration of intravenous contrast. Dose modulation, iterative reconstruction, and/or weight based adjustment of the mA/kV was utilized to reduce the radiation dose to as low as reasonably achievable.; CT of the face was performed without the administration of intravenous contrast. Multiplanar reformatted images are provided for review. Dose modulation, iterative reconstruction, and/or weight based adjustment of the mA/kV was utilized to reduce the radiation dose to as low as reasonably achievable.; CT of the cervical spine was performed without the administration of intravenous contrast. Multiplanar reformatted images are provided for review. Dose modulation, iterative reconstruction, and/or weight based adjustment of the mA/kV was utilized to reduce the radiation dose to as low as reasonably achievable.; CT of the thoracic spine was performed without the administration of intravenous contrast. Multiplanar reformatted images are provided for review. Dose modulation, iterative reconstruction, and/or weight based adjustment of the mA/kV was utilized to reduce the radiation dose to as low as reasonably achievable.; CT of the lumbar spine was performed without the administration of intravenous contrast. Multiplanar reformatted images are provided for review. Adjustment of mA and/or kV according to patient size was utilized. Dose modulation, iterative reconstruction, and/or weight based adjustment of the mA/kV was utilized to reduce the radiation dose to as low as reasonably achievable. COMPARISON: None. HISTORY: ORDERING SYSTEM PROVIDED HISTORY: trauma TECHNOLOGIST PROVIDED HISTORY: Reason for exam:->trauma Has a \"code stroke\" or \"stroke alert\" been called? ->No Decision Support Exception - unselect if not a suspected or confirmed emergency medical condition->Emergency Medical Condition ([place]) What reading provider will be dictating this exam?->CRC FINDINGS: CT head. Postoperative changes are identified in the left frontotemporal region with encephalomalacia in the temporal region. There is diffuse atrophy with prominence of the ventricles and sulci.   Confluent areas of decreased attenuation are present in the periventricular white matter and brainstem consistent with microvascular/ischemic changes. 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 There is no acute stroke, mass or hemorrhage. Retained secretions are identified in the nasopharynx with an indwelling NG tube. No acute intracranial abnormality. Postoperative changes with craniotomy defect and encephalomalacia in the left frontotemporal region. 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 CT maxillofacial area. There is no acute displaced fracture in the maxillofacial area. There is mucosal thickening in the ethmoid sinuses. Eye globes are intact. There is nasogastric tube with retained secretions in the nasopharynx. There is irregularity of the right zygomattic arch likely old trauma. Impression No acute fracture or dislocation in the maxillofacial area. There is slight irregularity of the right zygomattic arch which may be due to previous trauma. CT cervical spine. There is no acute fracture or dislocation in the cervical spine. The prevertebral soft tissues are normal.  Retained secretions are noted in the nasopharynx with the endotracheal tube and nasogastric tube in place. Mild degenerative changes are identified from C3-T1. Impression No acute fractures. Degenerative changes from C3-T1. CT thoracic spine. There is no acute fracture or dislocation in the thoracic spine. Mild degenerative changes are identified in the thoracic spine. There is atelectasis in the lung bases. NG tube and nasogastric tube are noted. Impression No acute fracture or dislocation in the thoracic spine. Atelectasis in the lung bases. CT lumbar spine. There is no acute fracture or dislocation in the lumbar spine. There is mild degenerative changes in lumbar spine with disc bulges at L4-5 and L5-S1. Impression No acute fracture or dislocation in the lumbar spine.  Mild diffuse degenerative changes. RECOMMENDATIONS: Unavailable     CT LUMBAR SPINE WO CONTRAST    Result Date: 4/30/2022  EXAMINATION: CT OF THE HEAD WITHOUT CONTRAST; CT OF THE FACE WITHOUT CONTRAST; CT OF THE CERVICAL SPINE WITHOUT CONTRAST; CT OF THE THORACIC SPINE WITHOUT CONTRAST; CT OF THE LUMBAR SPINE WITHOUT CONTRAST  4/30/2022 5:21 pm TECHNIQUE: CT of the head was performed without the administration of intravenous contrast. Dose modulation, iterative reconstruction, and/or weight based adjustment of the mA/kV was utilized to reduce the radiation dose to as low as reasonably achievable.; CT of the face was performed without the administration of intravenous contrast. Multiplanar reformatted images are provided for review. Dose modulation, iterative reconstruction, and/or weight based adjustment of the mA/kV was utilized to reduce the radiation dose to as low as reasonably achievable.; CT of the cervical spine was performed without the administration of intravenous contrast. Multiplanar reformatted images are provided for review. Dose modulation, iterative reconstruction, and/or weight based adjustment of the mA/kV was utilized to reduce the radiation dose to as low as reasonably achievable.; CT of the thoracic spine was performed without the administration of intravenous contrast. Multiplanar reformatted images are provided for review. Dose modulation, iterative reconstruction, and/or weight based adjustment of the mA/kV was utilized to reduce the radiation dose to as low as reasonably achievable.; CT of the lumbar spine was performed without the administration of intravenous contrast. Multiplanar reformatted images are provided for review. Adjustment of mA and/or kV according to patient size was utilized. Dose modulation, iterative reconstruction, and/or weight based adjustment of the mA/kV was utilized to reduce the radiation dose to as low as reasonably achievable. COMPARISON: None.  HISTORY: ORDERING SYSTEM PROVIDED HISTORY: trauma TECHNOLOGIST PROVIDED HISTORY: Reason for exam:->trauma Has a \"code stroke\" or \"stroke alert\" been called? ->No Decision Support Exception - unselect if not a suspected or confirmed emergency medical condition->Emergency Medical Condition ([place]) What reading provider will be dictating this exam?->CRC FINDINGS: CT head. Postoperative changes are identified in the left frontotemporal region with encephalomalacia in the temporal region. There is diffuse atrophy with prominence of the ventricles and sulci. Confluent areas of decreased attenuation are present in the periventricular white matter and brainstem consistent with microvascular/ischemic changes. 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 There is no acute stroke, mass or hemorrhage. Retained secretions are identified in the nasopharynx with an indwelling NG tube. No acute intracranial abnormality. Postoperative changes with craniotomy defect and encephalomalacia in the left frontotemporal region. 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 CT maxillofacial area. There is no acute displaced fracture in the maxillofacial area. There is mucosal thickening in the ethmoid sinuses. Eye globes are intact. There is nasogastric tube with retained secretions in the nasopharynx. There is irregularity of the right zygomattic arch likely old trauma. Impression No acute fracture or dislocation in the maxillofacial area. There is slight irregularity of the right zygomattic arch which may be due to previous trauma. CT cervical spine. There is no acute fracture or dislocation in the cervical spine. The prevertebral soft tissues are normal.  Retained secretions are noted in the nasopharynx with the endotracheal tube and nasogastric tube in place. Mild degenerative changes are identified from C3-T1. Impression No acute fractures. Degenerative changes from C3-T1. CT thoracic spine.  There is no acute fracture or dislocation in the thoracic spine. Mild degenerative changes are identified in the thoracic spine. There is atelectasis in the lung bases. NG tube and nasogastric tube are noted. Impression No acute fracture or dislocation in the thoracic spine. Atelectasis in the lung bases. CT lumbar spine. There is no acute fracture or dislocation in the lumbar spine. There is mild degenerative changes in lumbar spine with disc bulges at L4-5 and L5-S1. Impression No acute fracture or dislocation in the lumbar spine. Mild diffuse degenerative changes. RECOMMENDATIONS: Unavailable     XR CHEST PORTABLE    Result Date: 4/30/2022  EXAMINATION: ONE XRAY VIEW OF THE CHEST 4/30/2022 5:05 pm COMPARISON: 04/29/2022 HISTORY: ORDERING SYSTEM PROVIDED HISTORY: ett advancement and central line placement TECHNOLOGIST PROVIDED HISTORY: Reason for exam:->ett advancement and central line placement What reading provider will be dictating this exam?->CRC FINDINGS: There is a gastric catheter passing into the stomach, tip is coiled in the gastric fundus/cardia, side port in the gastric fundus. There is a left subclavian central venous catheter with its tip in the region of the SVC. No evidence of pneumothorax. Lungs appear clear. Gastric catheter is in the stomach. Left subclavian central venous catheter tip is in the SVC just below the level of the david in good position. XR CHEST PORTABLE    Result Date: 4/30/2022  EXAMINATION: ONE XRAY VIEW OF THE CHEST 4/30/2022 5:05 pm COMPARISON: None. HISTORY: ORDERING SYSTEM PROVIDED HISTORY: ett placement TECHNOLOGIST PROVIDED HISTORY: Reason for exam:->ett placement What reading provider will be dictating this exam?->CRC FINDINGS: Lungs are clear. No pneumothorax. Heart is normal in size. No evidence of pneumothorax. Endotracheal tube tip is 13 cm above the david. Repositioning recommended. Osseous structures are within normal limits.      Endotracheal tube tip is 13 cm above the david, repositioning recommended. No acute process identified in the chest.     XR CHEST 1 VIEW    Result Date: 4/30/2022  EXAMINATION: ONE XRAY VIEW OF THE CHEST 4/30/2022 5:05 pm COMPARISON: None. HISTORY: ORDERING SYSTEM PROVIDED HISTORY: ett advancement TECHNOLOGIST PROVIDED HISTORY: Reason for exam:->ett advancement What reading provider will be dictating this exam?->CRC FINDINGS: Heart is normal in size. Lungs are clear. No pneumothorax or pleural fluid. Endotracheal tube tip is above the level of the clavicles 12 cm above the david. Advancement of approximately 5 cm is recommended. Endotracheal tube tip is 12 cm above the david. XR ABDOMEN FOR NG/OG/NE TUBE PLACEMENT    Result Date: 4/30/2022  EXAMINATION: ONE SUPINE XRAY VIEW(S) OF THE ABDOMEN 4/30/2022 5:05 pm COMPARISON: None. HISTORY: ORDERING SYSTEM PROVIDED HISTORY: Confirmation of course of NG/OG/NE tube and location of tip of tube TECHNOLOGIST PROVIDED HISTORY: Reason for exam:->Confirmation of course of NG/OG/NE tube and location of tip of tube Portable? ->Yes What reading provider will be dictating this exam?->CRC FINDINGS: NG tube tip is in the gastric cardia, side port in the gastric fundus. Small amount of gas in the stomach. Bowel gas pattern is nonspecific. No organomegaly. Lung bases appear clear. Osseous structures are within normal limits. Gastric catheter is in the stomach. Discharge Exam:  Central Nervous System  Loss of consciousness: Yes     GCS:    Eye:  4 - Opens eyes on own  Motor:  6 - Follows simple motor commands  Verbal:  5 - Alert and oriented     Neuromuscular blockade: NO  Pupil size:      Left 3 mm                          Right 3 mm  Pupil reaction:  Yes     Wiggles fingers: Left Yes Right Yes  Wiggles toes: Left Yes   Right Yes     Hand grasp:   Left  Present                            Right  Present  Plantar flexion:          Left  Present                                       Right Present     PHYSICAL EXAM  General: No apparent distress, comfortable   HEENT: Trachea midline, no masses, Pupils equal round   Chest: Respiratory effort was normal with no retractions or use of accessory muscles. Cardiovascular: Extremities warm, well perfused,   Abdomen:  Soft and non distended. No tenderness, guarding, rebound, or rigidity   Extremities: Moves all 4 extremeties, No pedal edema      Spine:   Spine Tenderness ROM   Cervical 0/10 Normal   Thoracic 0 /10 Normal   Lumbar 0 /10 Normal      Musculoskeletal:    Joint Tenderness Swelling ROM   Right shoulder Absent absent normal   Left shoulder absent absent normal   Right elbow absent absent normal   Left elbow absent absent normal   Right wrist absent absent normal   Left wrist absent absent normal   Right hand grasp Absent absent normal   Left hand grasp absent absent normal   Right hip absent absent normal   Left hip absent absent normal   Right knee Absent absent normal   Left knee absent absent normal   Right ankle absent absent normal   Left ankle absent absent normal   Right foot Absent absent normal   Left foot absent absent tremaine         Disposition: home    In process/preliminary results:  Outstanding Order Results     Date and Time Order Name Status Description    4/30/2022  6:39 PM EKG 12 Lead Preliminary     4/30/2022  5:15 PM Culture, MRSA, Screening In process     4/30/2022  4:36 PM Intubation Preliminary           Patient Instructions: There are no discharge medications for this patient. TRAUMA SERVICES DISCHARGE INSTRUCTIONS    Call [phone], option 2, for any questions/concerns and for follow-up appointment in as needed     Please follow the instructions checked below:      ACTIVITY INSTRUCTIONS  Increase activity as tolerated  No heavy lifting or strenuous activity  Take your incentive spirometer home and use 4-6 times/day   [x]  No driving on pain medications    WOUND/DRESSING INSTRUCTIONS:  You may shower.   No sitting in bath tub, hot tub or swimming until cleared by physician. Ice to areas of pain for first 24 hours. Heat to areas of pain after that. Wash areas of lacerations/abrasions with soap & water. Rinse well. Pat dry with clean towel. Apply thin layer of Bacitracin, Neosporin, or triple antibiotic cream to affected area 2-3 times per day. Keep wounds clean and dry. MEDICATION INSTRUCTIONS  Take medication as prescribed. When taking pain medications, you may experience dizziness or drowsiness. Do not drink alcohol or drive when taking these medications. You may experience constipation while taking pain medication. You may take over the counter stool softeners such as docusate (Colace), sennosides S (Senokot-S), or Miralax. [x]  You may take Ibuprofen (over the counter) as directed for mild pain. --You may take up to 800mg every 8 hours for pain, please take with food or milk. [x]  You may take acetaminophen (Tylenol) products. Do NOT take more than 4000mg of Tylenol in 24h. [x]  Do not take any other acetaminophen (Tylenol) products if you are taking Percocet or Norco, as these contain Tylenol. --Do NOT take more than 4000mg of Tylenol in 24h. OPIOID MEDICATION INSTRUCTIONS  Read the medication guide that is included with your prescription. Take your medication exactly as prescribed. Store medication away from children and in a safe place. Do NOT share your medication with others. Do NOT take medication unless it is prescribed for you. Do NOT drink alcohol while taking opioids (I.e., Norco, Percocet, Oxycodone, etc). Discuss with the Trauma Clinic staff if the dose of medication you are taking does not control your pain and any side effects that you may be having. CALL 911 OR YOUR LOCAL EMERGENCY SERVICE:  --If you take too much medication  --If you have trouble breathing or shortness of breath  --A child has taken this medication.     WORK:  You may not return to work until you receive follow-up with the Trauma Clinic or clearance by all consultants. Call the trauma clinic for any of the following or for questions/concerns;  --fever over 101F  --redness, swelling, hardness or warmth at the wound site(s).   --Unrelieved nausea/vomiting  --Foul smelling or cloudy drainage at the wound site(s)  --Unrelieved pain or increase in pain  --Increase in shortness of breath    Follow-up:  Trauma Clinic: [phone] option [address]  [facility], [address] [place]          Follow up:   [address]  [facility] [id number]    As needed       Signed:  Maria Victoria Michelle MD  5/1/2022  7:28 AM

## 2022-05-01 NOTE — PROGRESS NOTES
Cervical Spine C Collar Clearance -  Patient CT Spine Imaging normal.  Patient does not complain of Cervical Spine tenderness upon palpation. Patients C-Spine ranged. C-spine clear, no need for C-Collar.     Electronically signed by Maria Victoria Michelle MD on 5/1/2022 at 7:07 AM

## 2022-05-01 NOTE — CARE COORDINATION
Visited room to complete sbi prior to discharge. Pt is alert and oriented. He kept focusing on his clothes that were \"cut up\" in order to be removed emergently. He requested I return when he receives clothes to discharge in.   9:30   Returned to room. Completed sbi , but pt kept changing focus to other subjects. He denies that he has a drinking problem or alcoholic. Attempted to offer peer recovery support or list of alcohol rehab facilities. He declines. Clinical manager provided pt with taxi voucher for discharge to home.

## 2022-05-01 NOTE — PROGRESS NOTES
Pt self extubated, voice intact, follows commands, NAD, placed on NRB and then weaned to RA, spo2 98%

## 2022-05-01 NOTE — PLAN OF CARE
Problem: Respiratory - Adult  Goal: Achieves optimal ventilation and oxygenation  Outcome: Progressing     Problem: Respiratory - Adult  Goal: Absence of hypoxia  Description: Absence of hypoxia  Outcome: Progressing

## 2022-05-01 NOTE — PROGRESS NOTES
Trauma Tertiary Survey    Admit Date: 4/30/2022  Hospital day 1    CC:  Fall EtOH    Alcohol pre-screening:  Men: How many times in the past year have you had 5 or more drinks in a day?  1 or more  How much do you drink on a daily basis? 2 16 oz tallboys / 4x week    Scheduled Meds:   sodium chloride flush  10 mL IntraVENous 2 times per day    polyethylene glycol  17 g Oral Daily     Continuous Infusions:   sodium chloride       PRN Meds:medicated lip balm, acetaminophen, sodium chloride flush, sodium chloride, ondansetron, labetalol, hydrALAZINE    Subjective:     Just HA this am. No new complaints    Objective:     Patient Vitals for the past 8 hrs:   BP Temp Temp src Pulse SpO2   05/01/22 0700 -- -- -- 99 --   05/01/22 0600 -- -- -- 93 --   05/01/22 0500 (!) 168/97 -- -- 100 95 %   05/01/22 0400 (!) 148/86 100 °F (37.8 °C) Bladder 103 --   05/01/22 0300 (!) 143/86 -- -- 103 --   05/01/22 0200 (!) 147/87 100.4 °F (38 °C) Bladder 103 --   05/01/22 0136 -- -- -- -- 100 %   05/01/22 0100 (!) 156/94 -- -- 91 100 %   05/01/22 0029 -- -- -- 87 100 %   05/01/22 0000 (!) 147/92 100.2 °F (37.9 °C) Bladder 93 100 %   04/30/22 2337 -- -- -- 92 100 %       I/O last 3 completed shifts: In: 385.5 [I.V.:385.5]  Out: 1340 [Urine:1340]  No intake/output data recorded. No past medical history on file. @homemeds@    Radiology:  CT HEAD WO CONTRAST   Final Result   No acute intracranial abnormality. Postoperative changes with craniotomy defect and encephalomalacia in the left   frontotemporal region. 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 CT maxillofacial area. There is no acute displaced fracture in the maxillofacial area. There is   mucosal thickening in the ethmoid sinuses. Eye globes are intact. There is   nasogastric tube with retained secretions in the nasopharynx. There is   irregularity of the right zygomattic arch likely old trauma.       Impression      No acute fracture or dislocation in the maxillofacial area. There is slight irregularity of the right zygomattic arch which may be due to   previous trauma. CT cervical spine. There is no acute fracture or dislocation in the cervical spine. The   prevertebral soft tissues are normal.  Retained secretions are noted in the   nasopharynx with the endotracheal tube and nasogastric tube in place. Mild   degenerative changes are identified from C3-T1. Impression      No acute fractures. Degenerative changes from C3-T1. CT thoracic spine. There is no acute fracture or dislocation in the thoracic spine. Mild   degenerative changes are identified in the thoracic spine. There is   atelectasis in the lung bases. NG tube and nasogastric tube are noted. Impression      No acute fracture or dislocation in the thoracic spine. Atelectasis in the lung bases. CT lumbar spine. There is no acute fracture or dislocation in the lumbar spine. There is mild   degenerative changes in lumbar spine with disc bulges at L4-5 and L5-S1. Impression      No acute fracture or dislocation in the lumbar spine. Mild diffuse degenerative changes. RECOMMENDATIONS:   Unavailable         CT CERVICAL SPINE WO CONTRAST   Final Result   No acute intracranial abnormality. Postoperative changes with craniotomy defect and encephalomalacia in the left   frontotemporal region. 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 CT maxillofacial area. There is no acute displaced fracture in the maxillofacial area. There is   mucosal thickening in the ethmoid sinuses. Eye globes are intact. There is   nasogastric tube with retained secretions in the nasopharynx. There is   irregularity of the right zygomattic arch likely old trauma. Impression      No acute fracture or dislocation in the maxillofacial area.       There is slight irregularity of the right zygomattic arch which may be due to   previous trauma. CT cervical spine. There is no acute fracture or dislocation in the cervical spine. The   prevertebral soft tissues are normal.  Retained secretions are noted in the   nasopharynx with the endotracheal tube and nasogastric tube in place. Mild   degenerative changes are identified from C3-T1. Impression      No acute fractures. Degenerative changes from C3-T1. CT thoracic spine. There is no acute fracture or dislocation in the thoracic spine. Mild   degenerative changes are identified in the thoracic spine. There is   atelectasis in the lung bases. NG tube and nasogastric tube are noted. Impression      No acute fracture or dislocation in the thoracic spine. Atelectasis in the lung bases. CT lumbar spine. There is no acute fracture or dislocation in the lumbar spine. There is mild   degenerative changes in lumbar spine with disc bulges at L4-5 and L5-S1. Impression      No acute fracture or dislocation in the lumbar spine. Mild diffuse degenerative changes. RECOMMENDATIONS:   Unavailable         CT FACIAL BONES WO CONTRAST   Final Result   No acute intracranial abnormality. Postoperative changes with craniotomy defect and encephalomalacia in the left   frontotemporal region. 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 CT maxillofacial area. There is no acute displaced fracture in the maxillofacial area. There is   mucosal thickening in the ethmoid sinuses. Eye globes are intact. There is   nasogastric tube with retained secretions in the nasopharynx. There is   irregularity of the right zygomattic arch likely old trauma. Impression      No acute fracture or dislocation in the maxillofacial area. There is slight irregularity of the right zygomattic arch which may be due to   previous trauma. CT cervical spine. There is no acute fracture or dislocation in the cervical spine. The   prevertebral soft tissues are normal.  Retained secretions are noted in the   nasopharynx with the endotracheal tube and nasogastric tube in place. Mild   degenerative changes are identified from C3-T1. Impression      No acute fractures. Degenerative changes from C3-T1. CT thoracic spine. There is no acute fracture or dislocation in the thoracic spine. Mild   degenerative changes are identified in the thoracic spine. There is   atelectasis in the lung bases. NG tube and nasogastric tube are noted. Impression      No acute fracture or dislocation in the thoracic spine. Atelectasis in the lung bases. CT lumbar spine. There is no acute fracture or dislocation in the lumbar spine. There is mild   degenerative changes in lumbar spine with disc bulges at L4-5 and L5-S1. Impression      No acute fracture or dislocation in the lumbar spine. Mild diffuse degenerative changes. RECOMMENDATIONS:   Unavailable         CT THORACIC SPINE WO CONTRAST   Final Result   No acute intracranial abnormality. Postoperative changes with craniotomy defect and encephalomalacia in the left   frontotemporal region. 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 CT maxillofacial area. There is no acute displaced fracture in the maxillofacial area. There is   mucosal thickening in the ethmoid sinuses. Eye globes are intact. There is   nasogastric tube with retained secretions in the nasopharynx. There is   irregularity of the right zygomattic arch likely old trauma. Impression      No acute fracture or dislocation in the maxillofacial area. There is slight irregularity of the right zygomattic arch which may be due to   previous trauma. CT cervical spine. There is no acute fracture or dislocation in the cervical spine.   The   prevertebral soft tissues are normal.  Retained secretions are noted in the   nasopharynx with the endotracheal tube and nasogastric tube in place. Mild   degenerative changes are identified from C3-T1. Impression      No acute fractures. Degenerative changes from C3-T1. CT thoracic spine. There is no acute fracture or dislocation in the thoracic spine. Mild   degenerative changes are identified in the thoracic spine. There is   atelectasis in the lung bases. NG tube and nasogastric tube are noted. Impression      No acute fracture or dislocation in the thoracic spine. Atelectasis in the lung bases. CT lumbar spine. There is no acute fracture or dislocation in the lumbar spine. There is mild   degenerative changes in lumbar spine with disc bulges at L4-5 and L5-S1. Impression      No acute fracture or dislocation in the lumbar spine. Mild diffuse degenerative changes. RECOMMENDATIONS:   Unavailable         CT LUMBAR SPINE WO CONTRAST   Final Result   No acute intracranial abnormality. Postoperative changes with craniotomy defect and encephalomalacia in the left   frontotemporal region. 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 CT maxillofacial area. There is no acute displaced fracture in the maxillofacial area. There is   mucosal thickening in the ethmoid sinuses. Eye globes are intact. There is   nasogastric tube with retained secretions in the nasopharynx. There is   irregularity of the right zygomattic arch likely old trauma. Impression      No acute fracture or dislocation in the maxillofacial area. There is slight irregularity of the right zygomattic arch which may be due to   previous trauma. CT cervical spine. There is no acute fracture or dislocation in the cervical spine.   The   prevertebral soft tissues are normal.  Retained secretions are noted in the   nasopharynx with the endotracheal tube and nasogastric tube in place. Mild   degenerative changes are identified from C3-T1. Impression      No acute fractures. Degenerative changes from C3-T1. CT thoracic spine. There is no acute fracture or dislocation in the thoracic spine. Mild   degenerative changes are identified in the thoracic spine. There is   atelectasis in the lung bases. NG tube and nasogastric tube are noted. Impression      No acute fracture or dislocation in the thoracic spine. Atelectasis in the lung bases. CT lumbar spine. There is no acute fracture or dislocation in the lumbar spine. There is mild   degenerative changes in lumbar spine with disc bulges at L4-5 and L5-S1. Impression      No acute fracture or dislocation in the lumbar spine. Mild diffuse degenerative changes. RECOMMENDATIONS:   Unavailable         XR CHEST PORTABLE   Final Result   Endotracheal tube tip is 13 cm above the david, repositioning recommended. No acute process identified in the chest.         XR CHEST 1 VIEW   Final Result   Endotracheal tube tip is 12 cm above the david. XR ABDOMEN FOR NG/OG/NE TUBE PLACEMENT   Final Result   Gastric catheter is in the stomach. XR PELVIS (1-2 VIEWS)   Final Result   No acute abnormality of the pelvis. XR CHEST PORTABLE   Final Result   Gastric catheter is in the stomach. Left subclavian central venous catheter   tip is in the SVC just below the level of the david in good position.          XR CHEST PORTABLE    (Results Pending)   XR CHEST PORTABLE    (Results Pending)       PHYSICAL EXAM:     Central Nervous System  Loss of consciousness:  Yes    GCS:    Eye:  4 - Opens eyes on own  Motor:  6 - Follows simple motor commands  Verbal:  5 - Alert and oriented    Neuromuscular blockade: NO  Pupil size:  Left 3 mm    Right 3 mm  Pupil reaction: Yes    Wiggles fingers: Left Yes Right Yes  Wiggles toes: Left Yes   Right Yes    Hand grasp: Left  Present      Right  Present  Plantar flexion: Left  Present      Right   Present    PHYSICAL EXAM  General: No apparent distress, comfortable   HEENT: Trachea midline, no masses, Pupils equal round   Chest: Respiratory effort was normal with no retractions or use of accessory muscles. Cardiovascular: Extremities warm, well perfused,   Abdomen:  Soft and non distended. No tenderness, guarding, rebound, or rigidity   Extremities: Moves all 4 extremeties, No pedal edema     Spine:   Spine Tenderness ROM   Cervical 0/10 Normal   Thoracic 0 /10 Normal   Lumbar 0 /10 Normal     Musculoskeletal:    Joint Tenderness Swelling ROM   Right shoulder Absent absent normal   Left shoulder absent absent normal   Right elbow absent absent normal   Left elbow absent absent normal   Right wrist absent absent normal   Left wrist absent absent normal   Right hand grasp Absent absent normal   Left hand grasp absent absent normal   Right hip absent absent normal   Left hip absent absent normal   Right knee Absent absent normal   Left knee absent absent normal   Right ankle absent absent normal   Left ankle absent absent normal   Right foot Absent absent normal   Left foot absent absent normal       CONSULTS: none    PROCEDURES: none    INJURIES:      Principal Problem:    Fall (on)(from) sidewalk curb, initial encounter  Active Problems:    Blood alcohol level greater than 0.3    Obtunded    Trauma  Resolved Problems:    * No resolved hospital problems. *        Assessment/Plan:       · Neuro:  GCS 15, no acute issues    · CV: HR near normal limits, no acute issues   · Pulm: tolerating room air    · GI: tolerating general diet   · Renal: no acute issues   · ID: afebrile, no acute issues     · Endocrine: no acute issues,   · MSK: no acute issues    · Heme: no acute issues      Bowel regime: colace  Pain control/Sedation: tylenol  DVT prophylaxis: SCDs    GI: diet   Glucose protocol: daily bmp  Mouth/Eye care: per patient,. Martinez: out     Code status:    Full Code    Patient/Family update:  As available     Disposition:  Hopefully DC today      Electronically signed by El Summers MD on 5/1/22 at 7:08 AM EDT

## 2022-05-01 NOTE — PROGRESS NOTES
Patient became agitated and aggressive in bed, and despite sedation and wrist restraints patient self extubated. Respiratory called to bedside. ICU resident called and notified. Patient verbalizing and breathing freely with no apparent issues. Oriented patient to situation. Patient explains he is originally from [facility] and he requests I call family member (niece) to notify of him being here. I called the number he gave me with no answer, voice message left.

## 2022-05-01 NOTE — FLOWSHEET NOTE
Patient intubated and sedated and will intermittently wake up restless and agitated, reaching for ETT. Continued use of bilateral wrist restraints for patient's safety.

## 2022-05-01 NOTE — PLAN OF CARE
Problem: Safety - Medical Restraint  Goal: Remains free of injury from restraints (Restraint for Interference with Medical Device)  Description: INTERVENTIONS:  1. Determine that other, less restrictive measures have been tried or would not be effective before applying the restraint  2. Evaluate the patient's condition at the time of restraint application  3. Inform patient/family regarding the reason for restraint  4.  Q2H: Monitor safety, psychosocial status, comfort, nutrition and hydration  Outcome: Completed

## 2022-05-02 ENCOUNTER — APPOINTMENT (OUTPATIENT)
Dept: CT IMAGING | Age: 57
End: 2022-05-02
Payer: COMMERCIAL

## 2022-05-02 ENCOUNTER — APPOINTMENT (OUTPATIENT)
Dept: GENERAL RADIOLOGY | Age: 57
End: 2022-05-02
Payer: COMMERCIAL

## 2022-05-02 ENCOUNTER — HOSPITAL ENCOUNTER (EMERGENCY)
Age: 57
Discharge: HOME | End: 2022-05-02
Attending: EMERGENCY MEDICINE
Payer: COMMERCIAL

## 2022-05-02 VITALS
TEMPERATURE: 98 F | RESPIRATION RATE: 18 BRPM | DIASTOLIC BLOOD PRESSURE: 74 MMHG | HEART RATE: 76 BPM | SYSTOLIC BLOOD PRESSURE: 132 MMHG | WEIGHT: 140 LBS | OXYGEN SATURATION: 99 % | BODY MASS INDEX: 21.97 KG/M2 | HEIGHT: 67 IN

## 2022-05-02 DIAGNOSIS — R07.9 CHEST PAIN, UNSPECIFIED TYPE: ICD-10-CM

## 2022-05-02 DIAGNOSIS — F10.920 ACUTE ALCOHOLIC INTOXICATION WITHOUT COMPLICATION (HCC): Primary | ICD-10-CM

## 2022-05-02 LAB
ACETAMINOPHEN LEVEL: <5 MCG/ML (ref 10–30)
ALBUMIN SERPL-MCNC: 4.2 G/DL (ref 3.5–5.2)
ALP BLD-CCNC: 60 U/L (ref 40–129)
ALT SERPL-CCNC: 53 U/L (ref 0–40)
ANION GAP SERPL CALCULATED.3IONS-SCNC: 14 MMOL/L (ref 7–16)
AST SERPL-CCNC: 68 U/L (ref 0–39)
BASOPHILS ABSOLUTE: 0.04 E9/L (ref 0–0.2)
BASOPHILS RELATIVE PERCENT: 0.4 % (ref 0–2)
BILIRUB SERPL-MCNC: 1.6 MG/DL (ref 0–1.2)
BUN BLDV-MCNC: 15 MG/DL (ref 6–20)
CALCIUM SERPL-MCNC: 8.7 MG/DL (ref 8.6–10.2)
CHLORIDE BLD-SCNC: 95 MMOL/L (ref 98–107)
CO2: 24 MMOL/L (ref 22–29)
CREAT SERPL-MCNC: 0.9 MG/DL (ref 0.7–1.2)
EKG ATRIAL RATE: 71 BPM
EKG ATRIAL RATE: 83 BPM
EKG P AXIS: 61 DEGREES
EKG P AXIS: 69 DEGREES
EKG P-R INTERVAL: 144 MS
EKG P-R INTERVAL: 146 MS
EKG Q-T INTERVAL: 384 MS
EKG Q-T INTERVAL: 426 MS
EKG QRS DURATION: 90 MS
EKG QRS DURATION: 96 MS
EKG QTC CALCULATION (BAZETT): 451 MS
EKG QTC CALCULATION (BAZETT): 462 MS
EKG R AXIS: -3 DEGREES
EKG R AXIS: 84 DEGREES
EKG T AXIS: 35 DEGREES
EKG T AXIS: 51 DEGREES
EKG VENTRICULAR RATE: 71 BPM
EKG VENTRICULAR RATE: 83 BPM
EOSINOPHILS ABSOLUTE: 0.14 E9/L (ref 0.05–0.5)
EOSINOPHILS RELATIVE PERCENT: 1.6 % (ref 0–6)
ETHANOL: 268 MG/DL (ref 0–0.08)
GFR AFRICAN AMERICAN: >60
GFR NON-AFRICAN AMERICAN: >60 ML/MIN/1.73
GLUCOSE BLD-MCNC: 111 MG/DL (ref 74–99)
HCT VFR BLD CALC: 34.7 % (ref 37–54)
HEMOGLOBIN: 13 G/DL (ref 12.5–16.5)
IMMATURE GRANULOCYTES #: 0.03 E9/L
IMMATURE GRANULOCYTES %: 0.3 % (ref 0–5)
LYMPHOCYTES ABSOLUTE: 2.5 E9/L (ref 1.5–4)
LYMPHOCYTES RELATIVE PERCENT: 28.1 % (ref 20–42)
MCH RBC QN AUTO: 29.8 PG (ref 26–35)
MCHC RBC AUTO-ENTMCNC: 37.5 % (ref 32–34.5)
MCV RBC AUTO: 79.6 FL (ref 80–99.9)
MONOCYTES ABSOLUTE: 0.75 E9/L (ref 0.1–0.95)
MONOCYTES RELATIVE PERCENT: 8.4 % (ref 2–12)
MRSA CULTURE ONLY: NORMAL
NEUTROPHILS ABSOLUTE: 5.44 E9/L (ref 1.8–7.3)
NEUTROPHILS RELATIVE PERCENT: 61.2 % (ref 43–80)
PDW BLD-RTO: 14.3 FL (ref 11.5–15)
PLATELET # BLD: 221 E9/L (ref 130–450)
PMV BLD AUTO: 10.6 FL (ref 7–12)
POTASSIUM SERPL-SCNC: 3.8 MMOL/L (ref 3.5–5)
RBC # BLD: 4.36 E12/L (ref 3.8–5.8)
SALICYLATE, SERUM: <0.3 MG/DL (ref 0–30)
SODIUM BLD-SCNC: 133 MMOL/L (ref 132–146)
TOTAL PROTEIN: 7.4 G/DL (ref 6.4–8.3)
TRICYCLIC ANTIDEPRESSANTS SCREEN SERUM: NEGATIVE NG/ML
TROPONIN, HIGH SENSITIVITY: 16 NG/L (ref 0–11)
TROPONIN, HIGH SENSITIVITY: 18 NG/L (ref 0–11)
WBC # BLD: 8.9 E9/L (ref 4.5–11.5)

## 2022-05-02 PROCEDURE — 70450 CT HEAD/BRAIN W/O DYE: CPT

## 2022-05-02 PROCEDURE — 84484 ASSAY OF TROPONIN QUANT: CPT

## 2022-05-02 PROCEDURE — 6370000000 HC RX 637 (ALT 250 FOR IP): Performed by: NURSE PRACTITIONER

## 2022-05-02 PROCEDURE — 2580000003 HC RX 258: Performed by: STUDENT IN AN ORGANIZED HEALTH CARE EDUCATION/TRAINING PROGRAM

## 2022-05-02 PROCEDURE — 93010 ELECTROCARDIOGRAM REPORT: CPT | Performed by: INTERNAL MEDICINE

## 2022-05-02 PROCEDURE — 72125 CT NECK SPINE W/O DYE: CPT

## 2022-05-02 PROCEDURE — 71045 X-RAY EXAM CHEST 1 VIEW: CPT

## 2022-05-02 RX ORDER — 0.9 % SODIUM CHLORIDE 0.9 %
1000 INTRAVENOUS SOLUTION INTRAVENOUS ONCE
Status: COMPLETED | OUTPATIENT
Start: 2022-05-02 | End: 2022-05-02

## 2022-05-02 RX ADMIN — SODIUM CHLORIDE 1000 ML: 9 INJECTION, SOLUTION INTRAVENOUS at 01:53

## 2022-05-02 RX ADMIN — ALUMINUM HYDROXIDE, MAGNESIUM HYDROXIDE, AND SIMETHICONE: 200; 200; 20 SUSPENSION ORAL at 00:07

## 2022-05-02 ASSESSMENT — PAIN SCALES - GENERAL: PAINLEVEL_OUTOF10: 3

## 2022-05-02 NOTE — ED PROVIDER NOTES
Department of Emergency Medicine   ED Provider Note  Admit Date/RoomTime: 5/2/2022 12:38 AM  ED Room: [facility]          History of Present Illness:  5/2/22, Time: 12:41 AM EDT         Lucy Prater is a 60 y.o. male presenting to the ED for multiple complaints,, beginning earlier today. The complaint has been intermittent, moderate in severity, and worsened by nothing. History limited, patient confused, not answering my questions. History obtained from triage note. Patient was complaining of chest pain, has had recent cardiac work-up with stress testing late last month. Patient also complaining of headache, possible fall. He is not on any anticoagulation. Patient shaking his head no to any pain at this time. Review of Systems:     Unable to obtain ROS secondary to patient's clinical condition.    --------------------------------------------- PAST HISTORY ---------------------------------------------  Past Medical History:  has a past medical history of Headache and Hypertension. Past Surgical History:  has no past surgical history on file. Social History:  reports that he has been smoking cigarettes. He has a 4.60 pack-year smoking history. He has never used smokeless tobacco. He reports current alcohol use. He reports that he does not use drugs. Family History: family history is not on file. The patients home medications have been reviewed. Allergies: Ace inhibitors and Fish-derived products        ---------------------------------------------------PHYSICAL EXAM--------------------------------------    Constitutional/General: Sleeping comfortably  Head: Normocephalic and atraumatic  Eyes: EOMI, conjunctiva normal, sclera non icteric, pupils 3 mm bilaterally, reactive to light  Mouth: Moist mucous membranes, uvula midline  Neck: Supple, no stridor, no meningeal signs  Respiratory: Lungs clear to auscultation bilaterally, no wheezes, rales, or rhonchi.  Not in respiratory distress  Cardiovascular:  Regular rate. Regular rhythm. No murmurs, no gallops, or rubs. 2+ distal pulses. Equal extremity pulses. Chest: No chest wall tenderness or deformity  GI:  Abdomen Soft, Non tender, Non distended. No rebound, guarding, or rigidity. No pulsatile masses. Musculoskeletal: Moves all extremities x 4. Warm and well perfused, no clubbing, cyanosis, or edema. Capillary refill <3 seconds  Integument: skin warm and dry. No rashes. Neurologic: no focal deficits, moving all 4 extremities        -----------------------------------------PROCEDURES----------------------------------------------------      -------------------------------------------------- RESULTS -------------------------------------------------  I have personally reviewed all laboratory and imaging results for this patient. Results are listed below.      LABS:  Results for orders placed or performed during the hospital encounter of 05/02/22   Troponin   Result Value Ref Range    Troponin, High Sensitivity 18 (H) 0 - 11 ng/L   CBC with Auto Differential   Result Value Ref Range    WBC 8.9 4.5 - 11.5 E9/L    RBC 4.36 3.80 - 5.80 E12/L    Hemoglobin 13.0 12.5 - 16.5 g/dL    Hematocrit 34.7 (L) 37.0 - 54.0 %    MCV 79.6 (L) 80.0 - 99.9 fL    MCH 29.8 26.0 - 35.0 pg    MCHC 37.5 (H) 32.0 - 34.5 %    RDW 14.3 11.5 - 15.0 fL    Platelets 241 058 - 433 E9/L    MPV 10.6 7.0 - 12.0 fL    Neutrophils % 61.2 43.0 - 80.0 %    Immature Granulocytes % 0.3 0.0 - 5.0 %    Lymphocytes % 28.1 20.0 - 42.0 %    Monocytes % 8.4 2.0 - 12.0 %    Eosinophils % 1.6 0.0 - 6.0 %    Basophils % 0.4 0.0 - 2.0 %    Neutrophils Absolute 5.44 1.80 - 7.30 E9/L    Immature Granulocytes # 0.03 E9/L    Lymphocytes Absolute 2.50 1.50 - 4.00 E9/L    Monocytes Absolute 0.75 0.10 - 0.95 E9/L    Eosinophils Absolute 0.14 0.05 - 0.50 E9/L    Basophils Absolute 0.04 0.00 - 0.20 E9/L   Comprehensive Metabolic Panel   Result Value Ref Range    Sodium 133 132 - 146 mmol/L Potassium 3.8 3.5 - 5.0 mmol/L    Chloride 95 (L) 98 - 107 mmol/L    CO2 24 22 - 29 mmol/L    Anion Gap 14 7 - 16 mmol/L    Glucose 111 (H) 74 - 99 mg/dL    BUN 15 6 - 20 mg/dL    CREATININE 0.9 0.7 - 1.2 mg/dL    GFR Non-African American >60 >=60 mL/min/1.73    GFR African American >60     Calcium 8.7 8.6 - 10.2 mg/dL    Total Protein 7.4 6.4 - 8.3 g/dL    Albumin 4.2 3.5 - 5.2 g/dL    Total Bilirubin 1.6 (H) 0.0 - 1.2 mg/dL    Alkaline Phosphatase 60 40 - 129 U/L    ALT 53 (H) 0 - 40 U/L    AST 68 (H) 0 - 39 U/L   Serum Drug Screen   Result Value Ref Range    Ethanol Lvl 268 mg/dL    Acetaminophen Level <5.0 (L) 10.0 - 33.6 mcg/mL    Salicylate, Serum <7.2 0.0 - 30.0 mg/dL    TCA Scrn NEGATIVE Cutoff:300 ng/mL   Troponin   Result Value Ref Range    Troponin, High Sensitivity 16 (H) 0 - 11 ng/L   EKG 12 Lead   Result Value Ref Range    Ventricular Rate 71 BPM    Atrial Rate 71 BPM    P-R Interval 146 ms    QRS Duration 96 ms    Q-T Interval 426 ms    QTc Calculation (Bazett) 462 ms    P Axis 69 degrees    R Axis -3 degrees    T Axis 51 degrees       RADIOLOGY:  Interpreted by Radiologist unless otherwise specified  XR CHEST PORTABLE   Final Result   No acute findings. CT HEAD WO CONTRAST   Final Result   No acute findings. Atrophy and chronic changes as above. Sinus disease as above, please correlate to exclude the possibility of acute   sinusitis superimposed on underlying chronic changes. CT CERVICAL SPINE WO CONTRAST   Final Result   No acute findings. Multilevel degenerative changes. Straightening of the cervical lordosis, which may be secondary to positioning   or spasm. EKG:  This EKG is signed by emergency department physician.     Rate: 71  Rhythm: Sinus  Interpretation: no acute changes  Comparison: stable as compared to patient's most recent EKG         ------------------------- NURSING NOTES AND VITALS REVIEWED ---------------------------   The nursing notes within the ED encounter and vital signs as below have been reviewed by myself. BP (!) 144/92   Pulse 85   Temp 98 °F (36.7 °C) (Oral)   Resp 18   Ht 5' 7\" (1.702 m)   Wt 140 lb (63.5 kg)   SpO2 99%   BMI 21.93 kg/m²   Oxygen Saturation Interpretation: Normal    The patients available past medical records and past encounters were reviewed. ------------------------------ ED COURSE/MEDICAL DECISION MAKING----------------------  Medications   aluminum & magnesium hydroxide-simethicone (MAALOX) 30 mL, lidocaine viscous hcl (XYLOCAINE) 5 mL (GI COCKTAIL) ( Oral Given 5/2/22 0007)   0.9 % sodium chloride bolus (1,000 mLs IntraVENous New Bag 5/2/22 0153)            Medical Decision Making: This is a 78-year-old male presenting to the emergency department for reported chest pain and headache. Patient initially intoxicated, not answering questions, initial history obtained from chart review from triage note. Patient had cardiac work-up with negative troponin, stable on repeat. No acute ischemic changes on EKG. Patient with recent cardiac work-up in the hospital with negative stress testing. Patient with no focal neuro deficits, moving all 4 extremities with equal strength bilaterally after metabolizing alcohol and becoming clinically sober. Patient was observed in the emergency department for extended period of time, 7 hours, initially with elevated ethanol level, significant clinical intoxication on exam.  After patient was observed, clinically sober, patient discharged home with sober ride. Given unclear initial history, CT of the head was obtained with no evidence of acute intracranial hemorrhage. Lab work otherwise unremarkable and reassuring. Patient given strict return precautions. See ED COURSE for additional MDM. Re-Evaluations:             ED Course as of 05/02/22 0605   Mon May 02, 2022   0047 EKG: This EKG is signed and interpreted by me.     Rate: 85  Rhythm: Sinus  Interpretation: no acute changes  Comparison: stable as compared to patient's most recent EKG   [RH]   0337 Patient still sleeping comfortably [RH]   0545 Patient reevaluated, easily arousable to voice at this time. Patient is answering questions appropriately, is oriented to person place and time. Patient clinically sober at this time. [RH]      ED Course User Index  [RH] 600 DAHLIA Friedman DO         This patient's ED course included: a personal history and physicial examination, re-evaluation prior to disposition, multiple bedside re-evaluations, IV medications, cardiac monitoring and continuous pulse oximetry    This patient has remained hemodynamically stable and improved during their ED course. Consultations:  See ED Course    Counseling: The emergency provider has spoken with the patient and discussed todays results, in addition to providing specific details for the plan of care and counseling regarding the diagnosis and prognosis. Questions are answered at this time and they are agreeable with the plan.       --------------------------------- IMPRESSION AND DISPOSITION ---------------------------------    IMPRESSION  1. Acute alcoholic intoxication without complication (HCC)    2. Chest pain, unspecified type        DISPOSITION  Disposition: Discharge to home  Patient condition is stable      NOTE: This report was transcribed using voice recognition software. Every effort was made to ensure accuracy; however, inadvertent computerized transcription errors may be present. Also please note that patient was seen and examined by attending physician. Plan of care and disposition discussed with attending physician and they were immediately available or present for all procedures performed.        -- Marcelina Wolf D.O. PGY-2     Resident Physician     Emergency Medicine      5/2/2022 6:05 AM        600 DAHLIA Friedman DO  Resident  05/02/22 5916

## 2022-05-02 NOTE — ED NOTES
Department of Emergency Medicine  FIRST PROVIDER TRIAGE NOTE             Independent MLP           5/1/22  11:08 PM EDT    Date of Encounter: 5/1/22   MRN: [id number]      HPI: Sarah Barragan is a 60 y.o. male who presents to the ED for Chest Pain (Pt poor historian) and Headache    Patient presents to the emergency department with complaints of chest pain as well as a headache. Patient was recently admitted and did have a negative stress test on April 29, patient does admit to drinking alcohol, patient also does report having a fall today. ROS: Negative for vomiting or diarrhea. PE: Gen Appearance/Constitutional: alert  HEENT: NC/NT. PERRLA,  Airway patent. Initial Plan of Care: All treatment areas with department are currently occupied. Plan to order/Initiate the following while awaiting opening in ED: labs, EKG and imaging studies.   Initiate Treatment-Testing, Proceed toTreatment Area When Bed Available for ED Attending/MLP to Continue Care    Electronically signed by Duanne Moritz, APRN - CNP   DD: 5/1/22         Duanne Moritz, APRN - CNP  05/01/22 1784

## 2022-05-02 NOTE — PROGRESS NOTES
PCP is Sung Zuniga MD  Office notified of admission.       Electronically signed by Betty Giron RN MSN APRN-NP [facility] NP  [facility]NS CCRN 5/2/2022 1:09 PM

## 2022-05-03 ENCOUNTER — HOSPITAL ENCOUNTER (EMERGENCY)
Age: 57
Discharge: HOME | End: 2022-05-03
Attending: EMERGENCY MEDICINE
Payer: COMMERCIAL

## 2022-05-03 VITALS
DIASTOLIC BLOOD PRESSURE: 76 MMHG | SYSTOLIC BLOOD PRESSURE: 130 MMHG | TEMPERATURE: 98.1 F | HEART RATE: 80 BPM | OXYGEN SATURATION: 97 % | RESPIRATION RATE: 16 BRPM

## 2022-05-03 DIAGNOSIS — R51.9 ACUTE NONINTRACTABLE HEADACHE, UNSPECIFIED HEADACHE TYPE: Primary | ICD-10-CM

## 2022-05-03 PROCEDURE — 6360000002 HC RX W HCPCS: Performed by: EMERGENCY MEDICINE

## 2022-05-03 PROCEDURE — 99284 EMERGENCY DEPT VISIT MOD MDM: CPT

## 2022-05-03 PROCEDURE — 96372 THER/PROPH/DIAG INJ SC/IM: CPT

## 2022-05-03 RX ORDER — KETOROLAC TROMETHAMINE 30 MG/ML
15 INJECTION, SOLUTION INTRAMUSCULAR; INTRAVENOUS ONCE
Status: COMPLETED | OUTPATIENT
Start: 2022-05-03 | End: 2022-05-03

## 2022-05-03 RX ADMIN — KETOROLAC TROMETHAMINE 15 MG: 30 INJECTION, SOLUTION INTRAMUSCULAR at 04:58

## 2022-05-03 ASSESSMENT — PAIN SCALES - GENERAL: PAINLEVEL_OUTOF10: 10

## 2022-05-03 NOTE — ED PROVIDER NOTES
HPI:  5/3/22,   Time: 4:42 AM EDT       Satish Maria is a 60 y.o. male presenting to the ED for headache, beginning 1 week ago. The complaint has been persistent, mild in severity, and worsened by nothing. Patient states he has been having headache for the last week. Describes it as a dull achy pressure sensation diffusely. He states he has had headaches for several years and this is pretty typical of his headaches. He states he took Tylenol with no relief therefore he came to the ED to be evaluated. No chest pain or shortness of breath. No abdominal pain. Did complain of some nausea earlier but none now. No numbness or tingling. No vision changes. No focal deficits. Review of Systems:   Pertinent positives and negatives are stated within HPI, all other systems reviewed and are negative.          --------------------------------------------- PAST HISTORY ---------------------------------------------  Past Medical History:  has a past medical history of Headache and Hypertension. Past Surgical History:  has no past surgical history on file. Social History:  reports that he has been smoking cigarettes. He has a 4.60 pack-year smoking history. He has never used smokeless tobacco. He reports current alcohol use. He reports that he does not use drugs. Family History: family history is not on file. The patients home medications have been reviewed.     Allergies: Ace inhibitors and Fish-derived products        ---------------------------------------------------PHYSICAL EXAM--------------------------------------    Constitutional/General: Alert and oriented x3, well appearing, non toxic in NAD  Head: Normocephalic and atraumatic  Eyes: PERRL, EOMI, conjunctive normal, sclera non icteric  Mouth: Oropharynx clear, handling secretions, no trismus, no asymmetry of the posterior oropharynx or uvular edema  Neck: Supple, full ROM, non tender to palpation in the midline, no stridor, no crepitus, no meningeal signs  Respiratory: Lungs clear to auscultation bilaterally, no wheezes, rales, or rhonchi. Not in respiratory distress  Cardiovascular:  Regular rate. Regular rhythm. No murmurs, gallops, or rubs. 2+ distal pulses  GI:  Abdomen Soft, Non tender, Non distended. +BS. No organomegaly, no palpable masses,  No rebound, guarding, or rigidity. Musculoskeletal: Moves all extremities x 4. Warm and well perfused, no clubbing, cyanosis, or edema. Capillary refill <3 seconds  Integument: skin warm and dry. No rashes. Neurologic: GCS 15, no focal deficits, symmetric strength 5/5 in the upper and lower extremities bilaterally, sensation intact all 4 extremities, cranial nerves II to XII intact  Psychiatric: Normal Affect    -------------------------------------------------- RESULTS -------------------------------------------------  I have personally reviewed all laboratory and imaging results for this patient. Results are listed below. LABS:  No results found for this visit on 05/03/22. RADIOLOGY:  Interpreted by Radiologist.  No orders to display       ------------------------- NURSING NOTES AND VITALS REVIEWED ---------------------------   The nursing notes within the ED encounter and vital signs as below have been reviewed by myself. /76   Pulse 80   Temp 98.1 °F (36.7 °C)   Resp 16   SpO2 97%   Oxygen Saturation Interpretation: Normal    The patients available past medical records and past encounters were reviewed. ------------------------------ ED COURSE/MEDICAL DECISION MAKING----------------------  Medications   ketorolac (TORADOL) injection 15 mg (15 mg IntraMUSCular Given 5/3/22 0458)         ED COURSE:       Medical Decision Making: This is a 61-year-old male presents to the ED for headache. Patient appears nontoxic. Vital signs are stable. Patient neurologically intact. Patient recently had CT imaging approximately 24 hours ago which was negative. Patient given Toradol. Patient symptoms improved. Patient be treated supportively. Return precautions given. Patient agrees with plan. I, Dr. Destiny De Luna, am the primary provider for this encounter    This patient's ED course included: a personal history and physicial examination and re-evaluation prior to disposition    This patient has remained hemodynamically stable during their ED course. Re-Evaluations:             Re-evaluation. Patients symptoms are improving      Counseling: The emergency provider has spoken with the patient and discussed todays results, in addition to providing specific details for the plan of care and counseling regarding the diagnosis and prognosis. Questions are answered at this time and they are agreeable with the plan.       --------------------------------- IMPRESSION AND DISPOSITION ---------------------------------    IMPRESSION  1. Acute nonintractable headache, unspecified headache type        DISPOSITION  Disposition: Discharge to home  Patient condition is poor    NOTE: This report was transcribed using voice recognition software.  Every effort was made to ensure accuracy; however, inadvertent computerized transcription errors may be present        Ganesh Plummer DO  05/03/22 7274

## 2022-05-03 NOTE — DISCHARGE SUMMARY
[facility]  Discharge Summary    PCP: Marifer Pulido MD    Admit Date:4/23/2022  Discharge Date: 4/23/2022, AMA    Admission Diagnosis:   1. Alcohol withdraw  2. Alcoholic hepatitis  3. HAGMA  4. CHAYITO  5. Electrolytes imbalance  6. hx of subdural Hematoma   7. Hx of chronic headache  8. Hx of seizure  9. Hx of HTN  10. B/l LE edema    Discharge Diagnosis:  1. Alcohol withdraw  2. Alcoholic hepatitis  3. HAGMA  4. CHAYITO  5. Electrolytes imbalance  6. hx of subdural Hematoma   7. Hx of chronic headache  8. Hx of seizure  9. Hx of HTN  10. B/l LE edema    Hospital Course: The patient is a 60 y.o. male with a past medical history of Headache, Hypertension, Pulmonary embolism suspected, not on anticoagulation, Seizure disorder, Pancreatitis, Alcohol abuse and alcohol withdrawal syndrome with complication, hx of subdural Hematoma (2021 after fall)     Patient presented to ED with complaint of intermittent  headache. His headache is 8/10 and has been waxing an waning for a period of 2 weeks and responds minimally to over the counter pain medications. He was taking 8 Ibuprofen a day for several days. Patient is unable to describe the location of his headache merely saying it's everywhere, but mostly on top. He endorses drinking prior to coming to ED, report heavy drinking with liquor. He denies dizziness, chest pain, palpitation, shortness of breath. He also denies any neurological symptoms.      Patient also complains of bilateral left leg swelling, left leg being much worse. It started a year ago, but it's been really bad for the last 2 weeks. It's swollen all day and elevation does not appear to help. It can be painful sometimes. He's not taking any medication for this.     Patient is taking Keppra 2 time daily for his seizure.  Patient smokes 3 cigarettes a day and drinks about 6 pack a week.     ED Course: Pt AOx3, but hemodynamically stable Labs: Lactic Acid 11.3, Beta-Hydroxybutyrate 1.45, Serum DS unremarkable, Serum , pH 7.192, PCO2 39.8, PO2 84.4, HCO3 14.9, AG 30, K 3.3 CT head unremarkable. Given 2L bolus and pain control with tylenol    After admission to the floor, patient reports headache is better, lactic acid is trending down, diet advanced, patient was put on CIWA protocol, osmo gap was elevated, but decision was made to monitor the patient and hold toxic panel work up because (discussed with attending on call Dr. Kristi Huggins) patient clinically improving, appropriate additional workup was sent. A few hrs later, IM team was informed that patient is AMA.  When we arrive to the floor, patient already AMA/eloped    Significant findings (history and exam, laboratory, radiological, pathology, other tests):   § General Appearance: alert and oriented to person, place and time, well developed and well- nourished, in no acute distress  § Skin: warm and dry, no rash or erythema  § Head: normocephalic and atraumatic  § Eyes: pupils equal, round, and reactive to light, extraocular eye movements intact, conjunctivae normal  § ENT: tympanic membrane, external ear and ear canal normal bilaterally, nose without deformity, nasal mucosa and turbinates normal without polyps  § Neck: supple and non-tender without mass, no thyromegaly or thyroid nodules, no cervical lymphadenopathy  § Pulmonary/Chest: clear to auscultation bilaterally- no wheezes, rales or rhonchi, normal air movement, no respiratory distress  § Cardiovascular: normal rate, regular rhythm, normal S1 and S2, no murmurs, rubs, clicks, or gallops, distal pulses intact, no carotid bruits  § Abdomen: soft, non-tender, non-distended, normal bowel sounds, no masses or organomegaly  § Extremities: no cyanosis, clubbing. + BLE edema, 4+ pitting left, 1+ pitting right  § Musculoskeletal: normal range of motion, no joint swelling, deformity or tenderness  § Neurologic: reflexes normal and symmetric, no cranial nerve deficit, gait, coordination and speech normal   ·     Pending test results: part of initial workup    Consults:      Procedures:    Condition at discharge: hemodynamically stable, but lots of abnormality in labs that has not been normalized or back to baselline    Disposition: AMA    Discharge Medications:  Patient AMA    Activity: activity as tolerated  Diet: regular diet    Follow-up appointments:   1.  No follow up appointment set, patient AMA    Patient Instructions: patient Lenny Lackey MD  PGY 2   1:23 PM 5/3/2022

## 2022-05-04 ENCOUNTER — HOSPITAL ENCOUNTER (EMERGENCY)
Age: 57
Discharge: HOME | End: 2022-05-04
Payer: COMMERCIAL

## 2022-05-04 ENCOUNTER — APPOINTMENT (OUTPATIENT)
Dept: CT IMAGING | Age: 57
End: 2022-05-04
Payer: COMMERCIAL

## 2022-05-04 VITALS
TEMPERATURE: 98.3 F | DIASTOLIC BLOOD PRESSURE: 75 MMHG | BODY MASS INDEX: 27.32 KG/M2 | SYSTOLIC BLOOD PRESSURE: 134 MMHG | HEART RATE: 82 BPM | HEIGHT: 66 IN | WEIGHT: 170 LBS | OXYGEN SATURATION: 98 % | RESPIRATION RATE: 16 BRPM

## 2022-05-04 DIAGNOSIS — G89.29 CHRONIC INTRACTABLE HEADACHE, UNSPECIFIED HEADACHE TYPE: Primary | ICD-10-CM

## 2022-05-04 DIAGNOSIS — R51.9 CHRONIC INTRACTABLE HEADACHE, UNSPECIFIED HEADACHE TYPE: Primary | ICD-10-CM

## 2022-05-04 PROCEDURE — 99284 EMERGENCY DEPT VISIT MOD MDM: CPT

## 2022-05-04 PROCEDURE — 70450 CT HEAD/BRAIN W/O DYE: CPT

## 2022-05-04 PROCEDURE — 6360000002 HC RX W HCPCS: Performed by: NURSE PRACTITIONER

## 2022-05-04 PROCEDURE — 96372 THER/PROPH/DIAG INJ SC/IM: CPT

## 2022-05-04 RX ORDER — KETOROLAC TROMETHAMINE 30 MG/ML
15 INJECTION, SOLUTION INTRAMUSCULAR; INTRAVENOUS ONCE
Status: COMPLETED | OUTPATIENT
Start: 2022-05-04 | End: 2022-05-04

## 2022-05-04 RX ORDER — IBUPROFEN 800 MG/1
800 TABLET ORAL EVERY 8 HOURS PRN
Qty: 9 TABLET | Refills: 0 | Status: SHIPPED | OUTPATIENT
Start: 2022-05-04 | End: 2022-05-07

## 2022-05-04 RX ADMIN — KETOROLAC TROMETHAMINE 15 MG: 30 INJECTION, SOLUTION INTRAMUSCULAR; INTRAVENOUS at 02:38

## 2022-05-04 ASSESSMENT — PAIN SCALES - GENERAL
PAINLEVEL_OUTOF10: 10
PAINLEVEL_OUTOF10: 5

## 2022-05-04 ASSESSMENT — PAIN DESCRIPTION - LOCATION
LOCATION: HEAD
LOCATION: HEAD

## 2022-05-04 ASSESSMENT — PAIN - FUNCTIONAL ASSESSMENT
PAIN_FUNCTIONAL_ASSESSMENT: 0-10
PAIN_FUNCTIONAL_ASSESSMENT: 0-10

## 2022-05-04 ASSESSMENT — LIFESTYLE VARIABLES
HOW OFTEN DO YOU HAVE A DRINK CONTAINING ALCOHOL: NEVER
HOW MANY STANDARD DRINKS CONTAINING ALCOHOL DO YOU HAVE ON A TYPICAL DAY: 3 OR 4

## 2022-05-04 ASSESSMENT — PAIN DESCRIPTION - DESCRIPTORS
DESCRIPTORS: ACHING
DESCRIPTORS: ACHING

## 2022-05-04 ASSESSMENT — PAIN DESCRIPTION - ONSET: ONSET: ON-GOING

## 2022-05-04 ASSESSMENT — PAIN DESCRIPTION - FREQUENCY: FREQUENCY: CONTINUOUS

## 2022-05-04 ASSESSMENT — PAIN DESCRIPTION - ORIENTATION: ORIENTATION: UPPER

## 2022-05-04 ASSESSMENT — PAIN DESCRIPTION - PAIN TYPE: TYPE: CHRONIC PAIN

## 2022-05-04 NOTE — ED PROVIDER NOTES
Independent  HPI:  5/4/22, Time: 12:47 AM EDT         Delbert Jiménez is a 60 y.o. male presenting to the ED for plaints of a headache. Patient presents to the emergency department with complaints of a headache that started several hours ago. Patient reports that the headache started approximately 4 hours ago. States that its primarily to his forehead area describing it as dull and achy. He denies take anything for pain relief including no noted Tylenol or Motrin. Patient does have a history significant for a subdural hematoma from December 2021. Patient since then has developed chronic headaches. Patient was questioned regarding any falls recently and does admit that he did fall earlier in the day but denies striking his head and denied feeling weak or dizzy prior to the fall. States that he pretty much landed on his knee. Patient is able to ambulate easily and independently. Patient reports otherwise normal state of health, he denies chest pain, shortness of breath, abdominal pain as well as no noted nausea, vomiting or diarrhea. Patient also does not have any associated neck pain or stiffening as well as no noted fevers or any visual disturbance with this. Patient reports that he would like to have the same thing that he was provided with the day prior because it really helped with his headache. Patient with symptoms mild in severity and persistent. Review of Systems:   A complete review of systems was performed and pertinent positives and negatives are stated within HPI, all other systems reviewed and are negative.          --------------------------------------------- PAST HISTORY ---------------------------------------------  Past Medical History:  has a past medical history of Headache and Hypertension. Past Surgical History:  has no past surgical history on file. Social History:  reports that he has been smoking cigarettes. He has a 4.60 pack-year smoking history.  He has never used smokeless tobacco. He reports current alcohol use. He reports that he does not use drugs. Family History: family history is not on file. The patients home medications have been reviewed. Allergies: Ace inhibitors and Fish-derived products    -------------------------------------------------- RESULTS -------------------------------------------------  All laboratory and radiology results have been personally reviewed by myself   LABS:  No results found for this visit on 05/04/22. RADIOLOGY:  Interpreted by Radiologist.  [address] [place] [address]   Final Result   No acute findings. No significant change in appearance of the brain   parenchyma when compared to the previous exam.      Left maxillary sinus disease, stable compared to the previous exam, please   correlate to exclude the possibility of acute sinusitis.             ------------------------- NURSING NOTES AND VITALS REVIEWED ---------------------------   The nursing notes within the ED encounter and vital signs as below have been reviewed. /75   Pulse 82   Temp 98.3 °F (36.8 °C) (Oral)   Resp 16   Ht 5' 6\" (1.676 m)   Wt 170 lb (77.1 kg)   SpO2 98%   BMI 27.44 kg/m²   Oxygen Saturation Interpretation: Normal      ---------------------------------------------------PHYSICAL EXAM--------------------------------------      Constitutional/General: Alert and oriented x3 overall nontoxic. Head: Normocephalic and atraumatic  Eyes: PERRL, EOMI  Mouth: Oropharynx clear, handling secretions, no trismus  Neck: Supple, full ROM,   Pulmonary: Lungs clear to auscultation bilaterally, no wheezes, rales, or rhonchi. Not in respiratory distress  Cardiovascular:  Regular rate and rhythm, no murmurs, gallops, or rubs. 2+ distal pulses  Abdomen: Soft, non tender, non distended,   Extremities: Moves all extremities x 4. Warm and well perfused  Skin: warm and dry without rash  Neurologic: GCS 15, cranial nerves II through XII grossly intact.   No acute neurovascular deficit noted. Speech clear and coherent strength strong and equal bilaterally, pleasant in conversation, able to ambulate easily and independently. Negative for meningeal irritation. Psych: Normal Affect      ------------------------------ ED COURSE/MEDICAL DECISION MAKING----------------------  Medications   ketorolac (TORADOL) injection 15 mg (15 mg IntraMUSCular Given 5/4/22 0238)         ED COURSE:       Medical Decision Making: Will be to obtain a CT scan of the brain due to patient having a fall today and with history of alcohol abuse as well as past subdural hematoma will rule out new bleed. CT scan of the brain is completely negative, no acute findings. No significant change in appearance of the brain parenchyma when compared to the previous exam.  I did provide patient with Toradol 15 mg IM after the CT scan was resulted. Patient also provided with sandwich and cookies and something to drink as well. Patient able to tolerate all foods without difficulty. Patient was educated that he does have a follow-up doctor's appointment today which he actually was unaware of so I reminded him of his 9 AM appointment he was also provided with information for neurology follow-up to manage his chronic headaches. Patient neurovascularly intact. Patient with out any acute intercranial abnormality. Patient negative for meningeal irritation. Patient will be safely discharged home with close follow-up. Counseling: The emergency provider has spoken with the patient and discussed todays results, in addition to providing specific details for the plan of care and counseling regarding the diagnosis and prognosis. Questions are answered at this time and they are agreeable with the plan.      --------------------------------- IMPRESSION AND DISPOSITION ---------------------------------    IMPRESSION  1.  Chronic intractable headache, unspecified headache type        DISPOSITION  Disposition: Discharge to home  Patient condition is good      NOTE: This report was transcribed using voice recognition software.  Every effort was made to ensure accuracy; however, inadvertent computerized transcription errors may be present     ROSY Adams - MERARI  05/04/22 4530

## 2022-05-06 ENCOUNTER — APPOINTMENT (OUTPATIENT)
Dept: CT IMAGING | Age: 57
End: 2022-05-06
Payer: COMMERCIAL

## 2022-05-06 ENCOUNTER — HOSPITAL ENCOUNTER (EMERGENCY)
Age: 57
Discharge: LEFT BEFORE DISCHARGE | End: 2022-05-06
Attending: EMERGENCY MEDICINE
Payer: COMMERCIAL

## 2022-05-06 VITALS
TEMPERATURE: 98 F | DIASTOLIC BLOOD PRESSURE: 96 MMHG | HEIGHT: 66 IN | BODY MASS INDEX: 27.32 KG/M2 | HEART RATE: 80 BPM | RESPIRATION RATE: 20 BRPM | OXYGEN SATURATION: 98 % | SYSTOLIC BLOOD PRESSURE: 152 MMHG | WEIGHT: 170 LBS

## 2022-05-06 DIAGNOSIS — S09.90XA INJURY OF HEAD, INITIAL ENCOUNTER: Primary | ICD-10-CM

## 2022-05-06 LAB
ACETAMINOPHEN LEVEL: <5 MCG/ML (ref 10–30)
ALBUMIN SERPL-MCNC: 4 G/DL (ref 3.5–5.2)
ALP BLD-CCNC: 62 U/L (ref 40–129)
ALT SERPL-CCNC: 29 U/L (ref 0–40)
ANION GAP SERPL CALCULATED.3IONS-SCNC: 11 MMOL/L (ref 7–16)
AST SERPL-CCNC: 57 U/L (ref 0–39)
BASOPHILS ABSOLUTE: 0.04 E9/L (ref 0–0.2)
BASOPHILS RELATIVE PERCENT: 0.9 % (ref 0–2)
BILIRUB SERPL-MCNC: 1.5 MG/DL (ref 0–1.2)
BUN BLDV-MCNC: 13 MG/DL (ref 6–20)
CALCIUM SERPL-MCNC: 8.2 MG/DL (ref 8.6–10.2)
CHLORIDE BLD-SCNC: 102 MMOL/L (ref 98–107)
CO2: 22 MMOL/L (ref 22–29)
CREAT SERPL-MCNC: 1 MG/DL (ref 0.7–1.2)
EOSINOPHILS ABSOLUTE: 0.12 E9/L (ref 0.05–0.5)
EOSINOPHILS RELATIVE PERCENT: 2.8 % (ref 0–6)
ETHANOL: 295 MG/DL (ref 0–0.08)
GFR AFRICAN AMERICAN: >60
GFR NON-AFRICAN AMERICAN: >60 ML/MIN/1.73
GLUCOSE BLD-MCNC: 101 MG/DL (ref 74–99)
HCT VFR BLD CALC: 35.4 % (ref 37–54)
HEMOGLOBIN: 13.2 G/DL (ref 12.5–16.5)
IMMATURE GRANULOCYTES #: 0.01 E9/L
IMMATURE GRANULOCYTES %: 0.2 % (ref 0–5)
LYMPHOCYTES ABSOLUTE: 2.23 E9/L (ref 1.5–4)
LYMPHOCYTES RELATIVE PERCENT: 51.5 % (ref 20–42)
MCH RBC QN AUTO: 30 PG (ref 26–35)
MCHC RBC AUTO-ENTMCNC: 37.3 % (ref 32–34.5)
MCV RBC AUTO: 80.5 FL (ref 80–99.9)
MONOCYTES ABSOLUTE: 0.39 E9/L (ref 0.1–0.95)
MONOCYTES RELATIVE PERCENT: 9 % (ref 2–12)
NEUTROPHILS ABSOLUTE: 1.54 E9/L (ref 1.8–7.3)
NEUTROPHILS RELATIVE PERCENT: 35.6 % (ref 43–80)
PDW BLD-RTO: 15.1 FL (ref 11.5–15)
PLATELET # BLD: 251 E9/L (ref 130–450)
PMV BLD AUTO: 10.3 FL (ref 7–12)
POTASSIUM SERPL-SCNC: 3.9 MMOL/L (ref 3.5–5)
RBC # BLD: 4.4 E12/L (ref 3.8–5.8)
SALICYLATE, SERUM: <0.3 MG/DL (ref 0–30)
SODIUM BLD-SCNC: 135 MMOL/L (ref 132–146)
TOTAL PROTEIN: 6.8 G/DL (ref 6.4–8.3)
TRICYCLIC ANTIDEPRESSANTS SCREEN SERUM: NEGATIVE NG/ML
WBC # BLD: 4.3 E9/L (ref 4.5–11.5)

## 2022-05-06 PROCEDURE — 99284 EMERGENCY DEPT VISIT MOD MDM: CPT

## 2022-05-06 PROCEDURE — 80143 DRUG ASSAY ACETAMINOPHEN: CPT

## 2022-05-06 PROCEDURE — 85025 COMPLETE CBC W/AUTO DIFF WBC: CPT

## 2022-05-06 PROCEDURE — 80179 DRUG ASSAY SALICYLATE: CPT

## 2022-05-06 PROCEDURE — 80053 COMPREHEN METABOLIC PANEL: CPT

## 2022-05-06 PROCEDURE — 72125 CT NECK SPINE W/O DYE: CPT

## 2022-05-06 PROCEDURE — 80307 DRUG TEST PRSMV CHEM ANLYZR: CPT

## 2022-05-06 PROCEDURE — 70450 CT HEAD/BRAIN W/O DYE: CPT

## 2022-05-06 PROCEDURE — 82077 ASSAY SPEC XCP UR&BREATH IA: CPT

## 2022-05-06 RX ORDER — THIAMINE HYDROCHLORIDE 100 MG/ML
100 INJECTION, SOLUTION INTRAMUSCULAR; INTRAVENOUS DAILY
Status: DISCONTINUED | OUTPATIENT
Start: 2022-05-06 | End: 2022-05-06 | Stop reason: HOSPADM

## 2022-05-06 RX ORDER — 0.9 % SODIUM CHLORIDE 0.9 %
1000 INTRAVENOUS SOLUTION INTRAVENOUS ONCE
Status: DISCONTINUED | OUTPATIENT
Start: 2022-05-06 | End: 2022-05-06 | Stop reason: HOSPADM

## 2022-05-06 ASSESSMENT — PAIN DESCRIPTION - FREQUENCY: FREQUENCY: CONTINUOUS

## 2022-05-06 ASSESSMENT — PAIN DESCRIPTION - LOCATION: LOCATION: HEAD

## 2022-05-06 ASSESSMENT — PAIN DESCRIPTION - DESCRIPTORS: DESCRIPTORS: ACHING

## 2022-05-06 ASSESSMENT — PAIN - FUNCTIONAL ASSESSMENT: PAIN_FUNCTIONAL_ASSESSMENT: 0-10

## 2022-05-06 NOTE — ED PROVIDER NOTES
HPI:  5/6/22, Time: 4:35 AM EDT         Pop Choudhary is a 60 y.o. male presenting to the ED for history of fall and head injury, beginning 1 day ago. The complaint has been persistent, moderate in severity, and worsened by nothing. Patient presenting here because of history of fall. Patient reportedly hit his head. Patient reporting no chest pain no difficulty breathing he reports no abdominal pain. There is no vomiting. Patient reporting no upper or lower extremity numbness or tingling. He reports no homicidal suicidal thoughts. Patient reporting no syncopal event. Patient has been seen here in the past for his same complaint. ROS:   Pertinent positives and negatives are stated within HPI, all other systems reviewed and are negative.  --------------------------------------------- PAST HISTORY ---------------------------------------------  Past Medical History:  has a past medical history of Headache and Hypertension. Past Surgical History:  has no past surgical history on file. Social History:  reports that he has been smoking cigarettes. He has a 4.60 pack-year smoking history. He has never used smokeless tobacco. He reports current alcohol use. He reports that he does not use drugs. Family History: family history is not on file. The patients home medications have been reviewed. Allergies: Ace inhibitors and Fish-derived products    ---------------------------------------------------PHYSICAL EXAM--------------------------------------    Constitutional/General: Alert and oriented x3, well appearing, non toxic in NAD  Head: Normocephalic and atraumatic  Eyes: PERRL, EOMI  Mouth: Oropharynx clear, handling secretions, no trismus  Neck: Supple, full ROM, non tender to palpation in the midline, no stridor, no crepitus, no meningeal signs  Pulmonary: Lungs clear to auscultation bilaterally, no wheezes, rales, or rhonchi. Not in respiratory distress  Cardiovascular:  Regular rate. Regular rhythm. No murmurs, gallops, or rubs. 2+ distal pulses  Chest: no chest wall tenderness  Abdomen: Soft. Non tender. Non distended. +BS. No rebound, guarding, or rigidity. No pulsatile masses appreciated. Musculoskeletal: Moves all extremities x 4. Warm and well perfused, no clubbing, cyanosis, or edema. Capillary refill <3 seconds  Skin: warm and dry. No rashes. Neurologic: GCS 15, CN 2-12 grossly intact, no focal deficits, symmetric strength 5/5 in the upper and lower extremities bilaterally  Psych: Normal Affect    -------------------------------------------------- RESULTS -------------------------------------------------  I have personally reviewed all laboratory and imaging results for this patient. Results are listed below.      LABS:  Results for orders placed or performed during the hospital encounter of 05/06/22   CBC with Auto Differential   Result Value Ref Range    WBC 4.3 (L) 4.5 - 11.5 E9/L    RBC 4.40 3.80 - 5.80 E12/L    Hemoglobin 13.2 12.5 - 16.5 g/dL    Hematocrit 35.4 (L) 37.0 - 54.0 %    MCV 80.5 80.0 - 99.9 fL    MCH 30.0 26.0 - 35.0 pg    MCHC 37.3 (H) 32.0 - 34.5 %    RDW 15.1 (H) 11.5 - 15.0 fL    Platelets 461 115 - 968 E9/L    MPV 10.3 7.0 - 12.0 fL    Neutrophils % 35.6 (L) 43.0 - 80.0 %    Immature Granulocytes % 0.2 0.0 - 5.0 %    Lymphocytes % 51.5 (H) 20.0 - 42.0 %    Monocytes % 9.0 2.0 - 12.0 %    Eosinophils % 2.8 0.0 - 6.0 %    Basophils % 0.9 0.0 - 2.0 %    Neutrophils Absolute 1.54 (L) 1.80 - 7.30 E9/L    Immature Granulocytes # 0.01 E9/L    Lymphocytes Absolute 2.23 1.50 - 4.00 E9/L    Monocytes Absolute 0.39 0.10 - 0.95 E9/L    Eosinophils Absolute 0.12 0.05 - 0.50 E9/L    Basophils Absolute 0.04 0.00 - 0.20 E9/L   Comprehensive Metabolic Panel   Result Value Ref Range    Sodium 135 132 - 146 mmol/L    Potassium 3.9 3.5 - 5.0 mmol/L    Chloride 102 98 - 107 mmol/L    CO2 22 22 - 29 mmol/L    Anion Gap 11 7 - 16 mmol/L    Glucose 101 (H) 74 - 99 mg/dL    BUN 13 6 - 20 mg/dL    CREATININE 1.0 0.7 - 1.2 mg/dL    GFR Non-African American >60 >=60 mL/min/1.73    GFR African American >60     Calcium 8.2 (L) 8.6 - 10.2 mg/dL    Total Protein 6.8 6.4 - 8.3 g/dL    Albumin 4.0 3.5 - 5.2 g/dL    Total Bilirubin 1.5 (H) 0.0 - 1.2 mg/dL    Alkaline Phosphatase 62 40 - 129 U/L    ALT 29 0 - 40 U/L    AST 57 (H) 0 - 39 U/L   Serum Drug Screen   Result Value Ref Range    Ethanol Lvl 295 mg/dL    Acetaminophen Level <5.0 (L) 10.0 - 41.5 mcg/mL    Salicylate, Serum <5.5 0.0 - 30.0 mg/dL    TCA Scrn NEGATIVE Cutoff:300 ng/mL       RADIOLOGY:  Interpreted by Radiologist.  CT HEAD WO CONTRAST   Final Result   No acute intracranial abnormality. No acute fracture or subluxation in the cervical spine. CT CERVICAL SPINE WO CONTRAST   Final Result   No acute intracranial abnormality. No acute fracture or subluxation in the cervical spine. EKG Interpretation        ------------------------- NURSING NOTES AND VITALS REVIEWED ---------------------------   The nursing notes within the ED encounter and vital signs as below have been reviewed by myself. BP (!) 152/96   Pulse 80   Temp 98 °F (36.7 °C) (Oral)   Resp 20   Ht 5' 6\" (1.676 m)   Wt 170 lb (77.1 kg)   SpO2 98%   BMI 27.44 kg/m²   Oxygen Saturation Interpretation: Normal    The patients available past medical records and past encounters were reviewed. ------------------------------ ED COURSE/MEDICAL DECISION MAKING----------------------  Medications   0.9 % sodium chloride bolus (has no administration in time range)   thiamine (B-1) injection 100 mg (has no administration in time range)             Medical Decision Making:      Patient presenting here because of fall. Patient reports headache. Patient reporting no numbness or tingling no chest pain or abdominal pain. Patient here is awake alert oriented x3. Patient was seen in the intake room labs were obtained alcohol level is elevated.   Patient CTs were noted and reviewed. Patient was to be observed given IV fluids here in the emergency department. Patient reportedly eloped from the emergency department. Re-Evaluations:             Was evaluated in chairs. Patient was did receive IV fluids as well as IV thiamine. Observed here. Patient reportedly eloped from the emergency department      Consultations:                 Critical Care: This patient's ED course included: a personal history and physicial eaxmination    This patient has been closely monitored during their ED course. Counseling: The emergency provider has spoken with the patient and discussed todays results, in addition to providing specific details for the plan of care and counseling regarding the diagnosis and prognosis. Questions are answered at this time and they are agreeable with the plan.       --------------------------------- IMPRESSION AND DISPOSITION ---------------------------------    IMPRESSION  1. Injury of head, initial encounter        DISPOSITION  Disposition:   Patient eloped from the emergency department        NOTE: This report was transcribed using voice recognition software.  Every effort was made to ensure accuracy; however, inadvertent computerized transcription errors may be present          Perry Montero MD  05/06/22 [address], [place]  05/06/22 5510

## 2022-05-06 NOTE — ED NOTES
At this time fatimah was instructed not to leaved by this nurse and that he can use the restroom across from the chair that he was sittng in and he states no. This nurse then notified [facility] officer that he could not leave. She then states that he walked to the restroom in the waiting and then walked out the door. This nurse was told by pd that he left. This nurse informed her that she needs to come back because he is ETOH.  The pd officer states that he is at the bus stop      Bubba Marquez RN  05/06/22 8761

## 2022-05-06 NOTE — ED NOTES
Department of Emergency Medicine  FIRST PROVIDER TRIAGE NOTE             [facility]           5/6/22  1:12 AM EDT    Date of Encounter: 5/6/22   MRN: [id number]      HPI: Franck Prabhakar is a 60 y.o. male who presents to the ED for Headache (Patient states \"fell and hit head on right side sometime today\"+ETOH; denie scheat pain sob n/v/d)    Patient presents to the emergency department after drinking alcohol, 2 beers and also having a headache. Patient does have chronic headaches. Does have a past history of a subdural hematoma. Patient did not take any Motrin or Tylenol for his headache. Patient intoxicated. Denies chest pain denies shortness of breath denies suicidal homicidal ideations. No gross unilateral weakness. Patient is not on any anticoagulant therapy. ROS: Negative for cp or sob. PE: Gen Appearance/Constitutional: drowsy  HEENT: NC/NT. PERRLA,  Airway patent. Initial Plan of Care: All treatment areas with department are currently occupied. Plan to order/Initiate the following while awaiting opening in ED: labs and imaging studies.   Initiate Treatment-Testing, Proceed toTreatment Area When Bed Available for ED Attending/MLP to Continue Care    Electronically signed by ROSY Caro CNP   DD: 5/6/22         ROSY Caro CNP  05/06/22 0113  ATTENDING PROVIDER ATTESTATION:     Supervising Physician, on-site, available for consultation, non-participatory in the evaluation or care of this patient         Maxwell Ramirez MD  05/06/22 6109

## 2022-05-09 ENCOUNTER — HOSPITAL ENCOUNTER (EMERGENCY)
Age: 57
Discharge: HOME | End: 2022-05-10
Attending: EMERGENCY MEDICINE
Payer: COMMERCIAL

## 2022-05-09 ENCOUNTER — APPOINTMENT (OUTPATIENT)
Dept: CT IMAGING | Age: 57
End: 2022-05-09
Payer: COMMERCIAL

## 2022-05-09 DIAGNOSIS — R03.0 ELEVATED BLOOD PRESSURE READING: ICD-10-CM

## 2022-05-09 DIAGNOSIS — F10.920 ACUTE ALCOHOLIC INTOXICATION WITHOUT COMPLICATION (HCC): Primary | ICD-10-CM

## 2022-05-09 LAB — ETHANOL: 289 MG/DL (ref 0–0.08)

## 2022-05-09 PROCEDURE — 70450 CT HEAD/BRAIN W/O DYE: CPT

## 2022-05-09 PROCEDURE — 82077 ASSAY SPEC XCP UR&BREATH IA: CPT

## 2022-05-09 PROCEDURE — 99284 EMERGENCY DEPT VISIT MOD MDM: CPT

## 2022-05-09 PROCEDURE — 72125 CT NECK SPINE W/O DYE: CPT

## 2022-05-09 ASSESSMENT — LIFESTYLE VARIABLES
HOW MANY STANDARD DRINKS CONTAINING ALCOHOL DO YOU HAVE ON A TYPICAL DAY: PATIENT DECLINED
HOW OFTEN DO YOU HAVE A DRINK CONTAINING ALCOHOL: PATIENT DECLINED

## 2022-05-10 VITALS
OXYGEN SATURATION: 98 % | TEMPERATURE: 97.9 F | DIASTOLIC BLOOD PRESSURE: 78 MMHG | SYSTOLIC BLOOD PRESSURE: 166 MMHG | HEART RATE: 80 BPM | RESPIRATION RATE: 18 BRPM

## 2022-05-10 ASSESSMENT — PAIN - FUNCTIONAL ASSESSMENT: PAIN_FUNCTIONAL_ASSESSMENT: NONE - DENIES PAIN

## 2022-05-10 NOTE — ED NOTES
Patient is awake and alert and oriented at this time. He is able to walk to the restroom without any difficulties. Patient given food tray at this time.      Allie Palomares RN  05/10/22 0450

## 2022-05-10 NOTE — ED PROVIDER NOTES
10:45 AM EDT  Spoke with patient at length. He tolerated a meal.  He is ambulatory in the ER to the bathroom and back without any difficulty. At this time he is alert and oriented times person place and time. States he is going to go to his family member's house from here. He maintains his capacity and has all of his faculties about him at this time. He is clinically sober and ready to go home. He has no acute medical injuries or complaints at this time. He will be provided a voucher for public transportation to go to his family member's house. Vital signs stable. Nontoxic in appearance, neurovascularly intact and ambulatory upon discharge. Expresses no SI, HI or hallucinations. He poses no threat to himself or any 1 else at this time. Provided rehab info but expressed no interest. Is not demonsrating any s/s withdrawal.      --------------------------------- IMPRESSION AND DISPOSITION ---------------------------------    IMPRESSION  1.  Acute alcoholic intoxication without complication ([facility] [address]    2. Elevated blood pressure reading        DISPOSITION  Disposition: Discharge to home  Patient condition is stable        Julio Cesar Dove DO  05/10/22 1045

## 2022-05-10 NOTE — ED PROVIDER NOTES
HPI:  5/9/22, Time: 8:53 PM EDT         Terese Barton is a 60 y.o. male presenting to the ED for possible intoxication. Came on suddenly, nothing makes it better or worse, no associated pain. [place]. He admit to drinking heavily throughout the day. He was drinking whiskey. He was initially refusing to answer questions, but he is cooperative with my evaluation. He denies any fevers or chills. Denies any direct injury or trauma. He states he just drank too much. He denies any suicidal or homicidal ideation. Denies any nausea, vomiting, chest pain, back pain, shortness of breath, cough, sputum, or any other symptoms or complaints. .    Review of Systems:   A complete review of systems was performed and pertinent positives and negatives are stated within HPI, all other systems reviewed and are negative.          --------------------------------------------- PAST HISTORY ---------------------------------------------  Past Medical History:  has a past medical history of Headache and Hypertension. Past Surgical History:  has no past surgical history on file. Social History:  reports that he has been smoking cigarettes. He has a 4.60 pack-year smoking history. He has never used smokeless tobacco. He reports current alcohol use of about 5.0 - 6.0 standard drinks of alcohol per week. He reports that he does not use drugs. Family History: family history is not on file. The patients home medications have been reviewed.     Allergies: Ace inhibitors and Fish-derived products    -------------------------------------------------- RESULTS -------------------------------------------------  All laboratory and radiology results have been personally reviewed by myself   LABS:  Results for orders placed or performed during the hospital encounter of 05/09/22   Ethanol   Result Value Ref Range    Ethanol Lvl 289 mg/dL       RADIOLOGY:  Interpreted by Radiologist.  Leilani Reese CONTRAST   Final Result   Chronic appearing findings similar to previous. MRI would be useful if   symptoms persist.      RECOMMENDATIONS:   Unavailable         CT CERVICAL SPINE WO CONTRAST   Final Result   Straightening of the normal lordosis with degenerative changes seen most   pronounced at the C5/C6 and C6/C7 level. No acute abnormality of the   cervical spine.             ------------------------- NURSING NOTES AND VITALS REVIEWED ---------------------------   The nursing notes within the ED encounter and vital signs as below have been reviewed. BP (!) 166/78   Pulse 80   Temp 97.9 °F (36.6 °C)   Resp 18   SpO2 98%   Oxygen Saturation Interpretation: Normal      ---------------------------------------------------PHYSICAL EXAM--------------------------------------      Constitutional/General: Alert and oriented x3, well appearing, non toxic in NAD  Head: Normocephalic and atraumatic  Eyes: PERRL, EOMI  Mouth: Oropharynx clear, handling secretions, no trismus  Neck: Supple, full ROM,   Pulmonary: Lungs clear to auscultation bilaterally, no wheezes, rales, or rhonchi. Not in respiratory distress  Cardiovascular:  Regular rate and rhythm, no murmurs, gallops, or rubs. 2+ distal pulses  Abdomen: Soft, non tender, non distended,   Extremities: Moves all extremities x 4. Warm and well perfused  Skin: warm and dry without rash  Neurologic: GCS 15,  Psych: Normal Affect      ------------------------------ ED COURSE/MEDICAL DECISION MAKING----------------------  Medications - No data to display      ED COURSE:       Medical Decision Making:    Labs and imaging reviewed. Reevaluation, patient's resting. He is still somewhat drowsy, but awake and alert otherwise. Once again, denies suicidal or homicidal ideation. He will be monitored till clinically sober, or he finds a ride home. Counseling:    The emergency provider has spoken with the patient and discussed todays results, in addition to providing specific details for the plan of care and counseling regarding the diagnosis and prognosis. Questions are answered at this time and they are agreeable with the plan.      --------------------------------- IMPRESSION AND DISPOSITION ---------------------------------    IMPRESSION  1. Acute alcoholic intoxication without complication (HCC)    2. Elevated blood pressure reading        DISPOSITION  Disposition: Discharge to home  Patient condition is stable      NOTE: This report was transcribed using voice recognition software.  Every effort was made to ensure accuracy; however, inadvertent computerized transcription errors may be present        Norma Ball MD  05/12/22 1529

## 2022-05-10 NOTE — ED NOTES
Pt awakens to name. Denies pain at this time. Resp easy non labored.       Nichelle Dunbar, DOROTHY  05/10/22 8157

## 2022-05-11 ENCOUNTER — HOSPITAL ENCOUNTER (EMERGENCY)
Age: 57
Discharge: LEFT BEFORE DISCHARGE | End: 2022-05-11
Payer: COMMERCIAL

## 2022-05-11 ENCOUNTER — APPOINTMENT (OUTPATIENT)
Dept: CT IMAGING | Age: 57
End: 2022-05-11
Payer: COMMERCIAL

## 2022-05-11 VITALS
DIASTOLIC BLOOD PRESSURE: 84 MMHG | RESPIRATION RATE: 16 BRPM | OXYGEN SATURATION: 97 % | HEART RATE: 86 BPM | HEIGHT: 66 IN | TEMPERATURE: 98.9 F | SYSTOLIC BLOOD PRESSURE: 146 MMHG | BODY MASS INDEX: 25.71 KG/M2 | WEIGHT: 160 LBS

## 2022-05-11 PROCEDURE — 70450 CT HEAD/BRAIN W/O DYE: CPT

## 2022-05-11 PROCEDURE — 6370000000 HC RX 637 (ALT 250 FOR IP): Performed by: PHYSICIAN ASSISTANT

## 2022-05-11 PROCEDURE — 4500000002 HC ER NO CHARGE

## 2022-05-11 RX ORDER — ACETAMINOPHEN 325 MG/1
650 TABLET ORAL ONCE
Status: COMPLETED | OUTPATIENT
Start: 2022-05-11 | End: 2022-05-11

## 2022-05-11 RX ADMIN — ACETAMINOPHEN 650 MG: 325 TABLET ORAL at 03:39

## 2022-05-11 ASSESSMENT — PAIN DESCRIPTION - DESCRIPTORS
DESCRIPTORS: ACHING
DESCRIPTORS: PRESSURE

## 2022-05-11 ASSESSMENT — PAIN DESCRIPTION - FREQUENCY: FREQUENCY: CONTINUOUS

## 2022-05-11 ASSESSMENT — PAIN SCALES - GENERAL
PAINLEVEL_OUTOF10: 10
PAINLEVEL_OUTOF10: 5

## 2022-05-11 ASSESSMENT — PAIN DESCRIPTION - LOCATION
LOCATION: HEAD
LOCATION: HEAD

## 2022-05-11 ASSESSMENT — PAIN DESCRIPTION - PAIN TYPE: TYPE: ACUTE PAIN

## 2022-05-11 ASSESSMENT — PAIN - FUNCTIONAL ASSESSMENT
PAIN_FUNCTIONAL_ASSESSMENT: 0-10
PAIN_FUNCTIONAL_ASSESSMENT: ACTIVITIES ARE NOT PREVENTED

## 2022-05-11 NOTE — ED NOTES
Department of Emergency Medicine  FIRST PROVIDER ELOPEMENT NOTE                 [facility]          5/11/22  10:09 AM EDT    MRN: [id number]    HPI: Mone Simpson is a 60 y.o. male who presents to the ED with the following complaint: Headache (Headache x3 hours; denies chest pain sob n/v/d fever)      (Please refer to [address] [place] [address] for pertinent ROS and PE documentation)  Labs:  No results found for this visit on 05/11/22. Imaging: All Radiology results interpreted by Radiologist unless otherwise noted. CT HEAD WO CONTRAST   Final Result   No acute intracranial abnormality. ED Course      Medications   acetaminophen (TYLENOL) tablet 650 mg (650 mg Oral Given 5/11/22 0339)        -------------------------  Disposition    Patient ELOPED from the department prior to completion of evaluation after triage. Results of triage orders that were completed at time of elopement as indicated above were reviewed.     Diagnosis at Time of Elopement: (Based on presenting complaint/available test results):   Headache    Electronically signed by AMINTA Nunez   DD: 5/11/22     AMINTA Nunez  05/11/22 1009

## 2022-05-11 NOTE — ED NOTES
Department of Emergency Medicine  FIRST PROVIDER TRIAGE NOTE             [facility]           5/11/22  2:42 AM EDT    Date of Encounter: 5/11/22   MRN: [id number]      HPI: Niki Butcher is a 60 y.o. male who presents to the ED for Headache (Headache x3 hours; denies chest pain sob n/v/d fever)  Headache. Patient has history of head injury on the ninth. ROS: Negative for cp or sob. PE: Gen Appearance/Constitutional: alert  CV: regular rate  Pulm: CTA bilat     Initial Plan of Care: All treatment areas with department are currently occupied. Plan to order/Initiate the following while awaiting opening in ED: imaging studies.   Initiate Treatment-Testing, Proceed toTreatment Area When Bed Available for ED Attending/MLP to Continue Care    Electronically signed by SAI Ingram   DD: 5/11/22       SAI Ingram  05/11/22 6660

## 2022-05-11 NOTE — ED NOTES
Department of Emergency Medicine  FIRST PROVIDER TRIAGE NOTE             Independent MLP           5/11/22  1:31 AM EDT    Date of Encounter: 5/11/22   MRN: [id number]      HPI: Eliane Arechiga is a 60 y.o. male who presents to the ED for No chief complaint on file. headache for 3 hours     ROS: Negative for cp or sob. PE: Gen Appearance/Constitutional: alert  CV: regular rate  Pulm: CTA bilat     Initial Plan of Care: All treatment areas with department are currently occupied. Plan to order/Initiate the following while awaiting opening in ED: analgesics.   Initiate Treatment-Testing, Proceed toTreatment Area When Bed Available for ED Attending/MLP to Continue Care    Electronically signed by SAI Galeano   DD: 5/11/22       SAI Galeano  05/11/22 [id number]

## 2022-05-19 ENCOUNTER — APPOINTMENT (OUTPATIENT)
Dept: CT IMAGING | Age: 57
End: 2022-05-19
Payer: COMMERCIAL

## 2022-05-19 ENCOUNTER — HOSPITAL ENCOUNTER (EMERGENCY)
Age: 57
Discharge: HOME | End: 2022-05-19
Attending: EMERGENCY MEDICINE
Payer: COMMERCIAL

## 2022-05-19 VITALS
HEIGHT: 78 IN | BODY MASS INDEX: 17.36 KG/M2 | WEIGHT: 150 LBS | OXYGEN SATURATION: 98 % | SYSTOLIC BLOOD PRESSURE: 144 MMHG | TEMPERATURE: 98 F | HEART RATE: 72 BPM | RESPIRATION RATE: 16 BRPM | DIASTOLIC BLOOD PRESSURE: 76 MMHG

## 2022-05-19 DIAGNOSIS — F10.920 ACUTE ALCOHOLIC INTOXICATION WITHOUT COMPLICATION (HCC): Primary | ICD-10-CM

## 2022-05-19 DIAGNOSIS — G89.29 CHRONIC NONINTRACTABLE HEADACHE, UNSPECIFIED HEADACHE TYPE: ICD-10-CM

## 2022-05-19 DIAGNOSIS — R51.9 CHRONIC NONINTRACTABLE HEADACHE, UNSPECIFIED HEADACHE TYPE: ICD-10-CM

## 2022-05-19 PROCEDURE — 99284 EMERGENCY DEPT VISIT MOD MDM: CPT

## 2022-05-19 PROCEDURE — 70450 CT HEAD/BRAIN W/O DYE: CPT

## 2022-05-19 RX ORDER — ACETAMINOPHEN 500 MG
1000 TABLET ORAL ONCE
Status: DISCONTINUED | OUTPATIENT
Start: 2022-05-19 | End: 2022-05-19 | Stop reason: HOSPADM

## 2022-05-19 ASSESSMENT — PAIN SCALES - GENERAL: PAINLEVEL_OUTOF10: 10

## 2022-05-19 ASSESSMENT — PAIN - FUNCTIONAL ASSESSMENT: PAIN_FUNCTIONAL_ASSESSMENT: 0-10

## 2022-05-19 ASSESSMENT — PAIN DESCRIPTION - DESCRIPTORS: DESCRIPTORS: ACHING

## 2022-05-19 ASSESSMENT — PAIN DESCRIPTION - LOCATION: LOCATION: HEAD

## 2022-05-19 NOTE — CARE COORDINATION
Social Work /Transition of Care:    Pt presented to the ED with alcohol intoxication. Per ED physician, pt is medically clear for discharge. Pt reports he will be returning to his home on [address] in [place]. Pt reports he can take the bus. Pt requested something to eat which [facility] provided. RN aware.

## 2022-05-19 NOTE — ED PROVIDER NOTES
HPI:  5/19/22,   Time: 2:07 AM EDT       Lucy Prater is a 60 y.o. male presenting to the ED for headache and alcohol intoxication, beginning 1 day ago. The complaint has been persistent, moderate in severity, and worsened by nothing. Patient states he is homeless. He states he has been drinking all day and needed a place to stay. He states he does have a mild headache but this is unchanged from his previous headaches. Denies any falls or trauma. No chest pain shortness of breath. No abdominal pain. No nausea vomiting. Review of Systems:   Pertinent positives and negatives are stated within HPI, all other systems reviewed and are negative.          --------------------------------------------- PAST HISTORY ---------------------------------------------  Past Medical History:  has a past medical history of Headache and Hypertension. Past Surgical History:  has no past surgical history on file. Social History:  reports that he has been smoking cigarettes. He has a 4.60 pack-year smoking history. He has never used smokeless tobacco. He reports current alcohol use of about 5.0 - 6.0 standard drinks of alcohol per week. He reports that he does not use drugs. Family History: family history is not on file. The patients home medications have been reviewed. Allergies: Ace inhibitors and Fish-derived products        ---------------------------------------------------PHYSICAL EXAM--------------------------------------    Constitutional/General: Alert and oriented x3, NAD  Head: Normocephalic and atraumatic  Eyes: PERRL, EOMI, conjunctive normal, sclera non icteric  Mouth: Oropharynx clear, handling secretions, no trismus, no asymmetry of the posterior oropharynx or uvular edema  Neck: Supple, full ROM, non tender to palpation in the midline, no stridor, no crepitus, no meningeal signs  Respiratory: Lungs clear to auscultation bilaterally, no wheezes, rales, or rhonchi.  Not in respiratory distress  Cardiovascular:  Regular rate. Regular rhythm. No murmurs, gallops, or rubs. 2+ distal pulses  GI:  Abdomen Soft, Non tender, Non distended. +BS. No organomegaly, no palpable masses,  No rebound, guarding, or rigidity. Musculoskeletal: Moves all extremities x 4. Warm and well perfused, no clubbing, cyanosis, or edema. Capillary refill <3 seconds  Integument: skin warm and dry. No rashes. Neurologic: GCS 15, no focal deficits, symmetric strength 5/5 in the upper and lower extremities bilaterally, sensation intact all 4 extremities  Psychiatric: Normal Affect    -------------------------------------------------- RESULTS -------------------------------------------------  I have personally reviewed all laboratory and imaging results for this patient. Results are listed below. LABS:  No results found for this visit on 05/19/22. RADIOLOGY:  Interpreted by Radiologist.  Chetan Carvajal   Final Result   Chronic appearing findings unchanged compared with 05/11/2022. MRI would be   useful if symptoms persist.      RECOMMENDATIONS:   Unavailable               ------------------------- NURSING NOTES AND VITALS REVIEWED ---------------------------   The nursing notes within the ED encounter and vital signs as below have been reviewed by myself. BP (!) 144/76   Pulse 72   Temp 98 °F (36.7 °C) (Oral)   Resp 16   Ht 6' 7\" (2.007 m)   Wt 150 lb (68 kg)   SpO2 98%   BMI 16.90 kg/m²   Oxygen Saturation Interpretation: Normal    The patients available past medical records and past encounters were reviewed. ------------------------------ ED COURSE/MEDICAL DECISION MAKING----------------------  Medications - No data to display      ED COURSE:       Medical Decision Making: This is a 68-year-old male presents to the ED for headache. Patient does admit to drinking alcohol today. Denies any falls or head trauma. Patient does admit to being homeless as well.   Patient under observation until clinically sober. CT head negative. Patient evaluated . Patient discharged home and stable condition. I, Dr. Tin Arellano, am the primary provider for this encounter    This patient's ED course included: a personal history and physicial examination, re-evaluation prior to disposition and multiple bedside re-evaluations    This patient has remained hemodynamically stable during their ED course. Re-Evaluations:             Re-evaluation. Patients symptoms are improving      Counseling: The emergency provider has spoken with the patient and discussed todays results, in addition to providing specific details for the plan of care and counseling regarding the diagnosis and prognosis. Questions are answered at this time and they are agreeable with the plan.       --------------------------------- IMPRESSION AND DISPOSITION ---------------------------------    IMPRESSION  1. Acute alcoholic intoxication without complication ([facility] [address]    2. Chronic nonintractable headache, unspecified headache type        DISPOSITION  Disposition: Discharge to home  Patient condition is stable    NOTE: This report was transcribed using voice recognition software.  Every effort was made to ensure accuracy; however, inadvertent computerized transcription errors may be present        Gonzalo Enamorado DO  05/19/22 2027

## 2022-05-23 PROCEDURE — 82077 ASSAY SPEC XCP UR&BREATH IA: CPT | Performed by: STUDENT IN AN ORGANIZED HEALTH CARE EDUCATION/TRAINING PROGRAM

## 2022-05-23 PROCEDURE — 85025 COMPLETE CBC W/AUTO DIFF WBC: CPT | Performed by: STUDENT IN AN ORGANIZED HEALTH CARE EDUCATION/TRAINING PROGRAM

## 2022-05-23 PROCEDURE — 80053 COMPREHEN METABOLIC PANEL: CPT | Performed by: STUDENT IN AN ORGANIZED HEALTH CARE EDUCATION/TRAINING PROGRAM

## 2022-05-23 PROCEDURE — 36415 COLL VENOUS BLD VENIPUNCTURE: CPT

## 2022-05-23 PROCEDURE — 99284 EMERGENCY DEPT VISIT MOD MDM: CPT

## 2022-05-24 ENCOUNTER — HOSPITAL ENCOUNTER (EMERGENCY)
Age: 57
Discharge: HOME | End: 2022-05-24
Attending: EMERGENCY MEDICINE

## 2022-05-24 ENCOUNTER — APPOINTMENT (OUTPATIENT)
Dept: CT IMAGING | Age: 57
End: 2022-05-24
Attending: STUDENT IN AN ORGANIZED HEALTH CARE EDUCATION/TRAINING PROGRAM

## 2022-05-24 VITALS
TEMPERATURE: 97.5 F | DIASTOLIC BLOOD PRESSURE: 90 MMHG | OXYGEN SATURATION: 100 % | SYSTOLIC BLOOD PRESSURE: 156 MMHG | RESPIRATION RATE: 15 BRPM | HEART RATE: 69 BPM

## 2022-05-24 DIAGNOSIS — R51.9 NONINTRACTABLE HEADACHE, UNSPECIFIED CHRONICITY PATTERN, UNSPECIFIED HEADACHE TYPE: Primary | ICD-10-CM

## 2022-05-24 DIAGNOSIS — Z86.79 HISTORY OF HYPERTENSION: ICD-10-CM

## 2022-05-24 DIAGNOSIS — F10.10 ALCOHOL ABUSE: ICD-10-CM

## 2022-05-24 LAB
ALBUMIN SERPL-MCNC: 3.2 G/DL (ref 3.6–5.1)
ALBUMIN/GLOB SERPL: 0.8 {RATIO} (ref 1–2.4)
ALP SERPL-CCNC: 59 UNITS/L (ref 45–117)
ALT SERPL-CCNC: 43 UNITS/L
ANION GAP SERPL CALC-SCNC: 15 MMOL/L (ref 7–19)
AST SERPL-CCNC: 110 UNITS/L
BASOPHILS # BLD: 0 K/MCL (ref 0–0.3)
BASOPHILS NFR BLD: 1 %
BILIRUB SERPL-MCNC: 1.3 MG/DL (ref 0.2–1)
BUN SERPL-MCNC: 12 MG/DL (ref 6–20)
BUN/CREAT SERPL: 14 (ref 7–25)
CALCIUM SERPL-MCNC: 8 MG/DL (ref 8.4–10.2)
CHLORIDE SERPL-SCNC: 106 MMOL/L (ref 97–110)
CO2 SERPL-SCNC: 25 MMOL/L (ref 21–32)
CREAT SERPL-MCNC: 0.87 MG/DL (ref 0.67–1.17)
DEPRECATED RDW RBC: 58.2 FL (ref 39–50)
EOSINOPHIL # BLD: 0.1 K/MCL (ref 0–0.5)
EOSINOPHIL NFR BLD: 2 %
ERYTHROCYTE [DISTWIDTH] IN BLOOD: 19.4 % (ref 11–15)
ETHANOL SERPL-MCNC: 224 MG/DL
FASTING DURATION TIME PATIENT: ABNORMAL H
GFR SERPLBLD BASED ON 1.73 SQ M-ARVRAT: >90 ML/MIN
GLOBULIN SER-MCNC: 4.2 G/DL (ref 2–4)
GLUCOSE SERPL-MCNC: 76 MG/DL (ref 70–99)
HCT VFR BLD CALC: 36.4 % (ref 39–51)
HGB BLD-MCNC: 13.6 G/DL (ref 13–17)
IMM GRANULOCYTES # BLD AUTO: 0 K/MCL (ref 0–0.2)
IMM GRANULOCYTES # BLD: 1 %
LYMPHOCYTES # BLD: 1.7 K/MCL (ref 1–4)
LYMPHOCYTES NFR BLD: 41 %
MCH RBC QN AUTO: 31.9 PG (ref 26–34)
MCHC RBC AUTO-ENTMCNC: 37.4 G/DL (ref 32–36.5)
MCV RBC AUTO: 85.4 FL (ref 78–100)
MONOCYTES # BLD: 0.3 K/MCL (ref 0.3–0.9)
MONOCYTES NFR BLD: 8 %
NEUTROPHILS # BLD: 2.1 K/MCL (ref 1.8–7.7)
NEUTROPHILS NFR BLD: 47 %
NRBC BLD MANUAL-RTO: 0 /100 WBC
PLATELET # BLD AUTO: 114 K/MCL (ref 140–450)
POTASSIUM SERPL-SCNC: 3.4 MMOL/L (ref 3.4–5.1)
PROT SERPL-MCNC: 7.4 G/DL (ref 6.4–8.2)
RBC # BLD: 4.26 MIL/MCL (ref 4.5–5.9)
SODIUM SERPL-SCNC: 143 MMOL/L (ref 135–145)
WBC # BLD: 4.3 K/MCL (ref 4.2–11)

## 2022-05-24 PROCEDURE — 10002803 HB RX 637: Performed by: EMERGENCY MEDICINE

## 2022-05-24 PROCEDURE — 72125 CT NECK SPINE W/O DYE: CPT

## 2022-05-24 PROCEDURE — 99284 EMERGENCY DEPT VISIT MOD MDM: CPT | Performed by: EMERGENCY MEDICINE

## 2022-05-24 PROCEDURE — G1004 CDSM NDSC: HCPCS

## 2022-05-24 PROCEDURE — 70450 CT HEAD/BRAIN W/O DYE: CPT

## 2022-05-24 RX ADMIN — IBUPROFEN 600 MG: 200 TABLET, FILM COATED ORAL at 04:43

## 2022-05-24 ASSESSMENT — PAIN SCALES - GENERAL
PAINLEVEL_OUTOF10: 5
PAINLEVEL_OUTOF10: 0

## 2022-05-27 PROBLEM — R77.8 ELEVATED TROPONIN: Status: RESOLVED | Noted: 2022-04-25 | Resolved: 2022-05-27

## 2023-06-28 ENCOUNTER — HOSPITAL ENCOUNTER (EMERGENCY)
Age: 58
Discharge: HOME | End: 2023-06-28
Attending: EMERGENCY MEDICINE

## 2023-06-28 VITALS
HEART RATE: 82 BPM | TEMPERATURE: 98 F | SYSTOLIC BLOOD PRESSURE: 169 MMHG | RESPIRATION RATE: 16 BRPM | OXYGEN SATURATION: 99 % | DIASTOLIC BLOOD PRESSURE: 92 MMHG

## 2023-06-28 DIAGNOSIS — G89.29 CHRONIC NONINTRACTABLE HEADACHE, UNSPECIFIED HEADACHE TYPE: Primary | ICD-10-CM

## 2023-06-28 DIAGNOSIS — R51.9 CHRONIC NONINTRACTABLE HEADACHE, UNSPECIFIED HEADACHE TYPE: Primary | ICD-10-CM

## 2023-06-28 PROCEDURE — 99282 EMERGENCY DEPT VISIT SF MDM: CPT

## 2023-06-28 PROCEDURE — 10002803 HB RX 637: Performed by: EMERGENCY MEDICINE

## 2023-06-28 PROCEDURE — 99283 EMERGENCY DEPT VISIT LOW MDM: CPT | Performed by: EMERGENCY MEDICINE

## 2023-06-28 RX ORDER — CARVEDILOL 6.25 MG/1
6.25 TABLET ORAL 2 TIMES DAILY WITH MEALS
Qty: 60 TABLET | Refills: 1 | Status: SHIPPED | OUTPATIENT
Start: 2023-06-28

## 2023-06-28 RX ORDER — IBUPROFEN 600 MG/1
600 TABLET ORAL EVERY 6 HOURS PRN
Qty: 20 TABLET | Refills: 0 | Status: SHIPPED | OUTPATIENT
Start: 2023-06-28

## 2023-06-28 RX ORDER — AMLODIPINE BESYLATE 10 MG/1
10 TABLET ORAL DAILY
Qty: 30 TABLET | Refills: 1 | Status: SHIPPED | OUTPATIENT
Start: 2023-06-28

## 2023-06-28 RX ORDER — LISINOPRIL 10 MG/1
10 TABLET ORAL DAILY
Qty: 30 TABLET | Refills: 11 | Status: SHIPPED | OUTPATIENT
Start: 2023-06-28 | End: 2024-06-27

## 2023-06-28 RX ADMIN — IBUPROFEN 600 MG: 400 TABLET ORAL at 02:00

## 2023-06-28 ASSESSMENT — ENCOUNTER SYMPTOMS: HEADACHES: 1

## 2023-07-15 ENCOUNTER — HOSPITAL ENCOUNTER (EMERGENCY)
Age: 58
Discharge: LEFT BEFORE DISCHARGE | End: 2023-07-15
Attending: STUDENT IN AN ORGANIZED HEALTH CARE EDUCATION/TRAINING PROGRAM

## 2023-07-15 ENCOUNTER — APPOINTMENT (OUTPATIENT)
Dept: GENERAL RADIOLOGY | Age: 58
End: 2023-07-15
Attending: STUDENT IN AN ORGANIZED HEALTH CARE EDUCATION/TRAINING PROGRAM

## 2023-07-15 ENCOUNTER — HOSPITAL ENCOUNTER (EMERGENCY)
Age: 58
Discharge: HOME | End: 2023-07-16
Attending: EMERGENCY MEDICINE

## 2023-07-15 VITALS
TEMPERATURE: 98 F | HEART RATE: 73 BPM | DIASTOLIC BLOOD PRESSURE: 89 MMHG | SYSTOLIC BLOOD PRESSURE: 165 MMHG | OXYGEN SATURATION: 98 % | RESPIRATION RATE: 15 BRPM
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
## 2023-07-15 DIAGNOSIS — F10.220 ALCOHOL INTOXICATION IN ACTIVE ALCOHOLIC WITHOUT COMPLICATION (CMD): Primary | ICD-10-CM

## 2023-07-15 DIAGNOSIS — R22.32 LOCALIZED SWELLING ON LEFT HAND: Primary | ICD-10-CM

## 2023-07-15 DIAGNOSIS — F10.929 ALCOHOLIC INTOXICATION WITH COMPLICATION (CMD): ICD-10-CM

## 2023-07-15 LAB — ETHANOL SERPL-MCNC: 135 MG/DL

## 2023-07-15 PROCEDURE — 99284 EMERGENCY DEPT VISIT MOD MDM: CPT | Performed by: EMERGENCY MEDICINE

## 2023-07-15 PROCEDURE — 36415 COLL VENOUS BLD VENIPUNCTURE: CPT

## 2023-07-15 PROCEDURE — 99284 EMERGENCY DEPT VISIT MOD MDM: CPT | Performed by: STUDENT IN AN ORGANIZED HEALTH CARE EDUCATION/TRAINING PROGRAM

## 2023-07-15 PROCEDURE — 96361 HYDRATE IV INFUSION ADD-ON: CPT

## 2023-07-15 PROCEDURE — 99283 EMERGENCY DEPT VISIT LOW MDM: CPT

## 2023-07-15 PROCEDURE — 99284 EMERGENCY DEPT VISIT MOD MDM: CPT

## 2023-07-15 PROCEDURE — 96374 THER/PROPH/DIAG INJ IV PUSH: CPT

## 2023-07-15 PROCEDURE — 82077 ASSAY SPEC XCP UR&BREATH IA: CPT | Performed by: STUDENT IN AN ORGANIZED HEALTH CARE EDUCATION/TRAINING PROGRAM

## 2023-07-15 PROCEDURE — 73130 X-RAY EXAM OF HAND: CPT

## 2023-07-15 RX ORDER — SODIUM CHLORIDE 9 MG/ML
INJECTION, SOLUTION INTRAVENOUS CONTINUOUS
Status: DISCONTINUED | OUTPATIENT
Start: 2023-07-15 | End: 2023-07-16 | Stop reason: HOSPADM

## 2023-07-15 ASSESSMENT — ENCOUNTER SYMPTOMS
CONSTITUTIONAL NEGATIVE: 1
ENDOCRINE NEGATIVE: 1
EYES NEGATIVE: 1
ALLERGIC/IMMUNOLOGIC NEGATIVE: 1
RESPIRATORY NEGATIVE: 1
NEUROLOGICAL NEGATIVE: 1
GASTROINTESTINAL NEGATIVE: 1
HEMATOLOGIC/LYMPHATIC NEGATIVE: 1
PSYCHIATRIC NEGATIVE: 1

## 2023-07-15 ASSESSMENT — PAIN SCALES - GENERAL: PAINLEVEL_OUTOF10: 0

## 2023-07-16 VITALS
DIASTOLIC BLOOD PRESSURE: 78 MMHG | HEART RATE: 80 BPM | TEMPERATURE: 98.2 F | SYSTOLIC BLOOD PRESSURE: 156 MMHG | RESPIRATION RATE: 16 BRPM | OXYGEN SATURATION: 97 %

## 2023-07-16 LAB
AMPHETAMINES UR QL SCN>500 NG/ML: NEGATIVE
ANION GAP SERPL CALC-SCNC: 15 MMOL/L (ref 7–19)
APPEARANCE UR: CLEAR
BARBITURATES UR QL SCN>200 NG/ML: NEGATIVE
BASOPHILS # BLD: 0.1 K/MCL (ref 0–0.3)
BASOPHILS NFR BLD: 2 %
BENZODIAZ UR QL SCN>200 NG/ML: NEGATIVE
BILIRUB UR QL STRIP: NEGATIVE
BUN SERPL-MCNC: 14 MG/DL (ref 6–20)
BUN/CREAT SERPL: 13 (ref 7–25)
BZE UR QL SCN>150 NG/ML: NEGATIVE
CALCIUM SERPL-MCNC: 8.2 MG/DL (ref 8.4–10.2)
CANNABINOIDS UR QL SCN>50 NG/ML: NEGATIVE
CHLORIDE SERPL-SCNC: 107 MMOL/L (ref 97–110)
CO2 SERPL-SCNC: 27 MMOL/L (ref 21–32)
COLOR UR: COLORLESS
CREAT SERPL-MCNC: 1.04 MG/DL (ref 0.67–1.17)
DEPRECATED RDW RBC: 45.6 FL (ref 39–50)
EOSINOPHIL # BLD: 0.1 K/MCL (ref 0–0.5)
EOSINOPHIL NFR BLD: 3 %
ERYTHROCYTE [DISTWIDTH] IN BLOOD: 14.5 % (ref 11–15)
ETHANOL SERPL-MCNC: 292 MG/DL
FASTING DURATION TIME PATIENT: ABNORMAL H
FENTANYL UR QL SCN: NEGATIVE
GFR SERPLBLD BASED ON 1.73 SQ M-ARVRAT: 83 ML/MIN
GLUCOSE SERPL-MCNC: 79 MG/DL (ref 70–99)
GLUCOSE UR STRIP-MCNC: NEGATIVE MG/DL
HCT VFR BLD CALC: 37.9 % (ref 39–51)
HGB BLD-MCNC: 13.9 G/DL (ref 13–17)
HGB UR QL STRIP: NEGATIVE
IMM GRANULOCYTES # BLD AUTO: 0 K/MCL (ref 0–0.2)
IMM GRANULOCYTES # BLD: 0 %
KETONES UR STRIP-MCNC: NEGATIVE MG/DL
LEUKOCYTE ESTERASE UR QL STRIP: NEGATIVE
LYMPHOCYTES # BLD: 2.4 K/MCL (ref 1–4)
LYMPHOCYTES NFR BLD: 46 %
MCH RBC QN AUTO: 31.5 PG (ref 26–34)
MCHC RBC AUTO-ENTMCNC: 36.7 G/DL (ref 32–36.5)
MCV RBC AUTO: 85.9 FL (ref 78–100)
MONOCYTES # BLD: 0.4 K/MCL (ref 0.3–0.9)
MONOCYTES NFR BLD: 9 %
NEUTROPHILS # BLD: 2.1 K/MCL (ref 1.8–7.7)
NEUTROPHILS NFR BLD: 40 %
NITRITE UR QL STRIP: NEGATIVE
NRBC BLD MANUAL-RTO: 0 /100 WBC
OPIATES UR QL SCN>300 NG/ML: NEGATIVE
PCP UR QL SCN>25 NG/ML: NEGATIVE
PH UR STRIP: 6.5 [PH] (ref 5–7)
PLATELET # BLD AUTO: 271 K/MCL (ref 140–450)
POTASSIUM SERPL-SCNC: 3.6 MMOL/L (ref 3.4–5.1)
PROT UR STRIP-MCNC: NEGATIVE MG/DL
RBC # BLD: 4.41 MIL/MCL (ref 4.5–5.9)
SODIUM SERPL-SCNC: 145 MMOL/L (ref 135–145)
SP GR UR STRIP: 1.01 (ref 1–1.03)
UROBILINOGEN UR STRIP-MCNC: 0.2 MG/DL
WBC # BLD: 5.1 K/MCL (ref 4.2–11)

## 2023-07-16 PROCEDURE — 10002800 HB RX 250 W HCPCS: Performed by: EMERGENCY MEDICINE

## 2023-07-16 PROCEDURE — 80048 BASIC METABOLIC PNL TOTAL CA: CPT | Performed by: EMERGENCY MEDICINE

## 2023-07-16 PROCEDURE — 81003 URINALYSIS AUTO W/O SCOPE: CPT | Performed by: EMERGENCY MEDICINE

## 2023-07-16 PROCEDURE — 80307 DRUG TEST PRSMV CHEM ANLYZR: CPT | Performed by: EMERGENCY MEDICINE

## 2023-07-16 PROCEDURE — 96374 THER/PROPH/DIAG INJ IV PUSH: CPT

## 2023-07-16 PROCEDURE — 10002807 HB RX 258: Performed by: EMERGENCY MEDICINE

## 2023-07-16 PROCEDURE — 10002803 HB RX 637: Performed by: EMERGENCY MEDICINE

## 2023-07-16 PROCEDURE — 82077 ASSAY SPEC XCP UR&BREATH IA: CPT | Performed by: EMERGENCY MEDICINE

## 2023-07-16 PROCEDURE — 85025 COMPLETE CBC W/AUTO DIFF WBC: CPT | Performed by: EMERGENCY MEDICINE

## 2023-07-16 RX ORDER — CARVEDILOL 12.5 MG/1
6.25 TABLET ORAL ONCE
Status: COMPLETED | OUTPATIENT
Start: 2023-07-16 | End: 2023-07-16

## 2023-07-16 RX ORDER — LISINOPRIL 10 MG/1
10 TABLET ORAL ONCE
Status: COMPLETED | OUTPATIENT
Start: 2023-07-16 | End: 2023-07-16

## 2023-07-16 RX ORDER — AMLODIPINE BESYLATE 5 MG/1
10 TABLET ORAL ONCE
Status: COMPLETED | OUTPATIENT
Start: 2023-07-16 | End: 2023-07-16

## 2023-07-16 RX ORDER — HYDRALAZINE HYDROCHLORIDE 20 MG/ML
10 INJECTION INTRAMUSCULAR; INTRAVENOUS ONCE
Status: COMPLETED | OUTPATIENT
Start: 2023-07-16 | End: 2023-07-16

## 2023-07-16 RX ADMIN — CARVEDILOL 6.25 MG: 12.5 TABLET, FILM COATED ORAL at 04:31

## 2023-07-16 RX ADMIN — AMLODIPINE BESYLATE 10 MG: 5 TABLET ORAL at 04:30

## 2023-07-16 RX ADMIN — LISINOPRIL 10 MG: 10 TABLET ORAL at 04:30

## 2023-07-16 RX ADMIN — SODIUM CHLORIDE: 9 INJECTION, SOLUTION INTRAVENOUS at 02:12

## 2023-07-16 RX ADMIN — HYDRALAZINE HYDROCHLORIDE 10 MG: 20 INJECTION INTRAMUSCULAR; INTRAVENOUS at 02:42

## 2023-07-16 ASSESSMENT — PAIN SCALES - GENERAL: PAINLEVEL_OUTOF10: 0
